# Patient Record
Sex: FEMALE | Race: WHITE | NOT HISPANIC OR LATINO | Employment: OTHER | URBAN - NONMETROPOLITAN AREA
[De-identification: names, ages, dates, MRNs, and addresses within clinical notes are randomized per-mention and may not be internally consistent; named-entity substitution may affect disease eponyms.]

---

## 2017-01-10 ENCOUNTER — OFFICE VISIT - GICH (OUTPATIENT)
Dept: FAMILY MEDICINE | Facility: OTHER | Age: 67
End: 2017-01-10

## 2017-01-10 ENCOUNTER — HISTORY (OUTPATIENT)
Dept: FAMILY MEDICINE | Facility: OTHER | Age: 67
End: 2017-01-10

## 2017-01-10 DIAGNOSIS — M25.511 PAIN IN RIGHT SHOULDER: ICD-10-CM

## 2017-01-10 DIAGNOSIS — G89.29 OTHER CHRONIC PAIN: ICD-10-CM

## 2017-01-13 ENCOUNTER — HOSPITAL ENCOUNTER (OUTPATIENT)
Dept: PHYSICAL THERAPY | Facility: OTHER | Age: 67
Setting detail: THERAPIES SERIES
End: 2017-01-13
Attending: FAMILY MEDICINE

## 2017-01-13 DIAGNOSIS — G89.29 OTHER CHRONIC PAIN: ICD-10-CM

## 2017-01-13 DIAGNOSIS — M25.511 PAIN IN RIGHT SHOULDER: ICD-10-CM

## 2017-01-16 ENCOUNTER — COMMUNICATION - GICH (OUTPATIENT)
Dept: FAMILY MEDICINE | Facility: OTHER | Age: 67
End: 2017-01-16

## 2017-01-16 DIAGNOSIS — E11.9 TYPE 2 DIABETES MELLITUS WITHOUT COMPLICATIONS (H): ICD-10-CM

## 2017-01-18 ENCOUNTER — HOSPITAL ENCOUNTER (OUTPATIENT)
Dept: PHYSICAL THERAPY | Facility: OTHER | Age: 67
Setting detail: THERAPIES SERIES
End: 2017-01-18
Attending: FAMILY MEDICINE

## 2017-01-20 ENCOUNTER — HOSPITAL ENCOUNTER (OUTPATIENT)
Dept: PHYSICAL THERAPY | Facility: OTHER | Age: 67
Setting detail: THERAPIES SERIES
End: 2017-01-20
Attending: FAMILY MEDICINE

## 2017-01-25 ENCOUNTER — HOSPITAL ENCOUNTER (OUTPATIENT)
Dept: PHYSICAL THERAPY | Facility: OTHER | Age: 67
Setting detail: THERAPIES SERIES
End: 2017-01-25
Attending: FAMILY MEDICINE

## 2017-01-27 ENCOUNTER — HOSPITAL ENCOUNTER (OUTPATIENT)
Dept: PHYSICAL THERAPY | Facility: OTHER | Age: 67
Setting detail: THERAPIES SERIES
End: 2017-01-27
Attending: FAMILY MEDICINE

## 2017-01-31 ENCOUNTER — HOSPITAL ENCOUNTER (OUTPATIENT)
Dept: PHYSICAL THERAPY | Facility: OTHER | Age: 67
Setting detail: THERAPIES SERIES
End: 2017-01-31
Attending: FAMILY MEDICINE

## 2017-02-03 ENCOUNTER — HOSPITAL ENCOUNTER (OUTPATIENT)
Dept: RADIOLOGY | Facility: OTHER | Age: 67
Setting detail: THERAPIES SERIES
End: 2017-02-03
Attending: FAMILY MEDICINE

## 2017-02-03 ENCOUNTER — HISTORY (OUTPATIENT)
Dept: RADIOLOGY | Facility: OTHER | Age: 67
End: 2017-02-03

## 2017-02-03 ENCOUNTER — AMBULATORY - GICH (OUTPATIENT)
Dept: FAMILY MEDICINE | Facility: OTHER | Age: 67
End: 2017-02-03

## 2017-02-03 DIAGNOSIS — Z12.31 ENCOUNTER FOR SCREENING MAMMOGRAM FOR MALIGNANT NEOPLASM OF BREAST: ICD-10-CM

## 2017-02-08 ENCOUNTER — HOSPITAL ENCOUNTER (OUTPATIENT)
Dept: PHYSICAL THERAPY | Facility: OTHER | Age: 67
Setting detail: THERAPIES SERIES
End: 2017-02-08
Attending: FAMILY MEDICINE

## 2017-02-10 ENCOUNTER — HISTORY (OUTPATIENT)
Dept: FAMILY MEDICINE | Facility: OTHER | Age: 67
End: 2017-02-10

## 2017-02-10 ENCOUNTER — OFFICE VISIT - GICH (OUTPATIENT)
Dept: FAMILY MEDICINE | Facility: OTHER | Age: 67
End: 2017-02-10

## 2017-02-10 DIAGNOSIS — M25.511 PAIN IN RIGHT SHOULDER: ICD-10-CM

## 2017-02-10 DIAGNOSIS — E11.9 TYPE 2 DIABETES MELLITUS WITHOUT COMPLICATIONS (H): ICD-10-CM

## 2017-02-10 DIAGNOSIS — G89.29 OTHER CHRONIC PAIN: ICD-10-CM

## 2017-02-13 ENCOUNTER — COMMUNICATION - GICH (OUTPATIENT)
Dept: FAMILY MEDICINE | Facility: OTHER | Age: 67
End: 2017-02-13

## 2017-02-13 DIAGNOSIS — E11.22 TYPE 2 DIABETES MELLITUS WITH DIABETIC CHRONIC KIDNEY DISEASE (H): ICD-10-CM

## 2017-02-13 DIAGNOSIS — N18.30 CHRONIC KIDNEY DISEASE, STAGE III (MODERATE) (H): ICD-10-CM

## 2017-02-13 DIAGNOSIS — Z79.4 LONG TERM CURRENT USE OF INSULIN (H): ICD-10-CM

## 2017-02-14 ENCOUNTER — COMMUNICATION - GICH (OUTPATIENT)
Dept: FAMILY MEDICINE | Facility: OTHER | Age: 67
End: 2017-02-14

## 2017-02-14 DIAGNOSIS — G89.29 OTHER CHRONIC PAIN: ICD-10-CM

## 2017-02-14 DIAGNOSIS — M25.511 PAIN IN RIGHT SHOULDER: ICD-10-CM

## 2017-02-25 ENCOUNTER — COMMUNICATION - GICH (OUTPATIENT)
Dept: FAMILY MEDICINE | Facility: OTHER | Age: 67
End: 2017-02-25

## 2017-02-25 DIAGNOSIS — E11.9 TYPE 2 DIABETES MELLITUS WITHOUT COMPLICATIONS (H): ICD-10-CM

## 2017-03-01 ENCOUNTER — AMBULATORY - GICH (OUTPATIENT)
Dept: ORTHOPEDICS | Facility: OTHER | Age: 67
End: 2017-03-01

## 2017-03-01 DIAGNOSIS — M25.511 PAIN IN RIGHT SHOULDER: ICD-10-CM

## 2017-03-01 DIAGNOSIS — G89.29 OTHER CHRONIC PAIN: ICD-10-CM

## 2017-03-04 ENCOUNTER — COMMUNICATION - GICH (OUTPATIENT)
Dept: FAMILY MEDICINE | Facility: OTHER | Age: 67
End: 2017-03-04

## 2017-03-04 DIAGNOSIS — K21.9 GASTRO-ESOPHAGEAL REFLUX DISEASE WITHOUT ESOPHAGITIS: ICD-10-CM

## 2017-03-04 DIAGNOSIS — I10 ESSENTIAL (PRIMARY) HYPERTENSION: ICD-10-CM

## 2017-03-06 ENCOUNTER — AMBULATORY - GICH (OUTPATIENT)
Dept: LAB | Facility: OTHER | Age: 67
End: 2017-03-06

## 2017-03-06 DIAGNOSIS — Z79.4 LONG TERM CURRENT USE OF INSULIN (H): ICD-10-CM

## 2017-03-06 DIAGNOSIS — N18.30 CHRONIC KIDNEY DISEASE, STAGE III (MODERATE) (H): ICD-10-CM

## 2017-03-06 DIAGNOSIS — E11.22 TYPE 2 DIABETES MELLITUS WITH DIABETIC CHRONIC KIDNEY DISEASE (H): ICD-10-CM

## 2017-03-06 LAB
ESTIMATED AVERAGE GLUCOSE: 183 MG/DL
HEMOGLOBIN A1C MONITORING (POCT) - HISTORICAL: 8 % (ref 4–6.2)

## 2017-03-08 ENCOUNTER — OFFICE VISIT - GICH (OUTPATIENT)
Dept: FAMILY MEDICINE | Facility: OTHER | Age: 67
End: 2017-03-08

## 2017-03-08 DIAGNOSIS — K21.9 GASTRO-ESOPHAGEAL REFLUX DISEASE WITHOUT ESOPHAGITIS: ICD-10-CM

## 2017-03-08 DIAGNOSIS — I10 ESSENTIAL (PRIMARY) HYPERTENSION: ICD-10-CM

## 2017-03-08 DIAGNOSIS — E11.9 TYPE 2 DIABETES MELLITUS WITHOUT COMPLICATIONS (H): ICD-10-CM

## 2017-03-10 ENCOUNTER — HISTORY (OUTPATIENT)
Dept: ORTHOPEDICS | Facility: OTHER | Age: 67
End: 2017-03-10

## 2017-03-10 ENCOUNTER — HOSPITAL ENCOUNTER (OUTPATIENT)
Dept: RADIOLOGY | Facility: OTHER | Age: 67
End: 2017-03-10
Attending: ORTHOPAEDIC SURGERY

## 2017-03-10 ENCOUNTER — AMBULATORY - GICH (OUTPATIENT)
Dept: SCHEDULING | Facility: OTHER | Age: 67
End: 2017-03-10

## 2017-03-10 ENCOUNTER — OFFICE VISIT - GICH (OUTPATIENT)
Dept: ORTHOPEDICS | Facility: OTHER | Age: 67
End: 2017-03-10

## 2017-03-10 DIAGNOSIS — G89.29 OTHER CHRONIC PAIN: ICD-10-CM

## 2017-03-10 DIAGNOSIS — M75.81 OTHER SHOULDER LESIONS, RIGHT SHOULDER: ICD-10-CM

## 2017-03-10 DIAGNOSIS — M25.511 PAIN IN RIGHT SHOULDER: ICD-10-CM

## 2017-03-10 DIAGNOSIS — M19.019 PRIMARY OSTEOARTHRITIS OF SHOULDER: ICD-10-CM

## 2017-03-10 DIAGNOSIS — M75.41 IMPINGEMENT SYNDROME OF RIGHT SHOULDER: ICD-10-CM

## 2017-03-13 ENCOUNTER — AMBULATORY - GICH (OUTPATIENT)
Dept: SCHEDULING | Facility: OTHER | Age: 67
End: 2017-03-13

## 2017-03-17 ENCOUNTER — OFFICE VISIT - GICH (OUTPATIENT)
Dept: UROLOGY | Facility: OTHER | Age: 67
End: 2017-03-17

## 2017-03-17 ENCOUNTER — HOSPITAL ENCOUNTER (OUTPATIENT)
Dept: RADIOLOGY | Facility: OTHER | Age: 67
End: 2017-03-17
Attending: UROLOGY

## 2017-03-17 ENCOUNTER — HISTORY (OUTPATIENT)
Dept: UROLOGY | Facility: OTHER | Age: 67
End: 2017-03-17

## 2017-03-17 DIAGNOSIS — N39.41 URGE INCONTINENCE: ICD-10-CM

## 2017-03-18 ENCOUNTER — COMMUNICATION - GICH (OUTPATIENT)
Dept: FAMILY MEDICINE | Facility: OTHER | Age: 67
End: 2017-03-18

## 2017-03-18 DIAGNOSIS — E11.9 TYPE 2 DIABETES MELLITUS WITHOUT COMPLICATIONS (H): ICD-10-CM

## 2017-03-22 ENCOUNTER — AMBULATORY - GICH (OUTPATIENT)
Dept: RADIOLOGY | Facility: OTHER | Age: 67
End: 2017-03-22

## 2017-03-24 ENCOUNTER — HOSPITAL ENCOUNTER (OUTPATIENT)
Dept: RADIOLOGY | Facility: OTHER | Age: 67
End: 2017-03-24
Attending: ORTHOPAEDIC SURGERY

## 2017-03-24 DIAGNOSIS — M75.41 IMPINGEMENT SYNDROME OF RIGHT SHOULDER: ICD-10-CM

## 2017-03-24 DIAGNOSIS — M75.81 OTHER SHOULDER LESIONS, RIGHT SHOULDER: ICD-10-CM

## 2017-03-24 DIAGNOSIS — M19.019 PRIMARY OSTEOARTHRITIS OF SHOULDER: ICD-10-CM

## 2017-03-31 ENCOUNTER — OFFICE VISIT - GICH (OUTPATIENT)
Dept: ORTHOPEDICS | Facility: OTHER | Age: 67
End: 2017-03-31

## 2017-03-31 ENCOUNTER — HISTORY (OUTPATIENT)
Dept: ORTHOPEDICS | Facility: OTHER | Age: 67
End: 2017-03-31

## 2017-03-31 DIAGNOSIS — M19.019 PRIMARY OSTEOARTHRITIS OF SHOULDER: ICD-10-CM

## 2017-03-31 DIAGNOSIS — M75.81 OTHER SHOULDER LESIONS, RIGHT SHOULDER: ICD-10-CM

## 2017-03-31 DIAGNOSIS — M75.41 IMPINGEMENT SYNDROME OF RIGHT SHOULDER: ICD-10-CM

## 2017-04-07 ENCOUNTER — HISTORY (OUTPATIENT)
Dept: FAMILY MEDICINE | Facility: OTHER | Age: 67
End: 2017-04-07

## 2017-04-07 ENCOUNTER — AMBULATORY - GICH (OUTPATIENT)
Dept: FAMILY MEDICINE | Facility: OTHER | Age: 67
End: 2017-04-07

## 2017-04-07 DIAGNOSIS — Z79.4 LONG TERM CURRENT USE OF INSULIN (H): ICD-10-CM

## 2017-04-07 DIAGNOSIS — Z01.818 ENCOUNTER FOR OTHER PREPROCEDURAL EXAMINATION: ICD-10-CM

## 2017-04-07 DIAGNOSIS — N18.30 CHRONIC KIDNEY DISEASE, STAGE III (MODERATE) (H): ICD-10-CM

## 2017-04-07 DIAGNOSIS — I25.118 ATHEROSCLEROTIC HEART DISEASE OF NATIVE CORONARY ARTERY WITH OTHER FORMS OF ANGINA PECTORIS (H): ICD-10-CM

## 2017-04-07 DIAGNOSIS — E11.22 TYPE 2 DIABETES MELLITUS WITH DIABETIC CHRONIC KIDNEY DISEASE (H): ICD-10-CM

## 2017-04-07 DIAGNOSIS — I10 ESSENTIAL (PRIMARY) HYPERTENSION: ICD-10-CM

## 2017-04-07 LAB
ANION GAP - HISTORICAL: 6 (ref 5–18)
BUN SERPL-MCNC: 25 MG/DL (ref 7–25)
BUN/CREAT RATIO - HISTORICAL: 11
CALCIUM SERPL-MCNC: 9.5 MG/DL (ref 8.6–10.3)
CHLORIDE SERPLBLD-SCNC: 107 MMOL/L (ref 98–107)
CO2 SERPL-SCNC: 28 MMOL/L (ref 21–31)
CREAT SERPL-MCNC: 2.38 MG/DL (ref 0.7–1.3)
GFR IF NOT AFRICAN AMERICAN - HISTORICAL: 20 ML/MIN/1.73M2
GLUCOSE SERPL-MCNC: 77 MG/DL (ref 70–105)
HEMOGLOBIN: 12.1 G/DL (ref 12–16)
MCV RBC AUTO: 89 FL (ref 80–100)
POTASSIUM SERPL-SCNC: 3 MMOL/L (ref 3.5–5.1)
SODIUM SERPL-SCNC: 141 MMOL/L (ref 133–143)

## 2017-04-10 ENCOUNTER — COMMUNICATION - GICH (OUTPATIENT)
Dept: FAMILY MEDICINE | Facility: OTHER | Age: 67
End: 2017-04-10

## 2017-04-10 DIAGNOSIS — E11.9 TYPE 2 DIABETES MELLITUS WITHOUT COMPLICATIONS (H): ICD-10-CM

## 2017-04-10 DIAGNOSIS — N18.30 CHRONIC KIDNEY DISEASE, STAGE III (MODERATE) (H): ICD-10-CM

## 2017-04-11 ENCOUNTER — HISTORY (OUTPATIENT)
Dept: SURGERY | Facility: OTHER | Age: 67
End: 2017-04-11

## 2017-04-14 ENCOUNTER — AMBULATORY - GICH (OUTPATIENT)
Dept: LAB | Facility: OTHER | Age: 67
End: 2017-04-14

## 2017-04-14 DIAGNOSIS — N18.30 CHRONIC KIDNEY DISEASE, STAGE III (MODERATE) (H): ICD-10-CM

## 2017-04-14 LAB
ANION GAP - HISTORICAL: 10 (ref 5–18)
BUN SERPL-MCNC: 18 MG/DL (ref 7–25)
BUN/CREAT RATIO - HISTORICAL: 8
CALCIUM SERPL-MCNC: 9.3 MG/DL (ref 8.6–10.3)
CHLORIDE SERPLBLD-SCNC: 111 MMOL/L (ref 98–107)
CO2 SERPL-SCNC: 25 MMOL/L (ref 21–31)
CREAT SERPL-MCNC: 2.36 MG/DL (ref 0.7–1.3)
GFR IF NOT AFRICAN AMERICAN - HISTORICAL: 20 ML/MIN/1.73M2
GLUCOSE SERPL-MCNC: 64 MG/DL (ref 70–105)
POTASSIUM SERPL-SCNC: 3.2 MMOL/L (ref 3.5–5.1)
SODIUM SERPL-SCNC: 146 MMOL/L (ref 133–143)

## 2017-04-19 ENCOUNTER — HISTORY (OUTPATIENT)
Dept: SURGERY | Facility: OTHER | Age: 67
End: 2017-04-19

## 2017-04-19 ENCOUNTER — SURGERY (OUTPATIENT)
Dept: SURGERY | Facility: OTHER | Age: 67
End: 2017-04-19

## 2017-04-19 ENCOUNTER — HOSPITAL ENCOUNTER (OUTPATIENT)
Dept: SURGERY | Facility: OTHER | Age: 67
Discharge: HOME | End: 2017-04-19
Attending: ORTHOPAEDIC SURGERY | Admitting: ORTHOPAEDIC SURGERY

## 2017-04-19 DIAGNOSIS — Z98.890 OTHER SPECIFIED POSTPROCEDURAL STATES: ICD-10-CM

## 2017-04-20 ENCOUNTER — AMBULATORY - GICH (OUTPATIENT)
Dept: ORTHOPEDICS | Facility: OTHER | Age: 67
End: 2017-04-20

## 2017-04-20 DIAGNOSIS — Z98.890 OTHER SPECIFIED POSTPROCEDURAL STATES: ICD-10-CM

## 2017-04-24 ENCOUNTER — COMMUNICATION - GICH (OUTPATIENT)
Dept: ORTHOPEDICS | Facility: OTHER | Age: 67
End: 2017-04-24

## 2017-04-24 ENCOUNTER — HISTORY (OUTPATIENT)
Dept: ORTHOPEDICS | Facility: OTHER | Age: 67
End: 2017-04-24

## 2017-04-24 ENCOUNTER — OFFICE VISIT - GICH (OUTPATIENT)
Dept: ORTHOPEDICS | Facility: OTHER | Age: 67
End: 2017-04-24

## 2017-04-24 DIAGNOSIS — Z98.890 OTHER SPECIFIED POSTPROCEDURAL STATES: ICD-10-CM

## 2017-04-28 ENCOUNTER — HOSPITAL ENCOUNTER (OUTPATIENT)
Dept: PHYSICAL THERAPY | Facility: OTHER | Age: 67
Setting detail: THERAPIES SERIES
End: 2017-04-28
Attending: ORTHOPAEDIC SURGERY

## 2017-04-28 DIAGNOSIS — Z98.890 OTHER SPECIFIED POSTPROCEDURAL STATES: ICD-10-CM

## 2017-05-08 ENCOUNTER — COMMUNICATION - GICH (OUTPATIENT)
Dept: FAMILY MEDICINE | Facility: OTHER | Age: 67
End: 2017-05-08

## 2017-05-08 DIAGNOSIS — N18.30 CHRONIC KIDNEY DISEASE, STAGE III (MODERATE) (H): ICD-10-CM

## 2017-05-08 DIAGNOSIS — E11.22 TYPE 2 DIABETES MELLITUS WITH DIABETIC CHRONIC KIDNEY DISEASE (H): ICD-10-CM

## 2017-05-08 DIAGNOSIS — Z79.4 LONG TERM CURRENT USE OF INSULIN (H): ICD-10-CM

## 2017-05-17 ENCOUNTER — HOSPITAL ENCOUNTER (OUTPATIENT)
Dept: PHYSICAL THERAPY | Facility: OTHER | Age: 67
Setting detail: THERAPIES SERIES
End: 2017-05-17
Attending: ORTHOPAEDIC SURGERY

## 2017-05-22 ENCOUNTER — HOSPITAL ENCOUNTER (OUTPATIENT)
Dept: PHYSICAL THERAPY | Facility: OTHER | Age: 67
Setting detail: THERAPIES SERIES
End: 2017-05-22
Attending: ORTHOPAEDIC SURGERY

## 2017-05-25 ENCOUNTER — AMBULATORY - GICH (OUTPATIENT)
Dept: SCHEDULING | Facility: OTHER | Age: 67
End: 2017-05-25

## 2017-05-25 ENCOUNTER — HOSPITAL ENCOUNTER (OUTPATIENT)
Dept: PHYSICAL THERAPY | Facility: OTHER | Age: 67
Setting detail: THERAPIES SERIES
End: 2017-05-25
Attending: ORTHOPAEDIC SURGERY

## 2017-05-30 ENCOUNTER — HOSPITAL ENCOUNTER (OUTPATIENT)
Dept: PHYSICAL THERAPY | Facility: OTHER | Age: 67
Setting detail: THERAPIES SERIES
End: 2017-05-30
Attending: ORTHOPAEDIC SURGERY

## 2017-06-02 ENCOUNTER — HOSPITAL ENCOUNTER (OUTPATIENT)
Dept: PHYSICAL THERAPY | Facility: OTHER | Age: 67
Setting detail: THERAPIES SERIES
End: 2017-06-02
Attending: ORTHOPAEDIC SURGERY

## 2017-06-02 ENCOUNTER — AMBULATORY - GICH (OUTPATIENT)
Dept: LAB | Facility: OTHER | Age: 67
End: 2017-06-02

## 2017-06-02 DIAGNOSIS — Z79.4 LONG TERM CURRENT USE OF INSULIN (H): ICD-10-CM

## 2017-06-02 DIAGNOSIS — E11.22 TYPE 2 DIABETES MELLITUS WITH DIABETIC CHRONIC KIDNEY DISEASE (H): ICD-10-CM

## 2017-06-02 DIAGNOSIS — N17.9 ACUTE KIDNEY FAILURE (H): ICD-10-CM

## 2017-06-02 DIAGNOSIS — N18.30 CHRONIC KIDNEY DISEASE, STAGE III (MODERATE) (H): ICD-10-CM

## 2017-06-02 LAB
ALBUMIN SERPL-MCNC: 3.7 G/DL (ref 3.5–5.7)
ANION GAP - HISTORICAL: 4 (ref 5–18)
BUN SERPL-MCNC: 21 MG/DL (ref 7–25)
BUN/CREAT RATIO - HISTORICAL: 9
CALCIUM SERPL-MCNC: 9.3 MG/DL (ref 8.6–10.3)
CHLORIDE SERPLBLD-SCNC: 111 MMOL/L (ref 98–107)
CO2 SERPL-SCNC: 24 MMOL/L (ref 21–31)
CREAT SERPL-MCNC: 2.23 MG/DL (ref 0.7–1.3)
ESTIMATED AVERAGE GLUCOSE: 111 MG/DL
GFR IF NOT AFRICAN AMERICAN - HISTORICAL: 22 ML/MIN/1.73M2
GLUCOSE SERPL-MCNC: 126 MG/DL (ref 70–105)
HEMOGLOBIN A1C MONITORING (POCT) - HISTORICAL: 5.5 % (ref 4–6.2)
PHOSPHATE SERPL-MCNC: 3 MG/DL (ref 2.5–5)
POTASSIUM SERPL-SCNC: 3.5 MMOL/L (ref 3.5–5.1)
SODIUM SERPL-SCNC: 139 MMOL/L (ref 133–143)

## 2017-06-05 ENCOUNTER — OFFICE VISIT - GICH (OUTPATIENT)
Dept: ORTHOPEDICS | Facility: OTHER | Age: 67
End: 2017-06-05

## 2017-06-05 ENCOUNTER — HISTORY (OUTPATIENT)
Dept: FAMILY MEDICINE | Facility: OTHER | Age: 67
End: 2017-06-05

## 2017-06-05 ENCOUNTER — OFFICE VISIT - GICH (OUTPATIENT)
Dept: FAMILY MEDICINE | Facility: OTHER | Age: 67
End: 2017-06-05

## 2017-06-05 ENCOUNTER — HISTORY (OUTPATIENT)
Dept: ORTHOPEDICS | Facility: OTHER | Age: 67
End: 2017-06-05

## 2017-06-05 DIAGNOSIS — I10 ESSENTIAL (PRIMARY) HYPERTENSION: ICD-10-CM

## 2017-06-05 DIAGNOSIS — Z98.890 OTHER SPECIFIED POSTPROCEDURAL STATES: ICD-10-CM

## 2017-06-05 DIAGNOSIS — E11.9 TYPE 2 DIABETES MELLITUS WITHOUT COMPLICATIONS (H): ICD-10-CM

## 2017-06-06 ENCOUNTER — HOSPITAL ENCOUNTER (OUTPATIENT)
Dept: PHYSICAL THERAPY | Facility: OTHER | Age: 67
Setting detail: THERAPIES SERIES
End: 2017-06-06
Attending: ORTHOPAEDIC SURGERY

## 2017-06-08 ENCOUNTER — HOSPITAL ENCOUNTER (OUTPATIENT)
Dept: PHYSICAL THERAPY | Facility: OTHER | Age: 67
Setting detail: THERAPIES SERIES
End: 2017-06-08
Attending: ORTHOPAEDIC SURGERY

## 2017-06-12 ENCOUNTER — HOSPITAL ENCOUNTER (OUTPATIENT)
Dept: PHYSICAL THERAPY | Facility: OTHER | Age: 67
Setting detail: THERAPIES SERIES
End: 2017-06-12
Attending: ORTHOPAEDIC SURGERY

## 2017-06-15 ENCOUNTER — HOSPITAL ENCOUNTER (OUTPATIENT)
Dept: PHYSICAL THERAPY | Facility: OTHER | Age: 67
Setting detail: THERAPIES SERIES
End: 2017-06-15
Attending: ORTHOPAEDIC SURGERY

## 2017-06-19 ENCOUNTER — HOSPITAL ENCOUNTER (OUTPATIENT)
Dept: PHYSICAL THERAPY | Facility: OTHER | Age: 67
Setting detail: THERAPIES SERIES
End: 2017-06-19
Attending: ORTHOPAEDIC SURGERY

## 2017-06-21 ENCOUNTER — COMMUNICATION - GICH (OUTPATIENT)
Dept: FAMILY MEDICINE | Facility: OTHER | Age: 67
End: 2017-06-21

## 2017-06-21 DIAGNOSIS — E11.9 TYPE 2 DIABETES MELLITUS WITHOUT COMPLICATIONS (H): ICD-10-CM

## 2017-06-22 ENCOUNTER — HOSPITAL ENCOUNTER (OUTPATIENT)
Dept: PHYSICAL THERAPY | Facility: OTHER | Age: 67
Setting detail: THERAPIES SERIES
End: 2017-06-22
Attending: ORTHOPAEDIC SURGERY

## 2017-06-26 ENCOUNTER — HOSPITAL ENCOUNTER (OUTPATIENT)
Dept: PHYSICAL THERAPY | Facility: OTHER | Age: 67
Setting detail: THERAPIES SERIES
End: 2017-06-26
Attending: ORTHOPAEDIC SURGERY

## 2017-06-28 ENCOUNTER — HOSPITAL ENCOUNTER (OUTPATIENT)
Dept: PHYSICAL THERAPY | Facility: OTHER | Age: 67
Setting detail: THERAPIES SERIES
End: 2017-06-28
Attending: ORTHOPAEDIC SURGERY

## 2017-07-05 ENCOUNTER — HOSPITAL ENCOUNTER (OUTPATIENT)
Dept: PHYSICAL THERAPY | Facility: OTHER | Age: 67
Setting detail: THERAPIES SERIES
End: 2017-07-05
Attending: ORTHOPAEDIC SURGERY

## 2017-07-07 ENCOUNTER — HOSPITAL ENCOUNTER (OUTPATIENT)
Dept: PHYSICAL THERAPY | Facility: OTHER | Age: 67
Setting detail: THERAPIES SERIES
End: 2017-07-07
Attending: ORTHOPAEDIC SURGERY

## 2017-07-12 ENCOUNTER — HOSPITAL ENCOUNTER (OUTPATIENT)
Dept: PHYSICAL THERAPY | Facility: OTHER | Age: 67
Setting detail: THERAPIES SERIES
End: 2017-07-12
Attending: ORTHOPAEDIC SURGERY

## 2017-07-14 ENCOUNTER — HOSPITAL ENCOUNTER (OUTPATIENT)
Dept: PHYSICAL THERAPY | Facility: OTHER | Age: 67
Setting detail: THERAPIES SERIES
End: 2017-07-14
Attending: ORTHOPAEDIC SURGERY

## 2017-07-18 ENCOUNTER — HISTORY (OUTPATIENT)
Dept: UROLOGY | Facility: OTHER | Age: 67
End: 2017-07-18

## 2017-07-18 ENCOUNTER — AMBULATORY - GICH (OUTPATIENT)
Dept: UROLOGY | Facility: OTHER | Age: 67
End: 2017-07-18

## 2017-07-19 ENCOUNTER — HOSPITAL ENCOUNTER (OUTPATIENT)
Dept: PHYSICAL THERAPY | Facility: OTHER | Age: 67
Setting detail: THERAPIES SERIES
End: 2017-07-19
Attending: ORTHOPAEDIC SURGERY

## 2017-07-28 ENCOUNTER — HISTORY (OUTPATIENT)
Dept: ORTHOPEDICS | Facility: OTHER | Age: 67
End: 2017-07-28

## 2017-07-28 ENCOUNTER — OFFICE VISIT - GICH (OUTPATIENT)
Dept: ORTHOPEDICS | Facility: OTHER | Age: 67
End: 2017-07-28

## 2017-07-28 DIAGNOSIS — Z98.890 OTHER SPECIFIED POSTPROCEDURAL STATES: ICD-10-CM

## 2017-08-07 ENCOUNTER — COMMUNICATION - GICH (OUTPATIENT)
Dept: FAMILY MEDICINE | Facility: OTHER | Age: 67
End: 2017-08-07

## 2017-08-07 DIAGNOSIS — E11.9 TYPE 2 DIABETES MELLITUS WITHOUT COMPLICATIONS (H): ICD-10-CM

## 2017-08-08 ENCOUNTER — HISTORY (OUTPATIENT)
Dept: UROLOGY | Facility: OTHER | Age: 67
End: 2017-08-08

## 2017-08-08 ENCOUNTER — AMBULATORY - GICH (OUTPATIENT)
Dept: UROLOGY | Facility: OTHER | Age: 67
End: 2017-08-08

## 2017-08-15 ENCOUNTER — AMBULATORY - GICH (OUTPATIENT)
Dept: SCHEDULING | Facility: OTHER | Age: 67
End: 2017-08-15

## 2017-08-30 ENCOUNTER — AMBULATORY - GICH (OUTPATIENT)
Dept: ORTHOPEDICS | Facility: OTHER | Age: 67
End: 2017-08-30

## 2017-08-30 DIAGNOSIS — Z98.890 OTHER SPECIFIED POSTPROCEDURAL STATES: ICD-10-CM

## 2017-09-08 ENCOUNTER — HISTORY (OUTPATIENT)
Dept: ORTHOPEDICS | Facility: OTHER | Age: 67
End: 2017-09-08

## 2017-09-08 ENCOUNTER — HOSPITAL ENCOUNTER (OUTPATIENT)
Dept: RADIOLOGY | Facility: OTHER | Age: 67
End: 2017-09-08
Attending: ORTHOPAEDIC SURGERY

## 2017-09-08 ENCOUNTER — OFFICE VISIT - GICH (OUTPATIENT)
Dept: ORTHOPEDICS | Facility: OTHER | Age: 67
End: 2017-09-08

## 2017-09-08 DIAGNOSIS — Z98.890 OTHER SPECIFIED POSTPROCEDURAL STATES: ICD-10-CM

## 2017-09-11 ENCOUNTER — AMBULATORY - GICH (OUTPATIENT)
Dept: SCHEDULING | Facility: OTHER | Age: 67
End: 2017-09-11

## 2017-09-23 ENCOUNTER — COMMUNICATION - GICH (OUTPATIENT)
Dept: FAMILY MEDICINE | Facility: OTHER | Age: 67
End: 2017-09-23

## 2017-09-23 DIAGNOSIS — I10 ESSENTIAL (PRIMARY) HYPERTENSION: ICD-10-CM

## 2017-10-10 ENCOUNTER — AMBULATORY - GICH (OUTPATIENT)
Dept: UROLOGY | Facility: OTHER | Age: 67
End: 2017-10-10

## 2017-11-03 ENCOUNTER — AMBULATORY - GICH (OUTPATIENT)
Dept: SCHEDULING | Facility: OTHER | Age: 67
End: 2017-11-03

## 2017-11-07 ENCOUNTER — COMMUNICATION - GICH (OUTPATIENT)
Dept: FAMILY MEDICINE | Facility: OTHER | Age: 67
End: 2017-11-07

## 2017-11-07 DIAGNOSIS — E11.9 TYPE 2 DIABETES MELLITUS WITHOUT COMPLICATIONS (H): ICD-10-CM

## 2017-11-14 ENCOUNTER — COMMUNICATION - GICH (OUTPATIENT)
Dept: FAMILY MEDICINE | Facility: OTHER | Age: 67
End: 2017-11-14

## 2017-11-14 ENCOUNTER — AMBULATORY - GICH (OUTPATIENT)
Dept: UROLOGY | Facility: OTHER | Age: 67
End: 2017-11-14

## 2017-11-14 ENCOUNTER — HISTORY (OUTPATIENT)
Dept: UROLOGY | Facility: OTHER | Age: 67
End: 2017-11-14

## 2017-11-14 DIAGNOSIS — E11.22 TYPE 2 DIABETES MELLITUS WITH DIABETIC CHRONIC KIDNEY DISEASE (H): ICD-10-CM

## 2017-11-14 DIAGNOSIS — N18.30 CHRONIC KIDNEY DISEASE, STAGE III (MODERATE) (H): ICD-10-CM

## 2017-11-14 DIAGNOSIS — Z79.4 LONG TERM CURRENT USE OF INSULIN (H): ICD-10-CM

## 2017-12-07 ENCOUNTER — COMMUNICATION - GICH (OUTPATIENT)
Dept: FAMILY MEDICINE | Facility: OTHER | Age: 67
End: 2017-12-07

## 2017-12-07 DIAGNOSIS — I10 ESSENTIAL (PRIMARY) HYPERTENSION: ICD-10-CM

## 2017-12-11 ENCOUNTER — AMBULATORY - GICH (OUTPATIENT)
Dept: LAB | Facility: OTHER | Age: 67
End: 2017-12-11

## 2017-12-11 DIAGNOSIS — E11.22 TYPE 2 DIABETES MELLITUS WITH DIABETIC CHRONIC KIDNEY DISEASE (H): ICD-10-CM

## 2017-12-11 DIAGNOSIS — N18.30 CHRONIC KIDNEY DISEASE, STAGE III (MODERATE) (H): ICD-10-CM

## 2017-12-11 DIAGNOSIS — Z79.4 LONG TERM CURRENT USE OF INSULIN (H): ICD-10-CM

## 2017-12-11 LAB
A/G RATIO - HISTORICAL: 1.5 (ref 1–2)
ALBUMIN SERPL-MCNC: 3.7 G/DL (ref 3.5–5.7)
ALP SERPL-CCNC: 66 IU/L (ref 34–104)
ALT (SGPT) - HISTORICAL: 16 IU/L (ref 7–52)
ANION GAP - HISTORICAL: 10 (ref 5–18)
AST SERPL-CCNC: 16 IU/L (ref 13–39)
BILIRUB SERPL-MCNC: 0.5 MG/DL (ref 0.3–1)
BUN SERPL-MCNC: 41 MG/DL (ref 7–25)
BUN/CREAT RATIO - HISTORICAL: 18
CALCIUM SERPL-MCNC: 8.7 MG/DL (ref 8.6–10.3)
CHLORIDE SERPLBLD-SCNC: 107 MMOL/L (ref 98–107)
CHOL/HDL RATIO - HISTORICAL: 3.87
CHOLESTEROL TOTAL: 151 MG/DL
CO2 SERPL-SCNC: 24 MMOL/L (ref 21–31)
CREAT SERPL-MCNC: 2.25 MG/DL (ref 0.7–1.3)
ESTIMATED AVERAGE GLUCOSE: 137 MG/DL
GFR IF NOT AFRICAN AMERICAN - HISTORICAL: 22 ML/MIN/1.73M2
GLOBULIN - HISTORICAL: 2.5 G/DL (ref 2–3.7)
GLUCOSE SERPL-MCNC: 175 MG/DL (ref 70–105)
HDLC SERPL-MCNC: 39 MG/DL (ref 23–92)
HEMOGLOBIN A1C MONITORING (POCT) - HISTORICAL: 6.4 % (ref 4–6.2)
LDLC SERPL CALC-MCNC: 72 MG/DL
NON-HDL CHOLESTEROL - HISTORICAL: 112 MG/DL
POTASSIUM SERPL-SCNC: 4 MMOL/L (ref 3.5–5.1)
PROT SERPL-MCNC: 6.2 G/DL (ref 6.4–8.9)
PROVIDER ORDERDED STATUS - HISTORICAL: ABNORMAL
SODIUM SERPL-SCNC: 141 MMOL/L (ref 133–143)
TRIGL SERPL-MCNC: 202 MG/DL

## 2017-12-15 ENCOUNTER — HISTORY (OUTPATIENT)
Dept: FAMILY MEDICINE | Facility: OTHER | Age: 67
End: 2017-12-15

## 2017-12-15 ENCOUNTER — OFFICE VISIT - GICH (OUTPATIENT)
Dept: FAMILY MEDICINE | Facility: OTHER | Age: 67
End: 2017-12-15

## 2017-12-15 DIAGNOSIS — E11.9 TYPE 2 DIABETES MELLITUS WITHOUT COMPLICATIONS (H): ICD-10-CM

## 2017-12-15 DIAGNOSIS — E78.5 HYPERLIPIDEMIA: ICD-10-CM

## 2017-12-15 DIAGNOSIS — I10 ESSENTIAL (PRIMARY) HYPERTENSION: ICD-10-CM

## 2017-12-15 DIAGNOSIS — K21.9 GASTRO-ESOPHAGEAL REFLUX DISEASE WITHOUT ESOPHAGITIS: ICD-10-CM

## 2017-12-15 ASSESSMENT — PATIENT HEALTH QUESTIONNAIRE - PHQ9: SUM OF ALL RESPONSES TO PHQ QUESTIONS 1-9: 0

## 2017-12-27 NOTE — PROGRESS NOTES
Patient Information     Patient Name MRN Sex Bailey Law [id number] Female 1950      Progress Notes by Brigid Rivas PT at 2017  8:53 AM     Author:  Brigid Rivas PT Service:  (none) Author Type:  PT- Physical Therapist     Filed:  2017  9:36 AM Date of Service:  2017  8:53 AM Status:  Signed     :  Brigid Rivas PT (PT- Physical Therapist)            [facility]  Outpatient PT - Daily note    Date of Service: 2017     Visit # 14 (4 of 10)    Patient Name: Bailey Sierra   YOB: 1950   Referring MD/Provider: Vaibhav Traore DO  Medical and Treatment Diagnosis: s/p right shoulder arthroscopy  PT Treatment Diagnosis: impaired shoulder range of motion and strength  Insurance: Medicare  Start of Service: 17  Certification Dates: 6/15/2017  Medicare/MA Re-Cert Due:  8/10/2017       Precautions:    Phase 3 - on  - out of all gear then.      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  (normally 5 hours but extra this week).  Mornings still tough to get up and moving.            Objective  DATE: 5/17 5/22 5/25 5/30 6/2 6/6 6/8 6/12 6/15 6/19 6/22   ROM:   107 P 114 P 95 pre  122 post 137 P 135 P  130  AA  148 P 142 AA  148 P 147 AA  152 P 150 AA  150 P 152 AA  154 P 150 AA  155 P   Pt specifics: supine flexion (pt position, PROM/AAROM/AROM, pain)  DATE:          ROM:   123 AROM standing  160 AAROM supine             Today s Interventions:   UBC standing level 65 clockwise and counter clockwise x2 mins each  internal rotation and external rotation AAROM cane x15 each    Pulleys into flexion and abduction x15-20    Ball roll on wall x1 min each clockwise and counter clockwise   Serratus punches yellow theraband x20 *        external  rotation yellow theraband x20 *    Supine wand into flexion x10 added 2# weight on cane today  Supine shoulder circles x1 min clockwise and counter clockwise *    PROM all motions to tolerance x10 mins  STM and cross-friction massage to right biceps tendon and pecs    Reviewed doorway pec stretch (she's got the paper but forgets to do this one)    Standing flex to 90 deg in front of mirror to avoid shoulder elevation x10 with tactile cuing    Row (green band at home) MedEx 60# with 3 holes showing for good posture 2x10 reps    Game ready x15 mins. Patient seated, low compression    Home Exercise Program:      Access Code: [email or web address] URL: [email or web address] Date: 06/22/2017 Prepared by: Lina Ruiz   Exercises   Doorway Pec Stretch at 90 Degrees Abduction - 2 Sets - 30 Hold (sec) - 1x daily - 5x weekly   Supine Shoulder Circles - 10-15 reps - 1-2 sets - 5 seconds hold - 1x daily   Shoulder External Rotation with Anchored Resistance - 10-15 reps - 1-2 sets - 5 seconds hold - 1x daily   Supine Shoulder External Rotation with Dowel - 10-15 reps - 1-2 sets - 5 seconds hold - 1x daily   Scapular Retraction with Resistance Advanced - 15-20 reps - 1-2 sets - 5 hold - 1x daily   Shoulder Flexion Overhead with Dowel - 8-10 reps - 1-2 sets - 5 hold - 1x daily   Standing Shoulder Abduction AAROM with Dowel - 8-10 reps - 1-2 sets - 5 hold - 1x daily   Standing Shoulder Flexion Wall Walk - 8-10 reps - 1-2 sets - 5 hold - 1x daily   Single Arm Punch with Resistance - 10-15 reps - 1-2 sets - 5 seconds hold - 1x daily       Assessment    Patient demonstrates ability to progress strengthening and home exercise program with expected soreness.    The patient is appropriate for physical therapy to address their pain, functional limitations, and physical impairments.    Patient Specific Functional and Pain Scales (PSFS):    Clinician Instructions: Complete after the history and before the exam.    Initial Assessment: We  want to know what 3 activities in your life you are unable to perform, or are having the most difficulty performing, as a result of your chief problem. Please list and score at least 3 activities that you are unable to perform, or having the most difficulty performing, because of your chief problem.   Patient Specific Activity Scoring Scheme (score one number for each activity):   Activity Score (0-10)  0= Unable to perform activity  10= Able to perform activity at same level as before injury or problem Score (0-10)  0= Unable to perform activity  10= Able to perform activity at same level as before injury or problem  6/15/2017    1. dishes 0/10 4/10   2. cooking 0/10 4/10   3. Taking garbage out 0/10 6/10   4. Reaching and lifting 0/10 3/10   5.  /10    Totals:  0/40 = 0 % ability which relates to 100% impairment 17/40= 42.5% ability, 57.5% impairment    Patient verbally states that they understand that the information they have provided above is current and complete to the best of their knowledge.    Patient Specific Functional Scale Modifier Scale Conversion: (patient's modifier that correlates with pt's score on PSFS): 5-Ck (50% Impaired).    G codes and Modifier taken from pt completing a PSFS: updated 6/15/2017    Current Primary G Code and Modifier:    Per the Patient's intake and/or assessment the Primary G Code is: Handling Objects .   The Patient's Impairment, Limitation or Restriction Modifier would be best described as: CK - 40% - 60% Impairment.     Goal Primary G Code and Modifier:    The Patient's G Code Goal would be: Handling Objects    The Patient's Impairment, Limitation or Restriction Modifier goal would be best described as: CI - 1% - 20% Impairment.         Goals:  Functional Short Term Goals (8 weeks):    Patient is to have improved reaching ability to allow dressing and self-cares with minimal to no pain. 50% met  Patient will have improved shoulder flexion to 100 degrees to allow  for improved reaching ability with less than 3/10 pain.  50% met with compensation patterns noted.  Patient is to complete hygiene activities with less than 3/10 pain consistently. Progressing. 3-4/10    Functional Long Term Goals (16 weeks): progressing.    Patient is to self-manage symptoms and return to prior level of function.    Patient is to be independent in their Home Exercise Program.  Patient is to tolerate all reaching and lifting activities above shoulder height with less than 2/10 pain.    Patient is to report less than 2/10 pain cooking and cleaning type activities.      Plan    Treatment Plan:      Frequency:   16 visits (10 additional)    Duration of Treatment: 16 weeks (8 weeks)    Plan for next visit:  Phase 3, strength and active motion    Thank you for your referral to [facility].  Please call with any questions, concerns or comments.  [phone]  Lina Ruiz, PT, DPT

## 2017-12-27 NOTE — PROGRESS NOTES
Patient Information     Patient Name MRN Sex Bailey Law [id number] Female 1950      Progress Notes by Vaibhav Traore DO at 2017 11:15 AM     Author:  Vaibhav Traore DO Service:  (none) Author Type:  Physician     Filed:  2017 11:49 AM Encounter Date:  2017 Status:  Signed     :  Vaibhav Traore DO (Physician)            PROGRESS NOTE    SUBJECTIVE:  Bailey Sierra is here for evaluation in regards to right shoulder. She is 6 weeks 5 days since shoulder surgery. She is making good gains has expected discomfort with her therapy. She is hoping to get back to some of her duties at work since she works at the [employer].    OBJECTIVE:  /58  Pulse 62  Wt 94.3 kg (208 lb)  BMI 35.7 kg/m2 Body mass index is 35.7 kg/(m^2).    General Appearance: Pleasant female in good appearance, mood and affect.  Alert and orientated times three ( time, date and location).    Skin: Well-healed incision sites.    Shoulder:  Strength: Expected weakness.  Motion: Motion tested today passively shows 145  forward flexion with a soft end feel to her capsule.    Elbow:  Flexion: Normal  Extension: Normal  Deep tendon reflexes: Normal    Hand:  Sensation: Normal  Radial and ulnar blood flow:  Normal    Eyes: Pupils are round.    Ears: Hearing: Impaired    Heart: Good capillary refill in her fingertips.    Lungs: Coarse breath sounds.     Physical therapy: Last note showed active assisted 130  passive 135.    Notes where reviewed with the patient.  Please refer to the reports for full details.    ASSESSMENT:    POSTOPERATIVE DIAGNOSIS  1. Right impingement syndrome.   2. Right AC arthritis.   3. Long head of the biceps fraying.   4. 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   5. Labral fraying.   6. Rotator cuff tear, incomplete.      PROCEDURE  1. Right shoulder arthroscopy with extensive debridement to include 2% of the anterior to posterior  labrum (DOS 04/19/2017).   2. Arthroscopic subacromial decompression.   3. Arthroscopic distal clavicle excision.   4. Arthroscopic biceps tenotomy.   5. Chondroplasty of the humeral head and glenoid.   6. Arthroscopic rotator cuff repair utilizing a double row technique with one 5.5 mm BioComposite corkscrew anchor and 2 lateral row 5.5 mm BioComposite SwiveLock anchors.     PLAN:    She'll continue with physical therapy.  She really needs to work on her motions at home.  She understands how to protect her shoulder while at work but can return to some of her duties on 6/26.  Follow up in a proximally 6-1/2 weeks I will need PT notes.    Phase 3 - on 6/14 - out of all gear then.    Vaibhav Traore D.O.  Orthopaedic Surgeon    [facility]  [address] [place] [address]  Phone [phone] (KNEE)  Fax [phone]    This document was created using computer generated templates and voice activated software.    11:46 AM 6/5/2017

## 2017-12-27 NOTE — PROGRESS NOTES
Patient Information     Patient Name MRN Sex Bailey Law [id number] Female 1950      Progress Notes by Lina Ruiz PT at 2017  1:00 PM     Author:  Lina Ruiz PT Service:  (none) Author Type:  PT- Physical Therapist     Filed:  2017  2:05 PM Date of Service:  2017  1:00 PM Status:  Signed     :  Lina Ruiz PT (PT- Physical Therapist)            [facility]  Outpatient PT - Daily note    Date of Service: 2017     Visit # 11 (1 of 10)    Patient Name: Bailey Sierra   YOB: 1950   Referring MD/Provider: Vaibhav Traore DO  Medical and Treatment Diagnosis: s/p right shoulder arthroscopy  PT Treatment Diagnosis: impaired shoulder range of motion and strength  Insurance: Medicare  Start of Service: 17  Certification Dates: 6/15/2017  Medicare/MA Re-Cert Due:  8/10/2017       Precautions:    Phase 3 - on  - out of all gear then.      Subjective      Patient rates soreness a 0 out of 10 - currently.  Yesterday felt like there were knots in there when she moved it and it really hurt. Today is better.       Objective  DATE: 5/17 5/22 5/25 5/30 6/2 6/6 6/8 6/12 6/15 6/19   ROM:   107 P 114 P 95 pre  122 post 137 P 135 P  130  AA  148 P 142 AA  148 P 147 AA  152 P 150 AA  150 P 152 AA  154 P   Pt specifics: supine flexion (pt position, PROM/AAROM/AROM, pain)    left shoulder flexion: passive in supine: 164 degrees     Today s Interventions:   UBC level 50 x3 mins, then x3 retro  TRX flexion stretch x1 min and rows x15. Cues for scapular depression  Pulleys into flexion and abduction x15    Ball roll on wall x1 min each clockwise and counter clockwise   Serratus punches yellow theraband x15  external rotation yellow theraband x15    Standing flexion cane x10, abduction x10  Supine wand into flexion x10 added 2# weight on cane today  Supine shoulder circles x1 min clockwise and counter clockwise *    PROM flexion and internal  rotation to tolerance x15 mins    pec stretch in doorway x30 sec *    Game ready x15 mins. Patient seated, low compression    Home Exercise Program:    Elbow and wrist range of motion, ball squeezes, scapular sets     Date: 06/02/2017 Prepared by: Tesha Hernandez [email or web address]      Standing Shoulder Flexion Wall Walk - 1 reps - 1 sets - 5 hold - 3x daily - 7x weekly    Seated Shoulder Flexion AAROM with Pulley Behind - 10 reps - 3 sets - 5 hold - 2x daily - 7x weekly    Supine Shoulder Flexion Extension AAROM with Dowel - 10-20 reps - 1 sets - 5 hold - 2x daily - 7x weekly    Access Code: [email or web address] URL: [email or web address] Date: 06/19/2017 Prepared by: Lina Ruiz   Doorway Pec Stretch at 90 Degrees Abduction - 2 Sets - 30 Hold (sec) - 1x daily - 5x weekly   Scapular Retraction with Resistance - 10-15 reps - 1-2 sets - 5 seconds hold - 1x daily   Supine Shoulder Circles - 10-15 reps - 1-2 sets - 5 seconds hold - 1x daily       Assessment    Patient demonstrates improvement in active assisted range of motion and ability to perform strengthening without significant increase in pain.   The patient is appropriate for physical therapy to address their pain, functional limitations, and physical impairments.    Patient Specific Functional and Pain Scales (PSFS):    Clinician Instructions: Complete after the history and before the exam.    Initial Assessment: We want to know what 3 activities in your life you are unable to perform, or are having the most difficulty performing, as a result of your chief problem. Please list and score at least 3 activities that you are unable to perform, or having the most difficulty performing, because of your chief problem.   Patient Specific Activity Scoring Scheme (score one number for each activity):   Activity Score (0-10)  0= Unable to perform activity  10= Able to perform activity at same level as before injury or problem Score (0-10)  0= Unable to perform activity  10= Able  to perform activity at same level as before injury or problem  6/15/2017    1. dishes 0/10 4/10   2. cooking 0/10 4/10   3. Taking garbage out 0/10 6/10   4. Reaching and lifting 0/10 3/10   5.  /10    Totals:  0/40 = 0 % ability which relates to 100% impairment 17/40= 42.5% ability, 57.5% impairment    Patient verbally states that they understand that the information they have provided above is current and complete to the best of their knowledge.    Patient Specific Functional Scale Modifier Scale Conversion: (patient's modifier that correlates with pt's score on PSFS): 5-Ck (50% Impaired).    G codes and Modifier taken from pt completing a PSFS: updated 6/15/2017    Current Primary G Code and Modifier:    Per the Patient's intake and/or assessment the Primary G Code is: Handling Objects .   The Patient's Impairment, Limitation or Restriction Modifier would be best described as: CK - 40% - 60% Impairment.     Goal Primary G Code and Modifier:    The Patient's G Code Goal would be: Handling Objects    The Patient's Impairment, Limitation or Restriction Modifier goal would be best described as: CI - 1% - 20% Impairment.         Goals:  Functional Short Term Goals (8 weeks):    Patient is to have improved reaching ability to allow dressing and self-cares with minimal to no pain. 50% met  Patient will have improved shoulder flexion to 100 degrees to allow for improved reaching ability with less than 3/10 pain.  50% met with compensation patterns noted.  Patient is to complete hygiene activities with less than 3/10 pain consistently. Progressing. 3-4/10    Functional Long Term Goals (16 weeks): progressing.    Patient is to self-manage symptoms and return to prior level of function.    Patient is to be independent in their Home Exercise Program.  Patient is to tolerate all reaching and lifting activities above shoulder height with less than 2/10 pain.    Patient is to report less than 2/10 pain cooking and  cleaning type activities.      Plan    Treatment Plan:      Frequency:   16 visits (10 additional)    Duration of Treatment: 16 weeks (8 weeks)    Plan for next visit:  Phase 3, strength and active motion    Thank you for your referral to [facility].  Please call with any questions, concerns or comments.  [phone]  Lina Ruiz, PT, DPT

## 2017-12-27 NOTE — PROGRESS NOTES
Patient Information     Patient Name MRN Sex Bailey Law [id number] Female 1950      Progress Notes by Tesha Hernandez at 2017 12:48 PM     Author:  Tesha Hernandez Service:  (none) Author Type:  PT- Physical Therapy Assistant     Filed:  2017  1:22 PM Date of Service:  2017 12:48 PM Status:  Signed     :  Tesha Hernandez (PT- Physical Therapy Assistant)            [facility]  Outpatient PT - Daily note    Date of Service: 2017     Visit # 6 of 10    Patient Name: Bailey Sierra   YOB: 1950   Referring MD/Provider: Vaibhav Traore DO  Medical and Treatment Diagnosis: s/p right shoulder arthroscopy  PT Treatment Diagnosis: impaired shoulder range of motion and strength  Insurance: Medicare  Start of Service: 17  Certification Dates: Start of Service: 17  Medicare/MA Re-Cert Due: 17        Precautions:    Start rehab now  Phase 1 - no passive ROM at shoulder  Phase 2A - on                  Phase 2B - on  - out of pillow in the daytime, wall walk 1X per every hour awake.  Focus is full passive forward flexion by 8 weeks post op.  Wear pillow at night  Phase 3 - on  - out of all gear then.      Subjective    0/10 - currently     Reports compliance with HEP and ice. Can now do zipper and buttons on jeans.     Objective  DATE:      ROM:   107 P 114 P 95 pre  122 post 137 P 135 P  130 AA     Pt specifics: supine flexion (pt position, PROM/AAROM/AROM, pain)    Today s Interventions:   Pendulums x1 min  Table flexion x10 reps  Pulleys into flexion x10   Wall walking x5   Supine wand into flexion x10     PROM flexion and internal rotation to tolerance x15 mins  Sidelying scapular mobilizations all directions x5 mins. Patient very guarded today despite cues for relaxing.  Sidelying soft tissue mobilization scapular and upper back/neck muscles x3 mins    Game ready x10 mins. Patient seated, low  compression      Home Exercise Program:    Elbow and wrist range of motion, ball squeezes, scapular sets       Date: 06/02/2017 Prepared by: Tesha Hernandez [email or web address]      Standing Shoulder Flexion Wall Walk - 1 reps - 1 sets - 5 hold - 3x daily - 7x weekly    Seated Shoulder Flexion AAROM with Pulley Behind - 10 reps - 3 sets - 5 hold - 2x daily - 7x weekly    Supine Shoulder Flexion Extension AAROM with Dowel - 10-20 reps - 1 sets - 5 hold - 2x daily - 7x weekly    Assessment    Patient demonstrates improving passive range of motion with complaints of soreness in anterior shoulder.    The patient is appropriate for physical therapy to address their pain, functional limitations, and physical impairments.    Patient Specific Functional and Pain Scales (PSFS):    Clinician Instructions: Complete after the history and before the exam.    Initial Assessment: We want to know what 3 activities in your life you are unable to perform, or are having the most difficulty performing, as a result of your chief problem. Please list and score at least 3 activities that you are unable to perform, or having the most difficulty performing, because of your chief problem.   Patient Specific Activity Scoring Scheme (score one number for each activity):   Activity Score (0-10)  0= Unable to perform activity  10= Able to perform activity at same level as before injury or problem   1. dishes 0/10   2. cooking 0/10   3. Taking garbage out 0/10   4. Reaching and lifting 0/10   5.  /10   Totals:  0/40 = 0 % ability which relates to 100% impairment    Patient verbally states that they understand that the information they have provided above is current and complete to the best of their knowledge.    Patient Specific Functional Scale Modifier Scale Conversion: (patient's modifier that correlates with pt's score on PSFS): 0-CN (100% Impaired).    G codes and Modifier taken from pt completing a PSFS: completed at initial evaluation   Initial Primary  G Code and Modifier:    Per the Patient's intake and/or assessment the Primary G Code is: Handling Objects .   The Patient's Impairment, Limitation or Restriction Modifier would be best described as: CL - 60% - 80% Impairment.     Goal Primary G Code and Modifier:    The Patient's G Code Goal would be: Handling Objects    The Patient's Impairment, Limitation or Restriction Modifier goal would be best described as: CI - 1% - 20% Impairment.         Goals:  Functional Short Term Goals (8 weeks):    Patient is to have improved reaching ability to allow dressing and self-cares with minimal to no pain.   Patient will have improved shoulder flexion to 100 degrees to allow for improved reaching ability with less than 3/10 pain.    Patient is to complete hygiene activities with less than 3/10 pain consistently.    Functional Long Term Goals (16 weeks):     Patient is to self-manage symptoms and return to prior level of function.    Patient is to be independent in their Home Exercise Program.  Patient is to tolerate all reaching and lifting activities above shoulder height with less than 2/10 pain.    Patient is to report less than 2/10 pain cooking and cleaning type activities.      Plan    Treatment Plan:      Frequency:   16 visits    Duration of Treatment: 16 weeks    Plan for next visit:  Phase 2B    Thank you for your referral to [facility].  Please call with any questions, concerns or comments.  [phone]  Lina Ruiz, PT, DPT

## 2017-12-27 NOTE — PROGRESS NOTES
Patient Information     Patient Name MRN Sex Bailey Law [id number] Female 1950      Progress Notes by Fredis Millan, PT at 2017  8:57 AM     Author:  Fredis Millan PT Service:  (none) Author Type:  PT- Physical Therapist     Filed:  2017  9:35 AM Date of Service:  2017  8:57 AM Status:  Signed     :  Fredis Millan PT (PT- Physical Therapist)            [facility]  Outpatient PT - Daily note    Date of Service: 2017     Visit # 15 (5 of 10)    Patient Name: Bailey Sierra   YOB: 1950   Referring MD/Provider: Vaibhav Traore DO  Medical and Treatment Diagnosis: s/p right shoulder arthroscopy  PT Treatment Diagnosis: impaired shoulder range of motion and strength  Insurance: Medicare  Start of Service: 17  Certification Dates: 6/15/2017  Medicare/MA Re-Cert Due:  8/10/2017       Precautions:    Phase 3 - on  - out of all gear then.      Subjective    0/10 - right shoulder    Patient reports gradual improvement with right shoulder.  Reports no issues with the current HEP.            Objective  DATE: 5/17 5/22 5/25 5/30 6/2 6/6 6/8 6/12 6/15 6/19 6/22   ROM:   107 P 114 P 95 pre  122 post 137 P 135 P  130  AA  148 P 142 AA  148 P 147 AA  152 P 150 AA  150 P 152 AA  154 P 150 AA  155 P   Pt specifics: supine flexion (pt position, PROM/AAROM/AROM, pain)  DATE:         ROM:   123 AROM standing  160 AAROM supine 125  AROM  standing  160  AAROM  supine            Today s Interventions:   UBC standing level 65 clockwise and counter clockwise x2 mins each  internal rotation and external rotation AAROM cane x15 each    Pulleys into flexion and abduction x15-20    Ball roll on wall x1 min each clockwise and counter clockwise   Serratus punches yellow theraband x20     external rotation yellow theraband x20     Supine wand into flexion x10 added 2# weight on cane today  Supine shoulder circles x1 min clockwise and counter clockwise      PROM all motions to tolerance x 5 mins    Standing flex to 90 deg in front of mirror to avoid shoulder elevation x10 with tactile cuing    Row (green band at home) MedEx 60# with 3 holes showing for good posture 2x10 reps    Game ready x15 mins. Patient seated, low compression    Home Exercise Program:      Access Code: [email or web address] URL: [email or web address] Date: 06/22/2017 Prepared by: Lina Ruiz   Exercises   Doorway Pec Stretch at 90 Degrees Abduction - 2 Sets - 30 Hold (sec) - 1x daily - 5x weekly   Supine Shoulder Circles - 10-15 reps - 1-2 sets - 5 seconds hold - 1x daily   Shoulder External Rotation with Anchored Resistance - 10-15 reps - 1-2 sets - 5 seconds hold - 1x daily   Supine Shoulder External Rotation with Dowel - 10-15 reps - 1-2 sets - 5 seconds hold - 1x daily   Scapular Retraction with Resistance Advanced - 15-20 reps - 1-2 sets - 5 hold - 1x daily   Shoulder Flexion Overhead with Dowel - 8-10 reps - 1-2 sets - 5 hold - 1x daily   Standing Shoulder Abduction AAROM with Dowel - 8-10 reps - 1-2 sets - 5 hold - 1x daily   Standing Shoulder Flexion Wall Walk - 8-10 reps - 1-2 sets - 5 hold - 1x daily   Single Arm Punch with Resistance - 10-15 reps - 1-2 sets - 5 seconds hold - 1x daily       Assessment    Patient demonstrates ability to progress strengthening and home exercise program with expected soreness.    The patient is appropriate for physical therapy to address their pain, functional limitations, and physical impairments.    Patient Specific Functional and Pain Scales (PSFS):    Clinician Instructions: Complete after the history and before the exam.    Initial Assessment: We want to know what 3 activities in your life you are unable to perform, or are having the most difficulty performing, as a result of your chief problem. Please list and score at least 3 activities that you are unable to perform, or having the most difficulty performing, because of your chief problem.    Patient Specific Activity Scoring Scheme (score one number for each activity):   Activity Score (0-10)  0= Unable to perform activity  10= Able to perform activity at same level as before injury or problem Score (0-10)  0= Unable to perform activity  10= Able to perform activity at same level as before injury or problem  6/15/2017    1. dishes 0/10 4/10   2. cooking 0/10 4/10   3. Taking garbage out 0/10 6/10   4. Reaching and lifting 0/10 3/10   5.  /10    Totals:  0/40 = 0 % ability which relates to 100% impairment 17/40= 42.5% ability, 57.5% impairment    Patient verbally states that they understand that the information they have provided above is current and complete to the best of their knowledge.    Patient Specific Functional Scale Modifier Scale Conversion: (patient's modifier that correlates with pt's score on PSFS): 5-Ck (50% Impaired).    G codes and Modifier taken from pt completing a PSFS: updated 6/15/2017    Current Primary G Code and Modifier:    Per the Patient's intake and/or assessment the Primary G Code is: Handling Objects .   The Patient's Impairment, Limitation or Restriction Modifier would be best described as: CK - 40% - 60% Impairment.     Goal Primary G Code and Modifier:    The Patient's G Code Goal would be: Handling Objects    The Patient's Impairment, Limitation or Restriction Modifier goal would be best described as: CI - 1% - 20% Impairment.         Goals:  Functional Short Term Goals (8 weeks):    Patient is to have improved reaching ability to allow dressing and self-cares with minimal to no pain. 50% met  Patient will have improved shoulder flexion to 100 degrees to allow for improved reaching ability with less than 3/10 pain.  50% met with compensation patterns noted.  Patient is to complete hygiene activities with less than 3/10 pain consistently. Progressing. 3-4/10    Functional Long Term Goals (16 weeks): progressing.    Patient is to self-manage symptoms and  return to prior level of function.    Patient is to be independent in their Home Exercise Program.  Patient is to tolerate all reaching and lifting activities above shoulder height with less than 2/10 pain.    Patient is to report less than 2/10 pain cooking and cleaning type activities.      Plan    Treatment Plan:      Frequency:   16 visits (10 additional)    Duration of Treatment: 16 weeks (8 weeks)    Plan for next visit:  Phase 3, strength and active motion    Thank you for your referral to [facility].  Please call with any questions, concerns or comments.  [phone]

## 2017-12-27 NOTE — PROGRESS NOTES
Patient Information     Patient Name MRN Sex Bailey Law [id number] Female 1950      Progress Notes by Lina Ruiz, PT at 2017  1:00 PM     Author:  Lina Ruiz PT Service:  (none) Author Type:  PT- Physical Therapist     Filed:  2017  4:29 PM Date of Service:  2017  1:00 PM Status:  Signed     :  Lina Ruiz PT (PT- Physical Therapist)            [facility]  Outpatient PT - Daily note    Date of Service: 2017     Visit # 12 (2 of 10)    Patient Name: Bailey Sierra   YOB: 1950   Referring MD/Provider: Vaibhav Traore DO  Medical and Treatment Diagnosis: s/p right shoulder arthroscopy  PT Treatment Diagnosis: impaired shoulder range of motion and strength  Insurance: Medicare  Start of Service: 17  Certification Dates: 6/15/2017  Medicare/MA Re-Cert Due:  8/10/2017       Precautions:    Phase 3 - on  - out of all gear then.      Subjective      Patient rates soreness an achy 3 out of 10 - currently.  Has difficulty reaching to the side to place the dog gate out of her way. (demo'ed external rotation motion limitation)      Objective  DATE: 5/17 5/22 5/25 5/30 6/2 6/6 6/8 6/12 6/15 6/19 6/22   ROM:   107 P 114 P 95 pre  122 post 137 P 135 P  130  AA  148 P 142 AA  148 P 147 AA  152 P 150 AA  150 P 152 AA  154 P 150 AA  155 P   Pt specifics: supine flexion (pt position, PROM/AAROM/AROM, pain)  125 active flexion in standing    left shoulder flexion: passive in supine: 164 degrees     Today s Interventions:   UBC standing level 65 clockwise and counter clockwise x2 mins each  internal rotation and external rotation AAROM cane x15 each    Pulleys into flexion and abduction x15    Ball roll on wall x1 min each clockwise and counter clockwise   Serratus punches yellow theraband x15 *  external rotation yellow theraband x15 *    Supine wand into flexion x10 added 2# weight on cane today  Supine shoulder circles x1 min  clockwise and counter clockwise *    PROM all motions to tolerance x15 mins    Review and modification of home exercise program x7 mins    Game ready x15 mins. Patient seated, low compression    Home Exercise Program:      Access Code: [email or web address] URL: [email or web address] Date: 06/22/2017 Prepared by: Lina Ruiz   Exercises   Doorway Pec Stretch at 90 Degrees Abduction - 2 Sets - 30 Hold (sec) - 1x daily - 5x weekly   Supine Shoulder Circles - 10-15 reps - 1-2 sets - 5 seconds hold - 1x daily   Shoulder External Rotation with Anchored Resistance - 10-15 reps - 1-2 sets - 5 seconds hold - 1x daily   Supine Shoulder External Rotation with Dowel - 10-15 reps - 1-2 sets - 5 seconds hold - 1x daily   Scapular Retraction with Resistance Advanced - 15-20 reps - 1-2 sets - 5 hold - 1x daily   Shoulder Flexion Overhead with Dowel - 8-10 reps - 1-2 sets - 5 hold - 1x daily   Standing Shoulder Abduction AAROM with Dowel - 8-10 reps - 1-2 sets - 5 hold - 1x daily   Standing Shoulder Flexion Wall Walk - 8-10 reps - 1-2 sets - 5 hold - 1x daily   Single Arm Punch with Resistance - 10-15 reps - 1-2 sets - 5 seconds hold - 1x daily       Assessment    Patient demonstrates ability to progress strengthening and home exercise program with expected soreness.    The patient is appropriate for physical therapy to address their pain, functional limitations, and physical impairments.    Patient Specific Functional and Pain Scales (PSFS):    Clinician Instructions: Complete after the history and before the exam.    Initial Assessment: We want to know what 3 activities in your life you are unable to perform, or are having the most difficulty performing, as a result of your chief problem. Please list and score at least 3 activities that you are unable to perform, or having the most difficulty performing, because of your chief problem.   Patient Specific Activity Scoring Scheme (score one number for each activity):   Activity  Score (0-10)  0= Unable to perform activity  10= Able to perform activity at same level as before injury or problem Score (0-10)  0= Unable to perform activity  10= Able to perform activity at same level as before injury or problem  6/15/2017    1. dishes 0/10 4/10   2. cooking 0/10 4/10   3. Taking garbage out 0/10 6/10   4. Reaching and lifting 0/10 3/10   5.  /10    Totals:  0/40 = 0 % ability which relates to 100% impairment 17/40= 42.5% ability, 57.5% impairment    Patient verbally states that they understand that the information they have provided above is current and complete to the best of their knowledge.    Patient Specific Functional Scale Modifier Scale Conversion: (patient's modifier that correlates with pt's score on PSFS): 5-Ck (50% Impaired).    G codes and Modifier taken from pt completing a PSFS: updated 6/15/2017    Current Primary G Code and Modifier:    Per the Patient's intake and/or assessment the Primary G Code is: Handling Objects .   The Patient's Impairment, Limitation or Restriction Modifier would be best described as: CK - 40% - 60% Impairment.     Goal Primary G Code and Modifier:    The Patient's G Code Goal would be: Handling Objects    The Patient's Impairment, Limitation or Restriction Modifier goal would be best described as: CI - 1% - 20% Impairment.         Goals:  Functional Short Term Goals (8 weeks):    Patient is to have improved reaching ability to allow dressing and self-cares with minimal to no pain. 50% met  Patient will have improved shoulder flexion to 100 degrees to allow for improved reaching ability with less than 3/10 pain.  50% met with compensation patterns noted.  Patient is to complete hygiene activities with less than 3/10 pain consistently. Progressing. 3-4/10    Functional Long Term Goals (16 weeks): progressing.    Patient is to self-manage symptoms and return to prior level of function.    Patient is to be independent in their Home Exercise  Program.  Patient is to tolerate all reaching and lifting activities above shoulder height with less than 2/10 pain.    Patient is to report less than 2/10 pain cooking and cleaning type activities.      Plan    Treatment Plan:      Frequency:   16 visits (10 additional)    Duration of Treatment: 16 weeks (8 weeks)    Plan for next visit:  Phase 3, strength and active motion    Thank you for your referral to [facility].  Please call with any questions, concerns or comments.  [phone]  Lina Ruiz, PT, DPT

## 2017-12-27 NOTE — PROGRESS NOTES
Patient Information     Patient Name MRN Sex Bailey Law [id number] Female 1950      Progress Notes by Vaibhav Traore DO at 2017 10:30 AM     Author:  Vaibhav Traore DO Service:  (none) Author Type:  Physician     Filed:  2017 10:55 AM Encounter Date:  2017 Status:  Signed     :  Vaibhav Traore DO (Physician)            PROGRESS NOTE    SUBJECTIVE:  Bailey Sierra is here for evaluation in regards to her right shoulder. She is now 14 weeks and 2 days and shoulder surgery. She's doing very well with minimal discomfort. She is able to do almost everything that she wants to do. Denies problems except for achiness when the weather changes. She does utilize over-the-counter medications were needed. And when she does have discomfort is more of a dull ache.    OBJECTIVE:  /62  Pulse 56  Wt 98.4 kg (217 lb)  BMI 37.25 kg/m2 Body mass index is 37.25 kg/(m^2).    General Appearance: Pleasant female in good appearance, mood and affect.  Alert and orientated times three ( time, date and location).    Skin: Well-healed incision sites.    Shoulder:  Strength: Expected weakness.  Motion: Motion tested today passively shows 175  forward flexion with a soft end feel to her capsule.    Elbow:  Flexion: Normal  Extension: Normal  Deep tendon reflexes: Normal    Hand:  Sensation: Normal  Radial and ulnar blood flow:  Normal    Eyes: Pupils are round.    Ears: Hearing: Impaired    Heart: Good capillary refill in her fingertips.    Lungs: Coarse breath sounds.     Physical therapy: Last note showed good progress.    Notes where reviewed with the patient.  Please refer to the reports for full details.    ASSESSMENT:    POSTOPERATIVE DIAGNOSIS  1. Right impingement syndrome.   2. Right AC arthritis.   3. Long head of the biceps fraying.   4. 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   5. Labral fraying.   6. Rotator cuff tear, incomplete.       PROCEDURE  1. Right shoulder arthroscopy with extensive debridement to include 2% of the anterior to posterior labrum (DOS 04/19/2017).   2. Arthroscopic subacromial decompression.   3. Arthroscopic distal clavicle excision.   4. Arthroscopic biceps tenotomy.   5. Chondroplasty of the humeral head and glenoid.   6. Arthroscopic rotator cuff repair utilizing a double row technique with one 5.5 mm Bio[employer]posite corkscrew anchor and 2 lateral row 5.5 mm Bio[employer]posite SwiveLock anchors.     PLAN:    She'll continue with home physical therapy.  She really needs to work on her motions at home, and I stressed this again this is really in her case a lifelong project.  She understands how to protect her shoulder while at work but can return to some of her duties on 6/26.  Follow up in a proximally 6-1/2 weeks with x-ray first.  Questions and concerns were answered to her satisfaction.    Vaibhav Traore D.O.  Orthopaedic Surgeon    [address] [place] [address]  Phone [phone] (KNEE)  Fax [phone]    This document was created using computer generated templates and voice activated software.    10:53 AM 7/28/2017

## 2017-12-27 NOTE — PROGRESS NOTES
Patient Information     Patient Name MRN Sex Bailey Law [id number] Female 1950      Progress Notes by Tesha Hernandez at 2017  9:55 AM     Author:  Tesha Hernandez Service:  (none) Author Type:  PT- Physical Therapy Assistant     Filed:  2017 12:02 PM Date of Service:  2017  9:55 AM Status:  Signed     :  Tesha Hernandez (PT- Physical Therapy Assistant)            [facility]  Outpatient PT - Daily note    Date of Service: 2017     Visit # 13 (3 of 10)    Patient Name: Bailey Sierra   YOB: 1950   Referring MD/Provider: Vaibhav Traore DO  Medical and Treatment Diagnosis: s/p right shoulder arthroscopy  PT Treatment Diagnosis: impaired shoulder range of motion and strength  Insurance: Medicare  Start of Service: 17  Certification Dates: 6/15/2017  Medicare/MA Re-Cert Due:  8/10/2017       Precautions:    Phase 3 - on  - out of all gear then.      Subjective    4/10 - right shoulder as her it isn't limbered up yet     Bailey reports she pulled weeds over the weekend with her right arm. Things went ok with this. Will be returning to work today.      30 minute appt secondary to mistake when checking patient in       Objective  DATE: 5/17 5/22 5/25 5/30 6/2 6/6 6/8 6/12 6/15 6/19 6/22   ROM:   107 P 114 P 95 pre  122 post 137 P 135 P  130  AA  148 P 142 AA  148 P 147 AA  152 P 150 AA  150 P 152 AA  154 P 150 AA  155 P   Pt specifics: supine flexion (pt position, PROM/AAROM/AROM, pain)  DATE:          ROM:   123 AROM standing  160 AAROM supine             Today s Interventions:   UBC standing level 65 clockwise and counter clockwise x2 mins each  internal rotation and external rotation AAROM cane x15 each    Pulleys into flexion and abduction x15    Ball roll on wall x1 min each clockwise and counter clockwise   Serratus punches yellow theraband x15 *    STM and cross-friction massage to right biceps tendon and UT    external  rotation yellow theraband x15 *    Supine wand into flexion x10 added 2# weight on cane today  Supine shoulder circles x1 min clockwise and counter clockwise *    PROM all motions to tolerance x15 mins    Game ready x10 mins. Patient seated, low compression    Home Exercise Program:      Access Code: [email or web address] URL: [email or web address] Date: 06/22/2017 Prepared by: Lina Ruiz   Exercises   Doorway Pec Stretch at 90 Degrees Abduction - 2 Sets - 30 Hold (sec) - 1x daily - 5x weekly   Supine Shoulder Circles - 10-15 reps - 1-2 sets - 5 seconds hold - 1x daily   Shoulder External Rotation with Anchored Resistance - 10-15 reps - 1-2 sets - 5 seconds hold - 1x daily   Supine Shoulder External Rotation with Dowel - 10-15 reps - 1-2 sets - 5 seconds hold - 1x daily   Scapular Retraction with Resistance Advanced - 15-20 reps - 1-2 sets - 5 hold - 1x daily   Shoulder Flexion Overhead with Dowel - 8-10 reps - 1-2 sets - 5 hold - 1x daily   Standing Shoulder Abduction AAROM with Dowel - 8-10 reps - 1-2 sets - 5 hold - 1x daily   Standing Shoulder Flexion Wall Walk - 8-10 reps - 1-2 sets - 5 hold - 1x daily   Single Arm Punch with Resistance - 10-15 reps - 1-2 sets - 5 seconds hold - 1x daily       Assessment    Patient demonstrates ability to progress strengthening and home exercise program with expected soreness.    The patient is appropriate for physical therapy to address their pain, functional limitations, and physical impairments.    Patient Specific Functional and Pain Scales (PSFS):    Clinician Instructions: Complete after the history and before the exam.    Initial Assessment: We want to know what 3 activities in your life you are unable to perform, or are having the most difficulty performing, as a result of your chief problem. Please list and score at least 3 activities that you are unable to perform, or having the most difficulty performing, because of your chief problem.   Patient Specific Activity  Scoring Scheme (score one number for each activity):   Activity Score (0-10)  0= Unable to perform activity  10= Able to perform activity at same level as before injury or problem Score (0-10)  0= Unable to perform activity  10= Able to perform activity at same level as before injury or problem  6/15/2017    1. dishes 0/10 4/10   2. cooking 0/10 4/10   3. Taking garbage out 0/10 6/10   4. Reaching and lifting 0/10 3/10   5.  /10    Totals:  0/40 = 0 % ability which relates to 100% impairment 17/40= 42.5% ability, 57.5% impairment    Patient verbally states that they understand that the information they have provided above is current and complete to the best of their knowledge.    Patient Specific Functional Scale Modifier Scale Conversion: (patient's modifier that correlates with pt's score on PSFS): 5-Ck (50% Impaired).    G codes and Modifier taken from pt completing a PSFS: updated 6/15/2017    Current Primary G Code and Modifier:    Per the Patient's intake and/or assessment the Primary G Code is: Handling Objects .   The Patient's Impairment, Limitation or Restriction Modifier would be best described as: CK - 40% - 60% Impairment.     Goal Primary G Code and Modifier:    The Patient's G Code Goal would be: Handling Objects    The Patient's Impairment, Limitation or Restriction Modifier goal would be best described as: CI - 1% - 20% Impairment.         Goals:  Functional Short Term Goals (8 weeks):    Patient is to have improved reaching ability to allow dressing and self-cares with minimal to no pain. 50% met  Patient will have improved shoulder flexion to 100 degrees to allow for improved reaching ability with less than 3/10 pain.  50% met with compensation patterns noted.  Patient is to complete hygiene activities with less than 3/10 pain consistently. Progressing. 3-4/10    Functional Long Term Goals (16 weeks): progressing.    Patient is to self-manage symptoms and return to prior level of  function.    Patient is to be independent in their Home Exercise Program.  Patient is to tolerate all reaching and lifting activities above shoulder height with less than 2/10 pain.    Patient is to report less than 2/10 pain cooking and cleaning type activities.      Plan    Treatment Plan:      Frequency:   16 visits (10 additional)    Duration of Treatment: 16 weeks (8 weeks)    Plan for next visit:  Phase 3, strength and active motion    Thank you for your referral to [facility].  Please call with any questions, concerns or comments.  [phone]  Lina Ruiz, PT, DPT

## 2017-12-28 NOTE — PROGRESS NOTES
Patient Information     Patient Name MRN Sex Bailey Law [id number] Female 1950      Progress Notes by Lina Ruiz PT at 6/15/2017 12:58 PM     Author:  Lina Ruiz PT Service:  (none) Author Type:  PT- Physical Therapist     Filed:  6/15/2017  2:07 PM Date of Service:  6/15/2017 12:58 PM Status:  Signed     :  Lina Ruiz PT (PT- Physical Therapist)            [facility]  Outpatient PT - Re-certification note    Date of Service: 6/15/2017     Visit # 10 of 10    Patient Name: Bailey Sierra   YOB: 1950   Referring MD/Provider: Vaibhav Traore DO  Medical and Treatment Diagnosis: s/p right shoulder arthroscopy  PT Treatment Diagnosis: impaired shoulder range of motion and strength  Insurance: Medicare  Start of Service: 17  Certification Dates: 6/15/2017  Medicare/MA Re-Cert Due:  8/10/2017       Precautions:    Phase 3 - on  - out of all gear then.      Subjective      Patient rates soreness a 2 out of 10 - currently.  Feels good being out of the sling and has been trying to use the right shoulder a little bit, but carefully.      Objective  DATE: 5/17 5/22 5/25 5/30 6/2 6/6 6/8 6/12 6/15   ROM:   107 P 114 P 95 pre  122 post 137 P 135 P  130  AA  148 P 142 AA  148 P 147 AA  152 P 150 AA  150 P   Pt specifics: supine flexion (pt position, PROM/AAROM/AROM, pain)    Standing AROM: 85 degrees without shoulder hiking; 110 with scapular compensation    ROM / Strength:  Passive measures in supine:       Norms Left Right   Shld.   Flexion 170   150    Shld. Abduction 170  110               External Rotation 90  60   Internal Rotation 70  80       Today s Interventions:   UBC level 60 x1 min, level 55 x2 more forward, then x2 retro  TRX flexion stretch x1 min and rows x15. Cues for scapular depression  Pulleys into flexion and abduction x15  Scapular sets red theraband, progressed to green x15. *    Supine wand into flexion x10   Supine  shoulder circles x1 min clockwise and counter clockwise *    PROM flexion and internal rotation to tolerance x15 mins    Game ready x15 mins. Patient seated, low compression    Home Exercise Program:    Elbow and wrist range of motion, ball squeezes, scapular sets     Date: 06/02/2017 Prepared by: Tesha Hernandez [id number]      Standing Shoulder Flexion Wall Walk - 1 reps - 1 sets - 5 hold - 3x daily - 7x weekly    Seated Shoulder Flexion AAROM with Pulley Behind - 10 reps - 3 sets - 5 hold - 2x daily - 7x weekly    Supine Shoulder Flexion Extension AAROM with Dowel - 10-20 reps - 1 sets - 5 hold - 2x daily - 7x weekly    Added 6/15/2017: resisted scapular sets, shoulder circles in supine    Assessment    Patient demonstrates minimal progress with active assisted and passive range of motion today with increased tightness and soreness. Patient has poor active shoulder motion with tendency for scapular compensation to obtain overhead ranges of motion with reaching. Persisting motion restrictions into flexion, abduction and external rotation and patient is very guarded against passive stretching. Patient is just beginning the strengthening phase of rehab protocol and will benefit from additional skilled physical therapy to guide strengthening and prevent compensatory patterns from developing.    The patient is appropriate for physical therapy to address their pain, functional limitations, and physical impairments.    Patient Specific Functional and Pain Scales (PSFS):    Clinician Instructions: Complete after the history and before the exam.    Initial Assessment: We want to know what 3 activities in your life you are unable to perform, or are having the most difficulty performing, as a result of your chief problem. Please list and score at least 3 activities that you are unable to perform, or having the most difficulty performing, because of your chief problem.   Patient Specific Activity Scoring Scheme (score one number  for each activity):   Activity Score (0-10)  0= Unable to perform activity  10= Able to perform activity at same level as before injury or problem Score (0-10)  0= Unable to perform activity  10= Able to perform activity at same level as before injury or problem  6/15/2017    1. dishes 0/10 4/10   2. cooking 0/10 4/10   3. Taking garbage out 0/10 6/10   4. Reaching and lifting 0/10 3/10   5.  /10    Totals:  0/40 = 0 % ability which relates to 100% impairment 17/40= 42.5% ability, 57.5% impairment    Patient verbally states that they understand that the information they have provided above is current and complete to the best of their knowledge.    Patient Specific Functional Scale Modifier Scale Conversion: (patient's modifier that correlates with pt's score on PSFS): 5-Ck (50% Impaired).    G codes and Modifier taken from pt completing a PSFS: updated 6/15/2017    Current Primary G Code and Modifier:    Per the Patient's intake and/or assessment the Primary G Code is: Handling Objects .   The Patient's Impairment, Limitation or Restriction Modifier would be best described as: CK - 40% - 60% Impairment.     Goal Primary G Code and Modifier:    The Patient's G Code Goal would be: Handling Objects    The Patient's Impairment, Limitation or Restriction Modifier goal would be best described as: CI - 1% - 20% Impairment.         Goals:  Functional Short Term Goals (8 weeks):    Patient is to have improved reaching ability to allow dressing and self-cares with minimal to no pain. 50% met  Patient will have improved shoulder flexion to 100 degrees to allow for improved reaching ability with less than 3/10 pain.  50% met with compensation patterns noted.  Patient is to complete hygiene activities with less than 3/10 pain consistently. Progressing. 3-4/10    Functional Long Term Goals (16 weeks): progressing.    Patient is to self-manage symptoms and return to prior level of function.    Patient is to be  independent in their Home Exercise Program.  Patient is to tolerate all reaching and lifting activities above shoulder height with less than 2/10 pain.    Patient is to report less than 2/10 pain cooking and cleaning type activities.      Plan    Treatment Plan:      Frequency:   16 visits (10 additional)    Duration of Treatment: 16 weeks (8 weeks)    Plan for next visit:  Phase 3, strength and active motion    Thank you for your referral to [facility].  Please call with any questions, concerns or comments.  [phone]  Lina Ruiz, PT, DPT

## 2017-12-28 NOTE — TELEPHONE ENCOUNTER
Patient Information     Patient Name MRN Bailey Monique [id number] Female 1950      Telephone Encounter by Ingrid Erazo at 2017  1:09 PM     Author:  Ingrid Erazo Service:  (none) Author Type:  (none)     Filed:  2017  1:10 PM Encounter Date:  2017 Status:  Signed     :  Ingrid Erazo            Left message for patient on voicemail as requested, notifying her of the information below.  Ingrid Erazo LPN .............2017  1:09 PM

## 2017-12-28 NOTE — TELEPHONE ENCOUNTER
Patient Information     Patient Name MRN Sex Bailey Law [id number] Female 1950      Telephone Encounter by Sam Leal MD at 2017 12:12 PM     Author:  Sam Leal MD Service:  (none) Author Type:  Physician     Filed:  2017 12:12 PM Encounter Date:  2017 Status:  Signed     :  Sam Leal MD (Physician)            Fasting labs ordered

## 2017-12-28 NOTE — TELEPHONE ENCOUNTER
Patient Information     Patient Name MRN Sex Bailey Law [id number] Female 1950      Telephone Encounter by Phan Wright RN at 2017 10:15 AM     Author:  Phan Wright RN Service:  (none) Author Type:  NURS- Registered Nurse     Filed:  2017 10:19 AM Encounter Date:  2017 Status:  Signed     :  Phan Wright RN (NURS- Registered Nurse)            Nitrates/Vasodilators    Office visit in the past 12 months.    Last visit with CELSA BUCK was on: 2017 in [facility]  Next visit with CELSA BUCK is on: No future appointment listed with this provider  Next visit with Family Practice is on: No future appointment listed in this department    Max refills 12 months from last office visit.  If any concerns call patient and notify provider.    Chart review shows that patient was last seen by PCP for diagnosis of hypertension on 17. No changes noted in relation to rx as requested in office visit notes on that date. However, PCP does state that patient should follow up in 6 months. Would be due in 2017. Writer will refill rx as requested for a 90 day supply at this time.    Prescription refilled per RN Medication Refill Policy.................... Phan Wright RN ....................  2017   10:17 AM

## 2017-12-28 NOTE — PROGRESS NOTES
Patient Information     Patient Name MRN Sex Bailey Law [id number] Female 1950      Progress Notes by Brigid Rivas PT at 2017 12:32 PM     Author:  Brigid Rivas PT Service:  (none) Author Type:  PT- Physical Therapist     Filed:  2017  1:12 PM Date of Service:  2017 12:32 PM Status:  Signed     :  Brigid Rivas PT (PT- Physical Therapist)            [facility]  Outpatient PT - Daily note    Date of Service: 2017     Visit # 16 (6 of 10)    Patient Name: Bailey Sierra   YOB: 1950   Referring MD/Provider: Vaibhav Traore DO  Medical and Treatment Diagnosis: s/p right shoulder arthroscopy  PT Treatment Diagnosis: impaired shoulder range of motion and strength  Insurance: Medicare  Start of Service: 17  Certification Dates: 6/15/2017  Medicare/MA Re-Cert Due:  8/10/2017       Precautions:    Phase 3 - on  - out of all gear then.      Subjective    2-3/10 - right shoulder    Patient reports last night it hurt to lay on R shoulder, usually had been doing good, not sure why it was worse.  Reports no issues with the current HEP.            Objective  DATE: 5/17 5/22 5/25 5/30 6/2 6/6 6/8 6/12 6/15 6/19 6/22   ROM:   107 P 114 P 95 pre  122 post 137 P 135 P  130  AA  148 P 142 AA  148 P 147 AA  152 P 150 AA  150 P 152 AA  154 P 150 AA  155 P   Pt specifics: supine flexion (pt position, PROM/AAROM/AROM, pain)  DATE:         ROM:   123 AROM standing  160 AAROM supine 125  AROM  standing  160  AAROM  supine            Today s Interventions:   UBC standing level 65 clockwise and counter clockwise x2 mins each  internal rotation and external rotation AAROM cane x15 each    Pulleys into flexion and abduction x15-20    Ball roll on wall x1 min each clockwise and counter clockwise   Serratus punches yellow theraband x20     External rotation yellow theraband x20   Row (green band at home) MedEx 60# with 3 holes showing  for good posture 2x10 reps    Supine wand into flexion x10 added 2# weight on cane today  Supine shoulder circles x1 min clockwise and counter clockwise     PROM all motions to tolerance x 8 mins  STM to pecs x2 minutes     Standing flex to 90 deg in front of mirror to avoid shoulder elevation x10 with tactile cuing  Doorway ER/pec stretch 2x30 sec     Game ready x15 mins. Patient seated, low compression    Home Exercise Program:      Access Code: [email or web address] URL: [email or web address] Date: 06/22/2017 Prepared by: Lina Ruiz   Exercises   Doorway Pec Stretch at 90 Degrees Abduction - 2 Sets - 30 Hold (sec) - 1x daily - 5x weekly   Supine Shoulder Circles - 10-15 reps - 1-2 sets - 5 seconds hold - 1x daily   Shoulder External Rotation with Anchored Resistance - 10-15 reps - 1-2 sets - 5 seconds hold - 1x daily   Supine Shoulder External Rotation with Dowel - 10-15 reps - 1-2 sets - 5 seconds hold - 1x daily   Scapular Retraction with Resistance Advanced - 15-20 reps - 1-2 sets - 5 hold - 1x daily   Shoulder Flexion Overhead with Dowel - 8-10 reps - 1-2 sets - 5 hold - 1x daily   Standing Shoulder Abduction AAROM with Dowel - 8-10 reps - 1-2 sets - 5 hold - 1x daily   Standing Shoulder Flexion Wall Walk - 8-10 reps - 1-2 sets - 5 hold - 1x daily   Single Arm Punch with Resistance - 10-15 reps - 1-2 sets - 5 seconds hold - 1x daily       Assessment    Patient demonstrates ability to progress strengthening and home exercise program with expected soreness.    The patient is appropriate for physical therapy to address their pain, functional limitations, and physical impairments.    Patient Specific Functional and Pain Scales (PSFS):    Clinician Instructions: Complete after the history and before the exam.    Initial Assessment: We want to know what 3 activities in your life you are unable to perform, or are having the most difficulty performing, as a result of your chief problem. Please list and score at  least 3 activities that you are unable to perform, or having the most difficulty performing, because of your chief problem.   Patient Specific Activity Scoring Scheme (score one number for each activity):   Activity Score (0-10)  0= Unable to perform activity  10= Able to perform activity at same level as before injury or problem Score (0-10)  0= Unable to perform activity  10= Able to perform activity at same level as before injury or problem  6/15/2017    1. dishes 0/10 4/10   2. cooking 0/10 4/10   3. Taking garbage out 0/10 6/10   4. Reaching and lifting 0/10 3/10   5.  /10    Totals:  0/40 = 0 % ability which relates to 100% impairment 17/40= 42.5% ability, 57.5% impairment    Patient verbally states that they understand that the information they have provided above is current and complete to the best of their knowledge.    Patient Specific Functional Scale Modifier Scale Conversion: (patient's modifier that correlates with pt's score on PSFS): 5-Ck (50% Impaired).    G codes and Modifier taken from pt completing a PSFS: updated 6/15/2017    Current Primary G Code and Modifier:    Per the Patient's intake and/or assessment the Primary G Code is: Handling Objects .   The Patient's Impairment, Limitation or Restriction Modifier would be best described as: CK - 40% - 60% Impairment.     Goal Primary G Code and Modifier:    The Patient's G Code Goal would be: Handling Objects    The Patient's Impairment, Limitation or Restriction Modifier goal would be best described as: CI - 1% - 20% Impairment.         Goals:  Functional Short Term Goals (8 weeks):    Patient is to have improved reaching ability to allow dressing and self-cares with minimal to no pain. 50% met  Patient will have improved shoulder flexion to 100 degrees to allow for improved reaching ability with less than 3/10 pain.  50% met with compensation patterns noted.  Patient is to complete hygiene activities with less than 3/10 pain  consistently. Progressing. 3-4/10    Functional Long Term Goals (16 weeks): progressing.    Patient is to self-manage symptoms and return to prior level of function.    Patient is to be independent in their Home Exercise Program.  Patient is to tolerate all reaching and lifting activities above shoulder height with less than 2/10 pain.    Patient is to report less than 2/10 pain cooking and cleaning type activities.      Plan    Treatment Plan:      Frequency:   16 visits (10 additional)    Duration of Treatment: 16 weeks (8 weeks)    Plan for next visit:  Phase 3, strength and active motion    Thank you for your referral to [facility].  Please call with any questions, concerns or comments.  [phone]

## 2017-12-28 NOTE — PROGRESS NOTES
Patient Information     Patient Name MRN Bailey Monique [id number] Female 1950      Progress Notes by Lina Ruiz PT at 2017 11:24 AM     Author:  Lina Ruiz PT Service:  (none) Author Type:  PT- Physical Therapist     Filed:  2017 12:00 PM Date of Service:  2017 11:24 AM Status:  Signed     :  Lina Ruiz PT (PT- Physical Therapist)            [facility]  Outpatient PT - Daily note    Date of Service: 2017     Visit # 19 (9 of 10)    Patient Name: Bailey Sierra   YOB: 1950   Referring MD/Provider: Vaibhav Traore DO  Medical and Treatment Diagnosis: s/p right shoulder arthroscopy  PT Treatment Diagnosis: impaired shoulder range of motion and strength  Insurance: Medicare  Start of Service: 17  Certification Dates: 6/15/2017  Medicare/MA Re-Cert Due:  8/10/2017       Precautions:    Phase 3 - on  - out of all gear then.      Subjective    Patient denies pain today, does get sore still with the fan blowing on her shoulder. Biggest complaint is tiring easily and not able to lift/carry groceries like normal yet. Other than that she is reaching up to do her hair, hang clothes with the right arm. 80% back to normal overall, and the only issue is weakness.        Objective  DATE: 5/17 5/22 5/25 5/30 6/2 6/6 6/8 6/12 6/15 6/19 6/22   ROM:   107 P 114 P 95 pre  122 post 137 P 135 P  130  AA  148 P 142 AA  148 P 147 AA  152 P 150 AA  150 P 152 AA  154 P 150 AA  155 P   Pt specifics: supine flexion (pt position, PROM/AAROM/AROM, pain)  DATE:      ROM:   123 AROM standing  160 AAROM supine 125  AROM  standing  160  AAROM  supine 123 AROM  163 AAROM 163 AAROM 135 AROM standing       Functional testing:  Patient able to lift 12.5# object from below waist height to above waist height, then from waist height to above shoulder height, then added weight to create 22.5# object which she proceeded to lift  from waist to above shoulder height without difficulty or pain.    Today s Interventions:   UBC standing level 60 clockwise and counter clockwise x5 mins total    CASSANDRA punches 3 kg  CASSANDRA adduction 3 kg    Med x:   Rows 64# 2 x15    Snow angels on wall x10    D2 flexion no weight to shoulder height, standing with light manual assist    Standing flex to 90 deg in front of mirror to avoid shoulder elevation x10 with tactile cuing- deferred today  TRX pec stretch 2x30 sec.     Review of home exercise program and patient instructed in progression of resistance. Dispensed red, green and blue theraband.    Home Exercise Program:      Access Code: [email or web address] URL: [email or web address] Date: 06/22/2017 Prepared by: Lina Ruiz   Exercises   Doorway Pec Stretch at 90 Degrees Abduction - 2 Sets - 30 Hold (sec) - 1x daily - 5x weekly   Supine Shoulder Circles - 10-15 reps - 1-2 sets - 5 seconds hold - 1x daily   Shoulder External Rotation with Anchored Resistance - 10-15 reps - 1-2 sets - 5 seconds hold - 1x daily   Supine Shoulder External Rotation with Dowel - 10-15 reps - 1-2 sets - 5 seconds hold - 1x daily   Scapular Retraction with Resistance Advanced - 15-20 reps - 1-2 sets - 5 hold - 1x daily   Shoulder Flexion Overhead with Dowel - 8-10 reps - 1-2 sets - 5 hold - 1x daily   Standing Shoulder Abduction AAROM with Dowel - 8-10 reps - 1-2 sets - 5 hold - 1x daily   Standing Shoulder Flexion Wall Walk - 8-10 reps - 1-2 sets - 5 hold - 1x daily   Single Arm Punch with Resistance - 10-15 reps - 1-2 sets - 5 seconds hold - 1x daily       Assessment    Patient demonstrates good progress with active range of motion and improving strength. She now has minimal compensations with reaching above shoulder height. She has met or nearly achieved all goals set at initial evaluation. Pending 2 weeks of home exercise program performance and visit to Dr. Traore, patient may be discharged to self management.    The patient is  appropriate for physical therapy to address their pain, functional limitations, and physical impairments.    Patient Specific Functional and Pain Scales (PSFS):    Clinician Instructions: Complete after the history and before the exam.    Initial Assessment: We want to know what 3 activities in your life you are unable to perform, or are having the most difficulty performing, as a result of your chief problem. Please list and score at least 3 activities that you are unable to perform, or having the most difficulty performing, because of your chief problem.   Patient Specific Activity Scoring Scheme (score one number for each activity):   Activity Score (0-10)  0= Unable to perform activity  10= Able to perform activity at same level as before injury or problem Score (0-10)  0= Unable to perform activity  10= Able to perform activity at same level as before injury or problem  6/15/2017    1. dishes 0/10 4/10   2. cooking 0/10 4/10   3. Taking garbage out 0/10 6/10   4. Reaching and lifting 0/10 3/10   5.  /10    Totals:  0/40 = 0 % ability which relates to 100% impairment 17/40= 42.5% ability, 57.5% impairment    Patient verbally states that they understand that the information they have provided above is current and complete to the best of their knowledge.    Patient Specific Functional Scale Modifier Scale Conversion: (patient's modifier that correlates with pt's score on PSFS): 5-Ck (50% Impaired).    G codes and Modifier taken from pt completing a PSFS: updated 6/15/2017    Current Primary G Code and Modifier:    Per the Patient's intake and/or assessment the Primary G Code is: Handling Objects .   The Patient's Impairment, Limitation or Restriction Modifier would be best described as: CK - 40% - 60% Impairment.     Goal Primary G Code and Modifier:    The Patient's G Code Goal would be: Handling Objects    The Patient's Impairment, Limitation or Restriction Modifier goal would be best described as: CI  - 1% - 20% Impairment.         Goals:  Functional Short Term Goals (8 weeks):    Patient is to have improved reaching ability to allow dressing and self-cares with minimal to no pain. Met 7/19/2017   Patient will have improved shoulder flexion to 100 degrees to allow for improved reaching ability with less than 3/10 pain.  Met 7/19/2017   Patient is to complete hygiene activities with less than 3/10 pain consistently. Met 7/19/2017     Functional Long Term Goals (16 weeks):     Patient is to self-manage symptoms and return to prior level of function.  80% met 7/19/2017   Patient is to be independent in their Home Exercise Program. Met 7/19/2017   Patient is to tolerate all reaching and lifting activities above shoulder height with less than 2/10 pain.  80% met 7/19/2017   Patient is to report less than 2/10 pain cooking and cleaning type activities.  Met 7/19/2017     Plan    Treatment Plan:      Frequency:   16 visits (10 additional)    Duration of Treatment: 16 weeks (8 weeks)    Plan for next visit:  Phase 3, strength and active motion    Thank you for your referral to [facility].  Please call with any questions, concerns or comments.  [phone]

## 2017-12-28 NOTE — PROGRESS NOTES
Patient Information     Patient Name MRN Sex Bailey Law [id number] Female 1950      Progress Notes by Vaibhav Traore DO at 2017 11:15 AM     Author:  Vaibhav Traore DO Service:  (none) Author Type:  Physician     Filed:  2017 11:59 AM Encounter Date:  2017 Status:  Signed     :  Vaibhav Traore DO (Physician)            PROGRESS NOTE    SUBJECTIVE:  Bailey Sierra is here for evaluation in regards to her right shoulder. She is now almost 5 months out from shoulder surgery. She's been making pretty good gains does have some soreness when the weather changes. She describes it as achy. She feels like her strength has improved. She utilizes over-the-counter medications and also a cream on her shoulder which helps.    OBJECTIVE:  /60  Pulse 60  Wt 98 kg (216 lb)  BMI 37.08 kg/m2 Body mass index is 37.08 kg/(m^2).    General Appearance: Pleasant female in good appearance, mood and affect.  Alert and orientated times three ( time, date and location).    Skin: Well-healed incision sites.    Shoulder:  Strength: Expected weakness with improvement since her last visit.  Motion: Motion tested today passively shows 175  forward flexion, actively is also 175  which is equal to the opposite side. Her abduction is 175 . External rotation 50  and internal rotation to her back pocket.    Elbow:  Flexion: Normal  Extension: Normal  Deep tendon reflexes: Normal    Hand:  Sensation: Normal  Radial and ulnar blood flow:  Normal    Eyes: Pupils are round.    Ears: Hearing: Impaired    Heart: Good capillary refill in her fingertips.    Lungs: Coarse breath sounds.     Radiographic images from  where independently reviewed and discussed with the patient.      Xray:     X-rays reviewed here today show an appropriate subacromial decompression and distal clavicle excision. The old metallic anchors had been placed prior to my surgery with her are still  visible.    ASSESSMENT:    POSTOPERATIVE DIAGNOSIS  1. Right impingement syndrome.   2. Right AC arthritis.   3. Long head of the biceps fraying.   4. 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   5. Labral fraying.   6. Rotator cuff tear, incomplete.      PROCEDURE  1. Right shoulder arthroscopy with extensive debridement to include 2% of the anterior to posterior labrum (DOS 04/19/2017).   2. Arthroscopic subacromial decompression.   3. Arthroscopic distal clavicle excision.   4. Arthroscopic biceps tenotomy.   5. Chondroplasty of the humeral head and glenoid.   6. Arthroscopic rotator cuff repair utilizing a double row technique with one 5.5 mm BioComposite corkscrew anchor and 2 lateral row 5.5 mm BioComposite SwiveLock anchors.     PLAN:    She'll continue with her home physical therapy.  She really needs to work on her motions at home, and I stressed this again this is really in her case a lifelong project, I did go over this again and she verbalizes an understanding.  Full return to all activities.  Follow up as needed.  Questions and concerns were answered to her satisfaction.    Vaibhav Traore D.O.  Orthopaedic Surgeon    [facility]  [address] [place] [address]  Phone [phone] (KNEE)  Fax [phone]    This document was created using computer generated templates and voice activated software.    11:56 AM 9/8/2017

## 2017-12-28 NOTE — TELEPHONE ENCOUNTER
Patient Information     Patient Name MRN Sex Bailey Law [id number] Female 1950      Telephone Encounter by Phan Wright RN at 2017 10:22 AM     Author:  Phan Wright RN Service:  (none) Author Type:  NURS- Registered Nurse     Filed:  2017 10:33 AM Encounter Date:  2017 Status:  Signed     :  Phan Wright RN (NURS- Registered Nurse)            Diabetes    Office visit in the past 12 months or per provider note.    Last visit with CELSA BUCK was on: 2017 in [facility]  Next visit with CELSA BUCK is on: 12/15/2017 in [facility]  Next visit with Family Practice is on: 12/15/2017 in [facility]    Lab test requirements:  HgbA1c annually or per provider note.  HEMOGLOBIN A1C MONITORING (POCT)    Date Value   2017 5.5 %   2012 6.9 % NGSP (H)     Max refill for 12 months from last office visit or per provider note.    Chart review shows that patient with an appointment scheduled with PCP for diabetic management on 12/15/17. Patient is in compliance with plan of care as per office visit notes on 17 with PCP. Writer will refill rx as requested at this time.    Prescription refilled per RN Medication Refill Policy.................... Phan Wright RN ....................  2017   10:30 AM

## 2017-12-28 NOTE — PROGRESS NOTES
Patient Information     Patient Name MRN Sex Bailey Law [id number] Female 1950      Progress Notes by Sam Leal MD at 2017 10:30 AM     Author:  Sam Leal MD Service:  (none) Author Type:  Physician     Filed:  2017 10:58 AM Encounter Date:  2017 Status:  Signed     :  Sam Leal MD (Physician)            SUBJECTIVE:  Bailey Sierra is a 67 y.o. female here for follow-up. Patient has a history of type 2 diabetes. She reports that she is currently using 50 units of Lantus at night and 7 units of NovoLog plus a sliding scale during the day with her meals. She's having no hypoglycemia.    She continues to work with nephrology for kidney disease, hypokalemia.    Allergies:  No Known Allergies    ROS:    As above otherwise ROS is unremarkable.    OBJECTIVE:  /58  Pulse 62  Wt 94.3 kg (208 lb)  BMI 35.7 kg/m2    EXAM:  General Appearance: Pleasant, alert, appropriate appearance for age. No acute distress  Lungs: Normal chest wall and respirations. Clear to auscultation, no wheezes or crackles.  Cardiovascular: Regular rate and rhythm. S1, S2, no murmurs.  Musculoskeletal: No edema.    Results for orders placed or performed in visit on 17      Hgb A1c      Result  Value Ref Range    HEMOGLOBIN A1C MONITORING (POCT) 5.5 4.0 - 6.2 %    ESTIMATED AVERAGE GLUCOSE  111 mg/dL   RENAL FUNCTION PANEL      Result  Value Ref Range    SODIUM 139 133 - 143 mmol/L    POTASSIUM 3.5 3.5 - 5.1 mmol/L    CHLORIDE 111 (H) 98 - 107 mmol/L    CO2,TOTAL 24 21 - 31 mmol/L    ANION GAP 4 (L) 5 - 18                    GLUCOSE 126 (H) 70 - 105 mg/dL    CALCIUM 9.3 8.6 - 10.3 mg/dL    BUN 21 7 - 25 mg/dL    CREATININE 2.23 (H) 0.70 - 1.30 mg/dL    BUN/CREAT RATIO           9                    GFR if African American 26 (L) >60 ml/min/1.73m2    GFR if not African American 22 (L) >60 ml/min/1.73m2    PHOSPHORUS 3.0 2.5 - 5.0 mg/dL    ALBUMIN 3.7 3.5 - 5.7 g/dL         ASSESSEMENT AND  PLAN:    Bailey was seen today for follow up.    Diagnoses and all orders for this visit:    Diabetes mellitus without complication (HC)    HYPERTENSION    Above labs were reviewed with the patient. Her diabetes is under great control, continue current regimen. She'll continue following up with nephrology in regards to her kidney function. Recommend yearly fasting labs again in 6 months.      Sukumar Leal MD    This document was prepared using voice generated software.  While every attempt was made for accuracy, grammatical errors may exist.

## 2017-12-28 NOTE — PROGRESS NOTES
"Patient Information     Patient Name MRN Bailey Monique [id number] Female 1950      Progress Notes by Tesha Hernandez at 2017  1:00 PM     Author:  Tesha Hernandez Service:  (none) Author Type:  PT- Physical Therapy Assistant     Filed:  2017  1:31 PM Date of Service:  2017  1:00 PM Status:  Signed     :  Tesha Hernandez (PT- Physical Therapy Assistant)            [facility]  Outpatient PT - Daily note    Date of Service: 2017     Visit # 8 of 10    Patient Name: Bailey Sierra   YOB: 1950   Referring MD/Provider: Vaibhav Traore DO  Medical and Treatment Diagnosis: s/p right shoulder arthroscopy  PT Treatment Diagnosis: impaired shoulder range of motion and strength  Insurance: Medicare  Start of Service: 17  Certification Dates: Start of Service: 17  Medicare/MA Re-Cert Due: 17        Precautions:    Start rehab now  Phase 1 - no passive ROM at shoulder  Phase 2A - on                  Phase 2B - on  - out of pillow in the daytime, wall walk 1X per every hour awake.  Focus is full passive forward flexion by 8 weeks post op.  Wear pillow at night  Phase 3 - on  - out of all gear then.      Subjective      3-4/10 - currently.     \"For some reason it's sore today. I don't know why.\"       Objective  DATE:    ROM:   107 P 114 P 95 pre  122 post 137 P 135 P  130  AA  148 P 142 AA  148 P   Pt specifics: supine flexion (pt position, PROM/AAROM/AROM, pain)    Today s Interventions:     Wall walking x5   Pulleys into flexion x15  Supine wand into flexion x10     PROM flexion and internal rotation to tolerance x15 mins  Sidelying scapular mobilizations all directions x5 mins. Patient very guarded today despite cues for relaxing.  Sidelying soft tissue mobilization scapular and upper back/neck muscles x3 mins  Seated UT and levator stretches x30 sec each    Game ready x15 mins. Patient " "seated, low compression    Bailey had questions regarding use of her sling. PTA recommended she wear it as directed, including \"just walking around the house\".     Home Exercise Program:    Elbow and wrist range of motion, ball squeezes, scapular sets       Date: 06/02/2017 Prepared by: Tesha Hernandez [email or web address]      Standing Shoulder Flexion Wall Walk - 1 reps - 1 sets - 5 hold - 3x daily - 7x weekly    Seated Shoulder Flexion AAROM with Pulley Behind - 10 reps - 3 sets - 5 hold - 2x daily - 7x weekly    Supine Shoulder Flexion Extension AAROM with Dowel - 10-20 reps - 1 sets - 5 hold - 2x daily - 7x weekly    Assessment    Patient demonstrates improving passive range of motion with complaints of soreness in anterior shoulder.    The patient is appropriate for physical therapy to address their pain, functional limitations, and physical impairments.    Patient Specific Functional and Pain Scales (PSFS):    Clinician Instructions: Complete after the history and before the exam.    Initial Assessment: We want to know what 3 activities in your life you are unable to perform, or are having the most difficulty performing, as a result of your chief problem. Please list and score at least 3 activities that you are unable to perform, or having the most difficulty performing, because of your chief problem.   Patient Specific Activity Scoring Scheme (score one number for each activity):   Activity Score (0-10)  0= Unable to perform activity  10= Able to perform activity at same level as before injury or problem   1. dishes 0/10   2. cooking 0/10   3. Taking garbage out 0/10   4. Reaching and lifting 0/10   5.  /10   Totals:  0/40 = 0 % ability which relates to 100% impairment    Patient verbally states that they understand that the information they have provided above is current and complete to the best of their knowledge.    Patient Specific Functional Scale Modifier Scale Conversion: (patient's modifier that correlates with " pt's score on PSFS): 0-CN (100% Impaired).    G codes and Modifier taken from pt completing a PSFS: completed at initial evaluation   Initial Primary G Code and Modifier:    Per the Patient's intake and/or assessment the Primary G Code is: Handling Objects .   The Patient's Impairment, Limitation or Restriction Modifier would be best described as: CL - 60% - 80% Impairment.     Goal Primary G Code and Modifier:    The Patient's G Code Goal would be: Handling Objects    The Patient's Impairment, Limitation or Restriction Modifier goal would be best described as: CI - 1% - 20% Impairment.         Goals:  Functional Short Term Goals (8 weeks):    Patient is to have improved reaching ability to allow dressing and self-cares with minimal to no pain.   Patient will have improved shoulder flexion to 100 degrees to allow for improved reaching ability with less than 3/10 pain.    Patient is to complete hygiene activities with less than 3/10 pain consistently.    Functional Long Term Goals (16 weeks):     Patient is to self-manage symptoms and return to prior level of function.    Patient is to be independent in their Home Exercise Program.  Patient is to tolerate all reaching and lifting activities above shoulder height with less than 2/10 pain.    Patient is to report less than 2/10 pain cooking and cleaning type activities.      Plan    Treatment Plan:      Frequency:   16 visits    Duration of Treatment: 16 weeks    Plan for next visit:  Phase 2B    Thank you for your referral to [facility].  Please call with any questions, concerns or comments.  [phone]  Lina Ruiz, PT, DPT

## 2017-12-28 NOTE — PROGRESS NOTES
"Patient Information     Patient Name MRN Bailey Monique [id number] Female 1950      Progress Notes by Lina Ruiz, PT at 2017  1:49 PM     Author:  Lina Ruiz, PT Service:  (none) Author Type:  PT- Physical Therapist     Filed:  2017  5:23 PM Date of Service:  2017  1:49 PM Status:  Signed     :  Lina Ruiz PT (PT- Physical Therapist)            [facility]  Outpatient PT - Daily note    Date of Service: 2017     Visit # 7 of 10    Patient Name: Bailey Sierra   YOB: 1950   Referring MD/Provider: Vaibhav Traore DO  Medical and Treatment Diagnosis: s/p right shoulder arthroscopy  PT Treatment Diagnosis: impaired shoulder range of motion and strength  Insurance: Medicare  Start of Service: 17  Certification Dates: Start of Service: 17  Medicare/MA Re-Cert Due: 17        Precautions:    Start rehab now  Phase 1 - no passive ROM at shoulder  Phase 2A - on                  Phase 2B - on  - out of pillow in the daytime, wall walk 1X per every hour awake.  Focus is full passive forward flexion by 8 weeks post op.  Wear pillow at night  Phase 3 - on  - out of all gear then.      Subjective    3-4/10 - currently. Did a little bit of exercising before she came \"so that's probably why it's sore.\"    Finally figured out how to put on socks without bringing the shoulder up. Saw Dr. Traore recently, reports that she gets to go back to work on  on light duty.     Objective  DATE:     ROM:   107 P 114 P 95 pre  122 post 137 P 135 P  130  AA  148 P    Pt specifics: supine flexion (pt position, PROM/AAROM/AROM, pain)    Today s Interventions:   Pulleys into flexion x10   Wall walking x5   Supine wand into flexion x10     PROM flexion and internal rotation to tolerance x15 mins  Sidelying scapular mobilizations all directions x5 mins. Patient very guarded today despite cues for " relaxing.  Sidelying soft tissue mobilization scapular and upper back/neck muscles x3 mins  Seated UT and levator stretches x30 sec each    Game ready x10 mins. Patient seated, low compression      Home Exercise Program:    Elbow and wrist range of motion, ball squeezes, scapular sets       Date: 06/02/2017 Prepared by: Tesha Hernandez [id number]      Standing Shoulder Flexion Wall Walk - 1 reps - 1 sets - 5 hold - 3x daily - 7x weekly    Seated Shoulder Flexion AAROM with Pulley Behind - 10 reps - 3 sets - 5 hold - 2x daily - 7x weekly    Supine Shoulder Flexion Extension AAROM with Dowel - 10-20 reps - 1 sets - 5 hold - 2x daily - 7x weekly    Assessment    Patient demonstrates improving passive range of motion with complaints of soreness in anterior shoulder.    The patient is appropriate for physical therapy to address their pain, functional limitations, and physical impairments.    Patient Specific Functional and Pain Scales (PSFS):    Clinician Instructions: Complete after the history and before the exam.    Initial Assessment: We want to know what 3 activities in your life you are unable to perform, or are having the most difficulty performing, as a result of your chief problem. Please list and score at least 3 activities that you are unable to perform, or having the most difficulty performing, because of your chief problem.   Patient Specific Activity Scoring Scheme (score one number for each activity):   Activity Score (0-10)  0= Unable to perform activity  10= Able to perform activity at same level as before injury or problem   1. dishes 0/10   2. cooking 0/10   3. Taking garbage out 0/10   4. Reaching and lifting 0/10   5.  /10   Totals:  0/40 = 0 % ability which relates to 100% impairment    Patient verbally states that they understand that the information they have provided above is current and complete to the best of their knowledge.    Patient Specific Functional Scale Modifier Scale Conversion:  (patient's modifier that correlates with pt's score on PSFS): 0-CN (100% Impaired).    G codes and Modifier taken from pt completing a PSFS: completed at initial evaluation   Initial Primary G Code and Modifier:    Per the Patient's intake and/or assessment the Primary G Code is: Handling Objects .   The Patient's Impairment, Limitation or Restriction Modifier would be best described as: CL - 60% - 80% Impairment.     Goal Primary G Code and Modifier:    The Patient's G Code Goal would be: Handling Objects    The Patient's Impairment, Limitation or Restriction Modifier goal would be best described as: CI - 1% - 20% Impairment.         Goals:  Functional Short Term Goals (8 weeks):    Patient is to have improved reaching ability to allow dressing and self-cares with minimal to no pain.   Patient will have improved shoulder flexion to 100 degrees to allow for improved reaching ability with less than 3/10 pain.    Patient is to complete hygiene activities with less than 3/10 pain consistently.    Functional Long Term Goals (16 weeks):     Patient is to self-manage symptoms and return to prior level of function.    Patient is to be independent in their Home Exercise Program.  Patient is to tolerate all reaching and lifting activities above shoulder height with less than 2/10 pain.    Patient is to report less than 2/10 pain cooking and cleaning type activities.      Plan    Treatment Plan:      Frequency:   16 visits    Duration of Treatment: 16 weeks    Plan for next visit:  Phase 2B    Thank you for your referral to [facility].  Please call with any questions, concerns or comments.  [phone]  Lina Ruiz, PT, DPT

## 2017-12-28 NOTE — PROGRESS NOTES
"Patient Information     Patient Name MRN Bailey Monique [id number] Female 1950      Progress Notes by Lina Ruiz, PT at 2017  1:03 PM     Author:  Lina Ruiz, PT Service:  (none) Author Type:  PT- Physical Therapist     Filed:  2017  4:41 PM Date of Service:  2017  1:03 PM Status:  Signed     :  Lina Ruiz PT (PT- Physical Therapist)            [facility]  Outpatient PT - Daily note    Date of Service: 2017     Visit # 18 (8 of 10)    Patient Name: Bailey Sierra   YOB: 1950   Referring MD/Provider: Vaibhav Traore DO  Medical and Treatment Diagnosis: s/p right shoulder arthroscopy  PT Treatment Diagnosis: impaired shoulder range of motion and strength  Insurance: Medicare  Start of Service: 17  Certification Dates: 6/15/2017  Medicare/MA Re-Cert Due:  8/10/2017       Precautions:    Phase 3 - on  - out of all gear then.      Subjective    Rates pain /10 today. Sore in the anterior aspect of the shoulder. Reports trying to use the right arm for reaching, but not too much for lifting yet. \"I'm careful.\"        Objective  DATE: 5/17 5/22 5/25 5/30 6/2 6/6 6/8 6/12 6/15 6/19 6/22   ROM:   107 P 114 P 95 pre  122 post 137 P 135 P  130  AA  148 P 142 AA  148 P 147 AA  152 P 150 AA  150 P 152 AA  154 P 150 AA  155 P   Pt specifics: supine flexion (pt position, PROM/AAROM/AROM, pain)  DATE:       ROM:   123 AROM standing  160 AAROM supine 125  AROM  standing  160  AAROM  supine 123 AROM  163 AAROM 163 AAROM        Today s Interventions:   UBC standing level 60 clockwise and counter clockwise x5 mins total    CASSANDRA punches 2.5 kg  CASSANDRA adduction 3 kg    Med x:   Rows 64# 2 x15   Lat pull 86# seat 4 x15   Chest press 32# seat 4, x15   Dips 80# seat 4 x15    Snow angels on wall x10    Standing flexion and abduction with cane x15 each  BAW horizontal abduction, extension with 2# weight x15    D2 flexion no " weight to shoulder height, flexion to 80 degrees, standing with manual assist    Ball roll on wall x1 min each clockwise and counter clockwise     Supine range of motion into flexion x10 added 2# weight on wrist today  Supine shoulder circles x1 min clockwise and counter clockwise 2# weight on wrist    Side lying abduction no weight, external rotation 2# weight    PROM all motions to tolerance x 5 mins    Standing flex to 90 deg in front of mirror to avoid shoulder elevation x10 with tactile cuing- deferred today  TRX pec stretch 2x30 sec. Rows x15    Game ready x15 mins. Patient seated, low compression    Home Exercise Program:      Access Code: [email or web address] URL: [email or web address] Date: 06/22/2017 Prepared by: Lina Ruiz   Exercises   Doorway Pec Stretch at 90 Degrees Abduction - 2 Sets - 30 Hold (sec) - 1x daily - 5x weekly   Supine Shoulder Circles - 10-15 reps - 1-2 sets - 5 seconds hold - 1x daily   Shoulder External Rotation with Anchored Resistance - 10-15 reps - 1-2 sets - 5 seconds hold - 1x daily   Supine Shoulder External Rotation with Dowel - 10-15 reps - 1-2 sets - 5 seconds hold - 1x daily   Scapular Retraction with Resistance Advanced - 15-20 reps - 1-2 sets - 5 hold - 1x daily   Shoulder Flexion Overhead with Dowel - 8-10 reps - 1-2 sets - 5 hold - 1x daily   Standing Shoulder Abduction AAROM with Dowel - 8-10 reps - 1-2 sets - 5 hold - 1x daily   Standing Shoulder Flexion Wall Walk - 8-10 reps - 1-2 sets - 5 hold - 1x daily   Single Arm Punch with Resistance - 10-15 reps - 1-2 sets - 5 seconds hold - 1x daily       Assessment    Patient demonstrates slowly improving ability to perform shoulder flexion without compensations. Tolerated progression of strengthening today without increase in pain, but definite fatigue.    The patient is appropriate for physical therapy to address their pain, functional limitations, and physical impairments.    Patient Specific Functional and Pain Scales  (PSFS):    Clinician Instructions: Complete after the history and before the exam.    Initial Assessment: We want to know what 3 activities in your life you are unable to perform, or are having the most difficulty performing, as a result of your chief problem. Please list and score at least 3 activities that you are unable to perform, or having the most difficulty performing, because of your chief problem.   Patient Specific Activity Scoring Scheme (score one number for each activity):   Activity Score (0-10)  0= Unable to perform activity  10= Able to perform activity at same level as before injury or problem Score (0-10)  0= Unable to perform activity  10= Able to perform activity at same level as before injury or problem  6/15/2017    1. dishes 0/10 4/10   2. cooking 0/10 4/10   3. Taking garbage out 0/10 6/10   4. Reaching and lifting 0/10 3/10   5.  /10    Totals:  0/40 = 0 % ability which relates to 100% impairment 17/40= 42.5% ability, 57.5% impairment    Patient verbally states that they understand that the information they have provided above is current and complete to the best of their knowledge.    Patient Specific Functional Scale Modifier Scale Conversion: (patient's modifier that correlates with pt's score on PSFS): 5-Ck (50% Impaired).    G codes and Modifier taken from pt completing a PSFS: updated 6/15/2017    Current Primary G Code and Modifier:    Per the Patient's intake and/or assessment the Primary G Code is: Handling Objects .   The Patient's Impairment, Limitation or Restriction Modifier would be best described as: CK - 40% - 60% Impairment.     Goal Primary G Code and Modifier:    The Patient's G Code Goal would be: Handling Objects    The Patient's Impairment, Limitation or Restriction Modifier goal would be best described as: CI - 1% - 20% Impairment.         Goals:  Functional Short Term Goals (8 weeks):    Patient is to have improved reaching ability to allow dressing and  self-cares with minimal to no pain. 50% met  Patient will have improved shoulder flexion to 100 degrees to allow for improved reaching ability with less than 3/10 pain.  50% met with compensation patterns noted.  Patient is to complete hygiene activities with less than 3/10 pain consistently. Progressing. 3-4/10    Functional Long Term Goals (16 weeks): progressing.    Patient is to self-manage symptoms and return to prior level of function.    Patient is to be independent in their Home Exercise Program.  Patient is to tolerate all reaching and lifting activities above shoulder height with less than 2/10 pain.    Patient is to report less than 2/10 pain cooking and cleaning type activities.      Plan    Treatment Plan:      Frequency:   16 visits (10 additional)    Duration of Treatment: 16 weeks (8 weeks)    Plan for next visit:  Phase 3, strength and active motion    Thank you for your referral to [facility].  Please call with any questions, concerns or comments.  [phone]

## 2017-12-28 NOTE — TELEPHONE ENCOUNTER
Patient Information     Patient Name MRN Bailey Monique [id number] Female 1950      Telephone Encounter by Phan Wright RN at 2017  9:32 AM     Author:  Phan Wright RN Service:  (none) Author Type:  NURS- Registered Nurse     Filed:  2017  9:46 AM Encounter Date:  2017 Status:  Signed     :  Phan Wright RN (NURS- Registered Nurse)            Diabetes    Office visit in the past 12 months or per provider note.    Last visit with CELSA BUCK was on: 2017 in [facility]  Next visit with CELSA BUCK is on: No future appointment listed with this provider  Next visit with Family Practice is on: No future appointment listed in this department    Lab test requirements:  HgbA1c annually or per provider note.  HEMOGLOBIN A1C MONITORING (POCT)    Date Value   2017 5.5 %   2012 6.9 % NGSP (H)     Max refill for 12 months from last office visit or per provider note.    Chart review shows that last office visit with PCP noted to be on on 17. Diagnosis of diabetes was noted at that office visit as per office visit notes. PCP states in notes at that office visit as follows:    She reports that she is currently using 50 units of Lantus at night and 7 units of NovoLog plus a sliding scale during the day with her meals.    Her diabetes is under great control, continue current regimen. She'll continue following up with nephrology in regards to her kidney function. Recommend yearly fasting labs again in 6 months.    Writer will update sig from pharmacy to match current sig in chart, as well as sig as noted above and fill rx as requested for a 6 month supply.     Prescription refilled per RN Medication Refill Policy.................... Phan Wright RN ....................  2017   9:35 AM

## 2017-12-28 NOTE — PROGRESS NOTES
"Patient Information     Patient Name MRN Bailey Monique [id number] Female 1950      Progress Notes by Lina Ruiz, PT at 2017 10:31 AM     Author:  Lina Ruiz PT Service:  (none) Author Type:  PT- Physical Therapist     Filed:  2017 12:49 PM Date of Service:  2017 10:31 AM Status:  Signed     :  Lina Ruiz PT (PT- Physical Therapist)            [facility]  Outpatient PT - Daily note    Date of Service: 2017     Visit # 17 (7 of 10)    Patient Name: Bailey Sierra   YOB: 1950   Referring MD/Provider: Vaibhav Traore DO  Medical and Treatment Diagnosis: s/p right shoulder arthroscopy  PT Treatment Diagnosis: impaired shoulder range of motion and strength  Insurance: Medicare  Start of Service: 17  Certification Dates: 6/15/2017  Medicare/MA Re-Cert Due:  8/10/2017       Precautions:    Phase 3 - on  - out of all gear then.      Subjective    4/10 - right shoulder. \"Sore today. I don't know why.\" Has been lifting the shoulder up and doing fine with that.    85% back to normal. Has been reaching up to do her hair but still feeling weak.         Objective  DATE: 5/17 5/22 5/25 5/30 6/2 6/6 6/8 6/12 6/15 6/19 6/22   ROM:   107 P 114 P 95 pre  122 post 137 P 135 P  130  AA  148 P 142 AA  148 P 147 AA  152 P 150 AA  150 P 152 AA  154 P 150 AA  155 P   Pt specifics: supine flexion (pt position, PROM/AAROM/AROM, pain)  DATE:        ROM:   123 AROM standing  160 AAROM supine 125  AROM  standing  160  AAROM  supine 123 AROM  163 AAROM         Today s Interventions:   UBC standing level 60 clockwise and counter clockwise x2 mins each    Pulleys into flexion and abduction x15-20    CASSANDRA punches 2 kg  CASSANDRA adduction 3 kg  CASSANDRA external rotation     Med x:   Rows 60# 2 x10   Lat pull 80# seat 4 x15   Chest press 30# seat 4, 2 x10    Snow angels on wall x10    Standing flexion and abduction with cane x15 each  BAW " horizontal abduction     D2 flexion no weight to shoulder height, flexion to 100 degrees, standing    Ball roll on wall x1 min each clockwise and counter clockwise     Supine range of motion into flexion x10 added 2# weight on wrist today  Supine shoulder circles x1 min clockwise and counter clockwise     Side lying abduction no weight, external rotation 1# weight    PROM all motions to tolerance x 5 mins    Standing flex to 90 deg in front of mirror to avoid shoulder elevation x10 with tactile cuing  TRX pec stretch 2x30 sec     Game ready x15 mins. Patient seated, low compression    Home Exercise Program:      Access Code: [email or web address] URL: [email or web address] Date: 06/22/2017 Prepared by: Lina Ruiz   Exercises   Doorway Pec Stretch at 90 Degrees Abduction - 2 Sets - 30 Hold (sec) - 1x daily - 5x weekly   Supine Shoulder Circles - 10-15 reps - 1-2 sets - 5 seconds hold - 1x daily   Shoulder External Rotation with Anchored Resistance - 10-15 reps - 1-2 sets - 5 seconds hold - 1x daily   Supine Shoulder External Rotation with Dowel - 10-15 reps - 1-2 sets - 5 seconds hold - 1x daily   Scapular Retraction with Resistance Advanced - 15-20 reps - 1-2 sets - 5 hold - 1x daily   Shoulder Flexion Overhead with Dowel - 8-10 reps - 1-2 sets - 5 hold - 1x daily   Standing Shoulder Abduction AAROM with Dowel - 8-10 reps - 1-2 sets - 5 hold - 1x daily   Standing Shoulder Flexion Wall Walk - 8-10 reps - 1-2 sets - 5 hold - 1x daily   Single Arm Punch with Resistance - 10-15 reps - 1-2 sets - 5 seconds hold - 1x daily       Assessment    Patient demonstrates slowly improving ability to perform shoulder flexion without compensations. Tolerated progression of strengthening today without increase in pain, but definite fatigue.    The patient is appropriate for physical therapy to address their pain, functional limitations, and physical impairments.    Patient Specific Functional and Pain Scales (PSFS):    Clinician  Instructions: Complete after the history and before the exam.    Initial Assessment: We want to know what 3 activities in your life you are unable to perform, or are having the most difficulty performing, as a result of your chief problem. Please list and score at least 3 activities that you are unable to perform, or having the most difficulty performing, because of your chief problem.   Patient Specific Activity Scoring Scheme (score one number for each activity):   Activity Score (0-10)  0= Unable to perform activity  10= Able to perform activity at same level as before injury or problem Score (0-10)  0= Unable to perform activity  10= Able to perform activity at same level as before injury or problem  6/15/2017    1. dishes 0/10 4/10   2. cooking 0/10 4/10   3. Taking garbage out 0/10 6/10   4. Reaching and lifting 0/10 3/10   5.  /10    Totals:  0/40 = 0 % ability which relates to 100% impairment 17/40= 42.5% ability, 57.5% impairment    Patient verbally states that they understand that the information they have provided above is current and complete to the best of their knowledge.    Patient Specific Functional Scale Modifier Scale Conversion: (patient's modifier that correlates with pt's score on PSFS): 5-Ck (50% Impaired).    G codes and Modifier taken from pt completing a PSFS: updated 6/15/2017    Current Primary G Code and Modifier:    Per the Patient's intake and/or assessment the Primary G Code is: Handling Objects .   The Patient's Impairment, Limitation or Restriction Modifier would be best described as: CK - 40% - 60% Impairment.     Goal Primary G Code and Modifier:    The Patient's G Code Goal would be: Handling Objects    The Patient's Impairment, Limitation or Restriction Modifier goal would be best described as: CI - 1% - 20% Impairment.         Goals:  Functional Short Term Goals (8 weeks):    Patient is to have improved reaching ability to allow dressing and self-cares with minimal to  no pain. 50% met  Patient will have improved shoulder flexion to 100 degrees to allow for improved reaching ability with less than 3/10 pain.  50% met with compensation patterns noted.  Patient is to complete hygiene activities with less than 3/10 pain consistently. Progressing. 3-4/10    Functional Long Term Goals (16 weeks): progressing.    Patient is to self-manage symptoms and return to prior level of function.    Patient is to be independent in their Home Exercise Program.  Patient is to tolerate all reaching and lifting activities above shoulder height with less than 2/10 pain.    Patient is to report less than 2/10 pain cooking and cleaning type activities.      Plan    Treatment Plan:      Frequency:   16 visits (10 additional)    Duration of Treatment: 16 weeks (8 weeks)    Plan for next visit:  Phase 3, strength and active motion    Thank you for your referral to [facility].  Please call with any questions, concerns or comments.  [phone]

## 2017-12-28 NOTE — PROGRESS NOTES
"Patient Information     Patient Name MRN Bailey Monique [id number] Female 1950      Progress Notes by Lina Ruiz PT at 2017  1:08 PM     Author:  Lina Ruiz PT Service:  (none) Author Type:  PT- Physical Therapist     Filed:  2017  2:04 PM Date of Service:  2017  1:08 PM Status:  Signed     :  Lina Ruiz PT (PT- Physical Therapist)            [facility]  Outpatient PT - Daily note    Date of Service: 2017     Visit # 9 of 10    Patient Name: Bailey Sierra   YOB: 1950   Referring MD/Provider: Vaibhav Traore DO  Medical and Treatment Diagnosis: s/p right shoulder arthroscopy  PT Treatment Diagnosis: impaired shoulder range of motion and strength  Insurance: Medicare  Start of Service: 17  Certification Dates: Start of Service: 17  Medicare/MA Re-Cert Due: 17        Precautions:    Start rehab now  Phase 1 - no passive ROM at shoulder  Phase 2A - on                  Phase 2B - on  - out of pillow in the daytime, wall walk 1X per every hour awake.  Focus is full passive forward flexion by 8 weeks post op.  Wear pillow at night  Phase 3 - on  - out of all gear then.      Subjective      \"maybe a 1\" out of 10 - currently.        Objective  DATE:    ROM:   107 P 114 P 95 pre  122 post 137 P 135 P  130  AA  148 P 142 AA  148 P 147 AA  152 P   Pt specifics: supine flexion (pt position, PROM/AAROM/AROM, pain)    Today s Interventions:   TRX flexion stretch x1 min and rows x15. Cues for scapular depression  Wall walking x5 Patient reached rung 27  Pulleys into flexion x15  Supine wand into flexion x10     PROM flexion and internal rotation to tolerance x15 mins  Sidelying scapular mobilizations all directions x5 mins. Patient moderately guarded today despite cues for relaxing.  Sidelying soft tissue mobilization scapular and upper back/neck muscles x3 mins    Game " ready x15 mins. Patient seated, low compression    Educated patient she can be out of sling on 6/14.    Home Exercise Program:    Elbow and wrist range of motion, ball squeezes, scapular sets       Date: 06/02/2017 Prepared by: Tesha Hernandez [email or web address]      Standing Shoulder Flexion Wall Walk - 1 reps - 1 sets - 5 hold - 3x daily - 7x weekly    Seated Shoulder Flexion AAROM with Pulley Behind - 10 reps - 3 sets - 5 hold - 2x daily - 7x weekly    Supine Shoulder Flexion Extension AAROM with Dowel - 10-20 reps - 1 sets - 5 hold - 2x daily - 7x weekly    Assessment    Patient demonstrates increased active assisted and passive range of motion today with increased soreness.    The patient is appropriate for physical therapy to address their pain, functional limitations, and physical impairments.    Patient Specific Functional and Pain Scales (PSFS):    Clinician Instructions: Complete after the history and before the exam.    Initial Assessment: We want to know what 3 activities in your life you are unable to perform, or are having the most difficulty performing, as a result of your chief problem. Please list and score at least 3 activities that you are unable to perform, or having the most difficulty performing, because of your chief problem.   Patient Specific Activity Scoring Scheme (score one number for each activity):   Activity Score (0-10)  0= Unable to perform activity  10= Able to perform activity at same level as before injury or problem   1. dishes 0/10   2. cooking 0/10   3. Taking garbage out 0/10   4. Reaching and lifting 0/10   5.  /10   Totals:  0/40 = 0 % ability which relates to 100% impairment    Patient verbally states that they understand that the information they have provided above is current and complete to the best of their knowledge.    Patient Specific Functional Scale Modifier Scale Conversion: (patient's modifier that correlates with pt's score on PSFS): 0-CN (100% Impaired).    G codes and  Modifier taken from pt completing a PSFS: completed at initial evaluation   Initial Primary G Code and Modifier:    Per the Patient's intake and/or assessment the Primary G Code is: Handling Objects .   The Patient's Impairment, Limitation or Restriction Modifier would be best described as: CL - 60% - 80% Impairment.     Goal Primary G Code and Modifier:    The Patient's G Code Goal would be: Handling Objects    The Patient's Impairment, Limitation or Restriction Modifier goal would be best described as: CI - 1% - 20% Impairment.         Goals:  Functional Short Term Goals (8 weeks):    Patient is to have improved reaching ability to allow dressing and self-cares with minimal to no pain.   Patient will have improved shoulder flexion to 100 degrees to allow for improved reaching ability with less than 3/10 pain.    Patient is to complete hygiene activities with less than 3/10 pain consistently.    Functional Long Term Goals (16 weeks):     Patient is to self-manage symptoms and return to prior level of function.    Patient is to be independent in their Home Exercise Program.  Patient is to tolerate all reaching and lifting activities above shoulder height with less than 2/10 pain.    Patient is to report less than 2/10 pain cooking and cleaning type activities.      Plan    Treatment Plan:      Frequency:   16 visits    Duration of Treatment: 16 weeks    Plan for next visit:  Phase 2B    Thank you for your referral to [facility].  Please call with any questions, concerns or comments.  [phone]  Lina Ruiz, PT, DPT

## 2017-12-28 NOTE — TELEPHONE ENCOUNTER
Patient Information     Patient Name MRN Bailey Monique [id number] Female 1950      Telephone Encounter by Kenya Teresa at 2017 11:51 AM     Author:  Kenya Teresa Service:  (none) Author Type:  (none)     Filed:  2017 11:52 AM Encounter Date:  2017 Status:  Signed     :  Kenya Teresa            Patient is requesting that her lab orders be placed for her A1C prior to her appointment in December with [facility]. Please call patient to schedule once orders are in.

## 2017-12-28 NOTE — TELEPHONE ENCOUNTER
Patient Information     Patient Name MRN Sex Bailey Law [id number] Female 1950      Telephone Encounter by Phan Wright RN at 2017  2:03 PM     Author:  Phan Wright RN Service:  (none) Author Type:  NURS- Registered Nurse     Filed:  2017  2:07 PM Encounter Date:  2017 Status:  Signed     :  Phan Wright RN (NURS- Registered Nurse)            Diabetic Supplies    Office visit in the past 12 months.    Last visit with CELSA BUCK was on: 2017 in [employer] [facility]  Next visit with CELSA BUCK is on: No future appointment listed with this provider  Next visit with Family Practice is on: No future appointment listed in this department    Max refill for 12 months from last office visit.    Needs not be listed on Med List to fill.  Need new RX every 12 months or if exceeds the limitations set by Medicare, then every 6 months.  Also when testing frequency is changed is there a need to obtain a new order.  A refill request does not need to be approved by the ordering physician-a beneficiary or their caregiver may request refills.  Physicians are not required to fill out additional forms such as home testing results for suppliers or provide additional documentation unless the supplier is audited and the  is requesting such documentation.    Chart review shows that patient was seen by PCP for diagnosis af diabetes on 17. PCP states in that office visit as follows:    Above labs were reviewed with the patient. Her diabetes is under great control, continue current regimen. She'll continue following up with nephrology in regards to her kidney function. Recommend yearly fasting labs again in 6 months.    No changes noted in relation to diabetic testing frequency. Writer will refill rx as requested at this time.    Prescription refilled per RN Medication Refill Policy.................... Phan Wright RN ....................  2017   2:06 PM

## 2017-12-29 ENCOUNTER — COMMUNICATION - GICH (OUTPATIENT)
Dept: FAMILY MEDICINE | Facility: OTHER | Age: 67
End: 2017-12-29

## 2017-12-29 DIAGNOSIS — E11.9 TYPE 2 DIABETES MELLITUS WITHOUT COMPLICATIONS (H): ICD-10-CM

## 2017-12-29 NOTE — DISCHARGE SUMMARY
Patient Information     Patient Name MRN Bailey Monique [id number] Female 1950      Discharge Summaries by Lina Ruiz PT at 10/5/2017  3:53 PM     Author:  Lina Ruiz PT Service:  (none) Author Type:  PT- Physical Therapist     Filed:  10/5/2017  3:55 PM Date of Service:  10/5/2017  3:53 PM Status:  Signed     :  Lina Ruiz PT (PT- Physical Therapist)            [facility]  Outpatient PT - Discharge Summary    Date of discharge: 10/5/2017     Patient Name: Bailey Sierra   YOB: 1950   Referring MD/Provider: Vaibhav Traore DO  Medical and Treatment Diagnosis: s/p right shoulder arthroscopy  PT Treatment Diagnosis: impaired shoulder range of motion and strength  Insurance: Medicare  Start of Service: 17      Subjective  from last visit on 17:  Patient denies pain today, does get sore still with the fan blowing on her shoulder. Biggest complaint is tiring easily and not able to lift/carry groceries like normal yet. Other than that she is reaching up to do her hair, hang clothes with the right arm. 80% back to normal overall, and the only issue is weakness.       Dates of Service: 17    Thru:  17  Number of visits: 19           Reason for Discharge:  Goals of therapy met     Progress from Initial to Discharge (if applicable):  Decreased pain   Increased ROM   Increased strength    Improved overhead reaching   Improved ability to reach behind back   Improved lifting/carrying   Improved sleep     Comments: Please see last visit note from 17 for details of patient progress. This is an unplanned discharge.    Further Recommendations:    Patient will continue with established home exercise program    Patient is discharged from Physical Therapy    Thank you for your referral to [facility].  Please call with any questions, concerns or comments.  [phone]          Lina Ruiz PT, DPT

## 2017-12-30 NOTE — NURSING NOTE
Patient Information     Patient Name MRBailey Hurst [id number] Female 1950      Nursing Note by Meg Dennison RN at 2017  9:45 AM     Author:  Meg Dennison RN Service:  (none) Author Type:  NURS- Registered Nurse     Filed:  2017 10:16 AM Encounter Date:  2017 Status:  Signed     :  Meg Dennison RN (NURS- Registered Nurse)            Per Adelso the [employer] representative patient is now moved to program 4 and will do a 12 day bladder diary.  She will bring the diary in and it can be given to Adelso.  Meg Dennison RN.........2017...10:16 AM

## 2017-12-30 NOTE — NURSING NOTE
Patient Information     Patient Name MRN Bailey Monique [id number] Female 1950      Nursing Note by Ingrid Erazo at 2017 10:30 AM     Author:  Ingrid Erazo Service:  (none) Author Type:  (none)     Filed:  2017 10:44 AM Encounter Date:  2017 Status:  Signed     :  Ingrid Erazo            Patient presents today to follow up on labs drawn 17.  Ingrid Erazo LPN .............2017  10:38 AM

## 2017-12-30 NOTE — NURSING NOTE
Patient Information     Patient Name MRN Bailey Monique [id number] Female 1950      Nursing Note by Iveth Lin at 2017 11:00 AM     Author:  Iveth Lin Service:  (none) Author Type:  (none)     Filed:  2017  1:13 PM Encounter Date:  2017 Status:  Signed     :  Iveth Lin [employer] rep pt is to follow up in 4-6 weeks with Bonny. Device is to be shut off for 2 weeks than restarted, and to complete a new diary.  Iveth Lin LPN  2017  1:13 PM

## 2017-12-30 NOTE — NURSING NOTE
Patient Information     Patient Name MRN Bailey Monique [id number] Female 1950      Nursing Note by Judith Cortez at 2017  9:45 AM     Author:  Judith Cortez Service:  (none) Author Type:  (none)     Filed:  2017 10:16 AM Encounter Date:  2017 Status:  Signed     :  Judith Cortez            Patient presents to the clinic for follow up with [facility] mitali.  Judith Cortez LPN........................2017  9:46 AM

## 2017-12-30 NOTE — NURSING NOTE
Patient Information     Patient Name MRN Bailey Monique [id number] Female 1950      Nursing Note by Iveth Lin at 2017 11:00 AM     Author:  Iveth Lin Service:  (none) Author Type:  (none)     Filed:  2017 11:18 AM Encounter Date:  2017 Status:  Signed     :  Iveth Lin            [facility] repShreya Lin LPN  2017  11:18 AM

## 2017-12-30 NOTE — NURSING NOTE
Patient Information     Patient Name MRN Sex Bailey Law [id number] Female 1950      Nursing Note by Rochelle Sanches at 2017 10:30 AM     Author:  Rochelle Sanches Service:  (none) Author Type:  (none)     Filed:  2017 10:38 AM Encounter Date:  2017 Status:  Signed     :  Rochelle Sanches            Pt presents for a follow up s/p right shoulder scope, dos 17    Rochelle Sanches CMA 2017 10:37 AM

## 2017-12-30 NOTE — NURSING NOTE
Patient Information     Patient Name MRN Sex Bailey Law [id number] Female 1950      Nursing Note by Monie Hu at 2017 11:15 AM     Author:  Monie Hu Service:  (none) Author Type:  (none)     Filed:  2017 11:23 AM Encounter Date:  2017 Status:  Signed     :  Monie Hu            Patient is here today for rt shoulder follow up  Monie Hu LPN 2017 11:21 AM

## 2017-12-30 NOTE — NURSING NOTE
Patient Information     Patient Name MRBailey Hurst [id number] Female 1950      Nursing Note by Meg Dennison RN at 2017  9:00 AM     Author:  Meg Dennison RN Service:  (none) Author Type:  NURS- Registered Nurse     Filed:  2017  9:49 AM Encounter Date:  2017 Status:  Signed     :  Meg Dennison RN (NURS- Registered Nurse)            Per Adelso the [employer] Inter[employer] representative the patient changed programs.  She will do a 12 day diary and bring it back into clinic.  We will show Adelso once received.  Meg Dennison RN.........2017...9:49 AM

## 2017-12-30 NOTE — NURSING NOTE
Patient Information     Patient Name MRBailey Hurst [id number] Female 1950      Nursing Note by Monie Hu at 10/10/2017 10:45 AM     Author:  Monie Hu Service:  (none) Author Type:  (none)     Filed:  10/10/2017 12:30 PM Encounter Date:  10/10/2017 Status:  Signed     :  Monie Hu            Patient saw Adelso the [employer] Rep.for interstem check. He changed her to program 1.  She will do a two week diary and return to clinic to see Adelso in approximately two weeks.  Monie Hu LPN 10/10/2017 12:30 PM

## 2017-12-30 NOTE — NURSING NOTE
Patient Information     Patient Name MRN Bailey Monique [id number] Female 1950      Nursing Note by Judith Cortez at 2017  9:00 AM     Author:  Judith Cortez Service:  (none) Author Type:  (none)     Filed:  2017  9:49 AM Encounter Date:  2017 Status:  Signed     :  Judith Cortez            Patient presents to the clinic for follow up with [employer] [facility] representative.  Judith Cortez LPN........................2017  9:00 AM

## 2017-12-30 NOTE — NURSING NOTE
Patient Information     Patient Name MRN Sex Bailey Law [id number] Female 1950      Nursing Note by Rochelle Sanches at 2017 11:15 AM     Author:  Rochelle Sanches Service:  (none) Author Type:  (none)     Filed:  2017 11:12 AM Encounter Date:  2017 Status:  Signed     :  Rochelle Sanches            Pt presents for a follow up s/p right shoulder scope, dos     Rochelle Sanches CMA 2017 11:11 AM

## 2018-01-02 NOTE — NURSING NOTE
Patient Information     Patient Name MRN Bailey Monique [id number] Female 1950      Nursing Note by Minoo Laura at 1/10/2017  4:30 PM     Author:  Minoo Laura Service:  (none) Author Type:  (none)     Filed:  1/10/2017  4:44 PM Encounter Date:  1/10/2017 Status:  Signed     :  Minoo Laura            Patient presents for right shoulder pain. States injection did not help  Minoo Laura LPN   1/10/2017  4:25 PM

## 2018-01-03 NOTE — TELEPHONE ENCOUNTER
Patient Information     Patient Name MRN Bailey Monique [id number] Female 1950      Telephone Encounter by Myrna Horton at 2017  8:42 AM     Author:  Myrna Horton Service:  (none) Author Type:  (none)     Filed:  2017  8:46 AM Encounter Date:  2017 Status:  Signed     :  Myrna Horton            Please call patient as she needs lab orders to be put in for her A1C. She has an appointment set up for 2017. Please call her when order are in so she can make th lab appointment.   Myrna Horton ....................  2017   8:46 AM

## 2018-01-03 NOTE — DISCHARGE SUMMARY
"Patient Information     Patient Name MRN Bailey Monique [id number] Female 1950      Discharge Summaries by Ravindra Fung PT at 2017 10:11 AM     Author:  Ravindra Fung PT Service:  (none) Author Type:  PT- Physical Therapist     Filed:  2017  5:04 PM Date of Service:  2017 10:11 AM Status:  Signed     :  Ravindra Fung PT (PT- Physical Therapist)            [facility]  Outpatient PT - Daily/ Discharge Note      Date of Service:  2/3/2017   Appointment #7    Patient Name: Bailey Sierra   YOB: 1950   Referring MD/Provider:Dr. Sam Leal  Medical and Treatment Diagnosis: Chronic right shoulder pain [M25.511, G89.29]   PT Treatment Diagnosis: Right shoulder pain due to impingement syndrome, weakness and immobility  Insurance: Medicare  Start of Service:  2017     Certification Dates: Start of Service: 2017  Medicare/MA Re-Cert Due: 3/11/2017          Subjective        Pain Ratin = Severe Pain, (Miserable, Gnawing, Fierce, Piercing) Notes that after work her shoulder pain is a \"10\". She continues to try an d complete all her work tasks many of which require lifting.  / Location:  Left superior anterior shoulder. Reports that even the supine RC exercises are causing pain. Patient is not taking any pain medications OTC or prescribed.            Objective    Persistent exquisite tenderness in anterior shoulder region with AROM limitations.    Continued patient guarding due to pain.    Shoulder Range of Motion 2017     ACTIVE  PASSIVE      Right Left Right Left Normal   Flexion 115 145   180   Extension     60   Abduction 124 147   180   Int Rotation     70   Ext Rotation     90     Shoulder Strength   Right Left   Flexion 3-    Extension 4    Abduction 3-    Int Rotation 4    Ext Rotation 3-      0-Absent   1-Trace   2-Poor   3-Fair   4-Good   5-Normal        Today's Intervention:   1) Reviewed Physical Therapy POC  2) " Reviewed therapeutic exercise program. The focus of exercises is on restoration of ROM.  3) Again encouraged patient to begin using ice packs 2-3x per day to treat shoulder pain. She should ice before bed time to reduce inflammation allowing better sleep.    Continue current treatment:    Overhead pulleys x 15 flexion and abduction in pain free zone w/u    - Physiologic mobilizations and manual end range stretching. Patient has difficulty relaxing the shoulder to allow Mobilization  - Supine reverse Codman's:   - flex/ext x15    - horiz abd/add x15    - circumduction x15 each clockwise/counterclockwise    - bench press x15    - protraction x15   - IFR e-stim to the right shoulder to treat pain x15 minutes with ice    Reported no pain following IFC and ice, but admits her shoulder usually feels good when ice is on.       -  STM with XFM to right anterior shoulder  -  GH Joint mobilizations  am     Home Exercise Program:   AAROM for all motions of the shoulder  Cervical/neck and upper shoulder stretches.    Assessment    Therapist Assessment:    Patient has not received long term relief with PT treatment. Persistent pain and AROM limitations.      Patient Specific Functional and Pain Scales (PSFS): 2/8/2017     Clinician Instructions: Complete after the history and before the exam.    Initial Assessment: We want to know what 3 activities in your life you are unable to perform, or are having the most difficulty performing, as a result of your chief problem. Please list and score at least 3 activities that you are unable to perform, or having the most difficulty performing, because of your chief problem.   Patient Specific Activity Scoring Scheme (score one number for each activity):   Activity Score (0-10)  0= Unable to perform activity  10= Able to perform activity at same level as before injury or problem   1. Lifting overhead  5/10   2. Grooming 3/10   3. Cleaning/cooking 5/10   4. Shoveling snow 5/10   5.  /10    Totals:  18/40 = 45 % ability which relates to 55% impairment    Patient verbally states that they understand that the information they have provided above is current and complete to the best of their knowledge.    Patient Specific Functional Scale Modifier Scale Conversion: (patient's modifier that correlates with pt's score on PSFS): 5-CK (50% Impaired).    G codes and Modifier taken from patient completing the PSFS:   Initial Primary G Code and Modifier:    Per the Patient's intake and/or assessment the Primary G Code is: Other PT/OT Primary .   The Patient's Impairment, Limitation or Restriction Modifier would be best described as: CK - 40% - 60% Impairment.   Goal Primary G Code and Modifier:    The Patient's G Code Goal would be: Other PT/OT Primary    The Patient's Impairment, Limitation or Restriction Modifier goal would be best described as: CJ - 20% - 40% Impairment.   Discharge Primary G Code and Modifier:    The Patient's status upon Discharge is Other PT/OT Primary    The Patient's Impairment, Limitation or Restriction Modifier would be best described as CK - 40% - 60% Impairment.     Goals:  Patient goal (time reference required): 8 weeks      Long term goal: Patient would like to be able to complete all her tasks and work without shoulder pain. NOT MET      ST) Patient is to be independent in Home Exercise Program in 4-6 weeks.  MET 2017    Below goals NOT MET  2017  2) Patient is to demo shoulder flexion to 140 deg. In 4-6 weeks  3) Patient will report a 30% decrease in shoulder pain to a VAS of no higher than 4-5/10 with use and minimal pain at rest.  4) Patient will be able to reach around the back sufficient to fasten clothing in 4-6 weeks.     Patients long term functional goals:   Below goals NOT MET 2017    Patient is to have improved shoulder elevation to 160 degrees to allow for improved dressing and self-cares with 0-2 pain in 8-10 weeks.  Patient is to  sleep with no disruptions due to shoulder pain in 10 weeks. Patient cannot lay on the right shoulder   Patient is to complete work activities with 0-3/10 pain with no restrictions in 8-12 weeks. She continues to use her shoulder at work for above shoulder lifting despite pain. 2/8/2017    Patient is to demonstrate improved strength to 4/5 minimum with no complaints of inability to perform work related tasks in 8-12 weeks. NOT MET 2/8/2017       Patient is to report 0-3/10 pain during household activities including light cleaning, self-cares in 8-10 weeks NOT MET 2/8/2017         Response to Intervention:  Decreased pain after E-stim  And MT treatment. Rates pain at 4/10.     Plan        Plan for next visit:  Patient is discharged from PT at this time. She is referred back to her PCP for further orthopedic evaluation. She will continue with her HEP with focus on restoring ROM and to continue strengthening. She should continue to use her ice packs at home and try to limit her lifting at work and home.        Thank you for your referral to [facility].  Please call with any questions, concerns or comments.  [phone]

## 2018-01-03 NOTE — NURSING NOTE
Patient Information     Patient Name MRN Sex Bailey Law [id number] Female 1950      Nursing Note by Meg Dennison RN at 3/17/2017 10:45 AM     Author:  Meg Dennison RN Service:  (none) Author Type:  NURS- Registered Nurse     Filed:  3/17/2017 11:00 AM Encounter Date:  3/17/2017 Status:  Signed     :  Meg Dennison RN (NURS- Registered Nurse)            Review of Systems:    Weight loss:    Yes     Recent fever/chills:  No   Night sweats:   No  Current skin rash:  No   Recent hair loss:  No  Heat intolerance:  No   Cold intolerance:  No  Chest pain:   No   Palpitations:   No  Shortness of breath:  Yes   Wheezing:   No  Constipation:    No   Diarrhea:   No   Nausea:   No   Vomiting:   No   Kidney/side pain:  No   Back pain:   Yes  Frequent headaches:  No   Dizziness:     No  Leg swelling:   Yes   Calf pain:    No

## 2018-01-03 NOTE — PROGRESS NOTES
Patient Information     Patient Name MRN Bailey Monique [id number] Female 1950      Progress Notes by Tesha Hernandez at 2/3/2017 12:50 PM     Author:  Tesha Hernandez  Service:  (none) Author Type:  PT- Physical Therapy Assistant     Filed:  2/3/2017  1:39 PM  Date of Service:  2/3/2017 12:50 PM Status:  Addendum     :  Tesha Hernandez (PT- Physical Therapy Assistant)        Related Notes: Original Note by Tesha Hernandez (PT- Physical Therapy Assistant) filed at 2/3/2017  1:37 PM            [facility]  Outpatient PT - Daily Note      Date of Service:  2/3/2017   Appointment #7    Patient Name: Bailey Sierra   YOB: 1950   Referring MD/Provider:Dr. Sam Leal  Medical and Treatment Diagnosis: Chronic right shoulder pain [M25.511, G89.29]   PT Treatment Diagnosis: Right shoulder pain due to impingement syndrome, weakness and immobility  Insurance: Medicare  Start of Service:  2017     Certification Dates: Start of Service: 2017  Medicare/MA Re-Cert Due: 3/11/2017          Subjective        Pain Ratin = Severe Pain, (Miserable, Gnawing, Fierce, Piercing) / Location:  Left superior anterior shoulder.     She reports only short term relief from pain with the PT interventions        Objective    Persistent exquisite tenderness in anterior shoulder region.     Continued patient guarding due to pain.    Today's Intervention:   1) Reviewed Physical Therapy POC  2) Reviewed therapeutic exercise program. The focus of exercises is on restoration of ROM.  3) Again encouraged patient to begin using ice packs 2-3x per day to treat shoulder pain. She should ice before bed time to reduce inflammation allowing better sleep.    Continue current treatment:    Overhead pulleys x 15 flexion and abduction in pain free zone w/u    - Physiologic mobilizations and manual end range stretching. Patient has difficulty relaxing the shoulder to allow Mobilization  -  Supine reverse Codman's:   - flex/ext x15    - horiz abd/add x15    - circumduction x15 each clockwise/counterclockwise    - bench press x15    - protraction x15   - IFR e-stim to the right shoulder to treat pain x15 minutes with ice    Reported no pain following IFC and ice, but admits her shoulder usually feels good when ice is on.     HOLD below 2017  -  STM with XFM to right anterior shoulder  -  GH Joint mobilizations  -  Phonophoresis: 1 MHz at 1.6 W/cm2 /pulsed 50% for 10 minutes to right anterior shoulder  -  Reviewed AAROM exercise program     Home Exercise Program:   AAROM for all motions of the shoulder  Cervical/neck and upper shoulder stretches.    Assessment    Therapist Assessment:    Persistent right shoulder pain, weakness and immobility due to impingement syndrome.        Goals:  Patient goal (time reference required): 8 weeks      Long term goal: Patient would like to be able to complete all her tasks and work without shoulder pain.     ST) Patient is to be independent in Home Exercise Program in 4-6 weeks.  2) Patient is to demo shoulder flexion to 140 deg. In 4-6 weeks  3) Patient will report a 30% decrease in shoulder pain to a VAS of no higher than 4-5/10 with use and minimal pain at rest.  4) Patient will be able to reach around the back sufficient to fasten clothing in 4-6 weeks.     Patients long term functional goals:   Patient is to have improved shoulder elevation to 160 degrees to allow for improved dressing and self-cares with 0-2 pain in 8-10 weeks.  Patient is to sleep with no disruptions due to shoulder pain in 10 weeks.  Patient is to complete work activities with 0-3/10 pain with no restrictions in 8-12 weeks.  Patient is to demonstrate improved strength to 4/5 minimum with no complaints of inability to perform work related tasks in 8-12 weeks.     Patient is to report 0-3/10 pain during household activities including light cleaning, self-cares in 8-10  weeks       Response to Intervention:  Patient is not tolerating manual therapy well due to pain.  She is marginally able to complete her exercises with adequate technique. She reports pain of  4/10  after E-Stim. Her pain was 7/10 previous.      Plan    Treatment Plan / Targeted Outcomes:     Frequency:   16 visits     Duration of Treatment: 60 days    Planned Interventions:    Home Exercise Program development  Therapeutic Exercise (ROM & Strengthening)  Manual Therapy  Neuromuscular Re-education  Ultrasound  Electrical Stimulation  Phonophoresis with Ketoprofen  Iontophoresis with Dexamethasone  Therapeutic Activities    Plan for next visit:  Continue PT. Focus has to be on pain reduction and restoration of mobility.    Student or PTA has been instructed in and demonstrates skills necessary to carry out above stated treatment plan: Yes    Thank you for your referral to [facility].  Please call with any questions, concerns or comments.  [phone]

## 2018-01-03 NOTE — TELEPHONE ENCOUNTER
Patient Information     Patient Name MRN Sex Bailey Law [id number] Female 1950      Telephone Encounter by Pretty Vasquez RN at 2017  8:28 AM     Author:  Pretty Vasquez RN Service:  (none) Author Type:  NURS- Registered Nurse     Filed:  2017  8:33 AM Encounter Date:  2017 Status:  Signed     :  Pretty Vasquez RN (NURS- Registered Nurse)            Diabetes    Office visit in the past 12 months or per provider note.    Last visit with CELSA BUCK was on: 01/10/2017 in [facility]  Next visit with CELSA BUCK is on: No future appointment listed with this provider  Next visit with Family Practice is on: No future appointment listed in this department    Lab test requirements:  HgbA1c annually or per provider note.  HEMOGLOBIN A1C MONITORING (POCT)    Date Value   2016 7.8 % (H)   2012 6.9 % NGSP (H)     HEMOGLOBIN A1C GIH (%)    Date Value   2012 7.2 (H)       Max refill for 12 months from last office visit or per provider note.  Prescription refilled per RN Medication Refill Policy.................... Pretty Vasquez RN ....................  2017   8:32 AM

## 2018-01-03 NOTE — PROGRESS NOTES
"Patient Information     Patient Name MRN Bailey Monique [id number] Female 1950      Progress Notes by Ravindra Fung PT at 2017  3:21 PM     Author:  Ravindra Fung PT Service:  (none) Author Type:  PT- Physical Therapist     Filed:  2017  3:59 PM Date of Service:  2017  3:21 PM Status:  Signed     :  Ravindra Fung PT (PT- Physical Therapist)            [facility]  Outpatient PT - Daily Note      Date of Service:  2017  Appointment #3    Patient Name: Bailey Sierra   YOB: 1950   Referring MD/Provider:Dr. Sam Leal  Medical and Treatment Diagnosis: Chronic right shoulder pain [M25.511, G89.29]   PT Treatment Diagnosis: Right shoulder pain due to impingement syndrome, weakness and immobility  Insurance: Medicare  Start of Service:  2017     Certification Dates: Start of Service: 2017  Medicare/MA Re-Cert Due: 3/11/2017          Subjective        Pain Rating: Increased pain today. She has been \"using it\" all day. She has \"lots of things I have to do\". She has been using ice but does not feel it is helping much. She is not using any pain medications.   6 = Severe Pain, (Miserable, Gnawing, Fierce, Piercing) / Location:  Left superior anterior shoulder.        Objective      Today's Intervention:   1) Reviewed Physical Therapy POC  2) Reviewed therapeutic exercise program  3) Again encouraged patient to begin using ice packs 2-3 X per day to treat shoulder pain. She should ice before bed time to reduce inflammation allowing better sleep.    Continue current treatment  4) STM with XFM to right anterior shoulder  5) GH Joint mobilizations  6) Ultrasound/Phonophoresis: 1 MHz at 1.6 W/cm2 /pulsed 50% for 10 minutes to right anterior shoulder  7) Reviewed AAROM exercise program     Home Exercise Program:   AAROM for all motions of the shoulder  Cervical/neck and upper shoulder stretches.    Assessment    Therapist Assessment:  "   Right shoulder pain, weakness and immobility due to impingement syndrome.        Goals:  Patient goal (time reference required): 8 weeks      Long term goal: Patient would like to be able to complete all her tasks and work without shoulder pain.     ST) Patient is to be independent in Home Exercise Program in 4-6 weeks.  2) Patient is to demo shoulder flexion to 140 deg. In 4-6 weeks  3) Patient will report a 30% decrease in shoulder pain to a VAS of no higher than 4-5/10 with use and minimal pain at rest.  4) Patient will be able to reach around the back sufficient to fasten clothing in 4-6 weeks.     Patients long term functional goals:   Patient is to have improved shoulder elevation to 160 degrees to allow for improved dressing and self-cares with 0-2 pain in 8-10 weeks.  Patient is to sleep with no disruptions due to shoulder pain in 10 weeks.  Patient is to complete work activities with 0-3/10 pain with no restrictions in 8-12 weeks.  Patient is to demonstrate improved strength to 4/5 minimum with no complaints of inability to perform work related tasks in 8-12 weeks.     Patient is to report 0-3/10 pain during household activities including light cleaning, self-cares in 8-10 weeks       Response to Intervention:  Patient understands and is able to complete all exercises with effective technique.  Patient demonstrated improved AROM right shoulder after treatment       Plan    Treatment Plan / Targeted Outcomes:     Frequency:   16 visits     Duration of Treatment: 60 days    Planned Interventions:    Home Exercise Program development  Therapeutic Exercise (ROM & Strengthening)  Manual Therapy  Neuromuscular Re-education  Ultrasound  Electrical Stimulation  Phonophoresis with Ketoprofen  Iontophoresis with Dexamethasone  Therapeutic Activities    Plan for next visit:  Continue PT as per initial plan    Student or PTA has been instructed in and demonstrates skills necessary to carry out above stated  treatment plan: Yes    Thank you for your referral to [facility].  Please call with any questions, concerns or comments.  [phone]

## 2018-01-03 NOTE — PROGRESS NOTES
Patient Information     Patient Name MRN Sex Bailey Law [id number] Female 1950      Progress Notes by Ravindra Fung PT at 2017  2:32 PM     Author:  Ravindra Fung PT Service:  (none) Author Type:  PT- Physical Therapist     Filed:  2017  3:17 PM Date of Service:  2017  2:32 PM Status:  Signed     :  Ravindra Fung PT (PT- Physical Therapist)            [facility]  Outpatient PT - Daily Note      Date of Service: 2017      Appointment #5    Patient Name: Bailey Sierra   YOB: 1950   Referring MD/Provider:Dr. Sam Leal  Medical and Treatment Diagnosis: Chronic right shoulder pain [M25.511, G89.29]   PT Treatment Diagnosis: Right shoulder pain due to impingement syndrome, weakness and immobility  Insurance: Medicare  Start of Service:  2017     Certification Dates: Start of Service: 2017  Medicare/MA Re-Cert Due: 3/11/2017          Subjective        Pain Rating: Her shoulder is more painful today. She is unsure why. No new injury.   6 = Severe Pain, (Miserable, Gnawing, Fierce, Piercing) and  7 = Severe Pain, (Miserable, Gnawing, Fierce, Piercing) / Location:  Left superior anterior shoulder. She reports only short term relief from pain with the PT interventions        Objective  Shoulder Range of Motion  2017     ACTIVE  PASSIVE      Right Left Right Left Normal   Flexion 110    180   Extension 55    60   Abduction 110    180   Int Rotation To lumbar    70   Ext Rotation 80    90     Shoulder Strength  2017   Right Left   Flexion 3-    Extension 4    Abduction 3-    Int Rotation 3-    Ext Rotation 3-      0-Absent   1-Trace   2-Poor   3-Fair   4-Good   5-Normal    Persistent exquisite tenderness in anterior shoulder region. Most notable over the supraspinatus tendon insertion    Continued patient guarding due to pain.      Today's Intervention:   1) Reviewed Physical Therapy POC  2) Reviewed therapeutic  exercise program. The focus of exercises is on restoration of ROM.  3) Again encouraged patient to begin using ice packs 2-3 X per day to treat shoulder pain. She should ice before bed time to reduce inflammation allowing better sleep.    Continue current treatment:  -  Physiologic mobilizations and manual end range stretching. Patient has difficulty relaxing the shoulder to allow Mobilization  -  started supine RC strength exercise program  - IFR e-stim to the right shoulder to treat pain x 20'    HOLD below 2017    -  STM with XFM to right anterior shoulder  -  GH Joint mobilizations  -  Phonophoresis: 1 MHz at 1.6 W/cm2 /pulsed 50% for 10 minutes to right anterior shoulder  -  Reviewed AAROM exercise program     Home Exercise Program:   AAROM for all motions of the shoulder  Cervical/neck and upper shoulder stretches.    Assessment    Therapist Assessment:    Persistent right shoulder pain, weakness and immobility due to impingement syndrome.        Goals:  Patient goal (time reference required): 8 weeks      Long term goal: Patient would like to be able to complete all her tasks and work without shoulder pain.     ST) Patient is to be independent in Home Exercise Program in 4-6 weeks.  2) Patient is to demo shoulder flexion to 140 deg. In 4-6 weeks  3) Patient will report a 30% decrease in shoulder pain to a VAS of no higher than 4-5/10 with use and minimal pain at rest.  4) Patient will be able to reach around the back sufficient to fasten clothing in 4-6 weeks.     Patients long term functional goals:   Patient is to have improved shoulder elevation to 160 degrees to allow for improved dressing and self-cares with 0-2 pain in 8-10 weeks.  Patient is to sleep with no disruptions due to shoulder pain in 10 weeks.  Patient is to complete work activities with 0-3/10 pain with no restrictions in 8-12 weeks.  Patient is to demonstrate improved strength to 4/5 minimum with no complaints of inability to  perform work related tasks in 8-12 weeks.     Patient is to report 0-3/10 pain during household activities including light cleaning, self-cares in 8-10 weeks       Response to Intervention:  Patient is not tolerating manual therapy well due to pain.  She is marginally able to complete her exercises with adequate technique. She reports pain of  4/10  after E-Stim. Her pain was 7/10 previous.      Plan    Treatment Plan / Targeted Outcomes:     Frequency:   16 visits     Duration of Treatment: 60 days    Planned Interventions:    Home Exercise Program development  Therapeutic Exercise (ROM & Strengthening)  Manual Therapy  Neuromuscular Re-education  Ultrasound  Electrical Stimulation  Phonophoresis with Ketoprofen  Iontophoresis with Dexamethasone  Therapeutic Activities    Plan for next visit:  Continue PT. Focus has to be on pain reduction and restoration of mobility.    Student or PTA has been instructed in and demonstrates skills necessary to carry out above stated treatment plan: Yes    Thank you for your referral to [facility].  Please call with any questions, concerns or comments.  [phone]

## 2018-01-03 NOTE — INITIAL ASSESSMENTS
"Patient Information     Patient Name MRN Bailey Monique [id number] Female 1950      Initial Assessments by Ravindra Fung PT at 2017  9:23 AM     Author:  Ravindra Fung PT  Service:  (none) Author Type:  PT- Physical Therapist     Filed:  2017 10:44 AM  Date of Service:  2017  9:23 AM Status:  Addendum     :  Ravindra Fung PT (PT- Physical Therapist)        Related Notes: Original Note by Ravindra Fung PT (PT- Physical Therapist) filed at 2017  1:35 PM            [facility]  Outpatient PT - Initial Evaluation  Upper Extremity Eval    Date of Service: 2017     Patient Name: Bailey Sierra   YOB: 1950   Referring MD/Provider:Dr. Sam Leal  Medical and Treatment Diagnosis: Chronic right shoulder pain [M25.511, G89.29]   PT Treatment Diagnosis: Right shoulder pain due to impingement syndrome, weakness and immobility  Insurance: Medicare  Start of Service:  2017    Certification Dates: Start of Service: 2017    Medicare/MA Re-Cert Due:  3/11/2017      Living Situations:  Independent in Living Situation Lives alone in two level home    Preadmission Functional Mobility: Independent  Precautions:  None  Cognition:  Oriented to Person, Place, and Time.     Were cultural / age or other special adaptations needed? No      Patient is a vulnerable adult: No      Patient is aware of diagnosis: Yes      Risks and benefits explained: Yes    Subjective        History of Injury:  66 y.o. Right hand dominant female reports that she has experienced right shoulder pain for several years. She did have two past RC repair surgeries the last \"about\" 15 years ago. She recently received a cortisone injection that did not relieve her pain. She reports she has pain when laying on the right shoulder. Her pain is generally with use only but she will have some pain at rest if she uses the shoulder very much. She is not taking any prescribed " "pain medications or NSAIDs as \"they don't work for me\". She is not using ice or heat at this time. Patient reports that she continues to \"use\" the shoulder and has tried to not let it \"get stiff\".  An MRI of the right shoulder in  revealed a possible small tear. Patient does not remember if she had any treatment at that time. Reports she also had two surgeries on her left shoulder. Patient is referred to PT at this time for treatment of right shoulder pain.         Current Symptoms:    Decreased Motion  Right shoulder.  Weakness  Right shouder  I  Tingling/Numbness  NO  Pain Ratin = Mild Pain, (Bothersome, Annoying, Irritating, Nagging) and  9 = Very Severe Pain, (Dreadful, Overwhelming, Horrible, Agonizing) / Location:  Anterior right shoulder.      Subjective rating of current functional limitations:  Functional Activity No Difficulty Mild Difficulty Mod. Difficulty Severe Difficulty   Overhead Reach    X   Reach Behind Back    X   Grooming    X   Carrying 1 gallon object   X X   Lifting 1 gallon to shoulder height   X X   Pushing  X     Pulling  X X    Driving   X    Sleep  X       Patient Specific Functional and Pain Scales (PSFS):    Clinician Instructions: Complete after the history and before the exam.    Initial Assessment: We want to know what 3 activities in your life you are unable to perform, or are having the most difficulty performing, as a result of your chief problem. Please list and score at least 3 activities that you are unable to perform, or having the most difficulty performing, because of your chief problem.   Patient Specific Activity Scoring Scheme (score one number for each activity):   Activity Score (0-10)  0= Unable to perform activity  10= Able to perform activity at same level as before injury or problem   1. Lifting overhead   5/10   2. Grooming 3/10   3. Cooking/cleaning 6/10   4. Shoveling snow 5/10   5.  /10   Totals:   = 47 % ability which relates to 53% impairment "    Patient verbally states that they understand that the information they have provided above is current and complete to the best of their knowledge.    Patient Specific Functional Scale Modifier Scale Conversion: (patient's modifier that correlates with pt's score on PSFS): 5-CK (50% Impaired).    G codes and Modifier taken from patient completing the PSFS:   Initial Primary G Code and Modifier:    Per the Patient's intake and/or assessment the Primary G Code is: Other PT/OT Primary .   The Patient's Impairment, Limitation or Restriction Modifier would be best described as: CK - 40% - 60% Impairment.   Goal Primary G Code and Modifier:    The Patient's G Code Goal would be: Other PT/OT Primary    The Patient's Impairment, Limitation or Restriction Modifier goal would be best described as: CJ - 20% - 40% Impairment.         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 This job requires significant use of the right shoulder.    Current Work Status:  She continues to work despite her pain.    Restrictions:  Self restricted to avoid use of the right shoulder.    Prior Level of Function:    The use of her right shoulder has been limited for several years but has become more acute over the past 3 months.    Previous Injury / Surgery:    Two past right shoulder surgeries at an outside facility.The reports are not available.     Previous Treatment:    Pain Meds / Anti-inflammatory Meds  OTC NSAIDs are of no help with pain   ?   Injections    One injection one month ago  Physical Therapy after her shoulder surgeries many years ago.      Diagnostics:    None recent.   An MRI of the right shoulder in 2011 revealed a possible small tear.    Current Medications:  Reviewed (see chart)    Drug Allergies:  Reviewed (see chart)  ?   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    Fall Risk Screening: Not indicated        ROM / Strength:          WNL        WFL        Not Applicable        Norms Left  Right Strength Left  Right R.I.T. s   Shld. Flexion 170  130 105 Shld. Flexion       4/5       3-/5    Shld. Abduction 170 155 115 Shld. Abduction      4/5      3-/5    External Rotation 90 72 60 External Rotation      4/5      3-/5    Internal Rotation 70 To thoracic To Lumbar Internal Rotation      5/5      3-/5    Elbow Extension 0 WFL WFL Elbow Extension      5/5      5/5    Elbow Flexion 140 ' \"  \" Elbow Flexion      5/5      5/5    Supination 90 \" \" Supination      5/5      5/5    Pronation 90 \"  Pronation      5/5      5/5    (R.I.T. s Resisted Isometric Test  + indicates pain)    Cervical AROM is all WFL. Note tightness in the upper trapezius and levator scapulae on the right.    Observation:   Head Forward, Rounded Shoulders, Increased Thoracic Kyphosis    Patient is holding her right shoulder in a position of rest.    Palpation:  Tender right anterior shoulder and anterior insertion of the supraspinatus. Tender subscapularis insertion    Special Tests:  (+ indicates positive test)  Neer impingement test  POS right  Sterling/Tevin impingement  POS right  Yergason s test (bicep s tendonitis)  NEG    Supraspinatus / drop arm test   POS   Belly press test   Subscap.  POS " right  Capsular pattern (Adhesive Cap.)  POS      Today's Intervention:    1) Discussed a Physical Therapy POC  2) Instructed an initial therapeutic exercise program  3) Encouraged patient to begin using ice packs 2-3 X per day to treat shoulder pain. She should ice before bed time to reduce inflammation allowing better sleep.  4) Instructed the importance of developing improved shoulder AROM    5) Discussed rotator cuff impingement and development of adhesive capsulitis. Discussed the difficulty of shoulder rehabilitation after two previous shoulder surgeries.    Home Exercise Program:    AAROM for all motions of the shoulder  Cervical/neck and upper shoulder stretches.      Assessment    Therapist Assessment / Clinical Impression:    Right shoulder functional impairment due to shoulder pain, immobility and=weakness associated with RC impingement syndrome.      Functional Impairment(s):  See subjective functional limitations above      Physical Impairment(s):       MUSCULOSKELETAL:  Deconditioned, Impingement, Loss of Motion, Muscle Tightness, Pain, Postural Problems, Soft Tissue Injury and Weakness       PAIN    Goals:  Patient goal (time reference required): 8 weeks     Long term goal: Patient would like to be able to complete all her tasks and work without shoulder pain.    ST) Patient is to be independent in Home Exercise Program in 4-6 weeks.  2) Patient is to demo shoulder flexion to 140 deg. In 4-6 weeks  3) Patient will report a 30% decrease in shoulder pain to a VAS of no higher than 4-5/10 with use and minimal pain at rest.  4) Patient will be able to reach around the back sufficient to fasten clothing in 4-6 weeks.    Patients long term functional goals:   Patient is to have improved shoulder elevation to 160 degrees to allow for improved dressing and self-cares with 0-2 pain in 8-10 weeks.  Patient is to sleep with no disruptions due to shoulder pain in 10 weeks.  Patient is to complete work  activities with 0-3/10 pain with no restrictions in 8-12 weeks.  Patient is to demonstrate improved strength to 4/5  minimum with no complaints of inability to perform work related tasks in 8-12 weeks.    Patient is to report 0-3/10 pain during household activities including light cleaning, self-cares in 8-10 weeks    Patient participated in goal selection and understand(s) the plan of care: Yes   Patient Potential for Achieving Desired Outcome:  Good, Fair Considering patient has history of two previous right shoulder surgeries.    Response to Intervention:    Patient understands and is able to complete all exercises with effective technique.       Plan    Treatment Plan / Targeted Outcomes:      Frequency:   16 visits    Duration of Treatment: 60 days    Planned Interventions:    Home Exercise Program development  Therapeutic Exercise (ROM & Strengthening)  Neuromuscular Re-education  Manual Therapy  Ice/heat packs as needed  Ultrasound  Phonophoresis with Ketoprofen  Iontophoresis with Dexamethasone  Therapeutic Activities    Plan for next visit:   Continue PT treatment with a focus on restoring right shoulder AROM and strength, reduce shoulder pain.      Student or PTA has been instructed in and demonstrates skills necessary to carry out above stated treatment plan: Yes    Thank you for your referral to [facility].  Please call with any questions, concerns or comments.  [phone]    The signature, of the referring medical provider, on this document indicates certification of the above prescribed plan of care and is medically necessary.

## 2018-01-03 NOTE — TELEPHONE ENCOUNTER
Patient Information     Patient Name MRN Sex Bailey aLw [id number] Female 1950      Telephone Encounter by Darling Reis at 2017  8:51 AM     Author:  Darling Reis Service:  (none) Author Type:  (none)     Filed:  2017  9:26 AM Encounter Date:  2017 Status:  Signed     :  Darling Reis            Please call patient today.  She is not able to have an MRI Arthrogram with CDI do to her Medtronic Neurostimulator Model 3058.  The only scans she can have done on a 1.5T is a MR Brain.  Patient was under the impression she could turn this off for the MRI, however, that is not the case.  Doing an MRI of her shoulder could render her stimulator useless.     Patient would like a phone call to discuss other options for her such as a CT.      Please call asap, as she states her shoulder is causing her a great deal of pain.    Thank you,   Darling Reis ....................  2017   9:26 AM]

## 2018-01-03 NOTE — PROGRESS NOTES
"Patient Information     Patient Name MRN Bailey Monique [id number] Female 1950      Progress Notes by Ravindra Fung PT at 2017 10:09 AM     Author:  Ravindra Fung PT Service:  (none) Author Type:  PT- Physical Therapist     Filed:  2017  3:27 PM Date of Service:  2017 10:09 AM Status:  Signed     :  Ravindra Fung PT (PT- Physical Therapist)            [facility]  Outpatient PT - Daily Note      Date of Service:  2017        Appointment #6    Patient Name: Bailey Sierra   YOB: 1950   Referring MD/Provider:Dr. Sam Leal  Medical and Treatment Diagnosis: Chronic right shoulder pain [M25.511, G89.29]   PT Treatment Diagnosis: Right shoulder pain due to impingement syndrome, weakness and immobility  Insurance: Medicare  Start of Service:  2017     Certification Dates: Start of Service: 2017  Medicare/MA Re-Cert Due: 3/11/2017          Subjective        Pain Rating: \"I am not doing so well\"  She has not been sleeping well. Has some family issues. Shoulder is not getting better. The exercises are hurting.  She is \"working through\"  her pain   6 = Severe Pain, (Miserable, Gnawing, Fierce, Piercing) and  7 = Severe Pain, (Miserable, Gnawing, Fierce, Piercing) / Location:  Left superior anterior shoulder. She reports only short term relief from pain with the PT interventions        Objective    Persistent exquisite tenderness in anterior shoulder region.     Continued patient guarding due to pain.      Today's Intervention:   1) Reviewed Physical Therapy POC  2) Reviewed therapeutic exercise program. The focus of exercises is on restoration of ROM.  3) Again encouraged patient to begin using ice packs 2-3 X per day to treat shoulder pain. She should ice before bed time to reduce inflammation allowing better sleep.    Continue current treatment:    Overhead pulleys x 15 flexion and abduction in pain free zone w/u    -  " Physiologic mobilizations and manual end range stretching. Patient has difficulty relaxing the shoulder to allow Mobilization  -  started supine RC strength exercise program  - IFR e-stim to the right shoulder to treat pain x 20'    HOLD below 2017  -  STM with XFM to right anterior shoulder  -  GH Joint mobilizations  -  Phonophoresis: 1 MHz at 1.6 W/cm2 /pulsed 50% for 10 minutes to right anterior shoulder  -  Reviewed AAROM exercise program     Home Exercise Program:   AAROM for all motions of the shoulder  Cervical/neck and upper shoulder stretches.    Assessment    Therapist Assessment:    Persistent right shoulder pain, weakness and immobility due to impingement syndrome.        Goals:  Patient goal (time reference required): 8 weeks      Long term goal: Patient would like to be able to complete all her tasks and work without shoulder pain.     ST) Patient is to be independent in Home Exercise Program in 4-6 weeks.  2) Patient is to demo shoulder flexion to 140 deg. In 4-6 weeks  3) Patient will report a 30% decrease in shoulder pain to a VAS of no higher than 4-5/10 with use and minimal pain at rest.  4) Patient will be able to reach around the back sufficient to fasten clothing in 4-6 weeks.     Patients long term functional goals:   Patient is to have improved shoulder elevation to 160 degrees to allow for improved dressing and self-cares with 0-2 pain in 8-10 weeks.  Patient is to sleep with no disruptions due to shoulder pain in 10 weeks.  Patient is to complete work activities with 0-3/10 pain with no restrictions in 8-12 weeks.  Patient is to demonstrate improved strength to 4/5 minimum with no complaints of inability to perform work related tasks in 8-12 weeks.     Patient is to report 0-3/10 pain during household activities including light cleaning, self-cares in 8-10 weeks       Response to Intervention:  Patient is not tolerating manual therapy well due to pain.  She is marginally able to  complete her exercises with adequate technique. She reports pain of  4/10  after E-Stim. Her pain was 7/10 previous.      Plan    Treatment Plan / Targeted Outcomes:     Frequency:   16 visits     Duration of Treatment: 60 days    Planned Interventions:    Home Exercise Program development  Therapeutic Exercise (ROM & Strengthening)  Manual Therapy  Neuromuscular Re-education  Ultrasound  Electrical Stimulation  Phonophoresis with Ketoprofen  Iontophoresis with Dexamethasone  Therapeutic Activities    Plan for next visit:  Continue PT. Focus has to be on pain reduction and restoration of mobility.    Student or PTA has been instructed in and demonstrates skills necessary to carry out above stated treatment plan: Yes    Thank you for your referral to [facility].  Please call with any questions, concerns or comments.  [phone]

## 2018-01-03 NOTE — PATIENT INSTRUCTIONS
Patient Information     Patient Name MRN Sex Bailey Law [id number] Female 1950      Patient Instructions by Sam Leal MD at 3/8/2017  4:00 PM     Author:  Sam Leal MD Service:  (none) Author Type:  Physician     Filed:  3/8/2017  4:17 PM Encounter Date:  3/8/2017 Status:  Signed     :  Sam Leal MD (Physician)            Increase lantus to 50 units at night.    Continue sliding scale as you are currently doing.    Repeat a1c in 3 months.

## 2018-01-03 NOTE — PROGRESS NOTES
Patient Information     Patient Name MRN Sex Bailey Law [id number] Female 1950      Progress Notes by Arlene Denise at 2/3/2017  1:58 PM     Author:  Arlene Denise Service:  (none) Author Type:  (none)     Filed:  2/3/2017  1:58 PM Date of Service:  2/3/2017  1:58 PM Status:  Signed     :  Arlene Denise            Falls Risk Criteria:    Age 65 and older or under age 4        Sensory deficits    Poor vision    Use of ambulatory aides    Impaired judgment    Unable to walk independently    Meets High Risk criteria for falls:  Yes                 1.  Do you have dizziness or vertigo?    no                    2.  Do you need help standing or walking?   no                 3.  Have you fallen within the last 6 months?    no           4.  Has the patient been fasting?      no       If any risks are marked Yes, the following interventions are utilized:    Do not leave patient unattended     Assist patient in the dressing room and bathroom    Have ambulatory aides available throughout procedure    Involve patient s family if available

## 2018-01-03 NOTE — TELEPHONE ENCOUNTER
Patient Information     Patient Name MRN Bailey Monique [id number] Female 1950      Telephone Encounter by Evelyn Zabala at 2/15/2017  8:53 AM     Author:  Evelyn Zabala Service:  (none) Author Type:  (none)     Filed:  2/15/2017  8:54 AM Encounter Date:  2017 Status:  Signed     :  Evelyn Zabala            Patient was given the message below and scheduled to see Dr. Traore.  Evelyn Zabala ....................  2/15/2017   8:54 AM

## 2018-01-03 NOTE — NURSING NOTE
Patient Information     Patient Name MRN Sex Bailey Law [id number] Female 1950      Nursing Note by Gosselin, Norma J at 3/10/2017 10:45 AM     Author:  Gosselin, Norma J Service:  (none) Author Type:  (none)     Filed:  3/10/2017 10:55 AM Encounter Date:  3/10/2017 Status:  Signed     :  Gosselin, Norma J            Consult chronic right shoulder pain.  Norma J Gosselin LPN....................  3/10/2017   10:52 AM

## 2018-01-03 NOTE — NURSING NOTE
Patient Information     Patient Name MRN Bailey Monique [id number] Female 1950      Nursing Note by Minoo Laura at 3/8/2017  4:00 PM     Author:  Minoo Laura Service:  (none) Author Type:  (none)     Filed:  3/8/2017  4:15 PM Encounter Date:  3/8/2017 Status:  Signed     :  Minoo Laura            Patient presents for a diabetic check  Minoo Luara LPN   3/8/2017  4:02 PM

## 2018-01-03 NOTE — TELEPHONE ENCOUNTER
Patient Information     Patient Name MRN Bailey Monique [id number] Female 1950      Telephone Encounter by Minoo Laura at 2017 11:48 AM     Author:  Minoo Laura Service:  (none) Author Type:  (none)     Filed:  2017 11:49 AM Encounter Date:  2017 Status:  Signed     :  Minoo Laura            Left message to call back  Minoo Laura LPN   2017  11:49 AM

## 2018-01-03 NOTE — TELEPHONE ENCOUNTER
Patient Information     Patient Name MRN Sex Bailey Law [id number] Female 1950      Telephone Encounter by Liz Camarena RN at 3/6/2017 10:40 AM     Author:  Liz Camarena RN Service:  (none) Author Type:  NURS- Registered Nurse     Filed:  3/6/2017 10:44 AM Encounter Date:  3/4/2017 Status:  Signed     :  Liz Camarena RN (NURS- Registered Nurse)            Request physician consideration to refill Nexium 40 mg and Lisinopril 2.5 mg as requested. OV scheduled today, 3/8/17, with PCP    Proton Pump Inhibitors  Office visit in the past 12 months or per provider note.  [facility]  Next visit with CELSA BUCK is on: 2017 in [facility]  Max refill for 12 months from last office visit or per provider note.  Unable to complete prescription refill per RN Medication Refill Policy.................... Liz Camarena RN ....................  3/6/2017   10:43 AM      Ace Inhibitors  Office visit in the past 12 months or per provider note.  Last visit with CELSA BUCK was on: 02/10/2017 in [facility]  Next visit with CELSA BUCK is on: 2017 in [facility]  Lab test requirements:  Creatinine and Potassium annually, if ordering lab, order BMP.  CREATININE (mg/dL)    Date Value   2016 1.89 (H)     POTASSIUM (mmol/L)    Date Value   2016 3.6   Max refill for 12 months from last office visit or per provider note  Unable to complete prescription refill per RN Medication Refill Policy.................... Liz Camarena RN ....................  3/6/2017   10:43 AM

## 2018-01-03 NOTE — PROGRESS NOTES
"Patient Information     Patient Name MRN Bailey Monique [id number] Female 1950      Progress Notes by Sam Leal MD at 1/10/2017  4:30 PM     Author:  Sam Leal MD Service:  (none) Author Type:  Physician     Filed:  1/10/2017  4:45 PM Encounter Date:  1/10/2017 Status:  Signed     :  Sam Leal MD (Physician)            SUBJECTIVE:  Bailey Sierra is a 66 y.o. female here for chronic right shoulder pain follow-up. Patient has had the symptoms for many years. She has a history of right shoulder surgery which required and anchored replaced approximately 25 years ago. Recently she has been having increased difficulty with repetitive movements above her shoulder height and behind her. She also has difficulty lifting items. She is able to sleep fairly well. She was seen naproxen one month ago where she tried a steroid injection which she states \"did not help.\" She's had no new injuries. She is right-hand dominant.    ROS:    As above otherwise ROS is unremarkable.    OBJECTIVE:  /58  Pulse 72  Wt 95.3 kg (210 lb)  BMI 36.05 kg/m2    EXAM:  General Appearance: Pleasant, alert, appropriate appearance for age. No acute distress  Musculoskeletal: Right shoulder shows flexion and abduction to approximately 120  actively, internal rotation to PSIS. She has tenderness anteriorly over her acromium. She has pain globally with strength testing of deltoid, supraspinous, internal rotation and external rotation. She also has pain with Sterling and Neer's.    ASSESSEMENT AND PLAN:    Bailey was seen today for shoulder pain/problem.    Diagnoses and all orders for this visit:    Chronic right shoulder pain  -     AMB CONSULT TO PHYSICAL THERAPY; Future    Her exam is fairly nonspecific given that she has pain with essentially any provocative test. She has failed cortical steroid injection. We'll refer to physical therapy. If that is not helpful after 4-6 weeks I recommend orthopedic referral. " Given her ankle that is in place until that also defer any further imaging to orthopedics to consider either a CT scan versus MR arthrogram with likely artifact.      Sukumar Leal MD    This document was prepared using voice generated softwear.  While every attempt was made for accuracy, grammatical errors may exist.

## 2018-01-03 NOTE — PROGRESS NOTES
"Patient Information     Patient Name MRN Bailey Monique [id number] Female 1950      Progress Notes by Ravindra Fung PT at 2017 11:01 AM     Author:  Ravindra Fung PT Service:  (none) Author Type:  PT- Physical Therapist     Filed:  2017  3:57 PM Date of Service:  2017 11:01 AM Status:  Signed     :  Ravindra Fung PT (PT- Physical Therapist)            [facility]  Outpatient PT - Daily Note      Date of Service: 2017    Appointment #4    Patient Name: Bailey Sierra   YOB: 1950   Referring MD/Provider:Dr. Sam Leal  Medical and Treatment Diagnosis: Chronic right shoulder pain [M25.511, G89.29]   PT Treatment Diagnosis: Right shoulder pain due to impingement syndrome, weakness and immobility  Insurance: Medicare  Start of Service:  2017     Certification Dates: Start of Service: 2017  Medicare/MA Re-Cert Due: 3/11/2017          Subjective        Pain Rating: Patient reports her pain remains and \"it is not really any better\".   5 = Moderate Pain, (Aggravating, Grueling, Upsetting, Frustrating) / Location:  Left superior anterior shoulder.        Objective  Shoulder Range of Motion   ACTIVE  PASSIVE      Right Left Right Left Normal   Flexion 110    180   Extension 55    60   Abduction 110    180   Int Rotation To lumbar    70   Ext Rotation 80    90     Shoulder Strength   Right Left   Flexion 3-    Extension 4    Abduction 3-    Int Rotation 3-    Ext Rotation 3-      0-Absent   1-Trace   2-Poor   3-Fair   4-Good   5-Normal    Patients right shoulder remains exquisitely tender in anterior region    Continued patient guarding due to pain.      Today's Intervention:   1) Reviewed Physical Therapy POC  2) Reviewed therapeutic exercise program  3) Again encouraged patient to begin using ice packs 2-3 X per day to treat shoulder pain. She should ice before bed time to reduce inflammation allowing better sleep.    Continue " current treatment    Add physiologic mobilizations and manual end range stretching. This is not tolerated well.  - started supine RC strength exercise program  -  STM with XFM to right anterior shoulder  -  GH Joint mobilizations  -  Phonophoresis: 1 MHz at 1.6 W/cm2 /pulsed 50% for 10 minutes to right anterior shoulder  -  Reviewed AAROM exercise program     Home Exercise Program:   AAROM for all motions of the shoulder  Cervical/neck and upper shoulder stretches.    Assessment    Therapist Assessment:    Persistent right shoulder pain, weakness and immobility due to impingement syndrome.        Goals:  Patient goal (time reference required): 8 weeks      Long term goal: Patient would like to be able to complete all her tasks and work without shoulder pain.     ST) Patient is to be independent in Home Exercise Program in 4-6 weeks.  2) Patient is to demo shoulder flexion to 140 deg. In 4-6 weeks  3) Patient will report a 30% decrease in shoulder pain to a VAS of no higher than 4-5/10 with use and minimal pain at rest.  4) Patient will be able to reach around the back sufficient to fasten clothing in 4-6 weeks.     Patients long term functional goals:   Patient is to have improved shoulder elevation to 160 degrees to allow for improved dressing and self-cares with 0-2 pain in 8-10 weeks.  Patient is to sleep with no disruptions due to shoulder pain in 10 weeks.  Patient is to complete work activities with 0-3/10 pain with no restrictions in 8-12 weeks.  Patient is to demonstrate improved strength to 4/5 minimum with no complaints of inability to perform work related tasks in 8-12 weeks.     Patient is to report 0-3/10 pain during household activities including light cleaning, self-cares in 8-10 weeks       Response to Intervention:  Patient is not tolerating manual therapy well. She is able to complete her exercises with adequate technique.      Plan    Treatment Plan / Targeted Outcomes:     Frequency:   16  visits     Duration of Treatment: 60 days    Planned Interventions:    Home Exercise Program development  Therapeutic Exercise (ROM & Strengthening)  Manual Therapy  Neuromuscular Re-education  Ultrasound  Electrical Stimulation  Phonophoresis with Ketoprofen  Iontophoresis with Dexamethasone  Therapeutic Activities    Plan for next visit:  Continue PT as per initial plan    Student or PTA has been instructed in and demonstrates skills necessary to carry out above stated treatment plan: Yes    Thank you for your referral to [facility].  Please call with any questions, concerns or comments.  [phone]

## 2018-01-03 NOTE — TELEPHONE ENCOUNTER
Patient Information     Patient Name MRN Bailey Monique [id number] Female 1950      Telephone Encounter by Minoo Laura at 2017  9:17 AM     Author:  Minoo Laura Service:  (none) Author Type:  (none)     Filed:  2017  9:18 AM Encounter Date:  2017 Status:  Signed     :  Minoo Laura            Patient notified and transferred to the appointment line  Minoo Laura LPN   2017  9:17 AM

## 2018-01-03 NOTE — TELEPHONE ENCOUNTER
Patient Information     Patient Name MRN Bailey Monique [id number] Female 1950      Telephone Encounter by Phan Wright RN at 2017 11:06 AM     Author:  Phan Wright RN Service:  (none) Author Type:  NURS- Registered Nurse     Filed:  2017 11:26 AM Encounter Date:  2017 Status:  Signed     :  Phan Wright RN (NURS- Registered Nurse)            Refill request received from pharmacy with regards to patient's Calcitriol. Medication is currently listed as a historical medication on patient's active med list with sig of taking one tab daily. Current rx request from pharmacy with directions to take one tab on M, W, F. Writer contacted patient to verify rx directions before pending to PCP for his consideration. Patient states she doesn't need a refill at this time. Has plenty of requested rx at home. Did verify that she is taking one tab daily. Will leave rx in chart as currently listed. Writer did remind patient that she is due for a diabetic check in 2017. Patient with appointment scheduled for 3/8/17 per chart review. Writer encouraged patient to discussed diabetes with PCP at that time, as well as to request a refill of calcitriol at that time so that she will have refills when she needs them. Patient stated understanding, did advise writer to forget about current requested refill. Writer will refuse rx request per patient's request.    Unable to complete prescription refill per RN Medication Refill Policy.................... Phan Wright RN ....................  2017   11:24 AM

## 2018-01-03 NOTE — PROGRESS NOTES
Patient Information     Patient Name MRN Sex Bailey Law [id number] Female 1950      Progress Notes by Honorio Bowling MD at 3/17/2017 10:45 AM     Author:  Honorio Bowling MD Service:  (none) Author Type:  Physician     Filed:  3/17/2017  1:20 PM Encounter Date:  3/17/2017 Status:  Signed     :  Honorio Bowling MD (Physician)            Type of Visit  Established    Chief Complaint  Urge urinary incontinence    HPI  Ms. Sierra is a 66 y.o. female with history of urge urinary incontinence s/p Interstim implant 3 year(s) ago.  The placement and post-operative course were uncomplicated.  She reported over 90% improvement shortly after the implant was placed.  She now reports it is not working-she denies working with the stimulator on the 4 different programs to assess potential benefit.  She denies any recent fall.  She also denies any other signs of progression of her MS      Review of Systems  I reviewed the ROS the patient today.    Nursing Notes:   Meg Dennison RN  3/17/2017 11:00 AM  Signed  Review of Systems:    Weight loss:    Yes     Recent fever/chills:  No   Night sweats:   No  Current skin rash:  No   Recent hair loss:  No  Heat intolerance:  No   Cold intolerance:  No  Chest pain:   No   Palpitations:   No  Shortness of breath:  Yes   Wheezing:   No  Constipation:    No   Diarrhea:   No   Nausea:   No   Vomiting:   No   Kidney/side pain:  No   Back pain:   Yes  Frequent headaches:  No   Dizziness:     No  Leg swelling:   Yes   Calf pain:    No    Family History  Family History       Problem   Relation Age of Onset     Cancer  Mother      Some type of cancer, aneurysm        Other  Father      MI       Cancer  Sister      Diabetes  Sister      Type II       Diabetes  Sister      Type II       Diabetes  Sister      Type II       Diabetes  Paternal Grandmother      Type II       Other  Daughter      depression       Other  Daughter      depression       Heart Disease  Brother      CAD,  "MI         Physical Exam  Vitals:     03/17/17 1057   BP: 112/42   Pulse: 60   Resp: 16   Weight: 95 kg (209 lb 6.4 oz)   Height: 1.651 m (5' 5\")   Constitutional: NAD, WDWN.  Cardiovascular: Regular rate.  Pulmonary/Chest: Respirations are even and non-labored bilaterally.  Abdominal: Soft. No distension, tenderness, masses or guarding. No CVA tenderness.  Extremities: BETTE x 4, Warm. No clubbing.  No cyanosis.    Skin: Pink, warm and dry.  No rashes noted.  Palpable battery, no signs of infection of issues      Imaging  Sacral X ray  3/17/2017  IMPRESSION: Neural stimulator wire projects in the left lateral abdomen with electrode pack projecting over the posterior left ilium. Degenerative arthritis visualized lower lumbar spine. Vascular calcifications. Sacrum and coccyx otherwise normal.    Assessment & Plan  Ms. Sierra is a 66 y.o. female with history of urge urinary incontinence s/p Interstim implant 3 year(s) ago.  She initially reported up to 90% improvement in her symptoms shortly after the time of implantation.  Currently she is very frustrated with the lack of improvement in her symptoms.  Today she presents with a remote without batteries.  She has not worked with the stimulator much when it comes to adjusting the program.  I recommended she try the 4 different programs and manage by FACT        "

## 2018-01-03 NOTE — TELEPHONE ENCOUNTER
Patient Information     Patient Name MRN Bailey Monique [id number] Female 1950      Telephone Encounter by Sam Leal MD at 2017  9:32 AM     Author:  Sam Leal MD Service:  (none) Author Type:  Physician     Filed:  2017  9:33 AM Encounter Date:  2017 Status:  Signed     :  Sam Leal MD (Physician)            I would like her to see Dr. Traore for assessment and his recommendations in regards to further testing.  I do not want to get a CT if he does not feel it would be helpful.  I will place the order if you could call her and let her know.

## 2018-01-03 NOTE — PROGRESS NOTES
"Patient Information     Patient Name MRN Sex Bailey Law [id number] Female 1950      Progress Notes by Sam Leal MD at 2/10/2017  1:15 PM     Author:  Sam Leal MD Service:  (none) Author Type:  Physician     Filed:  2/10/2017  1:50 PM Encounter Date:  2/10/2017 Status:  Signed     :  Sam Leal MD (Physician)            SUBJECTIVE:  Bailey Sierra is a 66 y.o. female here for chronic right shoulder pain follow-up. Patient has had the symptoms for many years. She has a history of right shoulder surgery approximately 25 years ago. Recently she has been having increased difficulty with repetitive movements above her shoulder height and behind her. She also has difficulty lifting items. She is able to sleep fairly well. She was seen naproxen one month ago where she tried a steroid injection which she states \"did not help.\" She's had no new injuries. She is right-hand dominant.    Since her last visit she has completed a month of physical therapy does not feel that that has been helpful.    ROS:    As above otherwise ROS is unremarkable.    OBJECTIVE:  /56  Pulse 63  Wt 93 kg (205 lb)  BMI 35.19 kg/m2    EXAM:  General Appearance: Pleasant, alert, appropriate appearance for age. No acute distress  Musculoskeletal: Right shoulder shows flexion and abduction to approximately 120  actively, internal rotation to PSIS. She has tenderness anteriorly over her acromium. She has pain globally with strength testing of deltoid, supraspinous, internal rotation and external rotation. She also has pain with Sterling and Neer's.    ASSESSEMENT AND PLAN:    Bailey was seen today for shoulder pain/problem.    Diagnoses and all orders for this visit:    Chronic right shoulder pain  -     MR ARTHROGRAM SHOULDER RIGHT; Future  -     XR INJ CT/MR ARTHROGRAM SHOULDER RIGHT; Future    Diabetes mellitus without complication (HC)  -     insulin glargine (LANTUS SOLOSTAR) 100 unit/mL (3 mL) pen; Inject " 45 Units subcutaneous before bedtime. Product desired:LANTUS. Dx: E11.9    Given her lack of improvement with oral anti-inflammatories, steroid injection and physical therapy will proceed with an MR arthrogram to further define her anatomy. Further recommendations will depend on those results.      Sukumar Leal MD    This document was prepared using voice generated softwear.  While every attempt was made for accuracy, grammatical errors may exist.

## 2018-01-03 NOTE — NURSING NOTE
Patient Information     Patient Name MRN Bailey Monique [id number] Female 1950      Nursing Note by Minoo Laura at 2/10/2017  1:15 PM     Author:  Minoo Laura Service:  (none) Author Type:  (none)     Filed:  2/10/2017  1:04 PM Encounter Date:  2/10/2017 Status:  Signed     :  Minoo Laura            Patient presents for continued right shoulder pain.  Minoo Laura LPN   2/10/2017  1:03 PM

## 2018-01-03 NOTE — TELEPHONE ENCOUNTER
Patient Information     Patient Name MRN Bailey Monique [id number] Female 1950      Telephone Encounter by Oanh Lin RN at 3/20/2017  9:53 AM     Author:  Oanh Lin RN Service:  (none) Author Type:  NURS- Registered Nurse     Filed:  3/20/2017  9:56 AM Encounter Date:  3/18/2017 Status:  Signed     :  Oanh Lin RN (NURS- Registered Nurse)            Diabetic Supplies    Office visit in the past 12 months.    Last visit with CELSA BUCK was on: 2017 in [facility]  Next visit with CELSA BUCK is on: No future appointment listed with this provider  Next visit with [facility] is on: No future appointment listed in this department    Max refill for 12 months from last office visit.  Always add ICD-9 code.    Needs not be listed on Med List to fill.  Need new RX every 12 months or if exceeds the limitations set by Medicare, then every 6 months.  Also when testing frequency is changed is there a need to obtain a new order.  A refill request does not need to be approved by the ordering physician-a beneficiary or their caregiver may request refills.  Physicians are not required to fill out additional forms such as home testing results for suppliers or provide additional documentation unless the supplier is audited and the  is requesting such documentation.      Diabetes    Office visit in the past 12 months or per provider note.    Last visit with CELSA BUCK was on: 2017 in [facility]  Next visit with CELSA BUCK is on: No future appointment listed with this provider  Next visit with [facility] is on: No future appointment listed in this department    Lab test requirements:  HgbA1c annually or per provider note.  HEMOGLOBIN A1C MONITORING (POCT)    Date Value   2017 8.0 % (H)   2012 6.9 % NGSP (H)     HEMOGLOBIN A1C GIH (%)    Date Value   2012 7.2 (H)       Max refill for 12 months from last office visit or  per provider note. Due for FU and lab in 3 months per LOV    Prescription refilled per RN Medication Refill Policy.................... CLEOPATRA BRITT RN ....................  3/20/2017   9:53 AM

## 2018-01-03 NOTE — PROGRESS NOTES
Patient Information     Patient Name MRN Sex Bailey Law [id number] Female 1950      Progress Notes by Sam Leal MD at 3/8/2017  4:00 PM     Author:  Sam Leal MD Service:  (none) Author Type:  Physician     Filed:  3/8/2017  4:26 PM Encounter Date:  3/8/2017 Status:  Signed     :  Sam Leal MD (Physician)            SUBJECTIVE:  Bailey Sierra is a 66 y.o. female here for follow-up.  Patient has a history of type 2 diabetes and she currently uses insulin. She is using 45 units of Lantus at night and is insulin sliding scale +5 units of NovoLog throughout the day. Her sugars continue to be elevated in the morning and as the day goes on. She's having no hypoglycemia. She recently had a hemoglobin A1c which can be seen below.    She also has a history of coronary artery disease which has been stable.    She continues to use Nexium for acid reflux and lisinopril for hypertension.      Patient Active Problem List       Diagnosis  Date Noted     Long-term insulin use in type 2 diabetes (HC)  2017     CAD (coronary artery disease)  2013     2 vessel bypass, 2013        Lumbar back pain  2013     GERD (gastroesophageal reflux disease)  2013     Neurogenic bladder  10/25/2012     SLEEP APNEA  2012     INSOMNIA  2012     HYPERLIPIDEMIA       HYPERTENSION       PEDAL EDEMA       chronic          DEGENERATIVE JOINT DISEASE       PLANTAR FASCIITIS       CHRONIC KIDNEY DISEASE STAGE III (MODERATE)       HEART MURMUR, SYSTOLIC         Past Medical History      Diagnosis   Date     ANEMIA, IRON DEFICIENCY       Bile leak, postoperative       CHRONIC KIDNEY DISEASE STAGE III (MODERATE)       DEGENERATIVE JOINT DISEASE       DIABETES MELLITUS, TYPE II       HEART MURMUR, SYSTOLIC       Hx of pregnancy       Three childbirths      HYPERLIPIDEMIA       HYPERTENSION       INSOMNIA  2012     PEDAL EDEMA       PLANTAR FASCIITIS       S/P CABG x 2   07/13     [facility]      SHOULDER IMPINGEMENT SYNDROME       URINARY URGENCY S/P BOTOX PRN  3/8/2011     UTERINE PROLAPSE         Past Surgical History       Procedure   Laterality Date     Lumbar laminectomy   1997     Tubal ligation        Dental surgery        Dental extractions       Shoulder arthroscopy   1997     Right       Shoulder arthroscopy   2012     Left       Kenny and bso   4/2/01     Vag vault suspension with Cortex mesh       Cystoscopy   3/15/01     Lap cholecystectomy   04/2008     Ercp diagnostic   05/2008     Bile leak with ERCP and stenting and drainage of bile collection through abdominal wall.       Cardiac bypass   07/2013     Interstim device placement   04/14/14     Dr. Dash WARD         Current Outpatient Prescriptions       Medication  Sig Dispense Refill     ASCENSIA CONTOUR strip TEST 3 TIMES A  Each 2     aspirin (ECOTRIN) 81 mg enteric coated tablet Take 1 tablet by mouth once daily with a meal.  0     blood sugar diagnostic (ACCU-CHEK ROXANNE PLUS TEST STRP) strip Test blood sugars TID with meals. Dx: E11.9 100 Strip 5     calcitriol (ROCALTROL) 0.25 mcg capsule Take one tablet weekly on Monday 12 capsule 3     calcium carbonate-vitamin D3 600 mg (1,500 mg)-2,500 unit cap Take 1 capsule by mouth once daily.  0     DME Diabetic shoes, Dx E11.9.  Lifelong need. 1 Each 0     esomeprazole (NEXIUM) 40 mg capsule Take 1 capsule by mouth once daily before a meal. 90 capsule 3     furosemide (LASIX) 40 mg tablet TAKE 1 TABLET BY MOUTH EVERY MORNING 90 tablet 2     insulin aspart (NOVOLOG FLEXPEN) 100 unit/mL solution for injection Sling scale for breakfast, 5 units plus sliding scale for lunch and dinner  0     insulin glargine (LANTUS SOLOSTAR) 100 unit/mL (3 mL) pen Inject 50 Units subcutaneous before bedtime. Product desired:LANTUS. Dx: E11.9 15 mL 6     isosorbide mononitrate (IMDUR) 30 mg extended release tablet 24 Hour TAKE 2 TABLETS BY MOUTH ONCE A  tablet 2     lancets  "(ACCU-CHEK FASTCLIX) Dispense item covered by pt ins. E11.9 IDDM type II - Test 3 times/day. 102 Each 10     lisinopril (PRINIVIL; ZESTRIL) 2.5 mg tablet Take 1 tablet by mouth once daily. 90 tablet 3     metoprolol succinate (TOPROL XL) 25 mg Sustained-Release tablet TAKE 1 TABLET BY MOUTH ONCE DAILY. 90 tablet 3     nitroglycerin (NITROSTAT) 0.4 mg sublingual tablet Place 1 tablet under the tongue every 5 minutes if needed for Chest Pain. 1 Bottle 0     rosuvastatin (CRESTOR) 40 mg tablet Take 40 mg by mouth daily 90 tablet 2     ULTICARE PEN NEEDLE 31 gauge x 5/16\" USE AS DIRECTED FOUR TIMES DAILY 400 Each 1     zolpidem (AMBIEN) 10 mg tablet Take 1 tablet by mouth at bedtime if needed for Sleep. 30 tablet 5     No current facility-administered medications for this visit.      Medications have been reviewed by me and are current to the best of my knowledge and ability.      Allergies:  No Known Allergies    Family History       Problem   Relation Age of Onset     Other  Father      MI       Cancer  Mother      Some type of cancer, aneurysm        Cancer  Sister      unknown type       Diabetes  Sister      Type II       Diabetes  Sister      Type II       Diabetes  Sister      Type II       Diabetes  Paternal Grandmother      Type II       Other  Daughter      depression       Other  Daughter      ovarian cancer and depression       Heart Disease  Brother      CAD, MI         Social History     Substance Use Topics       Smoking status: Never Smoker     Smokeless tobacco: Never Used     Alcohol use No       ROS:    As above otherwise ROS is unremarkable.      OBJECTIVE:  /52  Pulse 60  Wt 94.3 kg (208 lb)  BMI 35.7 kg/m2    EXAM:  General Appearance: Pleasant, alert, appropriate appearance for age. No acute distress  Lungs: Normal chest wall and respirations. Clear to auscultation, no wheezes or crackles.  Cardiovascular: Regular rate and rhythm. S1, S2, no murmurs.  Musculoskeletal: No edema.  Skin: no " concerning or new rashes.  Neurologic Exam: CN 2-12 grossly intact.  Normal gait.  Symmetric DTRs, No focal motor or sensory deficits. No tremor.  Psychiatric Exam: Alert and oriented, appropriate affect.    Results for orders placed or performed in visit on 03/06/17      HEMOGLOBIN A1C MONITORING (POCT)      Result  Value Ref Range    HEMOGLOBIN A1C MONITORING (POCT) 8.0 (H) 4.0 - 6.2 %    ESTIMATED AVERAGE GLUCOSE  183 mg/dL         ASSESSEMENT AND PLAN:    Bailey was seen today for diabetes.    Diagnoses and all orders for this visit:    Diabetes mellitus without complication (HC)    Gastroesophageal reflux disease without esophagitis  -     esomeprazole (NEXIUM) 40 mg capsule; Take 1 capsule by mouth once daily before a meal.    HYPERTENSION  -     lisinopril (PRINIVIL; ZESTRIL) 2.5 mg tablet; Take 1 tablet by mouth once daily.    We will increase her Lantus to 50 units at night and continue with sliding scale as she was preceded using. We'll repeat hemoglobin A1c in 3 months and if it continues to be elevated will likely change her NovoLog to 7 units plus a sliding scale with meals. She'll continue checking her blood sugars several times a day.    We'll continue Nexium for her acid reflux.    Blood pressure is well controlled and will continue lisinopril daily. She had a stable basic metabolic panel in November which was reviewed.      Sukumar Leal MD

## 2018-01-03 NOTE — PROGRESS NOTES
"Patient Information     Patient Name MRN Bailey Monique [id number] Female 1950      Progress Notes by Ravindra Fung PT at 2017 10:56 AM     Author:  Ravindra Fung PT Service:  (none) Author Type:  PT- Physical Therapist     Filed:  2017  4:16 PM Date of Service:  2017 10:56 AM Status:  Signed     :  Ravindra Fung PT (PT- Physical Therapist)            [facility]  Outpatient PT - Daily Note      Date of Service: 2017  Patient Name: Bailey Sierra   YOB: 1950   Referring MD/Provider:Dr. Sam Leal  Medical and Treatment Diagnosis: Chronic right shoulder pain [M25.511, G89.29]   PT Treatment Diagnosis: Right shoulder pain due to impingement syndrome, weakness and immobility  Insurance: Medicare  Start of Service:  2017     Certification Dates: Start of Service: 2017  Medicare/[place] Re-Cert Due: 3/11/2017          Subjective        Pain Rating: She has no shoulder pain in the morning. She cannot lay on the right side for \"very long\" but it does not wake her at night.    4 = Moderate Pain, (Aggravating, Grueling, Upsetting, Frustrating) and  7 = Severe Pain, (Miserable, Gnawing, Fierce, Piercing) / Location:  Left superior anterior shoulder.        Objective      Today's Intervention:   1) Reviewed Physical Therapy POC  2) Reviewed therapeutic exercise program  3) Again encouraged patient to begin using ice packs 2-3 X per day to treat shoulder pain. She should ice before bed time to reduce inflammation allowing better sleep.  4) Moist heat x 10' prior to shoulder stretch/mobilization all motions  5) GH Joint mobilizations  6) Ultrasound: 1 MHz at 1.6 W/cm2 /pulsed 50% for 10 minutes to right anterior shoulder       Home Exercise Program:   AAROM for all motions of the shoulder  Cervical/neck and upper shoulder stretches.    Assessment    Therapist Assessment:    Right shoulder pain, weakness and immobility due to " impingement syndrome.        Goals:  Patient goal (time reference required): 8 weeks      Long term goal: Patient would like to be able to complete all her tasks and work without shoulder pain.     ST) Patient is to be independent in Home Exercise Program in 4-6 weeks.  2) Patient is to demo shoulder flexion to 140 deg. In 4-6 weeks  3) Patient will report a 30% decrease in shoulder pain to a VAS of no higher than 4-5/10 with use and minimal pain at rest.  4) Patient will be able to reach around the back sufficient to fasten clothing in 4-6 weeks.     Patients long term functional goals:   Patient is to have improved shoulder elevation to 160 degrees to allow for improved dressing and self-cares with 0-2 pain in 8-10 weeks.  Patient is to sleep with no disruptions due to shoulder pain in 10 weeks.  Patient is to complete work activities with 0-3/10 pain with no restrictions in 8-12 weeks.  Patient is to demonstrate improved strength to 4/5 minimum with no complaints of inability to perform work related tasks in 8-12 weeks.     Patient is to report 0-3/10 pain during household activities including light cleaning, self-cares in 8-10 weeks       Response to Intervention:  Patient understands and is able to complete all exercises with effective technique.  Patient demonstrated improved AROM right shoulder after treatment       Plan    Treatment Plan / Targeted Outcomes:     Frequency:   16 visits     Duration of Treatment: 60 days    Planned Interventions:    Home Exercise Program development  Therapeutic Exercise (ROM & Strengthening)  Manual Therapy  Neuromuscular Re-education  Ultrasound  Electrical Stimulation  Phonophoresis with Ketoprofen  Iontophoresis with Dexamethasone  Therapeutic Activities    Plan for next visit:  Continue PT as per initial plan    Student or PTA has been instructed in and demonstrates skills necessary to carry out above stated treatment plan: Yes    Thank you for your [facility] to [facility].  Please call with any questions, concerns or comments.  [phone]

## 2018-01-04 NOTE — OR ANESTHESIA
Patient Information     Patient Name MRN Sex Bailey Law [id number] Female 1950      OR Anesthesia by Ellie Morales MD at 2017 11:50 AM     Author:  Ellie Morales MD  Service:  (none) Author Type:  PHYS- Anesthesiologist     Filed:  2017 12:22 PM  Date of Service:  2017 11:50 AM Status:  Addendum     :  Ellie Morales MD (PHYS- Anesthesiologist)        Related Notes: Original Note by Ellie Morales MD (PHYS- Anesthesiologist) filed at 2017 12:21 PM                                                           ANESTHESIA ASSESSMENT    Date: 17 Time: 11:50 PM      Patient:  Bailey Sierra    Procedure(s) (LRB):  Right shoulder arthroscopy w/subacromial decompression, distal Clavicle Excision, If Indicated: Rotator cuff Repair Biceps Tenodesis vs.Tenotomy, Labral Repair. (Right)    Past Medical History:     Diagnosis  Date     AC (acromioclavicular) joint arthritis 3/10/2017     ANEMIA, IRON DEFICIENCY      Bile leak, postoperative      CAD (coronary artery disease)      CHRONIC KIDNEY DISEASE STAGE III (MODERATE)      DEGENERATIVE JOINT DISEASE      DIABETES MELLITUS, TYPE II      GERD (gastroesophageal reflux disease)      HEART MURMUR, SYSTOLIC      Hx of pregnancy     Three childbirths      HYPERLIPIDEMIA      HYPERTENSION      Impingement syndrome of right shoulder 3/10/2017     INSOMNIA 2012     Neurogenic bladder      PEDAL EDEMA      PLANTAR FASCIITIS      Right rotator cuff tendinitis 3/10/2017     S/P arthroscopic right shoulder surgery 2017     S/P CABG x 2     [facility]      SHOULDER IMPINGEMENT SYNDROME      URINARY URGENCY S/P BOTOX PRN 3/8/2011     UTERINE PROLAPSE        Past Surgical History:      Procedure  Laterality Date     cardiac bypass  2013     CYSTOSCOPY  3/15/01     DENTAL SURGERY      Dental extractions       ERCP DIAGNOSTIC  2008    Bile leak with ERCP and stenting and drainage of bile collection through  abdominal wall.       Interstim Device placement  04/14/14    Dr. Dash WARD       LAP CHOLECYSTECTOMY  04/2008     LUMBAR LAMINECTOMY  1997     S/P ARTHROSCOPIC RIGHT SHOULDER SURGERY Right 04/19/2017     SHOULDER ARTHROSCOPY  1997    Right       SHOULDER ARTHROSCOPY  2012    Left       RICCARDO AND BSO  4/2/01    Vag vault suspension with Cortex mesh       TUBAL LIGATION         Family History       Problem   Relation Age of Onset     Other  Father      MI       Cancer  Mother      Some type of cancer, aneurysm        Cancer  Sister      unknown type       Diabetes  Sister      Type II       Diabetes  Sister      Type II       Diabetes  Sister      Type II       Diabetes  Paternal Grandmother      Type II       Other  Daughter      depression       Other  Daughter      ovarian cancer and depression       Heart Disease  Brother      CAD, MI         Patient Active Problem List     Diagnosis  Code     HYPERLIPIDEMIA E78.5     HYPERTENSION I10     PEDAL EDEMA R60.9     DEGENERATIVE JOINT DISEASE M19.90     PLANTAR FASCIITIS M72.2     CHRONIC KIDNEY DISEASE STAGE III (MODERATE) N18.3     HEART MURMUR, SYSTOLIC R01.1     INSOMNIA G47.00     Neurogenic bladder N31.9     GERD (gastroesophageal reflux disease) K21.9     Lumbar back pain M54.5     CAD (coronary artery disease) I25.10     Long-term insulin use in type 2 diabetes (HC) E11.9, Z79.4     Right rotator cuff tendinitis M75.81     AC (acromioclavicular) joint arthritis M19.90     Impingement syndrome of right shoulder M75.41     S/P arthroscopic right shoulder surgery Z98.890       Prescriptions Prior to Admission       Medication  Sig Dispense Refill     ASCENSIA CONTOUR strip TEST 3 TIMES A  Each 2     aspirin (ECOTRIN) 81 mg enteric coated tablet Take 1 tablet by mouth once daily with a meal.  0     blood sugar diagnostic (ACCU-CHEK ROXANNE PLUS TEST STRP) strip Test blood sugars TID with meals. Dx: E11.9 100 Strip 5     calcitriol (ROCALTROL) 0.25 mcg capsule  "Take one tablet weekly on Monday 12 capsule 3     calcium carbonate-vitamin D3 600 mg (1,500 mg)-2,500 unit cap Take 1 capsule by mouth once daily.  0     DME Diabetic shoes, Dx E11.9.  Lifelong need. 1 Each 0     esomeprazole (NEXIUM) 40 mg capsule Take 1 capsule by mouth once daily before a meal. 90 capsule 3     furosemide (LASIX) 40 mg tablet TAKE 1 TABLET BY MOUTH EVERY MORNING 90 tablet 2     insulin aspart (NOVOLOG FLEXPEN) 100 unit/mL solution for injection Use Sliding scale for breakfast, 5 units plus sliding scale for lunch and dinner 15 mL 2     insulin aspart (NOVOLOG FLEXPEN) 100 unit/mL solution for injection Sling scale for breakfast, 5 units plus sliding scale for lunch and dinner  0     insulin glargine (LANTUS SOLOSTAR) 100 unit/mL (3 mL) pen Inject 50 Units subcutaneous before bedtime. 45 mL 3     isosorbide mononitrate (IMDUR) 30 mg extended release tablet 24 Hour TAKE 2 TABLETS BY MOUTH ONCE A DAY (Patient taking differently: TAKE 1 TABLETS BY MOUTH ONCE A DAY) 180 tablet 2     lancets (ACCU-CHEK FASTCLIX) Dispense item covered by pt ins. E11.9 IDDM type II - Test 3 times/day. 102 Each 10     lisinopril (PRINIVIL; ZESTRIL) 2.5 mg tablet Take 1 tablet by mouth once daily. 90 tablet 3     metoprolol succinate (TOPROL XL) 25 mg Sustained-Release tablet TAKE 1 TABLET BY MOUTH ONCE DAILY. 90 tablet 3     nitroglycerin (NITROSTAT) 0.4 mg sublingual tablet Place 1 tablet under the tongue every 5 minutes if needed for Chest Pain. 1 Bottle 0     rosuvastatin (CRESTOR) 40 mg tablet Take 40 mg by mouth daily 90 tablet 2     ULTICARE PEN NEEDLE 31 gauge x 5/16\" USE AS DIRECTED FOUR TIMES DAILY 400 Each 3     zolpidem (AMBIEN) 10 mg tablet Take 1 tablet by mouth at bedtime if needed for Sleep. 30 tablet 5       Allergies:No Known Allergies    Review of Systems:  GERD: No (No symptoms on Nexium.)  Chest pain: No (CAD S/P CABG X2 8/2013; denies chest pain, SOB or syncope.)  Shortness of breath: No  Recent " "fever: No  Poor exercise tolerance: No  Bleeding tendency: No  Pregnant: No  Anesthesia Complications: None      History    Smoking Status      Never Smoker   Smokeless Tobacco      Never Used     Social History     Social History        Marital status:       Spouse name: Marky SHIPMAN     Number of children:  3     Years of education:  N/A     Social History Main Topics       Smoking status: Never Smoker     Smokeless tobacco: Never Used     Alcohol use No     Drug use: No     Sexual activity: No     Other Topics   Concern      Service  No     Blood Transfusions  Yes     Caffeine Concern  No     Occupational Exposure  No     Hobby Hazards  No     Sleep Concern  Yes     sleep apnea      Stress Concern  No     Weight Concern  Yes     BMI - 35.7      Special Diet  Yes     low salt, low fat. low cholesterol      Back Care  Yes     2 back surgeries      Exercise  No     Seat Belt  Yes     Self-Exams  No     Social History Narrative     She is .  Retired.     She had worked in a bakery.      Patient has never smoked.    Alcohol Use - no         Marky ENAMORADO has sleep apnea.    3 children.    Preload  8/5/2013.       Physical Examination:  /61  Pulse 52  Temp 98.5  F (36.9  C)  Resp 18  Ht 1.626 m (5' 4\")  Wt 94.3 kg (208 lb)  SpO2 99%  BMI 35.7 kg/m2 Body mass index is 35.7 kg/(m^2). Body surface area is 2.06 meters squared.  Dental Condition: Fair (Upper full denture in place; lower teeth intact.)     Mallampati Score (Airway): II (Jowls; short neck; large tongue.)  Cardiovascular: Abnormal  (Healed sternotomy.)  Pulmonary: Normal  Other: Interstim in place & turned off in SDS.    [facility]    No results for input(s): SODIUM, POTASSIUM, CHLORIDE, LY3LYEOA, ANIONGAP, BUN, CREATININE, BUNCREARATIO, CALCIUM, GLUCOSE, GLUCOSEMETER, KETONES, MAGNESIUM, WBC, HGB, HCT, PLT, ABORH, RHTYPE, PREGURINE, BHCGQL, HCGBETAQUANT, INR in the last 72 hours.          Assessment/Plan:  ASA " Class: IV  Risk of dental injury discussed: Yes  NPO status confirmed: Yes  Anesthetic Plan:  General; Block (Interscalene block for PPC.)  Risk/Benefit/Alt discussed: Yes  Questions answered: Yes  Emergency Case?: No  Labs/ECG/Radiology Reviewed?: Yes      H&P Reviewed.  Patient Examined.      Provider Electronic Signature:  JOHNATHAN LARSON MD

## 2018-01-04 NOTE — OR PREOP
Patient Information     Patient Name MRN Bailey Monique [id number] Female 1950      OR PreOp by Vilma Chao RN at 2017  1:30 PM     Author:  Vilma Chao RN Service:  (none) Author Type:  NURS- Registered Nurse     Filed:  2017  1:31 PM Date of Service:  2017  1:30 PM Status:  Signed     :  Vilma Chao RN (NURS- Registered Nurse)            Report given to DANICA Edmond prior to transport to the OR. VILMA CHAO RN ....................  2017   1:31 PM

## 2018-01-04 NOTE — TELEPHONE ENCOUNTER
Patient Information     Patient Name MRN Bailey Monique [id number] Female 1950      Telephone Encounter by Scarlet Barron at 2017 12:09 PM     Author:  Scarlet Barron Service:  (none) Author Type:  (none)     Filed:  2017 12:13 PM Encounter Date:  2017 Status:  Signed     :  Scarlet Barron            Patient forgot to get her work release paperwork today 2017. She would just like a call when it is at the front check in to .    Scarlet Barron ....................  2017   12:13 PM

## 2018-01-04 NOTE — PROGRESS NOTES
"Patient Information     Patient Name MRN Sex Bailey Law [id number] Female 1950      Progress Notes by Vaibhav Traore DO at 3/31/2017 11:30 AM     Author:  Vaibhav Traore DO Service:  (none) Author Type:  Physician     Filed:  3/31/2017 11:50 AM Encounter Date:  3/31/2017 Status:  Signed     :  Vaibhav Traore DO (Physician)            PROGRESS NOTE    SUBJECTIVE:  Bailey Sierra is here for evaluation in regards to right shoulder. Patient did make it to her CT arthrogram and had some discomfort into her shoulder after the procedure. She still has pain into the lateral deltoid region worse with overhead activities. This is a shoulder that she's had 2 surgeries previously with metallic anchors in place. She is utilizing over-the-counter medications as needed area    OBJECTIVE:  Visit Vitals       /60     Pulse 64     Ht 1.626 m (5' 4\")     Wt 94.8 kg (209 lb)     BMI 35.87 kg/m2    Body mass index is 35.87 kg/(m^2).    General Appearance: Pleasant female in good appearance, mood and affect.  Alert and orientated times three ( time, date and location).    Skin: Abnormal, well-healed surgical incision sites.  Sensation is Normal    Shoulder:  Motion: Patient struggles with active forward flexion and is lacking 5  . When I passively stretch her she almost at 180 . Abduction she is lacking 10   actively. Tight with internal and external rotation.  Hawkin's Sign: positive  Neer's Sign: positive  Cross body adduction:  positive  Drop arm test: negative  Weakness at waist level: positive  Bicipital testing: positive  Lift off test:  negative  Luna's test:  negative    Elbow:  Flexion: Normal  Extension: Normal    Hand:  Sensation: Normal  Radial and ulnar blood flow:  Normal    Eyes: Pupils are round.    Ears: Hearing: Slightly decreased hearing.    Heart: regular rate and rhythm    Lungs: Coarse breath sounds.     Radiographic images from 3/10 where independently " reviewed and discussed with the patient.      Xray:     Surgical changes are noted with anchors in place all metal, there is narrowing of the acromioclavicular joint and a type II acromial hook.    XR SHOULDER 3 VIEWS RIGHT  HISTORY: 66 yearsFemale Chronic right shoulder pain  TECHNIQUE: 3 views right shoulder were obtained.  COMPARISON: 12/22/2011  FINDINGS: 2-0 suture anchors are present within the greater tuberosity of the humerus. The glenohumeral joint is congruent. There is mild osteoarthritic change. The acromioclavicular joint is congruent with mild osteoarthritic change. There is no evidence of fracture or dislocation.  IMPRESSION: Interval surgical change. 2 new suture anchors have been placed.  There is mild osteoarthritic change of the shoulder.  Electronically Signed By: Alonzo Márquez M.D. on 3/10/2017 10:53 AM    CT arthrogram:    Procedure: CT ARTHROGRAM SHOULDER RIGHT  HISTORY: Right rotator cuff tendinitis. Shoulder pain for two years. History of previous rotator cuff repair.  TECHNIQUE: CT images of the right shoulder were obtained after intra-articular injection of dilute contrast. Sagittal and coronal reformatted images were reviewed..  COMPARISON: MRI right shoulder 12/28/2011.  FINDINGS:  Rotator cuff: There are postoperative changes from previous rotator cuff repair. There is an undersurface partial tear in the supraspinatus and extending to the junction with the infraspinatus tendon which comprises approximately 50% of the thickness of the rotator cuff. There is no complete rotator cuff tear. There is intramuscular delamination of this tear extending over a length of approximately 3.5 cm. The subscapularis tendon is grossly intact.  Acromioclavicular joint: Intact. There is minimal undersurface hypertrophy producing slight mass effect on the rotator cuff.  Biceps tendon: The biceps tendon is normally located in the bicipital groove and is grossly intact.  Glenohumeral joint: No fracture  or dislocation. No definite large labral tears, although CT arthrogram could miss subtle labral tears. There are mild degenerative changes in the glenohumeral joint.  IMPRESSION:  1. Partial undersurface supraspinatus and infraspinatus tendon tear with intramuscular delamination. No complete rotator cuff tear. Overall, this is similar to the prior MRI.  2. Mild degenerative disease.  Electronically Signed By: Pretty Rosas M.D. on 3/24/2017 3:41 PM    IMPRESSION:  History of previous shoulder surgery by Dr. Medina on the right and left with increased discomfort right shoulder over the past 1 year.  Probable re-tear right shoulder.  Right impingement syndrome.  Right acromioclavicular arthritis.  Minimal glenohumeral arthritis.  Early glenohumeral adhesive capsulitis on the right.    PLAN:  Risks, benefits, conservative, surgical and alternatives to treatment where discussed and the patient would like to proceed with consideration of surgery.  I reviewed the model, poster board and handouts so she can understand shoulder pathology.  She needs to work on her wall walking which I outlined today and had her , she will continue with this.  She will follow-up after above.  Questions and concerns answered.    I have discussed options with Bailey Sierra the treatment for shoulder rotator cuff tear, which have included observation, physical therapy, corticosteroid injection versus shoulder rotator cuff repair. I discussed pros and cons of each approach, and at this point, Bailey Sierra would like to proceed with rotator cuff shoulder surgery. We discussed surgery would be an outpatient procedure, you would be able to go home following the surgery. We will plan on arthroscopic versus mini open rotator cuff repair with anything else that needs to be done.  Surgical anesthesia would be general anesthesia with possible interscalene block to control pain following surgery.   I discussed following surgery Bailey Sierra would  "be in a sling for eight weeks following surgery with gentle motion of the elbow, wrist maneuvers after surgery.  At four weeks following surgery  further motion and strengthening with the shoulder with physical therapy will commence and it is a step wise approach.   Full recovery from rotator cuff surgery may take a year. The goal will be pain relief. Complications were discussed including continued pain, stiffness in the shoulder, rare chance of neurovascular damage, potential chance of infection. If deep   Infection were to occur, further surgery may be needed with repeat washout in the operating room with possible need for treatment with antibiotics.    If tolerated use of of an EC-ASA 325mg is recommended for 30 days after surgery.    Risks, benefits, conservative, surgical, and alternatives of treatment were thoroughly outlined. No guarantees were given. Risks which do include, but are not limited to:  Scar, infection, decreased motion, damage to blood vessels, nerves and tendons, failure or need for further treatment, reaction to medications and anesthesia, blood clots, and the possibility of death where discussed.  She did verbalize an understanding of the above and that if a biceps tenotomy is performed they would have a \"citlalli sign\" since the long head of the biceps would sit lower.  All questions and concerns were addressed.    Patient is set up for right shoulder arthroscopy with SAD,DCE, possible rotator cuff repair and biceps tenotomy and a block if it is felt appropriate after discussing with anesthesia.    Bailey Sierra will need pre op clearance for management of diabetes and she has an implantable stimulator that was placed by urology.    Follow up after surgery will be 3-7 days    All questions where answered to the patients satisfaction.    Vaibhav Traore D.O.  Orthopaedic Surgeon    [facility] [address] [place] [address]  Phone [phone] (KNEE)  Fax " [phone]    This document was created using computer generated templates and voice activated software.    11:47 AM 3/31/2017

## 2018-01-04 NOTE — TELEPHONE ENCOUNTER
Patient Information     Patient Name MRN Sex Bailey Law [id number] Female 1950      Telephone Encounter by Rochelle Sanches at 2017 12:45 PM     Author:  Rochelle Sanches Service:  (none) Author Type:  (none)     Filed:  2017 12:45 PM Encounter Date:  2017 Status:  Signed     :  Rochelle Sanches            Called pt back and let her know they are in the bag she was sent home with. No further questions.    Rochelle Sanches CMA 2017 12:45 PM

## 2018-01-04 NOTE — OR ANESTHESIA
"Patient Information     Patient Name MRN Sex Carlos Law [id number] Female 1950      OR Anesthesia by Ellie Morales MD at 2017 12:23 PM     Author:  Ellie Morales MD Service:  (none) Author Type:  PHYS- Anesthesiologist     Filed:  2017 12:24 PM Date of Service:  2017 12:23 PM Status:  Signed     :  Ellie Morales MD (PHYS- Anesthesiologist)            ULTRASOUND-GUIDED INTERSCALENE BRACHIAL PLEXUS BLOCK FOR POST-OP PAIN MANAGEMENT  [facility]    Patient: CARLOS SCRUGGS  Patient : 1950  Date: 2017  Procedure time:  1155 to 1205  Diagnosis: Shoulder pain.    Surgeon requests interscalene block of the brachial plexus for post-op pain management.    The procedure, potential benefits, risks, and alternatives were discussed during the preoperative interview.  The patient voiced understanding of the information and agreed to proceed with the procedure.    Universal protocol was followed.  TIME OUT conducted just prior to starting procedure confirmed patient identity, site/side, procedure, patient position and availability of correct equipment and implants.     Full continuous monitoring and supplemental oxygen per nasal cannula were placed.  Sedation: midazolam 2 mg IV & fentanyl 50 mcg IV was administered pre-procedure.  Anesthesia: 0.5% ropivacaine subcutaneously.    The ultrasound probe was used to locate the brachial plexus at the clavicle adjacent to the artery.  It was followed cephalad to a point where it was bracketed by the scalene muscles. The right neck was prepped with chlorhexidine.  A 22 gauge 4\" stimuplex insulated needle was advanced to a point where the tip rested adjacent to but not within the brachial plexus.  Proper needle placement was confirmed using a nerve stimulator set at 0.4 mAmps.  After negative aspiration, a 1 mL test dose of local anesthetic was administered.  The patient denied tinnitus or metallic " taste in the mouth.  A 5 mL bolus of local was injected easily and painlessly to demonstrate hydrodissection around the brachial plexus.  A total of 30 mL of 0.5% ropivacaine with 1 mg preservative free dexamethasone was injected incrementally, with negative aspiration every 5 ml.  No parasthesias were elicited either during needle placement or medication injection.  There were no other immediate complications apparent.  The patient tolerated the procedure well.     A permanent copy of the ultrasound image was printed and will be scanned into the EMR.    Electronically Signed by  JOHNATHAN LARSON MD

## 2018-01-04 NOTE — INTERVAL H&P NOTE
Patient Information     Patient Name MRN Bailey Monique [id number] Female 1950      Interval H&P Note by Vaibhav Traore DO at 2017 10:49 AM     Author:  Vaibhav Traore DO Service:  (none) Author Type:  Physician     Filed:  2017 10:49 AM Date of Service:  2017 10:49 AM Status:  Signed     :  Vaibhav Traore DO (Physician)            History and Physical Update    The history and physical has been reviewed and the patient's right she has been examined.  There are no interim changes to the patient's history or physical condition.  She is ready to proceed with planned procedure.  She understands the risks and benefits and once again these where outlined.    Vaibhav Traore DO  Orthopedic Surgeon        Source Note     Author:  Sam Leal MD Service:  (none) Author Type:  Physician    Filed:  2017  1:20 PM Date of Service:  2017 12:15 PM Status:  Signed    :  Sam Leal MD (Physician)              ----------------- PREOPERATIVE EXAM ------------------  2017    SUBJECTIVE:  Bialey Sierra is a 67 y.o. female here for preoperative optimization.    I was asked to see Bailey Sierra by Dr. Traore for a preoperative optimization due to CAD, DM2, HTN, lipids.    Date of Surgery: 17    Type of Surgery: Right shoulder arthroscopy  Surgeon: Dr. Traore  Hospital:  [facility]    HPI:  Patient has had recurrent right shoulder pain. She is scheduled to undergo arthroscopy with rotator cuff repair, distal clavicle excision. She has had bilateral shoulder arthroscopies in the past.    Patient has a history of type 2 diabetes and uses insulin. Currently she uses 50 units of Lantus at night and 15 units of NovoLog plus a sliding scale with meals. Her recent hemoglobin A1c in January was 8.0. She reports no hypoglycemia. She generally has checked her sugar one to 2 times daily and seems to be a little bit higher in the evening.    Patient  "also has history of hypertension and coronary artery disease. She has had no chest pain or exertional symptoms.      Fever/Chills or other infectious symptoms in past month:  (NO)   >10lb weight loss in past two months:  (NO)     Preoperative Evaluation: Obstructive Sleep Apnea screening    S: Snore -  Do you snore loudly? (louder than talking or loud enough to be heard through closed doors)(NO)  T: Tired - Do you often feel tired, fatigued, or sleepy during the daytime?(NO)  O: Observed - Has anyone ever observed you stop breathing during your sleep?(NO)  P: Pressure - Do you have or are you being treated for high blood pressure?(YES)  B: BMI - BMI greater than 35kg/m2?(YES)  A: Age - Age over 50 years old?(YES)  N: Neck - Neck circumference greater than 40 cm?(YES)  G: Gender - Gender: Male?(NO)    Total number of \"YES\" responses:  4    Scoring: Low risk of DARIA 0-2  At Risk of DARIA: >3 High Risk of DARIA: 5-8        Patient Active Problem List       Diagnosis  Date Noted     Right rotator cuff tendinitis  03/10/2017     AC (acromioclavicular) joint arthritis  03/10/2017     Impingement syndrome of right shoulder  03/10/2017     Long-term insulin use in type 2 diabetes (HC)  02/13/2017     CAD (coronary artery disease)  09/03/2013     2 vessel bypass, August 2013        Lumbar back pain  05/06/2013     GERD (gastroesophageal reflux disease)  01/08/2013     Neurogenic bladder  10/25/2012     INSOMNIA  01/25/2012     HYPERLIPIDEMIA       HYPERTENSION       PEDAL EDEMA       chronic          DEGENERATIVE JOINT DISEASE       PLANTAR FASCIITIS       CHRONIC KIDNEY DISEASE STAGE III (MODERATE)       HEART MURMUR, SYSTOLIC         Past Medical History:     Diagnosis  Date     AC (acromioclavicular) joint arthritis 3/10/2017     ANEMIA, IRON DEFICIENCY      Bile leak, postoperative      CHRONIC KIDNEY DISEASE STAGE III (MODERATE)      DEGENERATIVE JOINT DISEASE      DIABETES MELLITUS, TYPE II      HEART MURMUR, SYSTOLIC      " Hx of pregnancy     Three childbirths      HYPERLIPIDEMIA      HYPERTENSION      Impingement syndrome of right shoulder 3/10/2017     INSOMNIA 1/25/2012     PEDAL EDEMA      PLANTAR FASCIITIS      Right rotator cuff tendinitis 3/10/2017     S/P CABG x 2 07/13    [facility]      SHOULDER IMPINGEMENT SYNDROME      URINARY URGENCY S/P BOTOX PRN 3/8/2011     UTERINE PROLAPSE        Past Surgical History:      Procedure  Laterality Date     cardiac bypass  07/2013     CYSTOSCOPY  3/15/01     DENTAL SURGERY      Dental extractions       ERCP DIAGNOSTIC  05/2008    Bile leak with ERCP and stenting and drainage of bile collection through abdominal wall.       Interstim Device placement  04/14/14    Dr. Dash WARD       LAP CHOLECYSTECTOMY  04/2008     LUMBAR LAMINECTOMY  1997     SHOULDER ARTHROSCOPY  1997    Right       SHOULDER ARTHROSCOPY  2012    Left       RICCARDO AND BSO  4/2/01    Vag vault suspension with Cortex mesh       TUBAL LIGATION         Current Outpatient Prescriptions       Medication  Sig Dispense Refill     ASCENSIA CONTOUR strip TEST 3 TIMES A  Each 2     aspirin (ECOTRIN) 81 mg enteric coated tablet Take 1 tablet by mouth once daily with a meal.  0     blood sugar diagnostic (ACCU-CHEK ROXANNE PLUS TEST STRP) strip Test blood sugars TID with meals. Dx: E11.9 100 Strip 5     calcitriol (ROCALTROL) 0.25 mcg capsule Take one tablet weekly on Monday 12 capsule 3     calcium carbonate-vitamin D3 600 mg (1,500 mg)-2,500 unit cap Take 1 capsule by mouth once daily.  0     DME Diabetic shoes, Dx E11.9.  Lifelong need. 1 Each 0     esomeprazole (NEXIUM) 40 mg capsule Take 1 capsule by mouth once daily before a meal. 90 capsule 3     furosemide (LASIX) 40 mg tablet TAKE 1 TABLET BY MOUTH EVERY MORNING 90 tablet 2     insulin aspart (NOVOLOG FLEXPEN) 100 unit/mL solution for injection Use Sliding scale for breakfast, 5 units plus sliding scale for lunch and dinner 15 mL 2     insulin aspart (NOVOLOG FLEXPEN) 100  "unit/mL solution for injection Sling scale for breakfast, 5 units plus sliding scale for lunch and dinner  0     insulin glargine (LANTUS SOLOSTAR) 100 unit/mL (3 mL) pen Inject 50 Units subcutaneous before bedtime. Product desired:LANTUS. Dx: E11.9 15 mL 6     isosorbide mononitrate (IMDUR) 30 mg extended release tablet 24 Hour TAKE 2 TABLETS BY MOUTH ONCE A  tablet 2     lancets (ACCU-CHEK FASTCLIX) Dispense item covered by pt ins. E11.9 IDDM type II - Test 3 times/day. 102 Each 10     lisinopril (PRINIVIL; ZESTRIL) 2.5 mg tablet Take 1 tablet by mouth once daily. 90 tablet 3     metoprolol succinate (TOPROL XL) 25 mg Sustained-Release tablet TAKE 1 TABLET BY MOUTH ONCE DAILY. 90 tablet 3     nitroglycerin (NITROSTAT) 0.4 mg sublingual tablet Place 1 tablet under the tongue every 5 minutes if needed for Chest Pain. 1 Bottle 0     rosuvastatin (CRESTOR) 40 mg tablet Take 40 mg by mouth daily 90 tablet 2     ULTICARE PEN NEEDLE 31 gauge x 5/16\" USE AS DIRECTED FOUR TIMES DAILY 400 Each 3     zolpidem (AMBIEN) 10 mg tablet Take 1 tablet by mouth at bedtime if needed for Sleep. 30 tablet 5     No current facility-administered medications for this visit.      Medications have been reviewed by me and are current to the best of my knowledge and ability.    Recent use of: aspirin    Allergies:  No Known AllergiesLatex allergy  no    Family History       Problem   Relation Age of Onset     Other  Father      MI       Cancer  Mother      Some type of cancer, aneurysm        Cancer  Sister      unknown type       Diabetes  Sister      Type II       Diabetes  Sister      Type II       Diabetes  Sister      Type II       Diabetes  Paternal Grandmother      Type II       Other  Daughter      depression       Other  Daughter      ovarian cancer and depression       Heart Disease  Brother      CAD, MI         Denies family hx of bleeding tendencies, anesthesia complications, or other problems with surgery.    Social " "History     Substance Use Topics       Smoking status: Never Smoker     Smokeless tobacco: Never Used     Alcohol use No       ROS:    Surgical:  patient denies previous complications from prior surgeries including but not limited to prolonged bleeding, anesthesia complications, dysrhythmias, surgical wound infections, or prolonged hospital stay.       -------------------------------------------------------------    PHYSICAL EXAM:  /56  Pulse 59  Temp 97.6  F (36.4  C)  Ht 1.626 m (5' 4\")  Wt 94.3 kg (208 lb)  SpO2 98%  BMI 35.7 kg/m2    EXAM:  General Appearance: Pleasant, alert, appropriate appearance for age. No acute distress  Head Exam: Normal. Normocephalic, atraumatic.  Eyes: PERRL, EOMI  Ears: Normal TM's bilaterally. Normal auditory canals and external ears.   OroPharynx: Normal buccal mucosa. Normal pharynx.  Neck: Supple, no masses or nodes, no lymphadenopathy.  No thyromegaly.  Lungs: Normal chest wall and respirations. Clear to auscultation, no wheezes or crackles.  Cardiovascular: 1/6 systolic murmur heard best in the right upper sternal border. Regular rate and rhythm.  Gastrointestinal: Soft, nontender, no abnormal masses or organomegaly. BS normal   Musculoskeletal: No edema.  Skin: no concerning or new rashes.  Neurologic Exam: CN 2-12 grossly intact.  Normal gait.  Symmetric DTRs, normal gross motor movement, tone, and coordination. No tremor.  Psychiatric Exam: Alert and oriented, appropriate affect.      EKG:  appears normal, NSR  ---------------------------------------------------------------  Results for orders placed or performed in visit on 03/06/17      Hgb A1c      Result  Value Ref Range    HEMOGLOBIN A1C MONITORING (POCT) 8.0 (H) 4.0 - 6.2 %    ESTIMATED AVERAGE GLUCOSE  183 mg/dL       ASSESSEMENT AND PLAN:    Bailey was seen today for preoperative exam.    Diagnoses and all orders for this visit:    Preop examination  -     BASIC METABOLIC PANEL; Future  -     HEMOGLOBIN; " Future  -     EKG 12 LEAD UNIT PERFORMED (PERFORMED TODAY)    Coronary artery disease involving native coronary artery of native heart with other form of angina pectoris (HC)    Type 2 diabetes mellitus with stage 3 chronic kidney disease, with long-term current use of insulin (HC)    HYPERTENSION    Patient is cleared for her procedure with appropriate anesthesia. She has questions in regards to how soon after surgery she'll be able to return to work. I deferred her to her surgeon. She'll be nothing by mouth after midnight. Medication changes can be seen below. Feel free to contact me if you have any questions or concerns.  Patient Instructions   The night before your surgery, use lantus 25 units (1/2 normal dose).    The morning of your surgery, do not take any novolog.  Take your normal morning medications with a sip of water.    Stop aspirin on 4-12-17.          PRE OP RECOMMENDATIONS:  Patient is on chronic pain medications (NO);   Patient is on antiplatlet/anticoagulation (YES)  Other medications that need adjustment perioperatively (YES)    Other:  Patient was advised to call our office and the surgical services with any change in condition or new symptoms if they were to develop between today and their surgical date.  Especially any cardiopulmonary symptoms or symptoms concerning for an infection.    Sukumar Leal MD    This document was prepared using voice generated softwear.  While every attempt was made for accuracy, grammatical errors may exist.

## 2018-01-04 NOTE — OR POSTOP
Patient Information     Patient Name MRN Sex Bailey Law [id number] Female 1950      OR PostOp by Pam Carlos RN at 2017  5:10 PM     Author:  Pam Carlos RN Service:  (none) Author Type:  NURS- Registered Nurse     Filed:  2017  5:11 PM Date of Service:  2017  5:10 PM Status:  Signed     :  Pam Carlos RN (NURS- Registered Nurse)            Discharge Note    Data:  Bailey Sierra has been discharged home at 1709 via wheelchair accompanied by Registered Nurse and Family.      Action:  Written discharge/follow-up instructions were provided to patient and Daughter(s). Prescriptions were filled and sent with patient.  Belongings sent with patient. Medications from home sent with patient/family: Not Applicable  Equipment none .     Response:  Patient and Daughter(s) verbalized understanding of discharge instructions, reason for discharge, and necessary follow-up appointments.

## 2018-01-04 NOTE — PROGRESS NOTES
Patient Information     Patient Name MRN Sex Bailey Law [id number] Female 1950      Progress Notes by Alyse Del Rio at 3/24/2017  9:38 AM     Author:  Alyse Del Rio Service:  (none) Author Type:  (none)     Filed:  3/24/2017  9:38 AM Date of Service:  3/24/2017  9:38 AM Status:  Signed     :  Alyse Del Rio            [facility] Protocol    A. Pre-procedure verification complete yes  1-relevant information / documentation available, reviewed and properly matched to the patient; 2-consent accurate and complete, 3-equipment and supplies available    B. Site marking complete Yes  Site marked if not in continuous attendance with patient    C. TIME OUT completed yes  Time Out was conducted just prior to starting procedure to verify the eight required elements: 1-patient identity, 2-consent accurate and complete, 3-position, 4-correct side/site marked (if applicable), 5-procedure, 6-relevant images / results properly labeled and displayed (if applicable), 7-antibiotics / irrigation fluids (if applicable), 8-safety precautions.

## 2018-01-04 NOTE — PROGRESS NOTES
Patient Information     Patient Name MRN Bailey Monique [id number] Female 1950      Progress Notes by Marky Downey PT at 2017  4:42 PM     Author:  Marky Downey PT Service:  (none) Author Type:  PT- Physical Therapist     Filed:  2017  5:08 PM Date of Service:  2017  4:42 PM Status:  Signed     :  Marky Downey PT (PT- Physical Therapist)            G codes and Modifier based on patient's presentation and clinical judgement:     S: Patient seen post operatively for right shoulder arthroscopy. Patient's present during post operative session with PT/OT.    O: Patient properly fitted for Breg Slingshot 3 brace and then instructed in proper donning and doffing technique. Patient also instructed by OT with safe performance of ADLs along with instruction for gentle exercises to be performed. Patient able to          demonstrate fair stability with mobility activities.    A: Patient appears to understand function of Breg brace and appears stable with mobility.    P: Patient discharge to home with family.        Current Primary G Code and Modifier:    Per the Patient's intake and/or assessment the Primary G Code is: Mobility .   The Patient's Impairment, Limitation or Restriction Modifier would be best described as: CI - 1% - 20% Impairment.     Goal Primary G Code and Modifier:    The Patient's G Code Goal would be: Mobility    The Patient's Impairment, Limitation or Restriction Modifier goal would be best described as: CI - 1% - 20% Impairment.   Discharge Primary G Code and Modifier:      The Patient's status upon Discharge is Mobility    The Patient's Impairment, Limitation or Restriction Modifier would be best described as CI - 1% - 20% Impairment.

## 2018-01-04 NOTE — PROCEDURES
Patient Information     Patient Name MRN Sex Bailey Law [id number] Female 1950      Procedures signed by Vaibhav Alvarado DO at 2017  5:34 PM      Author:  Vaibhav Alvarado DO Service:  (none) Author Type:  Physician     Filed:  2017  5:34 PM Creation Time:  2017  4:11 PM Status:  Signed     :  Vaibhav Alvarado DO (Physician)            -  DATE OF SERVICE:  2017    SURGEON  VAIBHAV ALVARADO DO    ASSISTANT  None.    PREOPERATIVE DIAGNOSIS  1. Right impingement syndrome.   2. Right AC arthritis.     POSTOPERATIVE DIAGNOSIS  1. Right impingement syndrome.   2. Right AC arthritis.   3. Long head of the biceps fraying.   4. 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   5. Labral fraying.   6. Rotator cuff tear, incomplete.     PROCEDURE  1. Right shoulder arthroscopy with extensive debridement to include 2% of the anterior to posterior labrum (DOS 2017).   2. Arthroscopic subacromial decompression.   3. Arthroscopic distal clavicle excision.   4. Arthroscopic biceps tenotomy.   5. Chondroplasty of the humeral head and glenoid.   6. Arthroscopic rotator cuff repair utilizing a double row technique with one 5.5 mm BioComposite corkscrew anchor and 2 lateral row 5.5 mm BioComposite SwiveLock anchors.     IMPLANTS   As above.      ANESTHESIA  General via LMA, as well as an interscalene block.     ESTIMATED BLOOD LOSS  Less than 5.     FLUIDS  See chart.     DRAINS  None.     COMPLICATIONS  None.     DISPOSITION  Stable to postop.     INDICATIONS FOR PROCEDURE  The patient is a 67-year-old female who had been having ongoing complaints of pain about her right shoulder. She had multiple previous surgeries on that shoulder. After undergoing a CT arthrogram and having tried conservative measures including range of motion exercises and anti-inflammatories, she elected to go ahead with surgical intervention.     PROCEDURE NOTE  After  informed consent was received from the patient, having listed all the risks and benefits as noted on the consent form, but not limited to the consent form, and having discussed all conservative, surgical, and alternatives to treatment, patient signed the consent form with a witness present. The patient understood there were numerous risks, all of them were not written down, but were discussed at length. No guarantees were given. All questions were answered prior to signing the consent form. Patient was given antibiotics one half hour prior to skin incision. She also received an interscalene block according to anesthesia protocol. With this done, she was then taken back to the operating room, supine on a gurney, transferred to the operating room table, secured and all bony prominences padded. She was then given general anesthesia via LMA according to the anesthesia protocol. Once proper anesthesia was obtained, examined the shoulder, showing it to have good stability. She was repositioned in the beach chair, head and neck positioned by Anesthesia, bony prominences checked, padded once again, and she was secured to the operating room table. The patient's right upper extremity was sterilely prepped and draped.     Timeout was performed according to institutional guidelines. With this done, I then marked my incision site and made my posterior skin incision with a #11 scalpel. Blunt trocar and cannula were inserted, trocar removed and a Linvatec viewing lens was placed. Beginning in the glenohumeral joint, she was found to have small areas of grade II chondromalacia of the humeral head and glenoid. There was thinning of the supraspinatus noted. No loose bodies in the inferior pouch. There was fraying of the labrum as well as some fraying of the long head of the biceps and also what appeared to be irritation into the bicipital sling and subscapularis anterolaterally. Utilizing inside-out technique, I made my anterior  portal site. With the probe in place, I once again noted the above-mentioned structures. I then inserted my shaver and performed extensive debridement. About 2% of the anterior to posterior labrum was removed. With this back to a stable edge, I then elected to do a long biceps tenotomy, and this was done with the arthroscopic scissors. With that done, I debrided the stump. I then placed a marking suture through the thinned area of the supraspinatus, brought my viewing lens laterally and looked at the posterior labrum. I was happy with my debridement, and I removed my instruments.     I repositioned my instruments into the subacromial space, and I performed extensive debridement and bursectomy. I was able to see her previous surgery and could still see some of the sutures there. I went over to where the marking suture was placed, and made a lateral portal site under direct visualization and then was able to probe this area. It was found to be very thin, approximately 5% was left, 95% was thinned out. I removed the marking suture. She did have still remnants of the type II acromial hook and narrowing of the acromioclavicular joint even though she had had previous surgeries in these areas. I moved my viewing lens laterally, brought my bur in posteriorly and performed a subacromial decompression. I then reversed my viewing and working portals and removed more of the anterolateral corner of the acromion until I had it back to a type I reconfiguration. I then removed the inferior aspect of the distal clavicle, brought my bur in anteriorly and performed the last of the distal clavicle excision until approximately 12 mm width of resection was completed. I then irrigated everything copiously. I turned my attention onto the supraspinatus tear. I made a more anterolateral portal site, placed one Arthrex Passport cannula and debrided this area more to get good bleeding bone. I then placed one 5.5 BioComposite Arthrex corkscrew  FT anchor, passed all 4 suture limbs, and then placed and tied them arthroscopically. I then placed my lateral row fixation with two 5.5 mm BioComposite SwiveLock anchors. This was done according to 's specifications. I irrigated the area copiously, checked my repair, was satisfied, irrigated again and then I removed my instruments.     I closed the portal sites deep with 3-0 Monocryl interrupted and all 4 skin incisions were closed with 3-0 nylon modified horizontal mattress fashion. Sterile dressings, Xeroform, 4 x 4 and Tegaderm were placed and patient was placed into a slingshot 3. She was extubated, transferred back to the [facility] and taken to the recovery room in satisfactory condition. She will be discharged home per protocol. She was given a prescription for Norco 10/325, #60, and no refills; Keflex and aspirin.  She will follow up in my office as already arranged.       DO TOSHIA ZAIDI/larisa   D:  2017 15:09:04  T:  2017 15:44:55  Voice Job ID:  [id number]  Text Job ID:  [id number]  cc:CELSA BUCK MD, PRIMARY PHYSICIAN         [facility] & [place], [place]NAME:  SCRUGGS CARLOS HAMPTON  MR#:  [id number]  :  1950  DATE:  2017  LOCATION:  [facility]  ROOM:  OR  TYPE:  AMBOPERATIVE / PROCEDUREPage 1 of 3

## 2018-01-04 NOTE — TELEPHONE ENCOUNTER
Patient Information     Patient Name MRN Bailey Monique [id number] Female 1950      Telephone Encounter by Scarlet Barron at 2017  8:51 AM     Author:  Scarlet Barron Service:  (none) Author Type:  (none)     Filed:  2017  8:53 AM Encounter Date:  2017 Status:  Signed     :  Scarlet Barron            PATIENT WOULD LIKE LAB ORDERS FOR AN A1C. WONDERING IF YOU COULD PUT LAB ORDERS IN FOR HER.    Scarlet Barron ....................  2017   8:51 AM

## 2018-01-04 NOTE — H&P
Patient Information     Patient Name MRN Bailey Monique [id number] Female 1950      H&P by Sam Leal MD at 2017 12:15 PM     Author:  Sam Leal MD  Service:  (none) Author Type:  Physician     Filed:  2017  1:20 PM  Encounter Date:  2017 Status:  Addendum     :  Sam Leal MD (Physician)        Related Notes: Original Note by Sam Leal MD (Physician) filed at 2017 12:53 PM            ----------------- PREOPERATIVE EXAM ------------------  2017    SUBJECTIVE:  Bailey Sierra is a 67 y.o. female here for preoperative optimization.    I was asked to see Bailey Sierra by Dr. Traore for a preoperative optimization due to CAD, DM2, HTN, lipids.    Date of Surgery: 17    Type of Surgery: Right shoulder arthroscopy  Surgeon: Dr. Traore  Hospital:  [facility]    HPI:  Patient has had recurrent right shoulder pain. She is scheduled to undergo arthroscopy with rotator cuff repair, distal clavicle excision. She has had bilateral shoulder arthroscopies in the past.    Patient has a history of type 2 diabetes and uses insulin. Currently she uses 50 units of Lantus at night and 15 units of NovoLog plus a sliding scale with meals. Her recent hemoglobin A1c in January was 8.0. She reports no hypoglycemia. She generally has checked her sugar one to 2 times daily and seems to be a little bit higher in the evening.    Patient also has history of hypertension and coronary artery disease. She has had no chest pain or exertional symptoms.      Fever/Chills or other infectious symptoms in past month:  (NO)   >10lb weight loss in past two months:  (NO)     Preoperative Evaluation: Obstructive Sleep Apnea screening    S: Snore -  Do you snore loudly? (louder than talking or loud enough to be heard through closed doors)(NO)  T: Tired - Do you often feel tired, fatigued, or sleepy during the daytime?(NO)  O: Observed - Has anyone ever observed you stop breathing  "during your sleep?(NO)  P: Pressure - Do you have or are you being treated for high blood pressure?(YES)  B: BMI - BMI greater than 35kg/m2?(YES)  A: Age - Age over 50 years old?(YES)  N: Neck - Neck circumference greater than 40 cm?(YES)  G: Gender - Gender: Male?(NO)    Total number of \"YES\" responses:  4    Scoring: Low risk of DARIA 0-2  At Risk of DARIA: >3 High Risk of DARIA: 5-8        Patient Active Problem List       Diagnosis  Date Noted     Right rotator cuff tendinitis  03/10/2017     AC (acromioclavicular) joint arthritis  03/10/2017     Impingement syndrome of right shoulder  03/10/2017     Long-term insulin use in type 2 diabetes (HC)  02/13/2017     CAD (coronary artery disease)  09/03/2013     2 vessel bypass, August 2013        Lumbar back pain  05/06/2013     GERD (gastroesophageal reflux disease)  01/08/2013     Neurogenic bladder  10/25/2012     INSOMNIA  01/25/2012     HYPERLIPIDEMIA       HYPERTENSION       PEDAL EDEMA       chronic          DEGENERATIVE JOINT DISEASE       PLANTAR FASCIITIS       CHRONIC KIDNEY DISEASE STAGE III (MODERATE)       HEART MURMUR, SYSTOLIC         Past Medical History:     Diagnosis  Date     AC (acromioclavicular) joint arthritis 3/10/2017     ANEMIA, IRON DEFICIENCY      Bile leak, postoperative      CHRONIC KIDNEY DISEASE STAGE III (MODERATE)      DEGENERATIVE JOINT DISEASE      DIABETES MELLITUS, TYPE II      HEART MURMUR, SYSTOLIC      Hx of pregnancy     Three childbirths      HYPERLIPIDEMIA      HYPERTENSION      Impingement syndrome of right shoulder 3/10/2017     INSOMNIA 1/25/2012     PEDAL EDEMA      PLANTAR FASCIITIS      Right rotator cuff tendinitis 3/10/2017     S/P CABG x 2 07/13    [facility]      SHOULDER IMPINGEMENT SYNDROME      URINARY URGENCY S/P BOTOX PRN 3/8/2011     UTERINE PROLAPSE        Past Surgical History:      Procedure  Laterality Date     cardiac bypass  07/2013     CYSTOSCOPY  3/15/01     DENTAL SURGERY      Dental extractions       ERCP " DIAGNOSTIC  05/2008    Bile leak with ERCP and stenting and drainage of bile collection through abdominal wall.       Interstim Device placement  04/14/14    Dr. Dash WARD       LAP CHOLECYSTECTOMY  04/2008     LUMBAR LAMINECTOMY  1997     SHOULDER ARTHROSCOPY  1997    Right       SHOULDER ARTHROSCOPY  2012    Left       RICCARDO AND BSO  4/2/01    Vag vault suspension with Cortex mesh       TUBAL LIGATION         Current Outpatient Prescriptions       Medication  Sig Dispense Refill     ASCENSIA CONTOUR strip TEST 3 TIMES A  Each 2     aspirin (ECOTRIN) 81 mg enteric coated tablet Take 1 tablet by mouth once daily with a meal.  0     blood sugar diagnostic (ACCU-CHEK ROXANNE PLUS TEST STRP) strip Test blood sugars TID with meals. Dx: E11.9 100 Strip 5     calcitriol (ROCALTROL) 0.25 mcg capsule Take one tablet weekly on Monday 12 capsule 3     calcium carbonate-vitamin D3 600 mg (1,500 mg)-2,500 unit cap Take 1 capsule by mouth once daily.  0     DME Diabetic shoes, Dx E11.9.  Lifelong need. 1 Each 0     esomeprazole (NEXIUM) 40 mg capsule Take 1 capsule by mouth once daily before a meal. 90 capsule 3     furosemide (LASIX) 40 mg tablet TAKE 1 TABLET BY MOUTH EVERY MORNING 90 tablet 2     insulin aspart (NOVOLOG FLEXPEN) 100 unit/mL solution for injection Use Sliding scale for breakfast, 5 units plus sliding scale for lunch and dinner 15 mL 2     insulin aspart (NOVOLOG FLEXPEN) 100 unit/mL solution for injection Sling scale for breakfast, 5 units plus sliding scale for lunch and dinner  0     insulin glargine (LANTUS SOLOSTAR) 100 unit/mL (3 mL) pen Inject 50 Units subcutaneous before bedtime. Product desired:LANTUS. Dx: E11.9 15 mL 6     isosorbide mononitrate (IMDUR) 30 mg extended release tablet 24 Hour TAKE 2 TABLETS BY MOUTH ONCE A  tablet 2     lancets (ACCU-CHEK FASTCLIX) Dispense item covered by pt ins. E11.9 IDDM type II - Test 3 times/day. 102 Each 10     lisinopril (PRINIVIL; ZESTRIL) 2.5 mg  "tablet Take 1 tablet by mouth once daily. 90 tablet 3     metoprolol succinate (TOPROL XL) 25 mg Sustained-Release tablet TAKE 1 TABLET BY MOUTH ONCE DAILY. 90 tablet 3     nitroglycerin (NITROSTAT) 0.4 mg sublingual tablet Place 1 tablet under the tongue every 5 minutes if needed for Chest Pain. 1 Bottle 0     rosuvastatin (CRESTOR) 40 mg tablet Take 40 mg by mouth daily 90 tablet 2     ULTICARE PEN NEEDLE 31 gauge x 5/16\" USE AS DIRECTED FOUR TIMES DAILY 400 Each 3     zolpidem (AMBIEN) 10 mg tablet Take 1 tablet by mouth at bedtime if needed for Sleep. 30 tablet 5     No current facility-administered medications for this visit.      Medications have been reviewed by me and are current to the best of my knowledge and ability.    Recent use of: aspirin    Allergies:  No Known AllergiesLatex allergy  no    Family History       Problem   Relation Age of Onset     Other  Father      MI       Cancer  Mother      Some type of cancer, aneurysm        Cancer  Sister      unknown type       Diabetes  Sister      Type II       Diabetes  Sister      Type II       Diabetes  Sister      Type II       Diabetes  Paternal Grandmother      Type II       Other  Daughter      depression       Other  Daughter      ovarian cancer and depression       Heart Disease  Brother      CAD, MI         Denies family hx of bleeding tendencies, anesthesia complications, or other problems with surgery.    Social History     Substance Use Topics       Smoking status: Never Smoker     Smokeless tobacco: Never Used     Alcohol use No       ROS:    Surgical:  patient denies previous complications from prior surgeries including but not limited to prolonged bleeding, anesthesia complications, dysrhythmias, surgical wound infections, or prolonged hospital stay.       -------------------------------------------------------------    PHYSICAL EXAM:  /56  Pulse 59  Temp 97.6  F (36.4  C)  Ht 1.626 m (5' 4\")  Wt 94.3 kg (208 lb)  SpO2 98%  BMI " 35.7 kg/m2    EXAM:  General Appearance: Pleasant, alert, appropriate appearance for age. No acute distress  Head Exam: Normal. Normocephalic, atraumatic.  Eyes: PERRL, EOMI  Ears: Normal TM's bilaterally. Normal auditory canals and external ears.   OroPharynx: Normal buccal mucosa. Normal pharynx.  Neck: Supple, no masses or nodes, no lymphadenopathy.  No thyromegaly.  Lungs: Normal chest wall and respirations. Clear to auscultation, no wheezes or crackles.  Cardiovascular: 1/6 systolic murmur heard best in the right upper sternal border. Regular rate and rhythm.  Gastrointestinal: Soft, nontender, no abnormal masses or organomegaly. BS normal   Musculoskeletal: No edema.  Skin: no concerning or new rashes.  Neurologic Exam: CN 2-12 grossly intact.  Normal gait.  Symmetric DTRs, normal gross motor movement, tone, and coordination. No tremor.  Psychiatric Exam: Alert and oriented, appropriate affect.      EKG:  appears normal, NSR  ---------------------------------------------------------------  Results for orders placed or performed in visit on 03/06/17      Hgb A1c      Result  Value Ref Range    HEMOGLOBIN A1C MONITORING (POCT) 8.0 (H) 4.0 - 6.2 %    ESTIMATED AVERAGE GLUCOSE  183 mg/dL       ASSESSEMENT AND PLAN:    Bailey was seen today for preoperative exam.    Diagnoses and all orders for this visit:    Preop examination  -     BASIC METABOLIC PANEL; Future  -     HEMOGLOBIN; Future  -     EKG 12 LEAD UNIT PERFORMED (PERFORMED TODAY)    Coronary artery disease involving native coronary artery of native heart with other form of angina pectoris (HC)    Type 2 diabetes mellitus with stage 3 chronic kidney disease, with long-term current use of insulin (HC)    HYPERTENSION    Patient is cleared for her procedure with appropriate anesthesia. She has questions in regards to how soon after surgery she'll be able to return to work. I deferred her to her surgeon. She'll be nothing by mouth after midnight. Medication  changes can be seen below. Feel free to contact me if you have any questions or concerns.  Patient Instructions   The night before your surgery, use lantus 25 units (1/2 normal dose).    The morning of your surgery, do not take any novolog.  Take your normal morning medications with a sip of water.    Stop aspirin on 4-12-17.          PRE OP RECOMMENDATIONS:  Patient is on chronic pain medications (NO);   Patient is on antiplatlet/anticoagulation (YES)  Other medications that need adjustment perioperatively (YES)    Other:  Patient was advised to call our office and the surgical services with any change in condition or new symptoms if they were to develop between today and their surgical date.  Especially any cardiopulmonary symptoms or symptoms concerning for an infection.    Sukumar Leal MD    This document was prepared using voice generated softwear.  While every attempt was made for accuracy, grammatical errors may exist.

## 2018-01-04 NOTE — INITIAL ASSESSMENTS
Patient Information     Patient Name MRN Bailey Monique [id number] Female 1950      Initial Assessments by Padma Anguiano OT at 2017  2:53 PM     Author:  Padma Anguiano OT Service:  (none) Author Type:  OT- Occupational Therapist     Filed:  2017  5:24 PM Date of Service:  2017  2:53 PM Status:  Signed     :  Padma Anguiano OT (OT- Occupational Therapist)            Occupational Therapy Evaluation: 2017    Patient is a 67 year old female who presents today for right shoulder arthroscopy with Dr. Traore. Patient was seen pre-operatively and fit with sling shot III, size large. Patient presents today with her daughter.  Patient states she will have someone staying with her; however, it will not be the daughter who is present.     Patient seen post-operatively for adjustment of immobilizer as well as final education. Education provided to patient and daughter regarding fit of immobilizer as well as elbow, wrist, hand exercises. Demonstration with returned demonstration provided to patient and spouse regarding one-handed ADL techniques including bathing and dressing. Verbal cues required for post-op shoulder precautions as patient tends to move right shoulder. Reviewed importance of following precautions. Patient returned verbal understanding.      Patient and daughter returned verbal understanding of training provided this date. No further skilled OT services warranted.     G codes and Modifier based on patient's presentation and clinical judgement:     Current Primary G Code and Modifier:    Per the Patient's intake and/or assessment the Primary G Code is: Self Care .   The Patient's Impairment, Limitation or Restriction Modifier would be best described as: CI - 1% - 20% Impairment.     Goal Primary G Code and Modifier:    The Patient's G Code Goal would be: Self Care    The Patient's Impairment, Limitation or Restriction Modifier goal would be best  described as: CI - 1% - 20% Impairment.   Discharge Primary G Code and Modifier:      The Patient's status upon Discharge is Self Care    The Patient's Impairment, Limitation or Restriction Modifier would be best described as CI - 1% - 20% Impairment.       Padma Anguiano, OTR/L

## 2018-01-04 NOTE — NURSING NOTE
Patient Information     Patient Name MRN Sex Bailey Law [id number] Female 1950      Nursing Note by Minoo Laura at 2017 12:15 PM     Author:  Minoo Laura Service:  (none) Author Type:  (none)     Filed:  2017 12:18 PM Encounter Date:  2017 Status:  Signed     :  Minoo Laura            This patient presents today for a Preoperative exam for this procedure:  Right shoulder arthroscopy  Date of Surgery:  17  Surgeon:  León  Facility:  [facility]  Fax:

## 2018-01-04 NOTE — OR ANESTHESIA
Patient Information     Patient Name MRN Sex Bialey Law [id number] Female 1950      OR Anesthesia by Ellie Morales MD at 2017  4:00 PM     Author:  Ellie Morales MD Service:  (none) Author Type:  PHYS- Anesthesiologist     Filed:  2017  4:01 PM Date of Service:  2017  4:00 PM Status:  Signed     :  Ellie Morales MD (PHYS- Anesthesiologist)            Anesthesia Post Operative Care Note    Name: Bailey Sierra  MRN:   [id number]  :    1950       Procedure Done:  See Surgeon Note   Case Cancelled for Anesthetic Reason:  No      Anesthesia Technique    Anesthetic Type:  General     Airway Management:  LMA     Oral Trauma:  No    Intraoperative Course     Hemodynamics:  Requires Support  Hemodynamics Unstable: Hypotensive        Vasoactives:  Neosynephrine and Volume    Ventilation Normal:  Yes Lung Sounds:  Normal      PACU Course      Nondepolarizer Used: No    Reintubation:  No   Hemodynamics:  Stable      Hydration: Euvolemic   Temperature:  36.1 - 38.3      Mental Status:  Awake, alert, follows commands   Pain Management:  Adequate     Regional Block:  Yes     Regional Block Outcome:  Adequate   Anesthesia Complications:  None      Vital Signs:  Temp: 96.9  F (36.1  C)  Pulse: 54  BP: 127/95  Resp: 14  SpO2: 91 %    O2 Device: Room Air    NON-VERBAL PAIN SCALE  Face: No particular expression or smile  Activity (Movement): Lying quietly, normal position  Guarding: Lying quietly  Physiologic I (Vital Signs): Stable vital signs/no change 4 hrs  Physiologic II: Warm, dry, skin  Total Score: 0    Level of Nausea: None        Active Lines:  Patient Lines/Drains/Airways Status    Active Line     Name: Placement date: Placement time: Site: Days:    PERIPHERAL VAD 14   0720      1101    PERIPHERAL VAD Left Forearm 20 17   1125   Forearm   less than 1                Intake & Output:  Date  17 1500 - 17 0659(Not Admitted)   17 0700 -  04/20/17 0659      Shift  4603-2302 2398-4410 24 Hour Total 2904-8439 9497-9739 0818-1587 24 Hour Total   I  N  T  A  K  E   Intravenous    1000   1000       +I/O+  Maint IV (lactated ringers infusion)    1000   1000    Shift Total    1000   1000   O  U  T  P  U  T   Shift Total          NET     1000   1000   Weight (kg)  94.3 94.3 94.3 94.3 94.3 94.3 94.3         Labs:  No results for input(s): TO5YERLZTZB, EWH9SOOHOICH, PHARTERIAL, JBH3CQHOEYKR, A5GNLKANJYYW in the last 24 hours.    No results for input(s): MAGNESIUM in the last 24 hours.    No results for input(s): GLUCOSEMETER in the last 720 hours.        JOHNATHAN LARSON MD ....................  4/19/2017   4:00 PM

## 2018-01-04 NOTE — PROCEDURES
Patient Information     Patient Name MRN Sex Bailey Law [id number] Female 1950      Procedures by Vaibhav Traore DO at 2017  2:55 PM     Author:  Vaibhav Traore DO Service:  (none) Author Type:  Physician     Filed:  2017  2:56 PM Date of Service:  2017  2:55 PM Status:  Signed     :  Vaibhav Traore DO (Physician)            POSTOPERATIVE / POSTPROCEDURE NOTE - IMMEDIATE :    Surgeon(s)/Proceduralist(s) and Assistants (if any):  Surgeon(s):  Vaibhav Traore DO    Procedure(s):  Right shoulder arthroscopy with ED/SAD/DCE/RTCR    Procedure(s) findings:   See op note    Specimen(s) removed: no    (EBL) Estimated blood loss (ml): 5    Postoperative/Postprocedure Diagnosis:   See op note    Vaibhav Traore D.O.  Orthopaedic Surgeon    [address] [address]  Phone [phone] (KNEE)  Fax [phone]    2:55 PM 2017

## 2018-01-04 NOTE — PROGRESS NOTES
Patient Information     Patient Name MRN Sex Bailey Law [id number] Female 1950      Progress Notes by Alyse Del Rio at 3/24/2017  9:38 AM     Author:  Alyse Del Rio  Service:  (none) Author Type:  (none)     Filed:  3/24/2017  9:48 AM  Date of Service:  3/24/2017  9:38 AM Status:  Addendum     :  Alyse Del Rio        Related Notes: Original Note by Alyse Del Rio filed at 3/24/2017  9:38 AM            Falls Risk Criteria:    Age 65 and older or under age 4        Sensory deficits    Poor vision    Use of ambulatory aides    Impaired judgment    Unable to walk independently    Meets High Risk criteria for falls:  Yes             1.  Do you have dizziness or vertigo?    no                    2.  Do you need help standing or walking?   no                 3.  Have you fallen within the last 6 months?    no           4.  Has the patient been fasting?      no       If any risks are marked Yes, the following interventions are utilized:    Do not leave patient unattended     Assist patient in the dressing room and bathroom    Have ambulatory aides available throughout procedure    Involve patient s family if available

## 2018-01-04 NOTE — INITIAL ASSESSMENTS
Patient Information     Patient Name MRN Sex Bailey Law [id number] Female 1950      Initial Assessments by Lina Ruiz PT at 2017  1:24 PM     Author:  Lina Ruiz PT Service:  (none) Author Type:  PT- Physical Therapist     Filed:  2017  2:24 PM Date of Service:  2017  1:24 PM Status:  Signed     :  Lina Ruiz PT (PT- Physical Therapist)            [facility]  Outpatient PT - Initial Evaluation  Upper Extremity    Date of Service: 2017     Visit #: 1  [id number]    Patient Name: Bailey Sierra   YOB: 1950   Referring MD/Provider: Vaibhav Traore DO  Medical and Treatment Diagnosis: s/p right shoulder arthroscopy  PT Treatment Diagnosis: impaired shoulder range of motion and strength  Insurance: Medicare  Start of Service: 17  Certification Dates: Start of Service: 17  Medicare/MA Re-Cert Due: 17     Living Situations:  Independent in Living Situation     Preadmission Functional Mobility: Independent     Precautions:    Start rehab now  Phase 1 - no passive ROM at shoulder  Phase 2A - on                  Phase 2B - on  -out of pillow in the daytime, wall walk 1X per every hour awake.  Focus is full passive forward flexion by 8 weeks post op.  Wear pillow at night  Phase 3 - on  - out of all gear then.    Cognition:  Oriented to Person, Place, and Time.     Were cultural / age or other special adaptations needed? No      Patient is a vulnerable adult: No      Patient is aware of diagnosis: Yes      Risks and benefits explained: Yes    Subjective      History:  Patient underwent right shoulder arthroscopy on 2017 with Dr. Traore. Stopped taking pain pills this weekend. Did have a loud pop in her shoulder a few days ago when trying to pull up her pants with the left arm without her sling on. Reports she had been just dangling her arm and had to use the restroom. Was tender for several days and feeling  better now. Patient is right handed and is driving herself.  PROCEDURE  1. Right shoulder arthroscopy with extensive debridement to include 2% of the anterior to posterior labrum (DOS 04/19/2017).   2. Arthroscopic subacromial decompression.   3. Arthroscopic distal clavicle excision.   4. Arthroscopic biceps tenotomy.   5. Chondroplasty of the humeral head and glenoid.   6. Arthroscopic rotator cuff repair utilizing a double row technique with one 5.5 mm BioComposite corkscrew anchor and 2 lateral row 5.5 mm BioComposite SwiveLock anchors.     Rates pain: nothing at rest currently, at worst   Describes pain: tender, sore  Locates pain: anterior shoulder    Aggravating: movement  Easing: ice, rest      Current functional limitations: dressing, cooking, garbage, hair hygiene    Prior Level:  Minimal to no difficulty completing the above functional activities.     Past Medical History:     Diagnosis  Date     AC (acromioclavicular) joint arthritis 3/10/2017     ANEMIA, IRON DEFICIENCY      Bile leak, postoperative      CAD (coronary artery disease)      CHRONIC KIDNEY DISEASE STAGE III (MODERATE)      DEGENERATIVE JOINT DISEASE      DIABETES MELLITUS, TYPE II      GERD (gastroesophageal reflux disease)      HEART MURMUR, SYSTOLIC      Hx of pregnancy     Three childbirths      HYPERLIPIDEMIA      HYPERTENSION      Impingement syndrome of right shoulder 3/10/2017     INSOMNIA 1/25/2012     Neurogenic bladder      PEDAL EDEMA      PLANTAR FASCIITIS      Right rotator cuff tendinitis 3/10/2017     S/P arthroscopic right shoulder surgery 4/19/2017     S/P CABG x 2 07/13    [facility]      SHOULDER IMPINGEMENT SYNDROME      URINARY URGENCY S/P BOTOX PRN 3/8/2011     UTERINE PROLAPSE      Past Surgical History:      Procedure  Laterality Date     cardiac bypass  07/2013     CYSTOSCOPY  3/15/01     DENTAL SURGERY      Dental extractions       ERCP DIAGNOSTIC  05/2008    Bile leak with ERCP and stenting and drainage of bile  collection through abdominal wall.       Interstim Device placement  04/14/14    Dr. Dash WARD       LAP CHOLECYSTECTOMY  04/2008     LUMBAR LAMINECTOMY  1997     S/P ARTHROSCOPIC RIGHT SHOULDER SURGERY Right 04/19/2017     SHOULDER ARTHROSCOPY  1997    Right       SHOULDER ARTHROSCOPY  2012    Left       RICCARDO AND BSO  4/2/01    Vag vault suspension with Cortex mesh       TUBAL LIGATION       Occupation:  Volunteer driving    Previous Treatment:    Pain Meds / Anti-inflammatory Meds  - Yes after surgery  Physical Therapy - a few years ago  Injections - yes  Surgery - previous 2 surgeries on right, rotator cuff   Pain Clinic   No    Diagnostics:  Reviewed (see chart)   Current Medications:  Reviewed (see chart)    Drug Allergies:  Reviewed (see chart)  ?   Latex Allergy:  No     Objective    Items left blank indicate that the test was inappropriate or not meaningful at the time of evaluation and therefore not performed.    Fall Risk Screening: No risk factors identified    ROM / Strength:    Deferred due to recent surgical procedure; full elbow range of motion demo'd    Observation:     Palpation: n/a    Today s Interventions:   Evaluation  Patient education regarding goals and expectations for therapy    home exercise program development/ review    Game ready x15 mins. Patient seated, low compression    Home Exercise Program:  Elbow and wrist range of motion, ball squeezes, scapular sets     Assessment    Therapist Assessment / Clinical Impression:  Signs and symptoms consistent with impaired shoulder range of motion and strength s/p right shoulder arthroscopy, biceps tenotomy, supraspinatus repair, DCE, SAD on 4/19. Patient is unable to functionally use her dominant upper extremity.    The patient is appropriate for physical therapy to address their pain, functional limitations, and physical impairments.      Functional Impairment(s): See subjective on initial evaluation and Functional Assessment / Summary Report  from [facility].    Physical Impairment(s):  As above      Patient Specific Functional and Pain Scales (PSFS):    Clinician Instructions: Complete after the history and before the exam.    Initial Assessment: We want to know what 3 activities in your life you are unable to perform, or are having the most difficulty performing, as a result of your chief problem. Please list and score at least 3 activities that you are unable to perform, or having the most difficulty performing, because of your chief problem.   Patient Specific Activity Scoring Scheme (score one number for each activity):   Activity Score (0-10)  0= Unable to perform activity  10= Able to perform activity at same level as before injury or problem   1. dishes 0/10   2. cooking 0/10   3. Taking garbage out 0/10   4. Reaching and lifting 0/10   5.  /10   Totals:  0/40 = 0 % ability which relates to 100% impairment    Patient verbally states that they understand that the information they have provided above is current and complete to the best of their knowledge.    Patient Specific Functional Scale Modifier Scale Conversion: (patient's modifier that correlates with pt's score on PSFS): 0-CN (100% Impaired).    G codes and Modifier taken from pt completing a PSFS: completed at initial evaluation   Initial Primary G Code and Modifier:    Per the Patient's intake and/or assessment the Primary G Code is: Handling Objects .   The Patient's Impairment, Limitation or Restriction Modifier would be best described as: CL - 60% - 80% Impairment.     Goal Primary G Code and Modifier:    The Patient's G Code Goal would be: Handling Objects    The Patient's Impairment, Limitation or Restriction Modifier goal would be best described as: CI - 1% - 20% Impairment.         Goals:  Functional Short Term Goals (8 weeks):    Patient is to have improved reaching ability to allow dressing and self-cares with minimal to no pain.   Patient will have improved shoulder flexion  to 100 degrees to allow for improved reaching ability with less than 3/10 pain.    Patient is to complete hygiene activities with less than 3/10 pain consistently.    Functional Long Term Goals (16 weeks):     Patient is to self-manage symptoms and return to prior level of function.    Patient is to be independent in their Home Exercise Program.  Patient is to tolerate all reaching and lifting activities above shoulder height with less than 2/10 pain.    Patient is to report less than 2/10 pain cooking and cleaning type activities.      Patient participated in goal selection and understand(s) the plan of care: Yes   Patient Potential for Achieving Desired Outcome: Good     Response to Intervention:  Good response to treatment     Plan    Treatment Plan:      Frequency:   16 visits    Duration of Treatment: 16 weeks    Planned Interventions:    Home Exercise Program development  Therapeutic Exercise (ROM & Strengthening)  Therapeutic Activities  Neuromuscular Re-education  Manual Therapy  Hot Packs / Cold Pack Modalities  Vasopneumatic Compression with Cold Therapy ( Game Ready )    Plan for next visit:  Phase 2A    Thank you for your referral to [facility].  Please call with any questions, concerns or comments.  [phone]  Lina Ruiz PT, DPT    The signature, of the referring medical provider, on this document indicates certification of the above prescribed plan of care and is medically necessary.

## 2018-01-04 NOTE — PROGRESS NOTES
"Patient Information     Patient Name MRN Sex Bailey Law [id number] Female 1950      Progress Notes by Vaibhav Traore DO at 2017 11:15 AM     Author:  Vaibhav Traore DO Service:  (none) Author Type:  Physician     Filed:  2017 11:53 AM Encounter Date:  2017 Status:  Signed     :  Vaibhav Traore DO (Physician)            PROGRESS NOTE    SUBJECTIVE:  Bailey Sierra is here for recheck in evaluation of right shoulder. She is doing well with expected tenderness.     OBJECTIVE:  /60  Temp 97.7  F (36.5  C) (Tympanic)   Ht 1.626 m (5' 4\")  Wt 94.3 kg (208 lb)  BMI 35.7 kg/m2 Body mass index is 35.7 kg/(m^2).    General Appearance: Pleasant female in good appearance, mood and affect.  Alert and orientated times three ( time, date and location).    Incision Clean and dry, closed, no infection    Shoulder:  Effusion: no  Motion:  not tested due to the recent surgical procedure.    Elbow:  Flexion: Normal  Extension: Normal  Deep tendon reflexes: Normal    Hand:  Sensation: Normal  Radial and ulnar blood flow:  Normal    Eyes: Pupils are round.    Ears: Hearing: Impaired    Heart: Good capillary refill in her fingertips.    Lungs: Coarse breath sounds.     Arthroscopy pictures and op note where reviewed with the patient and copies given.    ASSESSMENT:    POSTOPERATIVE DIAGNOSIS  1. Right impingement syndrome.   2. Right AC arthritis.   3. Long head of the biceps fraying.   4. 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   5. Labral fraying.   6. Rotator cuff tear, incomplete.      PROCEDURE  1. Right shoulder arthroscopy with extensive debridement to include 2% of the anterior to posterior labrum (DOS 2017).   2. Arthroscopic subacromial decompression.   3. Arthroscopic distal clavicle excision.   4. Arthroscopic biceps tenotomy.   5. Chondroplasty of the humeral head and glenoid.   6. Arthroscopic rotator cuff repair " utilizing a double row technique with one 5.5 mm BioComposite corkscrew anchor and 2 lateral row 5.5 mm BioComposite SwiveLock anchors.     PLAN:    Suture removal and wound care where completed.  She'll begin physical therapy.  I did talked her about driving for long she is not to be driving while in the sling.  She understands how to protect her shoulder.  Follow up in a proximally 6-1/2 weeks I will need PT notes.    Evaluate and treat  Modalities as needed    See operative note for full details of procedure    Start rehab now  Phase 1 - no passive ROM at shoulder  Phase 2A - on 5/17   Phase 2B - on 5/31 -out of pillow in the daytime, wall walk 1X per every hour awake.  Focus is full passive forward flexion by 8 weeks post op.  Wear pillow at night  Phase 3 - on 6/14 - out of all gear then.    Vaibhav Traore D.O.  Orthopaedic Surgeon    [facility]  [address] [place] [address]  Phone [phone] (KNEE)  Fax [phone]    This document was created using computer generated templates and voice activated software.    11:50 AM 4/24/2017

## 2018-01-04 NOTE — TELEPHONE ENCOUNTER
Patient Information     Patient Name MRN Sex Bailey Law [id number] Female 1950      Telephone Encounter by Phan Wright RN at 4/10/2017 11:27 AM     Author:  Phan Wright RN Service:  (none) Author Type:  NURS- Registered Nurse     Filed:  4/10/2017 11:41 AM Encounter Date:  4/10/2017 Status:  Signed     :  Phan Wright RN (NURS- Registered Nurse)            Diabetes    Office visit in the past 12 months or per provider note.    Last visit with CELSA BUCK was on: 2017 in [facility]  Next visit with CELSA BUCK is on: No future appointment listed with this provider  Next visit with Family Practice is on: No future appointment listed in this department    Lab test requirements:  HgbA1c annually or per provider note.  HEMOGLOBIN A1C MONITORING (POCT)    Date Value   2017 8.0 % (H)   2012 6.9 % NGSP (H)       Max refill for 12 months from last office visit or per provider note.    Chart review shows that patient was seen on 17 for a preop appointment with PCP. Medications reviewed, including lantus at that office visit. PCP addressed lantus in specific at office visit, stating that patient is taking lantus 50 units at night. Will refill rx as requested.    Prescription refilled per RN Medication Refill Policy.................... Phan Wright RN ....................  4/10/2017   11:39 AM

## 2018-01-04 NOTE — PATIENT INSTRUCTIONS
Patient Information     Patient Name MRN Sex Bailey Law [id number] Female 1950      Patient Instructions by Sam Leal MD at 2017 12:15 PM     Author:  Sam Leal MD Service:  (none) Author Type:  Physician     Filed:  2017 12:44 PM Encounter Date:  2017 Status:  Signed     :  Sam Leal MD (Physician)            The night before your surgery, use lantus 25 units (1/2 normal dose).    The morning of your surgery, do not take any novolog.  Take your normal morning medications with a sip of water.    Stop aspirin on 17.

## 2018-01-04 NOTE — NURSING NOTE
Patient Information     Patient Name MRN Sex Bailey Law [id number] Female 1950      Nursing Note by Rochelle Sanches at 2017 11:15 AM     Author:  Rochelle Sanches Service:  (none) Author Type:  (none)     Filed:  2017 11:16 AM Encounter Date:  2017 Status:  Signed     :  Rochelle Sanches            Pt presents for a post op s/p right shoulder scope, dos     Rochelle Sanches CMA 2017 11:15 AM

## 2018-01-04 NOTE — TELEPHONE ENCOUNTER
Patient Information     Patient Name MRN Sex Bailey Law [id number] Female 1950      Telephone Encounter by Sam Leal MD at 2017  9:20 AM     Author:  Sam Leal MD Service:  (none) Author Type:  Physician     Filed:  2017  9:21 AM Encounter Date:  2017 Status:  Signed     :  Sam Leal MD (Physician)            The computer will not allow me to order her lab until 90 days past which would be mid .

## 2018-01-04 NOTE — H&P
Patient Information     Patient Name MRN Bailey Monique [id number] Female 1950      H&P (View-Only) by Sam Leal MD at 2017 12:15 PM     Author:  Sam Leal MD Service:  (none) Author Type:  Physician     Filed:  2017  1:20 PM Date of Service:  2017 12:15 PM Status:  Signed     :  Sam Leal MD (Physician)            ----------------- PREOPERATIVE EXAM ------------------  2017    SUBJECTIVE:  Bailey Sierra is a 67 y.o. female here for preoperative optimization.    I was asked to see Bailey Sierra by Dr. Traore for a preoperative optimization due to CAD, DM2, HTN, lipids.    Date of Surgery: 17    Type of Surgery: Right shoulder arthroscopy  Surgeon: Dr. Traore  Hospital:  [facility]    HPI:  Patient has had recurrent right shoulder pain. She is scheduled to undergo arthroscopy with rotator cuff repair, distal clavicle excision. She has had bilateral shoulder arthroscopies in the past.    Patient has a history of type 2 diabetes and uses insulin. Currently she uses 50 units of Lantus at night and 15 units of NovoLog plus a sliding scale with meals. Her recent hemoglobin A1c in January was 8.0. She reports no hypoglycemia. She generally has checked her sugar one to 2 times daily and seems to be a little bit higher in the evening.    Patient also has history of hypertension and coronary artery disease. She has had no chest pain or exertional symptoms.      Fever/Chills or other infectious symptoms in past month:  (NO)   >10lb weight loss in past two months:  (NO)     Preoperative Evaluation: Obstructive Sleep Apnea screening    S: Snore -  Do you snore loudly? (louder than talking or loud enough to be heard through closed doors)(NO)  T: Tired - Do you often feel tired, fatigued, or sleepy during the daytime?(NO)  O: Observed - Has anyone ever observed you stop breathing during your sleep?(NO)  P: Pressure - Do you have or are you being treated for high  "blood pressure?(YES)  B: BMI - BMI greater than 35kg/m2?(YES)  A: Age - Age over 50 years old?(YES)  N: Neck - Neck circumference greater than 40 cm?(YES)  G: Gender - Gender: Male?(NO)    Total number of \"YES\" responses:  4    Scoring: Low risk of DARIA 0-2  At Risk of DARIA: >3 High Risk of DARIA: 5-8        Patient Active Problem List       Diagnosis  Date Noted     Right rotator cuff tendinitis  03/10/2017     AC (acromioclavicular) joint arthritis  03/10/2017     Impingement syndrome of right shoulder  03/10/2017     Long-term insulin use in type 2 diabetes (HC)  02/13/2017     CAD (coronary artery disease)  09/03/2013     2 vessel bypass, August 2013        Lumbar back pain  05/06/2013     GERD (gastroesophageal reflux disease)  01/08/2013     Neurogenic bladder  10/25/2012     INSOMNIA  01/25/2012     HYPERLIPIDEMIA       HYPERTENSION       PEDAL EDEMA       chronic          DEGENERATIVE JOINT DISEASE       PLANTAR FASCIITIS       CHRONIC KIDNEY DISEASE STAGE III (MODERATE)       HEART MURMUR, SYSTOLIC         Past Medical History:     Diagnosis  Date     AC (acromioclavicular) joint arthritis 3/10/2017     ANEMIA, IRON DEFICIENCY      Bile leak, postoperative      CHRONIC KIDNEY DISEASE STAGE III (MODERATE)      DEGENERATIVE JOINT DISEASE      DIABETES MELLITUS, TYPE II      HEART MURMUR, SYSTOLIC      Hx of pregnancy     Three childbirths      HYPERLIPIDEMIA      HYPERTENSION      Impingement syndrome of right shoulder 3/10/2017     INSOMNIA 1/25/2012     PEDAL EDEMA      PLANTAR FASCIITIS      Right rotator cuff tendinitis 3/10/2017     [facility]      SHOULDER IMPINGEMENT SYNDROME      URINARY URGENCY S/P BOTOX PRN 3/8/2011     UTERINE PROLAPSE        Past Surgical History:      Procedure  Laterality Date     cardiac bypass  07/2013     CYSTOSCOPY  3/15/01     DENTAL SURGERY      Dental extractions       ERCP DIAGNOSTIC  05/2008    Bile leak with ERCP and stenting and drainage of bile " collection through abdominal wall.       Interstim Device placement  04/14/14    Dr. Dash WARD       LAP CHOLECYSTECTOMY  04/2008     LUMBAR LAMINECTOMY  1997     SHOULDER ARTHROSCOPY  1997    Right       SHOULDER ARTHROSCOPY  2012    Left       RICCARDO AND BSO  4/2/01    Vag vault suspension with Cortex mesh       TUBAL LIGATION         Current Outpatient Prescriptions       Medication  Sig Dispense Refill     ASCENSIA CONTOUR strip TEST 3 TIMES A  Each 2     aspirin (ECOTRIN) 81 mg enteric coated tablet Take 1 tablet by mouth once daily with a meal.  0     blood sugar diagnostic (ACCU-CHEK ROXANNE PLUS TEST STRP) strip Test blood sugars TID with meals. Dx: E11.9 100 Strip 5     calcitriol (ROCALTROL) 0.25 mcg capsule Take one tablet weekly on Monday 12 capsule 3     calcium carbonate-vitamin D3 600 mg (1,500 mg)-2,500 unit cap Take 1 capsule by mouth once daily.  0     DME Diabetic shoes, Dx E11.9.  Lifelong need. 1 Each 0     esomeprazole (NEXIUM) 40 mg capsule Take 1 capsule by mouth once daily before a meal. 90 capsule 3     furosemide (LASIX) 40 mg tablet TAKE 1 TABLET BY MOUTH EVERY MORNING 90 tablet 2     insulin aspart (NOVOLOG FLEXPEN) 100 unit/mL solution for injection Use Sliding scale for breakfast, 5 units plus sliding scale for lunch and dinner 15 mL 2     insulin aspart (NOVOLOG FLEXPEN) 100 unit/mL solution for injection Sling scale for breakfast, 5 units plus sliding scale for lunch and dinner  0     insulin glargine (LANTUS SOLOSTAR) 100 unit/mL (3 mL) pen Inject 50 Units subcutaneous before bedtime. Product desired:LANTUS. Dx: E11.9 15 mL 6     isosorbide mononitrate (IMDUR) 30 mg extended release tablet 24 Hour TAKE 2 TABLETS BY MOUTH ONCE A  tablet 2     lancets (ACCU-CHEK FASTCLIX) Dispense item covered by pt ins. E11.9 IDDM type II - Test 3 times/day. 102 Each 10     lisinopril (PRINIVIL; ZESTRIL) 2.5 mg tablet Take 1 tablet by mouth once daily. 90 tablet 3     metoprolol succinate  "(TOPROL XL) 25 mg Sustained-Release tablet TAKE 1 TABLET BY MOUTH ONCE DAILY. 90 tablet 3     nitroglycerin (NITROSTAT) 0.4 mg sublingual tablet Place 1 tablet under the tongue every 5 minutes if needed for Chest Pain. 1 Bottle 0     rosuvastatin (CRESTOR) 40 mg tablet Take 40 mg by mouth daily 90 tablet 2     ULTICARE PEN NEEDLE 31 gauge x 5/16\" USE AS DIRECTED FOUR TIMES DAILY 400 Each 3     zolpidem (AMBIEN) 10 mg tablet Take 1 tablet by mouth at bedtime if needed for Sleep. 30 tablet 5     No current facility-administered medications for this visit.      Medications have been reviewed by me and are current to the best of my knowledge and ability.    Recent use of: aspirin    Allergies:  No Known AllergiesLatex allergy  no    Family History       Problem   Relation Age of Onset     Other  Father      MI       Cancer  Mother      Some type of cancer, aneurysm        Cancer  Sister      unknown type       Diabetes  Sister      Type II       Diabetes  Sister      Type II       Diabetes  Sister      Type II       Diabetes  Paternal Grandmother      Type II       Other  Daughter      depression       Other  Daughter      ovarian cancer and depression       Heart Disease  Brother      CAD, MI         Denies family hx of bleeding tendencies, anesthesia complications, or other problems with surgery.    Social History     Substance Use Topics       Smoking status: Never Smoker     Smokeless tobacco: Never Used     Alcohol use No       ROS:    Surgical:  patient denies previous complications from prior surgeries including but not limited to prolonged bleeding, anesthesia complications, dysrhythmias, surgical wound infections, or prolonged hospital stay.       -------------------------------------------------------------    PHYSICAL EXAM:  /56  Pulse 59  Temp 97.6  F (36.4  C)  Ht 1.626 m (5' 4\")  Wt 94.3 kg (208 lb)  SpO2 98%  BMI 35.7 kg/m2    EXAM:  General Appearance: Pleasant, alert, appropriate appearance " for age. No acute distress  Head Exam: Normal. Normocephalic, atraumatic.  Eyes: PERRL, EOMI  Ears: Normal TM's bilaterally. Normal auditory canals and external ears.   OroPharynx: Normal buccal mucosa. Normal pharynx.  Neck: Supple, no masses or nodes, no lymphadenopathy.  No thyromegaly.  Lungs: Normal chest wall and respirations. Clear to auscultation, no wheezes or crackles.  Cardiovascular: 1/6 systolic murmur heard best in the right upper sternal border. Regular rate and rhythm.  Gastrointestinal: Soft, nontender, no abnormal masses or organomegaly. BS normal   Musculoskeletal: No edema.  Skin: no concerning or new rashes.  Neurologic Exam: CN 2-12 grossly intact.  Normal gait.  Symmetric DTRs, normal gross motor movement, tone, and coordination. No tremor.  Psychiatric Exam: Alert and oriented, appropriate affect.      EKG:  appears normal, NSR  ---------------------------------------------------------------  Results for orders placed or performed in visit on 03/06/17      Hgb A1c      Result  Value Ref Range    HEMOGLOBIN A1C MONITORING (POCT) 8.0 (H) 4.0 - 6.2 %    ESTIMATED AVERAGE GLUCOSE  183 mg/dL       ASSESSEMENT AND PLAN:    Bailey was seen today for preoperative exam.    Diagnoses and all orders for this visit:    Preop examination  -     BASIC METABOLIC PANEL; Future  -     HEMOGLOBIN; Future  -     EKG 12 LEAD UNIT PERFORMED (PERFORMED TODAY)    Coronary artery disease involving native coronary artery of native heart with other form of angina pectoris (HC)    Type 2 diabetes mellitus with stage 3 chronic kidney disease, with long-term current use of insulin (HC)    HYPERTENSION    Patient is cleared for her procedure with appropriate anesthesia. She has questions in regards to how soon after surgery she'll be able to return to work. I deferred her to her surgeon. She'll be nothing by mouth after midnight. Medication changes can be seen below. Feel free to contact me if you have any questions or  concerns.  Patient Instructions   The night before your surgery, use lantus 25 units (1/2 normal dose).    The morning of your surgery, do not take any novolog.  Take your normal morning medications with a sip of water.    Stop aspirin on 4-12-17.          PRE OP RECOMMENDATIONS:  Patient is on chronic pain medications (NO);   Patient is on antiplatlet/anticoagulation (YES)  Other medications that need adjustment perioperatively (YES)    Other:  Patient was advised to call our office and the surgical services with any change in condition or new symptoms if they were to develop between today and their surgical date.  Especially any cardiopulmonary symptoms or symptoms concerning for an infection.    Sukumar Leal MD    This document was prepared using voice generated softwear.  While every attempt was made for accuracy, grammatical errors may exist.

## 2018-01-04 NOTE — NURSING NOTE
Patient Information     Patient Name MRN Sex Bailey Law [id number] Female 1950      Nursing Note by Rochelle Sanches at 3/31/2017 11:30 AM     Author:  Rochelle Sanches Service:  (none) Author Type:  (none)     Filed:  3/31/2017 11:31 AM Encounter Date:  3/31/2017 Status:  Signed     :  Rochelle Sanches            Pt presents for a follow up on her right shoulder pain. Here to review the results from her CT scan.    Rochelle Sanches CMA 3/31/2017 11:31 AM

## 2018-01-04 NOTE — OR POSTOP
Patient Information     Patient Name MRN Sex Bailey Law [id number] Female 1950      OR PostOp by Korin Mcneal RN at 2017  3:50 PM     Author:  Korin Mcneal RN Service:  (none) Author Type:  NURS- Registered Nurse     Filed:  2017  3:50 PM Date of Service:  2017  3:50 PM Status:  Signed     :  Korin Mcneal RN (NURS- Registered Nurse)            PACU Transfer Note    Bailey Sierra transferred to [facility] via cart.  Report to [facility].  Equipment used for transport:  None.  Accompanied by:  Registered Nurse    Patient stable and meets phase 1 discharge criteria for transport from PACU.      PACU Respiratory Event Documentation     1) Episodes of Apnea greater than or equal to 10 seconds: no    2) Bradypnea - less than 8 breaths per minute: no    3) Pain score on 0 to 10 scale: 0    4) Pain-sedation mismatch (yes or no): no    5) Repeated 02 desaturation less than 90% (yes or no): no    Anesthesia notified? (yes or no): no    Any of the above events occuring repeatedly in separate 30 minute intervals may be considered recurrent PACU respiratory events.

## 2018-01-04 NOTE — TELEPHONE ENCOUNTER
Patient Information     Patient Name MRN Bailey Monique [id number] Female 1950      Telephone Encounter by Odilia Cisneros at 4/10/2017 10:29 AM     Author:  Odilia Cisneros Service:  (none) Author Type:  (none)     Filed:  4/10/2017 10:30 AM Encounter Date:  4/10/2017 Status:  Signed     :  Odilia Cisneros            Notified of lab results. She is coming in Friday to do BMP. Can you place an order?  Odilia Cisneros LPN..................4/10/2017   10:29 AM

## 2018-01-05 NOTE — PROGRESS NOTES
Patient Information     Patient Name MRN Sex Bailey Law [id number] Female 1950      Progress Notes by Lina Ruiz PT at 2017 10:31 AM     Author:  Lina Ruiz PT Service:  (none) Author Type:  PT- Physical Therapist     Filed:  2017 11:09 AM Date of Service:  2017 10:31 AM Status:  Signed     :  Lina Ruiz PT (PT- Physical Therapist)            [facility]  Outpatient PT - Daily note    Date of Service: 2017     Visit #: 2 of 10    Patient Name: Bailey Sierra   YOB: 1950   Referring MD/Provider: Vaibhav Traore DO  Medical and Treatment Diagnosis: s/p right shoulder arthroscopy  PT Treatment Diagnosis: impaired shoulder range of motion and strength  Insurance: Medicare  Start of Service: 17  Certification Dates: Start of Service: 17  Medicare/MA Re-Cert Due: 17        Precautions:    Start rehab now  Phase 1 - no passive ROM at shoulder  Phase 2A - on                  Phase 2B - on  -out of pillow in the daytime, wall walk 1X per every hour awake.  Focus is full passive forward flexion by 8 weeks post op.  Wear pillow at night  Phase 3 - on  - out of all gear then.      Subjective    Patient reports she has minimal pain today, maybe a 10.    Objective  DATE:          ROM:   107 P         Pt specifics: supine flexion (pt position, PROM/AAROM/AROM, pain)    Today s Interventions:   Pendulums x1 min  Table flexion x10 reps  home exercise program progression and education    PROM flexion and internal rotation to tolerance x15 mins  Side lying scapular mobilizations all directions x5 mins    Game ready x15 mins. Patient seated, low compression    Home Exercise Program:  Elbow and wrist range of motion, ball squeezes, scapular sets     Assessment    Patient demonstrates fair tolerance to Passive range of motion.    The patient is appropriate for physical therapy to address their pain, functional  limitations, and physical impairments.    Patient Specific Functional and Pain Scales (PSFS):    Clinician Instructions: Complete after the history and before the exam.    Initial Assessment: We want to know what 3 activities in your life you are unable to perform, or are having the most difficulty performing, as a result of your chief problem. Please list and score at least 3 activities that you are unable to perform, or having the most difficulty performing, because of your chief problem.   Patient Specific Activity Scoring Scheme (score one number for each activity):   Activity Score (0-10)  0= Unable to perform activity  10= Able to perform activity at same level as before injury or problem   1. dishes 0/10   2. cooking 0/10   3. Taking garbage out 0/10   4. Reaching and lifting 0/10   5.  /10   Totals:  0/40 = 0 % ability which relates to 100% impairment    Patient verbally states that they understand that the information they have provided above is current and complete to the best of their knowledge.    Patient Specific Functional Scale Modifier Scale Conversion: (patient's modifier that correlates with pt's score on PSFS): 0-CN (100% Impaired).    G codes and Modifier taken from pt completing a PSFS: completed at initial evaluation   Initial Primary G Code and Modifier:    Per the Patient's intake and/or assessment the Primary G Code is: Handling Objects .   The Patient's Impairment, Limitation or Restriction Modifier would be best described as: CL - 60% - 80% Impairment.     Goal Primary G Code and Modifier:    The Patient's G Code Goal would be: Handling Objects    The Patient's Impairment, Limitation or Restriction Modifier goal would be best described as: CI - 1% - 20% Impairment.         Goals:  Functional Short Term Goals (8 weeks):    Patient is to have improved reaching ability to allow dressing and self-cares with minimal to no pain.   Patient will have improved shoulder flexion to 100  degrees to allow for improved reaching ability with less than 3/10 pain.    Patient is to complete hygiene activities with less than 3/10 pain consistently.    Functional Long Term Goals (16 weeks):     Patient is to self-manage symptoms and return to prior level of function.    Patient is to be independent in their Home Exercise Program.  Patient is to tolerate all reaching and lifting activities above shoulder height with less than 2/10 pain.    Patient is to report less than 2/10 pain cooking and cleaning type activities.      Plan    Treatment Plan:      Frequency:   16 visits    Duration of Treatment: 16 weeks    Plan for next visit:  Phase 2A    Thank you for your referral to [facility].  Please call with any questions, concerns or comments.  [phone]  Lina Ruiz PT, DPT

## 2018-01-05 NOTE — PROGRESS NOTES
Patient Information     Patient Name MRN Sex Bailey Law [id number] Female 1950      Progress Notes by Lina Ruiz PT at 2017  1:08 PM     Author:  Lina Ruiz PT Service:  (none) Author Type:  PT- Physical Therapist     Filed:  2017  1:38 PM Date of Service:  2017  1:08 PM Status:  Signed     :  Lina Ruiz PT (PT- Physical Therapist)            [facility]  Outpatient PT - Daily note    Date of Service: 2017     Visit # 4 of 10    Patient Name: Bailey Sierra   YOB: 1950   Referring MD/Provider: Vaibhav Traore DO  Medical and Treatment Diagnosis: s/p right shoulder arthroscopy  PT Treatment Diagnosis: impaired shoulder range of motion and strength  Insurance: Medicare  Start of Service: 17  Certification Dates: Start of Service: 17  Medicare/MA Re-Cert Due: 17        Precautions:    Start rehab now  Phase 1 - no passive ROM at shoulder  Phase 2A - on                  Phase 2B - on  - out of pillow in the daytime, wall walk 1X per every hour awake.  Focus is full passive forward flexion by 8 weeks post op.  Wear pillow at night  Phase 3 - on  - out of all gear then.      Subjective    0/10 - currently     Still tough sleeping.      Objective  DATE:        ROM:   107 P 114 P 95 pre  122 post       Pt specifics: supine flexion (pt position, PROM/AAROM/AROM, pain)    Today s Interventions:   Pendulums x1 min  Table flexion x10 reps    PROM flexion and internal rotation to tolerance x15 mins  Sidelying scapular mobilizations all directions x5 mins  Side lying soft tissue mobilization scapular and upper back/neck muscles x3 mins  Elbow range of motion with stretch into full extension x3 mins    Game ready x15 mins. Patient seated, low compression- deferred today as patient had an errand.    Home Exercise Program:  Elbow and wrist range of motion, ball squeezes, scapular sets      Assessment    Patient demonstrates difficulty relaxing for passive range of motion but does gain significant motion from pre to post session.    The patient is appropriate for physical therapy to address their pain, functional limitations, and physical impairments.    Patient Specific Functional and Pain Scales (PSFS):    Clinician Instructions: Complete after the history and before the exam.    Initial Assessment: We want to know what 3 activities in your life you are unable to perform, or are having the most difficulty performing, as a result of your chief problem. Please list and score at least 3 activities that you are unable to perform, or having the most difficulty performing, because of your chief problem.   Patient Specific Activity Scoring Scheme (score one number for each activity):   Activity Score (0-10)  0= Unable to perform activity  10= Able to perform activity at same level as before injury or problem   1. dishes 0/10   2. cooking 0/10   3. Taking garbage out 0/10   4. Reaching and lifting 0/10   5.  /10   Totals:  0/40 = 0 % ability which relates to 100% impairment    Patient verbally states that they understand that the information they have provided above is current and complete to the best of their knowledge.    Patient Specific Functional Scale Modifier Scale Conversion: (patient's modifier that correlates with pt's score on PSFS): 0-CN (100% Impaired).    G codes and Modifier taken from pt completing a PSFS: completed at initial evaluation   Initial Primary G Code and Modifier:    Per the Patient's intake and/or assessment the Primary G Code is: Handling Objects .   The Patient's Impairment, Limitation or Restriction Modifier would be best described as: CL - 60% - 80% Impairment.     Goal Primary G Code and Modifier:    The Patient's G Code Goal would be: Handling Objects    The Patient's Impairment, Limitation or Restriction Modifier goal would be best described as: CI - 1% - 20%  Impairment.         Goals:  Functional Short Term Goals (8 weeks):    Patient is to have improved reaching ability to allow dressing and self-cares with minimal to no pain.   Patient will have improved shoulder flexion to 100 degrees to allow for improved reaching ability with less than 3/10 pain.    Patient is to complete hygiene activities with less than 3/10 pain consistently.    Functional Long Term Goals (16 weeks):     Patient is to self-manage symptoms and return to prior level of function.    Patient is to be independent in their Home Exercise Program.  Patient is to tolerate all reaching and lifting activities above shoulder height with less than 2/10 pain.    Patient is to report less than 2/10 pain cooking and cleaning type activities.      Plan    Treatment Plan:      Frequency:   16 visits    Duration of Treatment: 16 weeks    Plan for next visit:  Phase 2A    Thank you for your referral to [facility].  Please call with any questions, concerns or comments.  [phone]  Lina Ruiz PT, DPT

## 2018-01-05 NOTE — PROGRESS NOTES
Patient Information     Patient Name MRN Sex Bailey Law [id number] Female 1950      Progress Notes by Tesha Hernandez at 2017  1:44 PM     Author:  Tesha Hernandez Service:  (none) Author Type:  PT- Physical Therapy Assistant     Filed:  2017  2:35 PM Date of Service:  2017  1:44 PM Status:  Signed     :  Tesha Hernandez (PT- Physical Therapy Assistant)            [facility]  Outpatient PT - Daily note    Date of Service: 2017     Visit # 3 of 10    Patient Name: Bailey Sierra   YOB: 1950   Referring MD/Provider: Vaibhav Traore DO  Medical and Treatment Diagnosis: s/p right shoulder arthroscopy  PT Treatment Diagnosis: impaired shoulder range of motion and strength  Insurance: Medicare  Start of Service: 17  Certification Dates: Start of Service: 17  Medicare/MA Re-Cert Due: 17        Precautions:    Start rehab now  Phase 1 - no passive ROM at shoulder  Phase 2A - on                  Phase 2B - on  - out of pillow in the daytime, wall walk 1X per every hour awake.  Focus is full passive forward flexion by 8 weeks post op.  Wear pillow at night  Phase 3 - on  - out of all gear then.      Subjective    0/10 - currently     Bailey reports she is able to sleep 2 hours in a row before the sling starts to get uncomfortable.     Objective  DATE:         ROM:   107 P 114 P        Pt specifics: supine flexion (pt position, PROM/AAROM/AROM, pain)    Today s Interventions:   Pendulums x1 min  Table flexion x10 reps    PROM flexion and internal rotation to tolerance x15 mins  Sidelying scapular mobilizations all directions x5 mins    Game ready x15 mins. Patient seated, low compression    Home Exercise Program:  Elbow and wrist range of motion, ball squeezes, scapular sets     Assessment    Patient demonstrates fair tolerance to Passive range of motion.    The patient is appropriate for physical therapy to  address their pain, functional limitations, and physical impairments.    Patient Specific Functional and Pain Scales (PSFS):    Clinician Instructions: Complete after the history and before the exam.    Initial Assessment: We want to know what 3 activities in your life you are unable to perform, or are having the most difficulty performing, as a result of your chief problem. Please list and score at least 3 activities that you are unable to perform, or having the most difficulty performing, because of your chief problem.   Patient Specific Activity Scoring Scheme (score one number for each activity):   Activity Score (0-10)  0= Unable to perform activity  10= Able to perform activity at same level as before injury or problem   1. dishes 0/10   2. cooking 0/10   3. Taking garbage out 0/10   4. Reaching and lifting 0/10   5.  /10   Totals:  0/40 = 0 % ability which relates to 100% impairment    Patient verbally states that they understand that the information they have provided above is current and complete to the best of their knowledge.    Patient Specific Functional Scale Modifier Scale Conversion: (patient's modifier that correlates with pt's score on PSFS): 0-CN (100% Impaired).    G codes and Modifier taken from pt completing a PSFS: completed at initial evaluation   Initial Primary G Code and Modifier:    Per the Patient's intake and/or assessment the Primary G Code is: Handling Objects .   The Patient's Impairment, Limitation or Restriction Modifier would be best described as: CL - 60% - 80% Impairment.     Goal Primary G Code and Modifier:    The Patient's G Code Goal would be: Handling Objects    The Patient's Impairment, Limitation or Restriction Modifier goal would be best described as: CI - 1% - 20% Impairment.         Goals:  Functional Short Term Goals (8 weeks):    Patient is to have improved reaching ability to allow dressing and self-cares with minimal to no pain.   Patient will have  improved shoulder flexion to 100 degrees to allow for improved reaching ability with less than 3/10 pain.    Patient is to complete hygiene activities with less than 3/10 pain consistently.    Functional Long Term Goals (16 weeks):     Patient is to self-manage symptoms and return to prior level of function.    Patient is to be independent in their Home Exercise Program.  Patient is to tolerate all reaching and lifting activities above shoulder height with less than 2/10 pain.    Patient is to report less than 2/10 pain cooking and cleaning type activities.      Plan    Treatment Plan:      Frequency:   16 visits    Duration of Treatment: 16 weeks    Plan for next visit:  Phase 2A    Thank you for your referral to [facility].  Please call with any questions, concerns or comments.  [phone]  Lina Ruiz PT, DPT

## 2018-01-05 NOTE — PROGRESS NOTES
Patient Information     Patient Name MRN Sex Bailey Law [id number] Female 1950      Progress Notes by Lina Ruiz PT at 2017  1:49 PM     Author:  Lina Ruiz PT Service:  (none) Author Type:  PT- Physical Therapist     Filed:  2017  5:13 PM Date of Service:  2017  1:49 PM Status:  Signed     :  Lina Ruiz PT (PT- Physical Therapist)            [facility]  Outpatient PT - Daily note    Date of Service: 2017     Visit # 5 of 10    Patient Name: Bailey Sierra   YOB: 1950   Referring MD/Provider: Vaibhav Traore DO  Medical and Treatment Diagnosis: s/p right shoulder arthroscopy  PT Treatment Diagnosis: impaired shoulder range of motion and strength  Insurance: Medicare  Start of Service: 17  Certification Dates: Start of Service: 17  Medicare/MA Re-Cert Due: 17        Precautions:    Start rehab now  Phase 1 - no passive ROM at shoulder  Phase 2A - on                  Phase 2B - on  - out of pillow in the daytime, wall walk 1X per every hour awake.  Focus is full passive forward flexion by 8 weeks post op.  Wear pillow at night  Phase 3 - on  - out of all gear then.      Subjective    0/10 - currently     Shoulder is the same. Hoping today would get rid of that pillow.    Objective  DATE:       ROM:   107 P 114 P 95 pre  122 post 137 P      Pt specifics: supine flexion (pt position, PROM/AAROM/AROM, pain)    Today s Interventions:   Pendulums x1 min  Table flexion x10 reps    PROM flexion and internal rotation to tolerance x15 mins  Sidelying scapular mobilizations all directions x5 mins. Patient very guarded today despite cues for relaxing.  Side lying soft tissue mobilization scapular and upper back/neck muscles x3 mins    Game ready x15 mins. Patient seated, low compression    Patient educated in removal of abduction pillow beginning tomorrow.    Home Exercise Program:  Elbow and  wrist range of motion, ball squeezes, scapular sets     Assessment    Patient demonstrates improving passive range of motion with complaints of soreness in anterior shoulder.    The patient is appropriate for physical therapy to address their pain, functional limitations, and physical impairments.    Patient Specific Functional and Pain Scales (PSFS):    Clinician Instructions: Complete after the history and before the exam.    Initial Assessment: We want to know what 3 activities in your life you are unable to perform, or are having the most difficulty performing, as a result of your chief problem. Please list and score at least 3 activities that you are unable to perform, or having the most difficulty performing, because of your chief problem.   Patient Specific Activity Scoring Scheme (score one number for each activity):   Activity Score (0-10)  0= Unable to perform activity  10= Able to perform activity at same level as before injury or problem   1. dishes 0/10   2. cooking 0/10   3. Taking garbage out 0/10   4. Reaching and lifting 0/10   5.  /10   Totals:  0/40 = 0 % ability which relates to 100% impairment    Patient verbally states that they understand that the information they have provided above is current and complete to the best of their knowledge.    Patient Specific Functional Scale Modifier Scale Conversion: (patient's modifier that correlates with pt's score on PSFS): 0-CN (100% Impaired).    G codes and Modifier taken from pt completing a PSFS: completed at initial evaluation   Initial Primary G Code and Modifier:    Per the Patient's intake and/or assessment the Primary G Code is: Handling Objects .   The Patient's Impairment, Limitation or Restriction Modifier would be best described as: CL - 60% - 80% Impairment.     Goal Primary G Code and Modifier:    The Patient's G Code Goal would be: Handling Objects    The Patient's Impairment, Limitation or Restriction Modifier goal would be  best described as: CI - 1% - 20% Impairment.         Goals:  Functional Short Term Goals (8 weeks):    Patient is to have improved reaching ability to allow dressing and self-cares with minimal to no pain.   Patient will have improved shoulder flexion to 100 degrees to allow for improved reaching ability with less than 3/10 pain.    Patient is to complete hygiene activities with less than 3/10 pain consistently.    Functional Long Term Goals (16 weeks):     Patient is to self-manage symptoms and return to prior level of function.    Patient is to be independent in their Home Exercise Program.  Patient is to tolerate all reaching and lifting activities above shoulder height with less than 2/10 pain.    Patient is to report less than 2/10 pain cooking and cleaning type activities.      Plan    Treatment Plan:      Frequency:   16 visits    Duration of Treatment: 16 weeks    Plan for next visit:  Phase 2B    Thank you for your referral to [facility].  Please call with any questions, concerns or comments.  [phone]  Lina Ruiz PT, DPT

## 2018-01-06 ENCOUNTER — COMMUNICATION - GICH (OUTPATIENT)
Dept: FAMILY MEDICINE | Facility: OTHER | Age: 68
End: 2018-01-06

## 2018-01-06 DIAGNOSIS — I10 ESSENTIAL (PRIMARY) HYPERTENSION: ICD-10-CM

## 2018-01-09 ENCOUNTER — AMBULATORY - GICH (OUTPATIENT)
Dept: SCHEDULING | Facility: OTHER | Age: 68
End: 2018-01-09

## 2018-01-16 ENCOUNTER — HISTORY (OUTPATIENT)
Dept: UROLOGY | Facility: OTHER | Age: 68
End: 2018-01-16

## 2018-01-16 ENCOUNTER — AMBULATORY - GICH (OUTPATIENT)
Dept: UROLOGY | Facility: OTHER | Age: 68
End: 2018-01-16

## 2018-01-17 PROBLEM — E11.9 LONG-TERM INSULIN USE IN TYPE 2 DIABETES (H): Status: ACTIVE | Noted: 2017-02-13

## 2018-01-17 PROBLEM — M19.019 AC (ACROMIOCLAVICULAR) JOINT ARTHRITIS: Status: ACTIVE | Noted: 2017-03-10

## 2018-01-17 PROBLEM — M75.41 IMPINGEMENT SYNDROME OF RIGHT SHOULDER: Status: ACTIVE | Noted: 2017-03-10

## 2018-01-17 PROBLEM — M75.81 RIGHT ROTATOR CUFF TENDINITIS: Status: ACTIVE | Noted: 2017-03-10

## 2018-01-17 PROBLEM — Z98.890 S/P SHOULDER SURGERY: Status: ACTIVE | Noted: 2017-04-19

## 2018-01-17 PROBLEM — I10 HYPERTENSION: Status: ACTIVE | Noted: 2018-01-17

## 2018-01-17 PROBLEM — N18.30 CHRONIC KIDNEY DISEASE, STAGE III (MODERATE) (H): Status: ACTIVE | Noted: 2018-01-17

## 2018-01-17 PROBLEM — E78.5 HYPERLIPIDEMIA: Status: ACTIVE | Noted: 2018-01-17

## 2018-01-17 PROBLEM — M19.90 OSTEOARTHROSIS: Status: ACTIVE | Noted: 2018-01-17

## 2018-01-17 PROBLEM — R60.9 EDEMA: Status: ACTIVE | Noted: 2018-01-17

## 2018-01-17 PROBLEM — Z79.4 LONG-TERM INSULIN USE IN TYPE 2 DIABETES (H): Status: ACTIVE | Noted: 2017-02-13

## 2018-01-17 PROBLEM — M72.2 PLANTAR FASCIITIS: Status: ACTIVE | Noted: 2018-01-17

## 2018-01-17 PROBLEM — R01.1 HEART MURMUR, SYSTOLIC: Status: ACTIVE | Noted: 2018-01-17

## 2018-01-17 RX ORDER — FUROSEMIDE 20 MG
20 TABLET ORAL EVERY MORNING
COMMUNITY
Start: 2017-12-15 | End: 2019-10-04

## 2018-01-17 RX ORDER — CALCITRIOL 0.25 UG/1
0.25 CAPSULE, LIQUID FILLED ORAL
COMMUNITY
Start: 2017-12-15 | End: 2018-06-22

## 2018-01-17 RX ORDER — INSULIN GLARGINE 100 [IU]/ML
50 INJECTION, SOLUTION SUBCUTANEOUS AT BEDTIME
COMMUNITY
Start: 2017-12-15 | End: 2018-05-03 | Stop reason: ALTCHOICE

## 2018-01-17 RX ORDER — ROSUVASTATIN CALCIUM 40 MG/1
TABLET, COATED ORAL
COMMUNITY
Start: 2017-12-15 | End: 2019-02-04

## 2018-01-17 RX ORDER — METOPROLOL SUCCINATE 25 MG/1
25 TABLET, EXTENDED RELEASE ORAL DAILY
COMMUNITY
Start: 2017-12-15 | End: 2019-01-21

## 2018-01-17 RX ORDER — ISOSORBIDE MONONITRATE 30 MG/1
75 TABLET, EXTENDED RELEASE ORAL DAILY
COMMUNITY
Start: 2017-12-15 | End: 2018-12-12

## 2018-01-17 RX ORDER — ESOMEPRAZOLE MAGNESIUM 40 MG/1
40 CAPSULE, DELAYED RELEASE ORAL DAILY
COMMUNITY
Start: 2017-12-15 | End: 2018-12-06

## 2018-01-17 RX ORDER — LISINOPRIL 2.5 MG/1
2.5 TABLET ORAL DAILY
COMMUNITY
Start: 2017-12-15 | End: 2019-02-04

## 2018-01-17 RX ORDER — POTASSIUM CHLORIDE 1500 MG/1
TABLET, EXTENDED RELEASE ORAL
COMMUNITY
Start: 2017-05-25 | End: 2019-12-31

## 2018-01-17 RX ORDER — ZOLPIDEM TARTRATE 10 MG/1
10 TABLET ORAL
COMMUNITY
Start: 2016-03-31 | End: 2020-12-21

## 2018-01-17 RX ORDER — NITROGLYCERIN 0.4 MG/1
0.4 TABLET SUBLINGUAL EVERY 5 MIN PRN
COMMUNITY
Start: 2016-03-31 | End: 2024-04-22

## 2018-01-27 VITALS
HEART RATE: 62 BPM | BODY MASS INDEX: 36.05 KG/M2 | SYSTOLIC BLOOD PRESSURE: 112 MMHG | SYSTOLIC BLOOD PRESSURE: 116 MMHG | DIASTOLIC BLOOD PRESSURE: 58 MMHG | DIASTOLIC BLOOD PRESSURE: 58 MMHG | WEIGHT: 210 LBS | BODY MASS INDEX: 35.7 KG/M2 | WEIGHT: 208 LBS | HEART RATE: 72 BPM

## 2018-01-27 VITALS
WEIGHT: 209.4 LBS | DIASTOLIC BLOOD PRESSURE: 42 MMHG | HEIGHT: 65 IN | BODY MASS INDEX: 37.08 KG/M2 | HEART RATE: 60 BPM | SYSTOLIC BLOOD PRESSURE: 130 MMHG | SYSTOLIC BLOOD PRESSURE: 120 MMHG | WEIGHT: 217 LBS | RESPIRATION RATE: 16 BRPM | BODY MASS INDEX: 34.89 KG/M2 | DIASTOLIC BLOOD PRESSURE: 60 MMHG | DIASTOLIC BLOOD PRESSURE: 62 MMHG | SYSTOLIC BLOOD PRESSURE: 112 MMHG | WEIGHT: 216 LBS | HEART RATE: 60 BPM | BODY MASS INDEX: 37.25 KG/M2 | HEART RATE: 56 BPM

## 2018-01-27 VITALS
TEMPERATURE: 97.6 F | HEART RATE: 62 BPM | DIASTOLIC BLOOD PRESSURE: 56 MMHG | DIASTOLIC BLOOD PRESSURE: 58 MMHG | WEIGHT: 208 LBS | HEART RATE: 59 BPM | SYSTOLIC BLOOD PRESSURE: 100 MMHG | SYSTOLIC BLOOD PRESSURE: 112 MMHG | OXYGEN SATURATION: 98 % | BODY MASS INDEX: 35.51 KG/M2 | BODY MASS INDEX: 34.61 KG/M2 | HEIGHT: 64 IN | WEIGHT: 208 LBS

## 2018-01-27 VITALS
SYSTOLIC BLOOD PRESSURE: 104 MMHG | WEIGHT: 217.6 LBS | SYSTOLIC BLOOD PRESSURE: 124 MMHG | HEART RATE: 64 BPM | DIASTOLIC BLOOD PRESSURE: 52 MMHG | BODY MASS INDEX: 37.35 KG/M2 | WEIGHT: 208 LBS | SYSTOLIC BLOOD PRESSURE: 120 MMHG | HEART RATE: 60 BPM | HEART RATE: 64 BPM | BODY MASS INDEX: 35.69 KG/M2 | BODY MASS INDEX: 35.68 KG/M2 | HEIGHT: 64 IN | WEIGHT: 209 LBS | DIASTOLIC BLOOD PRESSURE: 68 MMHG | DIASTOLIC BLOOD PRESSURE: 60 MMHG

## 2018-01-27 VITALS
BODY MASS INDEX: 34.11 KG/M2 | HEART RATE: 72 BPM | SYSTOLIC BLOOD PRESSURE: 108 MMHG | DIASTOLIC BLOOD PRESSURE: 56 MMHG | SYSTOLIC BLOOD PRESSURE: 109 MMHG | WEIGHT: 205 LBS | DIASTOLIC BLOOD PRESSURE: 62 MMHG | WEIGHT: 213 LBS | HEART RATE: 60 BPM | WEIGHT: 216.2 LBS | HEART RATE: 63 BPM | BODY MASS INDEX: 37.11 KG/M2 | RESPIRATION RATE: 14 BRPM | BODY MASS INDEX: 36.56 KG/M2

## 2018-01-27 VITALS
HEIGHT: 64 IN | TEMPERATURE: 97.7 F | HEIGHT: 64 IN | DIASTOLIC BLOOD PRESSURE: 54 MMHG | SYSTOLIC BLOOD PRESSURE: 130 MMHG | DIASTOLIC BLOOD PRESSURE: 60 MMHG | SYSTOLIC BLOOD PRESSURE: 100 MMHG | WEIGHT: 208 LBS | BODY MASS INDEX: 35.51 KG/M2 | WEIGHT: 208 LBS | BODY MASS INDEX: 35.51 KG/M2

## 2018-01-27 VITALS — BODY MASS INDEX: 37.18 KG/M2 | RESPIRATION RATE: 18 BRPM | WEIGHT: 216.6 LBS | HEART RATE: 60 BPM

## 2018-02-03 ENCOUNTER — COMMUNICATION - GICH (OUTPATIENT)
Dept: FAMILY MEDICINE | Facility: OTHER | Age: 68
End: 2018-02-03

## 2018-02-03 DIAGNOSIS — E11.9 TYPE 2 DIABETES MELLITUS WITHOUT COMPLICATIONS (H): ICD-10-CM

## 2018-02-05 ENCOUNTER — AMBULATORY - GICH (OUTPATIENT)
Dept: EDUCATION SERVICES | Facility: OTHER | Age: 68
End: 2018-02-05

## 2018-02-05 DIAGNOSIS — N18.30 CHRONIC KIDNEY DISEASE, STAGE III (MODERATE) (H): ICD-10-CM

## 2018-02-05 DIAGNOSIS — E11.22 TYPE 2 DIABETES MELLITUS WITH DIABETIC CHRONIC KIDNEY DISEASE (H): ICD-10-CM

## 2018-02-05 DIAGNOSIS — Z79.4 LONG TERM CURRENT USE OF INSULIN (H): ICD-10-CM

## 2018-02-09 VITALS
HEIGHT: 64 IN | SYSTOLIC BLOOD PRESSURE: 135 MMHG | BODY MASS INDEX: 36.77 KG/M2 | DIASTOLIC BLOOD PRESSURE: 65 MMHG | WEIGHT: 215.4 LBS | HEART RATE: 55 BPM

## 2018-02-09 VITALS — HEART RATE: 68 BPM | TEMPERATURE: 96.6 F | RESPIRATION RATE: 16 BRPM

## 2018-02-11 ASSESSMENT — PATIENT HEALTH QUESTIONNAIRE - PHQ9: SUM OF ALL RESPONSES TO PHQ QUESTIONS 1-9: 0

## 2018-02-12 NOTE — TELEPHONE ENCOUNTER
Patient Information     Patient Name MRN Bailey oMnique [id number] Female 1950      Telephone Encounter by Phan Wright RN at 2018 10:49 AM     Author:  Phan Wright RN Service:  (none) Author Type:  NURS- Registered Nurse     Filed:  2018 10:51 AM Encounter Date:  2018 Status:  Signed     :  Phan Wright RN (NURS- Registered Nurse)            Redundant Refill Request for Metoprolol refused;    Prescribing Provider: Sam Buck MD                   Order Date: 12/15/2017  Ordered by: SAM BUCK  Medication:metoprolol succinate (TOPROL XL) 25 mg Sustained-Release tablet    Qty:90 tablet   Ref:3  Start:12/15/2017  End:              Route:Oral                  YUAN:No   Class:eRx    Sig:Take 1 tablet by mouth once daily.    Pharmacy:[facility] [address] [place] [address] AT SEC              OF [facility] & [facility] [phone]    Unable to complete prescription refill per RN Medication Refill Policy.................... Phan Wright RN ....................  2018   10:50 AM

## 2018-02-12 NOTE — TELEPHONE ENCOUNTER
Patient Information     Patient Name MRN Sex Bailey Law [id number] Female 1950      Telephone Encounter by Phan Wright RN at 2017 12:39 PM     Author:  Phan Wright RN Service:  (none) Author Type:  NURS- Registered Nurse     Filed:  2017 12:44 PM Encounter Date:  2017 Status:  Signed     :  Phan Wright RN (NURS- Registered Nurse)            Beta Blockers     Office visit in the past 12 months or per provider note.    Last visit with CELSA BUCK was on: 2017 in [facility]  Next visit with CELSA BUCK is on: 12/15/2017 in [facility] [employer] [facility]  Next visit with Family Practice is on: 12/15/2017 in [employer] [facility]    Max refill for 12 months from last office visit or per provider note.    Chart review shows that patient was last seen by PCP for diagnosis of hypertension on 17. No changes noted in office visit notes on that date in relation to rx as requested. Patient was to follow up in 6 months as per office visit notes on that date. Office visit is scheduled with PCP for 12/15/17. Writer will refill rx as requested at this time for a limited supply to get patient through her appointment date as noted.    Prescription refilled per RN Medication Refill Policy.................... Phan Wright RN ....................  2017   12:42 PM

## 2018-02-12 NOTE — NURSING NOTE
Patient Information     Patient Name MRN Bailey Monique [id number] Female 1950      Nursing Note by Iveth Lin at 2018 11:15 AM     Author:  Iveth Lin Service:  (none) Author Type:  (none)     Filed:  2018  1:04 PM Encounter Date:  2018 Status:  Signed     :  Iveth Lin here to see Blanca the [facility] rep. Bailey will call if she is having problems and than can discuss a replacement.  Iveth Lin LPN  2018  1:02 PM

## 2018-02-12 NOTE — TELEPHONE ENCOUNTER
Patient Information     Patient Name MRN Bailey Monique [id number] Female 1950      Telephone Encounter by Phan Ferreira RN at 2017  3:59 PM     Author:  Phan Ferreira RN Service:  (none) Author Type:  NURS- Registered Nurse     Filed:  2017  4:05 PM Encounter Date:  2017 Status:  Signed     :  Phan Ferreira RN (NURS- Registered Nurse)            Writer received an rx request from pharmacy as noted below:    METOPROLOL ER SUCCINATE 25MG TABS  Will file in chart as: metoprolol succinate (TOPROL XL) 25 mg Sustained-Release tablet  TAKE 1 TABLET BY MOUTH EVERY DAY       Disp: 90 tablet Refills: 0    Class: eRx Start: 2017    For: Essential hypertension  Documented:4 years ago  Last refill:2017  To pharmacy: **Patient requests 90 days supply**    Chart review shows that this writer had filled rx as requested for a 30 day supply as noted below, today:    Prescribing Provider: Sam Buck MD                   Order Date: 2017  Ordered by: PHAN FERREIRA  Medication:metoprolol succinate (TOPROL XL) 25 mg Sustained-Release tablet  Prescription #:[id number]    Qty:30 tablet   Ref:0  Start:2017   End:              Route:Oral                  YUAN:No   Class:eRx    Sig:TAKE 1 TABLET BY MOUTH ONCE DAILY.    Pharmacy:[facility] [address] [place] [address] AT SEC              OF [facility] - [phone]    Cosign required by SAM BUCK[[id number]]    Patient is due for an office visit with PCP. Office visit is scheduled for 12/15/17. Rx as noted above is sufficient to get patient through office visit date as noted. 90 day supply is inappropriate at this time. Writer will refuse rx request at this time.    Unable to complete prescription refill per RN Medication Refill Policy.................... Phan Ferreira RN ....................  2017   4:01 PM

## 2018-02-12 NOTE — NURSING NOTE
Patient Information     Patient Name Bailey Nunes [id number] Female 1950      Nursing Note by Ingrid Erazo at 12/15/2017 12:00 PM     Author:  Ingrid Erazo Service:  (none) Author Type:  (none)     Filed:  12/15/2017 12:07 PM Encounter Date:  12/15/2017 Status:  Signed     :  Ingrid Erazo            Patient presents today to follow up on her A1c that was drawn .  Ingrid Erazo LPN .............12/15/2017  11:55 AM

## 2018-02-12 NOTE — TELEPHONE ENCOUNTER
Patient Information     Patient Name MRN Bailey Monique [id number] Female 1950      Telephone Encounter by Phan Wright RN at 2017  4:21 PM     Author:  Phan Wright RN Service:  (none) Author Type:  NURS- Registered Nurse     Filed:  2017  4:28 PM Encounter Date:  2017 Status:  Signed     :  Phan Wright RN (NURS- Registered Nurse)            Writer received a soft transfer from patient as she is out of her novolog. Writer advised patient that he would look at rx request at this time. Encouraged patient to ask for refills of rxs at her appointments as she just saw Dr. Buck on 12/15/17. Patient states she will do so moving forward.    Diabetes    Office visit in the past 12 months or per provider note.    [facility]  Next visit with CELSA BUCK is on: 06/15/2018 in [facility]  Next visit with Family Practice is on: 06/15/2018 in [facility]    Lab test requirements:  HgbA1c annually or per provider note.  HEMOGLOBIN A1C MONITORING (POCT)    Date Value   2017 6.4 % (H)   2012 6.9 % NGSP (H)     Max refill for 12 months from last office visit or per provider note.    Chart review shows that patient was seen for diagnosis of diabetes on 12/15/17 by PCP. Rx as requested was noted and reviewed by PCP on that date as per office visit notes. Office visit notes on that date indicate that patient is to continue on her current regimen and follow up in 6 months. Writer will refill rx as requested at this time.    Prescription refilled per RN Medication Refill Policy.................... Phan Wright RN ....................  2017   4:27 PM

## 2018-02-12 NOTE — PROGRESS NOTES
Patient Information     Patient Name MRN Sex Bailey Law [id number] Female 1950      Progress Notes by Sam Leal MD at 12/15/2017 12:00 PM     Author:  Sam Leal MD Service:  (none) Author Type:  Physician     Filed:  12/15/2017 12:24 PM Encounter Date:  12/15/2017 Status:  Signed     :  Sam Leal MD (Physician)            SUBJECTIVE:  Bailey Sierra is a 67 y.o. female here for follow-up. She has a history of type 2 diabetes. She's currently using 50 units of Lantus at night and 7 units of NovoLog with meals. Her sugars have been averaging between 80 and 150. She's had no hypoglycemia.    She had her eyes checked in November.    She's had no chest pain or shortness of breath with activity.      Patient Active Problem List       Diagnosis  Date Noted     S/P arthroscopic right shoulder surgery  2017     Right rotator cuff tendinitis  03/10/2017     AC (acromioclavicular) joint arthritis  03/10/2017     Impingement syndrome of right shoulder  03/10/2017     Long-term insulin use in type 2 diabetes (HC)  2017     CAD (coronary artery disease)  2013     2 vessel bypass, 2013        Lumbar back pain  2013     GERD (gastroesophageal reflux disease)  2013     Neurogenic bladder  10/25/2012     INSOMNIA  2012     HYPERLIPIDEMIA       HYPERTENSION       PEDAL EDEMA       chronic          DEGENERATIVE JOINT DISEASE       PLANTAR FASCIITIS       CHRONIC KIDNEY DISEASE STAGE III (MODERATE)       HEART MURMUR, SYSTOLIC         Past Medical History:     Diagnosis  Date     AC (acromioclavicular) joint arthritis 3/10/2017     ANEMIA, IRON DEFICIENCY      Bile leak, postoperative      CAD (coronary artery disease)      CHRONIC KIDNEY DISEASE STAGE III (MODERATE)      DEGENERATIVE JOINT DISEASE      DIABETES MELLITUS, TYPE II      GERD (gastroesophageal reflux disease)      HEART MURMUR, SYSTOLIC      Hx of pregnancy     Three childbirths       HYPERLIPIDEMIA      HYPERTENSION      Impingement syndrome of right shoulder 3/10/2017     INSOMNIA 1/25/2012     Neurogenic bladder      PEDAL EDEMA      PLANTAR FASCIITIS      Right rotator cuff tendinitis 3/10/2017     S/P arthroscopic right shoulder surgery 4/19/2017     S/P CABG x 2 07/13    [facility]      SHOULDER IMPINGEMENT SYNDROME      URINARY URGENCY S/P BOTOX PRN 3/8/2011     UTERINE PROLAPSE        Past Surgical History:      Procedure  Laterality Date     cardiac bypass  07/2013     CYSTOSCOPY  3/15/01     DENTAL SURGERY      Dental extractions       ERCP DIAGNOSTIC  05/2008    Bile leak with ERCP and stenting and drainage of bile collection through abdominal wall.       Interstim Device placement  04/14/14    Dr. Dash WARD       LAP CHOLECYSTECTOMY  04/2008     LUMBAR LAMINECTOMY  1997     S/P ARTHROSCOPIC RIGHT SHOULDER SURGERY Right 04/19/2017     SHOULDER ARTHROSCOPY  1997    Right       SHOULDER ARTHROSCOPY  2012    Left       RICCARDO AND BSO  4/2/01    Vag vault suspension with Cortex mesh       TUBAL LIGATION         Current Outpatient Prescriptions       Medication  Sig Dispense Refill     ASCENSIA CONTOUR strip TEST 3 TIMES A  Each 2     BASAGLAR KWIKPEN 100 unit/mL (3 mL) pen Inject 50 Units subcutaneous before bedtime. Product desired:BASAGLAR 1 box 0     blood sugar diagnostic (ACCU-CHEK ROXANNE PLUS TEST STRP) strip Test blood sugars TID with meals. Dx: E11.9 300 Strip 1     calcitriol (ROCALTROL) 0.25 mcg capsule Take 1 capsule by mouth every morning. Take one tablet weekly on Monday 90 capsule 3     calcium carbonate-vitamin D3 600 mg (1,500 mg)-2,500 unit cap Take 1 capsule by mouth once daily.  0     DME Diabetic shoes, Dx E11.9.  Lifelong need. 1 Each 0     esomeprazole (NEXIUM) 40 mg capsule Take 1 capsule by mouth once daily before a meal. 90 capsule 3     furosemide (LASIX) 20 mg tablet Take 1 tablet by mouth every morning. 90 tablet 3     isosorbide mononitrate (IMDUR) 30 mg  "extended release tablet 24 Hour Take 2 tablets by mouth once daily. 180 tablet 3     lancets (ACCU-CHEK FASTCLIX) Dispense item covered by pt ins. E11.9 IDDM type II - Test 3 times/day. 102 Each 10     lisinopril (PRINIVIL; ZESTRIL) 2.5 mg tablet Take 1 tablet by mouth once daily. 90 tablet 3     metoprolol succinate (TOPROL XL) 25 mg Sustained-Release tablet Take 1 tablet by mouth once daily. 90 tablet 3     nitroglycerin (NITROSTAT) 0.4 mg sublingual tablet Place 1 tablet under the tongue every 5 minutes if needed for Chest Pain. 1 Bottle 0     NOVOLOG FLEXPEN 100 unit/mL solution for injection INJECT 7 UNITS THREE TIMES DAILY WITH MEALS PLUS A SLIDING SCALE 30 mL 0     potassium chloride (KLOR-CON M20) 20 mEq Extended-Release tablet TK 1 T PO QD. DO NOT CRUSH  6     rosuvastatin (CRESTOR) 40 mg tablet Take 40 mg by mouth daily 90 tablet 3     ULTICARE PEN NEEDLE 31 gauge x 5/16\" USE AS DIRECTED FOUR TIMES DAILY 400 Each 3     zolpidem (AMBIEN) 10 mg tablet Take 1 tablet by mouth at bedtime if needed for Sleep. 30 tablet 5     No current facility-administered medications for this visit.      Medications have been reviewed by me and are current to the best of my knowledge and ability.      Allergies:  No Known Allergies    Family History       Problem   Relation Age of Onset     Other  Father      MI       Cancer  Mother      Some type of cancer, aneurysm        Cancer  Sister      unknown type       Diabetes  Sister      Type II       Diabetes  Sister      Type II       Diabetes  Sister      Type II       Diabetes  Paternal Grandmother      Type II       Other  Daughter      depression       Other  Daughter      ovarian cancer and depression       Heart Disease  Brother      CAD, MI         Social History     Substance Use Topics       Smoking status: Never Smoker     Smokeless tobacco: Never Used     Alcohol use No       ROS:    As above otherwise ROS is unremarkable.      OBJECTIVE:  /65 (Cuff Site: Right " "Arm, Position: Sitting, Cuff Size: Adult Large)  Pulse 55  Ht 1.626 m (5' 4\")  Wt 97.7 kg (215 lb 6.4 oz)  BMI 36.97 kg/m2    EXAM:  General Appearance: Pleasant, alert, appropriate appearance for age. No acute distress  Lungs: Normal chest wall and respirations. Clear to auscultation, no wheezes or crackles.  Cardiovascular: Regular rate and rhythm with a 1/6 systolic murmur heard best at the right sternal border..  Musculoskeletal: No edema.  Skin: no concerning or new rashes.  Neurologic Exam: CN 2-12 grossly intact.  Normal gait.  Symmetric DTRs, No focal motor or sensory deficits. No tremor.  Psychiatric Exam: Alert and oriented, appropriate affect.    Results for orders placed or performed in visit on 12/11/17      Hgb A1c      Result  Value Ref Range    HEMOGLOBIN A1C MONITORING (POCT) 6.4 (H) 4.0 - 6.2 %    ESTIMATED AVERAGE GLUCOSE  137 mg/dL   LIPID PANEL      Result  Value Ref Range    CHOLESTEROL,TOTAL 151 <200 mg/dL    TRIGLYCERIDES 202 (H) <150 mg/dL    HDL CHOLESTEROL 39 23 - 92 mg/dL    NON-HDL CHOLESTEROL 112 <145 mg/dl    CHOL/HDL RATIO            3.87 <4.50                    LDL CHOLESTEROL 72 <100 mg/dL    PROVIDER ORDERED STATUS FASTING    COMPLETE METABOLIC PANEL      Result  Value Ref Range    SODIUM 141 133 - 143 mmol/L    POTASSIUM 4.0 3.5 - 5.1 mmol/L    CHLORIDE 107 98 - 107 mmol/L    CO2,TOTAL 24 21 - 31 mmol/L    ANION GAP 10 5 - 18                    GLUCOSE 175 (H) 70 - 105 mg/dL    CALCIUM 8.7 8.6 - 10.3 mg/dL    BUN 41 (H) 7 - 25 mg/dL    CREATININE 2.25 (H) 0.70 - 1.30 mg/dL    BUN/CREAT RATIO           18                    GFR if African American 26 (L) >60 ml/min/1.73m2    GFR if not African American 22 (L) >60 ml/min/1.73m2    ALBUMIN 3.7 3.5 - 5.7 g/dL    PROTEIN,TOTAL 6.2 (L) 6.4 - 8.9 g/dL    GLOBULIN                  2.5 2.0 - 3.7 g/dL    A/G RATIO 1.5 1.0 - 2.0                    BILIRUBIN,TOTAL 0.5 0.3 - 1.0 mg/dL    ALK PHOSPHATASE 66 34 - 104 IU/L    ALT (SGPT) 16 7 " - 52 IU/L    AST (SGOT) 16 13 - 39 IU/L         ASSESSEMENT AND PLAN:    Bailey was seen today for diabetes.    Diagnoses and all orders for this visit:    Diabetes mellitus without complication (HC)  -     calcitriol (ROCALTROL) 0.25 mcg capsule; Take 1 capsule by mouth every morning. Take one tablet weekly on Monday  -     BASAGLAR KWIKPEN 100 unit/mL (3 mL) pen; Inject 50 Units subcutaneous before bedtime. Product desired:BASAGLAR    Essential hypertension  -     isosorbide mononitrate (IMDUR) 30 mg extended release tablet 24 Hour; Take 2 tablets by mouth once daily.  -     metoprolol succinate (TOPROL XL) 25 mg Sustained-Release tablet; Take 1 tablet by mouth once daily.  -     furosemide (LASIX) 20 mg tablet; Take 1 tablet by mouth every morning.  -     lisinopril (PRINIVIL; ZESTRIL) 2.5 mg tablet; Take 1 tablet by mouth once daily.    Hyperlipidemia, unspecified hyperlipidemia type  -     rosuvastatin (CRESTOR) 40 mg tablet; Take 40 mg by mouth daily    Gastroesophageal reflux disease without esophagitis  -     esomeprazole (NEXIUM) 40 mg capsule; Take 1 capsule by mouth once daily before a meal.    HYPERTENSION  -     isosorbide mononitrate (IMDUR) 30 mg extended release tablet 24 Hour; Take 2 tablets by mouth once daily.  -     metoprolol succinate (TOPROL XL) 25 mg Sustained-Release tablet; Take 1 tablet by mouth once daily.  -     furosemide (LASIX) 20 mg tablet; Take 1 tablet by mouth every morning.  -     lisinopril (PRINIVIL; ZESTRIL) 2.5 mg tablet; Take 1 tablet by mouth once daily.     recent labs reviewed with patient. Her diabetes continues under good control. She'll continue current regimen. Patient insurance we will switch her to basaglar 50 units at night and continue NovoLog 7 units with meals. She'll follow-up in 6 months for repeat hemoglobin A1c.    Fasting lipids was reviewed and is appropriate. She'll follow-up in one year for this.    Blood pressures well controlled, continue current  regimen.    She declined flu shot today.      Sukumar Leal MD

## 2018-02-13 NOTE — TELEPHONE ENCOUNTER
Patient Information     Patient Name MRN Sex Bailey Law [id number] Female 1950      Telephone Encounter by Phan Wright RN at 2018  2:49 PM     Author:  Phan Wright RN Service:  (none) Author Type:  NURS- Registered Nurse     Filed:  2018  2:53 PM Encounter Date:  2/3/2018 Status:  Signed     :  Phan Wright RN (NURS- Registered Nurse)            Diabetic Supplies    Office visit in the past 12 months.    Last visit with CELSA BUCK was on: 12/15/2017 in [facility]  Next visit with CELSA BUCK is on: No future appointment listed with this provider  Next visit with Family Practice is on: No future appointment listed in this department    Max refill for 12 months from last office visit.    Needs not be listed on Med List to fill.  Need new RX every 12 months or if exceeds the limitations set by Medicare, then every 6 months.  Also when testing frequency is changed is there a need to obtain a new order.  A refill request does not need to be approved by the ordering physician-a beneficiary or their caregiver may request refills.  Physicians are not required to fill out additional forms such as home testing results for suppliers or provide additional documentation unless the supplier is audited and the  is requesting such documentation.      Chart review shows that patient was last seen by PCP on 12/15/17 for diagnosis of diabetes. Rx as requested was noted and reviewed by PCP as per office visit notes on that date. No changes to rx as requested, and patient is to follow up in 6 months time. Writer will refill rx as requested at this time.    Prescription refilled per RN Medication Refill Policy.................... Phan Wright RN ....................  2018   2:52 PM

## 2018-02-13 NOTE — PROGRESS NOTES
"Patient Information     Patient Name MRN Bailey Monique [id number] Female 1950      Progress Notes by Hazel Del Rio RN at 2018 10:30 AM     Author:  Hazel Del Rio RN Service:  (none) Author Type:  NURS- Registered Nurse     Filed:  2018 12:05 PM Encounter Date:  2018 Status:  Signed     :  Hazel Del Rio RN (NURS- Registered Nurse)               Diabetes Education Consult for CGM     SUBJECTIVE:  Bailey Sierra is an 67 y.o. female who presents for education regarding Continuous Glucose Monitoring (CGM) for Type 2 diabetes mellitus.   Age at diagnosis \"about age 34.\"    Current treatment includes diet and insulin injections.         GLUCOSE      Date/Time Value Ref Range Status   2017 09:00  (H) 70 - 105 mg/dL Final     HEMOGLOBIN A1C MONITORING (POCT)      Date/Time Value Ref Range Status   2017 09:00 AM 6.4 (H) 4.0 - 6.2 % Final   2012 09:40 AM 6.9 (H) <6.4 % NGSP Final     CHOLESTEROL,TOTAL      Date/Time Value Ref Range Status   2017 09:00  <200 mg/dL Final     CHOL/HDL RATIO                 Date/Time Value Ref Range Status   2017 09:00 AM 3.87 <4.50                 Final     TRIGLYCERIDES      Date/Time Value Ref Range Status   2017 09:00  (H) <150 mg/dL Final     HDL CHOLESTEROL      Date/Time Value Ref Range Status   2017 09:00 AM 39 23 - 92 mg/dL Final     LDL CHOLESTEROL,DIRECT         Date/Time Value Ref Range Status   2016 09:38 AM 79 mg/dL Final     MICROALBUMIN,RAND UR             Date/Time   Value Ref Range Status   2013 12:09 PM     Final    Urine Albumin below measurement range, unable to calculate.         Comment:           Interpretation Note:        Elevations seen with incipient nephropathy associated with diabetes        mellitis or hypertension.  Stress, exercise, hematuria, and urinary        tract infection may also produce elevated results.        If clinically indicated, " confirm with 24 Hour Microalbumin        Last dilated eye exam and results if available:  Patient states she visits for an annual exam and within the past year, no signs of diabetes retinopathy.     Social History     Substance Use Topics       Smoking status: Never Smoker     Smokeless tobacco: Never Used     Alcohol use No       Current Outpatient Rx       Medication  Sig Dispense Refill     ASCENSIA CONTOUR strip TEST 3 TIMES A  Each 2     BASAGLAR KWIKPEN 100 unit/mL (3 mL) pen Inject 50 Units subcutaneous before bedtime. Product desired:BASAGLAR 1 box 0     blood sugar diagnostic (ACCU-CHEK ROXANNE PLUS TEST STRP) strip Test blood sugars TID with meals. Dx: E11.9 300 Strip 1     calcitriol (ROCALTROL) 0.25 mcg capsule Take 1 capsule by mouth every morning. Take one tablet weekly on Monday 90 capsule 3     calcium carbonate-vitamin D3 600 mg (1,500 mg)-2,500 unit cap Take 1 capsule by mouth once daily.  0     DME Diabetic shoes, Dx E11.9.  Lifelong need. 1 Each 0     esomeprazole (NEXIUM) 40 mg capsule Take 1 capsule by mouth once daily before a meal. 90 capsule 3     furosemide (LASIX) 20 mg tablet Take 1 tablet by mouth every morning. 90 tablet 3     isosorbide mononitrate (IMDUR) 30 mg extended release tablet 24 Hour Take 2 tablets by mouth once daily. 180 tablet 3     lancets (ACCU-CHEK FASTCLIX) Dispense item covered by pt ins. E11.9 IDDM type II - Test 3 times/day. 102 Each 10     lisinopril (PRINIVIL; ZESTRIL) 2.5 mg tablet Take 1 tablet by mouth once daily. 90 tablet 3     metoprolol succinate (TOPROL XL) 25 mg Sustained-Release tablet Take 1 tablet by mouth once daily. 90 tablet 3     nitroglycerin (NITROSTAT) 0.4 mg sublingual tablet Place 1 tablet under the tongue every 5 minutes if needed for Chest Pain. 1 Bottle 0     NOVOLOG FLEXPEN 100 unit/mL solution for injection INJECT 7 UNITS THREE TIMES DAILY WITH MEALS PLUS A SLIDING SCALE 30 mL 2     potassium chloride (KLOR-CON M20) 20 mEq  "Extended-Release tablet TK 1 T PO QD. DO NOT CRUSH  6     rosuvastatin (CRESTOR) 40 mg tablet Take 40 mg by mouth daily 90 tablet 3     ULTICARE PEN NEEDLE 31 gauge x 5/16\" USE AS DIRECTED FOUR TIMES DAILY 400 Each 3     zolpidem (AMBIEN) 10 mg tablet Take 1 tablet by mouth at bedtime if needed for Sleep. 30 tablet 5     Medications have been reviewed by me and are current to the best of my knowledge and ability.       Allergies: Review of patient's allergies indicates no known allergies.     OBJECTIVE:  There were no vitals taken for this visit.      Patient visits for education using CGM help to continue tight control of DM2.  Discussed how the CGM works as well as benefits and contraindications regarding using the Dexcom G5 CGM for insulin dosing.   CGM placement training demonstrated and patient shares she would be able to place sensor without difficulty.      Patient interested in using Dexcom G5 CGM for insulin dosing and decreasing BG self-monitoring.  She shares she has been SMBG 4 x daily, \"because my bedtime BG has been high.\"  Paperwork given with take home booklets for further review.  Will begin investigation of insurance benefit after speaking with PCP for approval.      PLAN:    Patient will follow up if any further questions or concerns.    Time spent with patient was 60 minutes.     Hazel Del Rio RN, BSN, CDE  2/5/2018 10:29 AM               "

## 2018-02-19 ENCOUNTER — OFFICE VISIT (OUTPATIENT)
Dept: FAMILY MEDICINE | Facility: OTHER | Age: 68
End: 2018-02-19
Attending: FAMILY MEDICINE
Payer: COMMERCIAL

## 2018-02-19 VITALS
DIASTOLIC BLOOD PRESSURE: 60 MMHG | SYSTOLIC BLOOD PRESSURE: 124 MMHG | WEIGHT: 214.8 LBS | BODY MASS INDEX: 36.87 KG/M2 | HEART RATE: 72 BPM

## 2018-02-19 DIAGNOSIS — E78.00 PURE HYPERCHOLESTEROLEMIA: ICD-10-CM

## 2018-02-19 DIAGNOSIS — Z79.4 TYPE 2 DIABETES MELLITUS WITHOUT COMPLICATION, WITH LONG-TERM CURRENT USE OF INSULIN (H): Primary | ICD-10-CM

## 2018-02-19 DIAGNOSIS — I10 ESSENTIAL HYPERTENSION: ICD-10-CM

## 2018-02-19 DIAGNOSIS — E11.9 TYPE 2 DIABETES MELLITUS WITHOUT COMPLICATION, WITH LONG-TERM CURRENT USE OF INSULIN (H): Primary | ICD-10-CM

## 2018-02-19 PROCEDURE — 99213 OFFICE O/P EST LOW 20 MIN: CPT | Performed by: FAMILY MEDICINE

## 2018-02-19 PROCEDURE — G0463 HOSPITAL OUTPT CLINIC VISIT: HCPCS

## 2018-02-19 NOTE — MR AVS SNAPSHOT
"              After Visit Summary   2/19/2018    Bailey Sierra    MRN: [id number]           Patient Information     Date Of Birth          1950        Visit Information        Provider Department      2/19/2018 1:15 PM Sam Leal MD [facility]        Today's Diagnoses     Type 2 diabetes mellitus without complication, with long-term current use of insulin (H)    -  1    Pure hypercholesterolemia        Essential hypertension           Follow-ups after your visit        Your next 10 appointments already scheduled     Jack 15, 2018 12:45 PM CDT   SHORT with Sam Leal MD   [facility] ([facility])    [address]  [place] [address] [phone]              Who to contact     If you have questions or need follow up information about today's clinic visit or your schedule please contact [facility] directly at [phone].  Normal or non-critical lab and imaging results will be communicated to you by [employer], letter or phone within 4 business days after the clinic has received the results. If you do not hear from us within 7 days, please contact the clinic through [employer] or phone. If you have a critical or abnormal lab result, we will notify you by phone as soon as possible.  Submit refill requests through [employer] or call your pharmacy and they will forward the refill request to us. Please allow 3 business days for your refill to be completed.          Additional Information About Your Visit        [employer] Information     [employer] lets you send messages to your doctor, view your test results, renew your prescriptions, schedule appointments and more. To sign up, go to [email or web address] . Click on \"Log in\" on the left side of the screen, which will take you to the Welcome page. Then click on \"Sign up Now\" on the right side of the page.     You will be asked to enter the access code listed below, as " well as some personal information. Please follow the directions to create your username and password.     Your access code is: [email or web address]  Expires: 2018  1:46 PM     Your access code will  in 90 days. If you need help or a new code, please call your [place] clinic or [phone].        Care EveryWhere ID     This is your Care EveryWhere ID. This could be used by other organizations to access your [place] medical records  [id number]        Your Vitals Were     Pulse BMI (Body Mass Index)                72 36.87 kg/m2           Blood Pressure from Last 3 Encounters:   18 124/60   12/15/17 135/65   17 120/60    Weight from Last 3 Encounters:   18 214 lb 12.8 oz (97.4 kg)   12/15/17 215 lb 6.4 oz (97.7 kg)   17 213 lb (96.6 kg)              Today, you had the following     No orders found for display       Primary Care Provider Office Phone # Fax #    Sam Leal [phone]       [address] [facility] [address]        Equal Access to Services     [facility]: Hadii aad ku hadasho Sosharonali, waaxda luqadaha, qaybta kaalmada adekazyada, bill andrew. So [facility] [phone].    ATENCIÓN: Si habla español, tiene a johnson disposición servicios gratuitos de asistencia lingüística. Llame al [phone].    We comply with applicable federal civil rights laws and [place] laws. We do not discriminate on the basis of race, color, national origin, age, disability, sex, sexual orientation, or gender identity.            Thank you!     Thank you for choosing [facility] AND [facility]  for your care. Our goal is always to provide you with excellent care. Hearing back from our patients is one way we can continue to improve our services. Please take a few minutes to complete the written survey that you may receive in the mail after your visit with us. Thank you!             Your Updated Medication List - Protect others around  you: Learn how to safely use, store and throw away your medicines at www.disposemymeds.org.          This list is accurate as of 2/19/18  1:46 PM.  Always use your most recent med list.                   Brand Name Dispense Instructions for use Diagnosis    aspirin 81 MG tablet     30 tablet    Take 1 tablet (81 mg) by mouth daily        BASAGLAR 100 UNIT/ML injection      Inject 50 Units Subcutaneous At Bedtime        TRICIA CONTOUR test strip   Generic drug:  blood glucose monitoring      TEST 3 TIMES A DAY        calcitRIOL 0.25 MCG capsule    ROCALTROL     Take 0.25 mcg by mouth Take 1 capsule by mouth every morning. Take one tablet weekly on Monday        Calcium Plus D3 Absorbable 600-2500 MG-UNIT Caps      Take 1 capsule by mouth daily        esomeprazole 40 MG CR capsule    nexIUM     Take 40 mg by mouth daily Before a meal.        furosemide 20 MG tablet    LASIX     Take 20 mg by mouth every morning        isosorbide mononitrate 30 MG 24 hr tablet    IMDUR     Take 60 mg by mouth daily        lisinopril 2.5 MG tablet    PRINIVIL/Zestril     Take 2.5 mg by mouth daily        metoprolol succinate 25 MG 24 hr tablet    TOPROL-XL     Take 25 mg by mouth daily        nitroGLYcerin 0.4 MG sublingual tablet    NITROSTAT     Place 0.4 mg under the tongue every 5 minutes as needed for chest pain        NovoLOG FLEXPEN 100 UNIT/ML injection   Generic drug:  insulin aspart           potassium chloride SA 20 MEQ CR tablet    K-DUR/KLOR-CON M     TK 1 T PO QD. DO NOT CRUSH        rosuvastatin 40 MG tablet    CRESTOR     Take 40 mg by mouth daily        ULTICARE SHORT 31G X 8 MM   Generic drug:  insulin pen needle      USE AS DIRECTED FOUR TIMES DAILY        zolpidem 10 MG tablet    AMBIEN     Take 10 mg by mouth nightly as needed for sleep

## 2018-02-19 NOTE — PROGRESS NOTES
SUBJECTIVE:  Bailey Sierra is a 67 year old female here for follow-up.  She has a history of type 2 diabetes.  Her last hemoglobin A1c in December was 6.4%.  She has been checking her blood sugars 4 times daily and they have been averaging in the low to mid 100s.  She occasionally has readings in the 2-300s later in the day.  She is having no trouble with hypoglycemia.  She is interested in pursuing a continuous blood glucose monitor.    Patient takes 3 injections daily of 7 units mealtime insulin before breakfast, lunch and dinner.   Patient takes 1 injection(s) daily of 50 background/basal insulin.   Patient also requires frequent adjustments of novolog insulin using a sliding scale three times daily before breakfast, lunch and dinner.   Patient is and has been testing BG 4 times daily before breakfast, lunch, dinner and bedtime.         She is otherwise doing well today and has no further concerns.    Allergies:  No Known Allergies    ROS:    As above otherwise ROS is unremarkable.    OBJECTIVE:  /60 (BP Location: Right arm, Patient Position: Sitting, Cuff Size: Adult Large)  Pulse 72  Wt 214 lb 12.8 oz (97.4 kg)  BMI 36.87 kg/m2    EXAM:  General Appearance: Pleasant, alert, appropriate appearance for age. No acute distress  Lungs: Normal chest wall and respirations. Clear to auscultation, no wheezes or crackles.  Cardiovascular: Regular rate and rhythm. S1, S2, no murmurs.  Musculoskeletal: No edema.    Ambulatory - [facility] on 12/11/2017   Component Date Value Ref Range Status     Estimated Average Glucose 12/11/2017 137  mg/dL Final     Hemoglobin A1C Monitoring - Histor* 12/11/2017 6.4* 4.0 - 6.2 % Final     HDL Cholesterol 12/11/2017 39  23 - 92 mg/dL Final     LDL Cholesterol Calculated 12/11/2017 72  <100 mg/dL Final     Triglycerides 12/11/2017 202* <150 mg/dL Final     Non-HDL Cholesterol - Historical 12/11/2017 112  <145 mg/dL Final     Cholesterol/HDL Ratio - Historical 12/11/2017 3.87  <4.50  Final     Cholesterol Total 12/11/2017 151  <200 mg/dL Final     Provider Ordered Status - Historic* 12/11/2017 FASTING   Final     ALT (SGPT) - Historical 12/11/2017 16  7 - 52 IU/L Final     Creatinine 12/11/2017 2.25* 0.70 - 1.30 mg/dL Final     A/G Ratio - Historical 12/11/2017 1.5  1.0 - 2.0 Final     Albumin 12/11/2017 3.7  3.5 - 5.7 g/dL Final     Urea Nitrogen 12/11/2017 41* 7 - 25 mg/dL Final     Anion Gap - Historical 12/11/2017 10  5 - 18 Final     Sodium 12/11/2017 141  133 - 143 mmol/L Final     GFR If Not  - Hist* 12/11/2017 22* >60 ml/min/1.73m2 Final     GFR Estimate If Black 12/11/2017 26* >60 ml/min/1.73m2 Final     Bilirubin Total 12/11/2017 0.5  0.3 - 1.0 mg/dL Final     BUN/Creatinine Ratio - Historical 12/11/2017 18   Final     Globulin - Historical 12/11/2017 2.5  2.0 - 3.7 g/dL Final     Protein Total 12/11/2017 6.2* 6.4 - 8.9 g/dL Final     Calcium 12/11/2017 8.7  8.6 - 10.3 mg/dL Final     Chloride 12/11/2017 107  98 - 107 mmol/L Final     AST (SGOT) - Historical 12/11/2017 16  13 - 39 IU/L Final     Alkaline Phosphatase 12/11/2017 66  34 - 104 IU/L Final     Glucose 12/11/2017 175* 70 - 105 mg/dL Final     Potassium 12/11/2017 4.0  3.5 - 5.1 mmol/L Final     Carbon Dioxide 12/11/2017 24  21 - 31 mmol/L Final         ASSESSEMENT AND PLAN:    1. Type 2 diabetes mellitus without complication, with long-term current use of insulin (H)    2. Pure hypercholesterolemia    3. Essential hypertension      Diabetes under good control.  She will continue pursuing continuous blood glucose monitoring.    Repeat hemoglobin A1c in June and repeat lipids again in December.    Blood pressure continues to be well controlled.    Sukumar Leal MD    This document was prepared using voice generated software.  While every attempt was made for accuracy, grammatical errors may exist.

## 2018-02-19 NOTE — NURSING NOTE
Patient presents today to discuss CG5. She states she was talking to the diabetic educator about it and was informed she is a good candidate.    Previous A1C is at goal of <8  No results found for: A1C  Urine microalbumin:creatine: 9/26/16  Foot exam 1/11/16  Eye exam November 2017    Tobacco User No  Patient is on a daily aspirin  Patient is on a Statin.  Blood pressure today of 124/60 is at the goal of <139/89 for diabetics.    Ingrid Erazo 2/19/2018 1:19 PM

## 2018-02-20 ENCOUNTER — DOCUMENTATION ONLY (OUTPATIENT)
Dept: FAMILY MEDICINE | Facility: OTHER | Age: 68
End: 2018-02-20

## 2018-03-29 DIAGNOSIS — Z79.4 TYPE 2 DIABETES MELLITUS WITHOUT COMPLICATION, WITH LONG-TERM CURRENT USE OF INSULIN (H): Primary | ICD-10-CM

## 2018-03-29 DIAGNOSIS — E11.9 TYPE 2 DIABETES MELLITUS WITHOUT COMPLICATION, WITH LONG-TERM CURRENT USE OF INSULIN (H): Primary | ICD-10-CM

## 2018-03-30 RX ORDER — INSULIN GLARGINE 100 [IU]/ML
50 INJECTION, SOLUTION SUBCUTANEOUS AT BEDTIME
Status: CANCELLED | OUTPATIENT
Start: 2018-03-30

## 2018-04-03 NOTE — TELEPHONE ENCOUNTER
RN refill protocol failed as last microalbumin was completed on 12/19/13 as per chart review, although patient does have a HgbA1c completed on 12/11/17. Writer is unable to fill rx as requested as per RN refill protocol. Patient was seen by PCP last on 2/19/18 for diagnosis of diabetes. Use of rx as requested was noted in office visit notes on that date, at dosing as requested by pharmacy. No changes to rx as requested as per office visit notes on that date and patient is to follow up in June 2018. Writer will route rx request to PCP for his consideration/approval at this time.    Unable to complete prescription refill per RN Medication Refill Policy. Phan Wright 4/3/2018 1:48 PM

## 2018-04-04 RX ORDER — INSULIN GLARGINE 100 [IU]/ML
50 INJECTION, SOLUTION SUBCUTANEOUS AT BEDTIME
Qty: 60 ML | Refills: 11 | Status: SHIPPED | OUTPATIENT
Start: 2018-04-04 | End: 2018-05-03

## 2018-04-10 ENCOUNTER — ALLIED HEALTH/NURSE VISIT (OUTPATIENT)
Dept: EDUCATION SERVICES | Facility: OTHER | Age: 68
End: 2018-04-10
Attending: FAMILY MEDICINE
Payer: MEDICARE

## 2018-04-10 DIAGNOSIS — E11.22 TYPE 2 DIABETES MELLITUS WITH STAGE 3 CHRONIC KIDNEY DISEASE, WITH LONG-TERM CURRENT USE OF INSULIN (H): Primary | ICD-10-CM

## 2018-04-10 DIAGNOSIS — Z79.4 TYPE 2 DIABETES MELLITUS WITH STAGE 3 CHRONIC KIDNEY DISEASE, WITH LONG-TERM CURRENT USE OF INSULIN (H): Primary | ICD-10-CM

## 2018-04-10 DIAGNOSIS — N18.30 TYPE 2 DIABETES MELLITUS WITH STAGE 3 CHRONIC KIDNEY DISEASE, WITH LONG-TERM CURRENT USE OF INSULIN (H): Primary | ICD-10-CM

## 2018-04-10 PROCEDURE — G0108 DIAB MANAGE TRN  PER INDIV: HCPCS

## 2018-04-10 NOTE — MR AVS SNAPSHOT
"              After Visit Summary   4/10/2018    Bailey Sierra    MRN: [id number]           Patient Information     Date Of Birth          1950        Visit Information        Provider Department      4/10/2018 10:00 AM  DIABETES EDUCATION [facility]        Today's Diagnoses     Type 2 diabetes mellitus with stage 3 chronic kidney disease, with long-term current use of insulin (H)    -  1      Care Instructions    Continuous Glucose Monitoring      Congratulations! You have decided to utilize the DEXCOM G5 PLATINUM Continuous Glucose Monitoring System. We strongly believe in the use of the new technologies to better assist you in managing your diabetes. These technologies give both you and your provider a better understanding of your diabetes and current trends, which will enable you to improve control of your blood glucose levels.      Remember the DEXCOM G5 PLATINUM CGM is contraindicated with acetaminophen.  Low and high alerts are set 75 mg/dL and 210 mg/dL. Sensor inserted today at 10:40am.     You may download your CGM information from your iPhone or  at www.dexcom.com.  Choose the Dexcom Clarity software.    STEPS FOR SENSOR PLACEMENT  After cleaning skin with alcohol,                         1.  PEEL the adhesive from the sensor.                        2.  PLACE on skin.                        3.  POSITION sensor.                        4.  PULL safety latch out.                        5.  PUSH sensor  needle into skin.                        6.  PULL sensor  needle out of skin.                        7.  PINCH in sensor base on both sides.                         8.  POP sensor applicator off by pushing forward.  To place transmitter:  \"Feet first, use latch to crank transmitter into sensor and listen for 2 'clicks.'\"  See your Set Up guide for diagrams.        PLAN:      1. Calibration - Check BG and enter values into  twice at initial start up " "and once every 12 hours until sensor stop (7 days).     2. Events - You are encouraged to enter events such as Carbohydrates (grams), Insulin (units), Exercise (intensity and duration), Health (illness, stress, high/low symptoms, menstrual cycle, and alcohol).     3. Battery - Charge  with  1 - 3 hours every 3 - 5 days or as  battery depletes.      4. Please follow up if any questions or concerns.  Remember if you have Dexcom questions you can also call the 24 hour technical support line at [phone].    Hazel Del Rio RN, BSN, CDE  4/10/2018 10:06 AM               Follow-ups after your visit        Your next 10 appointments already scheduled     Jack 15, 2018 12:45 PM CDT   [facility]    [address]  [place] [address] [phone]              Who to contact     If you have questions or need follow up information about today's clinic visit or your schedule please contact [facility] directly at [phone].  Normal or non-critical lab and imaging results will be communicated to you by bettermarkshart, letter or phone within 4 business days after the clinic has received the results. If you do not hear from us within 7 days, please contact the clinic through bettermarkshart or phone. If you have a critical or abnormal lab result, we will notify you by phone as soon as possible.  Submit refill requests through [employer] or call your pharmacy and they will forward the refill request to us. Please allow 3 business days for your refill to be completed.          Additional Information About Your Visit        bettermarkshar[employer] Leaf [employer] Information     [employer] lets you send messages to your doctor, view your test results, renew your prescriptions, schedule appointments and more. To sign up, go to [email or web address] . Click on \"Log in\" on the left side of the screen, which will take you to the Welcome " "page. Then click on \"Sign up Now\" on the right side of the page.     You will be asked to enter the access code listed below, as well as some personal information. Please follow the directions to create your username and password.     Your access code is: [email or web address]  Expires: 2018  2:46 PM     Your access code will  in 90 days. If you need help or a new code, please call your [place] clinic or [phone].        Care EveryWhere ID     This is your Care EveryWhere ID. This could be used by other organizations to access your [place] medical records  [id number]         Blood Pressure from Last 3 Encounters:   18 124/60   12/15/17 135/65   17 120/60    Weight from Last 3 Encounters:   18 97.4 kg (214 lb 12.8 oz)   12/15/17 97.7 kg (215 lb 6.4 oz)   17 96.6 kg (213 lb)              Today, you had the following     No orders found for display       Primary Care Provider Office Phone # Fax #    Sam Leal [phone] [address] [facility] [address]        Equal Access to Services     PILAR MARTINEZ AH: Hadii angélica acevedoo Sodayana, waaxda luqadaha, qaybta kaalmada adeegyada, bill andrew. So [facility] [phone].    ATENCIÓN: Si habla español, tiene a johnson disposición servicios gratuitos de asistencia lingüística. Llame al [phone].    We comply with applicable federal civil rights laws and [place] laws. We do not discriminate on the basis of race, color, national origin, age, disability, sex, sexual orientation, or gender identity.            Thank you!     Thank you for choosing [facility]  for your care. Our goal is always to provide you with excellent care. Hearing back from our patients is one way we can continue to improve our services. Please take a few minutes to complete the written survey that you may receive in the mail after your visit with us. Thank you!             Your Updated " Medication List - Protect others around you: Learn how to safely use, store and throw away your medicines at www.disposemymeds.org.          This list is accurate as of 4/10/18 11:13 AM.  Always use your most recent med list.                   Brand Name Dispense Instructions for use Diagnosis    aspirin 81 MG tablet     30 tablet    Take 1 tablet (81 mg) by mouth daily        * BASAGLAR 100 UNIT/ML injection      Inject 50 Units Subcutaneous At Bedtime        * BASAGLAR 100 UNIT/ML injection     60 mL    Inject 50 Units Subcutaneous At Bedtime    Type 2 diabetes mellitus without complication, with long-term current use of insulin (H)       [employer] CONTOUR test strip   Generic drug:  blood glucose monitoring      TEST 3 TIMES A DAY        calcitRIOL 0.25 MCG capsule    ROCALTROL     Take 0.25 mcg by mouth Take 1 capsule by mouth every morning. Take one tablet weekly on Monday        Calcium Plus D3 Absorbable 600-2500 MG-UNIT Caps      Take 1 capsule by mouth daily        esomeprazole 40 MG CR capsule    nexIUM     Take 40 mg by mouth daily Before a meal.        furosemide 20 MG tablet    LASIX     Take 20 mg by mouth every morning        isosorbide mononitrate 30 MG 24 hr tablet    IMDUR     Take 60 mg by mouth daily        lisinopril 2.5 MG tablet    PRINIVIL/Zestril     Take 2.5 mg by mouth daily        metoprolol succinate 25 MG 24 hr tablet    TOPROL-XL     Take 25 mg by mouth daily        nitroGLYcerin 0.4 MG sublingual tablet    NITROSTAT     Place 0.4 mg under the tongue every 5 minutes as needed for chest pain        NovoLOG FLEXPEN 100 UNIT/ML injection   Generic drug:  insulin aspart           potassium chloride SA 20 MEQ CR tablet    K-DUR/KLOR-CON M     TK 1 T PO QD. DO NOT CRUSH        rosuvastatin 40 MG tablet    CRESTOR     Take 40 mg by mouth daily        ULTICARE SHORT 31G X 8 MM   Generic drug:  insulin pen needle      USE AS DIRECTED FOUR TIMES DAILY        zolpidem 10 MG tablet    AMBIEN     Take  10 mg by mouth nightly as needed for sleep        * Notice:  This list has 2 medication(s) that are the same as other medications prescribed for you. Read the directions carefully, and ask your doctor or other care provider to review them with you.

## 2018-04-10 NOTE — PROGRESS NOTES
"Diabetes Education Progress Note  Continuous Glucose Monitoring System (CGMS)     SUBJECTIVE: Bailey is a 68 y.o. female who has type 2 diabetes and is here for Personal Continuous Glucose Monitoring System (CGMS) training.      OBJECTIVE:  FBG today: 77 mg/dl.   Testing frequency: 4 times a day    Current Diabetes Medications: Basaglar and Novolog insulin      ASSESSMENT: Bailey was instructed in features of DEXCOM G5 PLATINUM CGMS.  She was instructed in programming a BG reading, events for meds, CHO, exercise and health such as stress, illness, high/low symptoms.  Bailey will wear the sensor for 7 days and if she would like, can download at home using Dexcom Clarity software found at www.dexcom.com.       Patient demonstrated understanding by inserting her own sensor (see below). Sensor inserted without difficulty,  receiving data.  Helped patient program her .  Reviewed use and calibrations needed every 12 hours.      Patient given step-by-step directions for placing sensor:  After cleaning skin with alcohol,                         1. PEEL the adhesive from the sensor.                        2.  PLACE on skin.                        3.  POSITION sensor.                        4.  PULL safety latch out.                        5.  PUSH sensor  needle into skin.                        6.  PULL sensor  needle out of skin.                        7.  PINCH in sensor base on both sides.                         8.  POP sensor applicator off by pushing forward.  Patient to place transmitter:  \"Feet first, use latch to crank transmitter into sensor and listen for 2 'clicks.'\"  Patient started new sensor on her .  Patient given home instruction sheet with diagrams.            Time spent with patient was 60 minutes.     See patient instructions for complete plan.     Hazel Del Rio RN, BSN, CDE  4/10/2018 10:05 AM          "

## 2018-04-10 NOTE — PATIENT INSTRUCTIONS
"Continuous Glucose Monitoring      Congratulations! You have decided to utilize the DEXCOM G5 PLATINUM Continuous Glucose Monitoring System. We strongly believe in the use of the new technologies to better assist you in managing your diabetes. These technologies give both you and your provider a better understanding of your diabetes and current trends, which will enable you to improve control of your blood glucose levels.      Remember the DEXCOM G5 PLATINUM CGM is contraindicated with acetaminophen.  Low and high alerts are set 75 mg/dL and 210 mg/dL. Sensor inserted today at 10:40am.     You may download your CGM information from your iPhone or  at www.dexcom.com.  Choose the Dexcom Clarity software.    STEPS FOR SENSOR PLACEMENT  After cleaning skin with alcohol,                         1.  PEEL the adhesive from the sensor.                        2.  PLACE on skin.                        3.  POSITION sensor.                        4.  PULL safety latch out.                        5.  PUSH sensor  needle into skin.                        6.  PULL sensor  needle out of skin.                        7.  PINCH in sensor base on both sides.                         8.  POP sensor applicator off by pushing forward.  To place transmitter:  \"Feet first, use latch to crank transmitter into sensor and listen for 2 'clicks.'\"  See your Set Up guide for diagrams.        PLAN:      1. Calibration - Check BG and enter values into  twice at initial start up and once every 12 hours until sensor stop (7 days).     2. Events - You are encouraged to enter events such as Carbohydrates (grams), Insulin (units), Exercise (intensity and duration), Health (illness, stress, high/low symptoms, menstrual cycle, and alcohol).     3. Battery - Charge  with  1 - 3 hours every 3 - 5 days or as  battery depletes.      4. Please follow up if any questions or concerns.  Remember if you have " Dexcom questions you can also call the 24 hour technical support line at [phone].    Hazel Del Rio RN, BSN, CDE  4/10/2018 10:06 AM

## 2018-04-19 ENCOUNTER — ALLIED HEALTH/NURSE VISIT (OUTPATIENT)
Dept: EDUCATION SERVICES | Facility: OTHER | Age: 68
End: 2018-04-19
Attending: FAMILY MEDICINE
Payer: MEDICARE

## 2018-04-19 DIAGNOSIS — Z79.4 TYPE 2 DIABETES MELLITUS WITH COMPLICATION, WITH LONG-TERM CURRENT USE OF INSULIN (H): Primary | ICD-10-CM

## 2018-04-19 DIAGNOSIS — E11.8 TYPE 2 DIABETES MELLITUS WITH COMPLICATION, WITH LONG-TERM CURRENT USE OF INSULIN (H): Primary | ICD-10-CM

## 2018-04-19 PROCEDURE — G0108 DIAB MANAGE TRN  PER INDIV: HCPCS

## 2018-04-19 NOTE — MR AVS SNAPSHOT
"              After Visit Summary   4/19/2018    Bailey Sierra    MRN: [id number]           Patient Information     Date Of Birth          1950        Visit Information        Provider Department      4/19/2018 10:30 AM  DIABETES EDUCATION [facility]        Care Instructions    CGM Report and Recommendations    CGM Report:    Your sensor glucose results measure a total of 1,832 readings over the past week.  Report shows all day totals:  69% in target (70 - 180), 28% High (181 - 401), and 2%  low (56 - 69) and 1% hypo (39 - 55).  Total average glucose during CGM use:  140 mg/dl.      Recommended insulin adjustments/recommendations:      Recommend giving your breakfast Novolog \"a head start\" by taking your injection 10 minutes before you eat.      Hazel Del Rio RN, BSN, CDE 4/19/2018 10:56 AM                   Follow-ups after your visit        Your next 10 appointments already scheduled     Jun 22, 2018 11:30 AM CDT   SHORT with Sam Leal MD   [facility] ([facility])    [address] [place] [address] [phone]              Who to contact     If you have questions or need follow up information about today's clinic visit or your schedule please contact [facility] directly at [phone].  Normal or non-critical lab and imaging results will be communicated to you by Cogheadhart, letter or phone within 4 business days after the clinic has received the results. If you do not hear from us within 7 days, please contact the clinic through Cogheadhart or phone. If you have a critical or abnormal lab result, we will notify you by phone as soon as possible.  Submit refill requests through [employer] or call your pharmacy and they will forward the refill request to us. Please allow 3 business days for your refill to be completed.          Additional Information About Your Visit        Cogheadhart Information     [employer] lets you send " "messages to your doctor, view your test results, renew your prescriptions, schedule appointments and more. To sign up, go to [email or web address] . Click on \"Log in\" on the left side of the screen, which will take you to the Welcome page. Then click on \"Sign up Now\" on the right side of the page.     You will be asked to enter the access code listed below, as well as some personal information. Please follow the directions to create your username and password.     Your access code is: [email or web address]  Expires: 2018  2:46 PM     Your access code will  in 90 days. If you need help or a new code, please call your [place] clinic or [phone].        Care EveryWhere ID     This is your Care EveryWhere ID. This could be used by other organizations to access your [place] medical records  [id number]         Blood Pressure from Last 3 Encounters:   18 124/60   12/15/17 135/65   17 120/60    Weight from Last 3 Encounters:   18 97.4 kg (214 lb 12.8 oz)   12/15/17 97.7 kg (215 lb 6.4 oz)   17 96.6 kg (213 lb)              Today, you had the following     No orders found for display       Primary Care Provider Office Phone # Fax #    Sam Leal [phone] [address] [facility] [address]        Equal Access to Services     [facility]: Hadii angélica rodney hadasho Sosharonali, waaxda luqadaha, qaybta kaalmada jose, bill choudhary . So [facility] [phone].    ATENCIÓN: Si rakesh cifuentes, tiene a johnson disposición servicios gratuitos de asistencia lingüística. Llame al [phone].    We comply with applicable federal civil rights laws and [place] laws. We do not discriminate on the basis of race, color, national origin, age, disability, sex, sexual orientation, or gender identity.            Thank you!     Thank you for choosing [facility]  for your care. Our goal is always to provide you with excellent care. " Hearing back from our patients is one way we can continue to improve our services. Please take a few minutes to complete the written survey that you may receive in the mail after your visit with us. Thank you!             Your Updated Medication List - Protect others around you: Learn how to safely use, store and throw away your medicines at www.disposemymeds.org.          This list is accurate as of 4/19/18 11:31 AM.  Always use your most recent med list.                   Brand Name Dispense Instructions for use Diagnosis    aspirin 81 MG tablet     30 tablet    Take 1 tablet (81 mg) by mouth daily        * BASAGLAR 100 UNIT/ML injection      Inject 50 Units Subcutaneous At Bedtime        * BASAGLAR 100 UNIT/ML injection     60 mL    Inject 50 Units Subcutaneous At Bedtime    Type 2 diabetes mellitus without complication, with long-term current use of insulin (H)       [employer] CONTOUR test strip   Generic drug:  blood glucose monitoring      TEST 3 TIMES A DAY        calcitRIOL 0.25 MCG capsule    ROCALTROL     Take 0.25 mcg by mouth Take 1 capsule by mouth every morning. Take one tablet weekly on Monday        Calcium Plus D3 Absorbable 600-2500 MG-UNIT Caps      Take 1 capsule by mouth daily        esomeprazole 40 MG CR capsule    nexIUM     Take 40 mg by mouth daily Before a meal.        furosemide 20 MG tablet    LASIX     Take 20 mg by mouth every morning        isosorbide mononitrate 30 MG 24 hr tablet    IMDUR     Take 60 mg by mouth daily        lisinopril 2.5 MG tablet    PRINIVIL/Zestril     Take 2.5 mg by mouth daily        metoprolol succinate 25 MG 24 hr tablet    TOPROL-XL     Take 25 mg by mouth daily        nitroGLYcerin 0.4 MG sublingual tablet    NITROSTAT     Place 0.4 mg under the tongue every 5 minutes as needed for chest pain        NovoLOG FLEXPEN 100 UNIT/ML injection   Generic drug:  insulin aspart           potassium chloride SA 20 MEQ CR tablet    K-DUR/KLOR-CON M     TK 1 T PO QD. DO NOT  CRUSH        rosuvastatin 40 MG tablet    CRESTOR     Take 40 mg by mouth daily        ULTICARE SHORT 31G X 8 MM   Generic drug:  insulin pen needle      USE AS DIRECTED FOUR TIMES DAILY        zolpidem 10 MG tablet    AMBIEN     Take 10 mg by mouth nightly as needed for sleep        * Notice:  This list has 2 medication(s) that are the same as other medications prescribed for you. Read the directions carefully, and ask your doctor or other care provider to review them with you.

## 2018-04-19 NOTE — PATIENT INSTRUCTIONS
"CGM Report and Recommendations    CGM Report:    Your sensor glucose results measure a total of 1,832 readings over the past week.  Report shows all day totals:  69% in target (70 - 180), 28% High (181 - 401), and 2%  low (56 - 69) and 1% hypo (39 - 55).  Total average glucose during CGM use:  140 mg/dl.      Recommended insulin adjustments/recommendations:      Recommend giving your breakfast Novolog \"a head start\" by taking your injection 10 minutes before you eat.      Hazel Del Rio RN, BSN, CDE 4/19/2018 10:56 AM           "

## 2018-04-24 DIAGNOSIS — E11.9 TYPE 2 DIABETES MELLITUS WITHOUT COMPLICATION (H): Primary | ICD-10-CM

## 2018-04-24 NOTE — TELEPHONE ENCOUNTER
Chart review shows that patient was last seen by PCP on 2/19/18. Diagnosis of diabetes was noted in office visit notes on that date, as well as PCP notes that patient is injecting insulin 4 times daily. No changes noted to injection frequency in office visit notes on that date. Writer will refill rx as requested at this time as per RN refill protocol for a 90 day supply. Patient is seeing PCP in June 2018 in follow up.    Prescription refilled per RN Medication Refill Policy..................Phan Wright 4/24/2018 9:35 AM

## 2018-05-03 ENCOUNTER — TELEPHONE (OUTPATIENT)
Dept: EDUCATION SERVICES | Facility: OTHER | Age: 68
End: 2018-05-03

## 2018-05-03 ENCOUNTER — ALLIED HEALTH/NURSE VISIT (OUTPATIENT)
Dept: EDUCATION SERVICES | Facility: OTHER | Age: 68
End: 2018-05-03
Attending: FAMILY MEDICINE
Payer: MEDICARE

## 2018-05-03 DIAGNOSIS — E11.9 TYPE 2 DIABETES MELLITUS WITHOUT COMPLICATION, WITH LONG-TERM CURRENT USE OF INSULIN (H): ICD-10-CM

## 2018-05-03 DIAGNOSIS — Z79.4 TYPE 2 DIABETES MELLITUS WITHOUT COMPLICATION, WITH LONG-TERM CURRENT USE OF INSULIN (H): ICD-10-CM

## 2018-05-03 DIAGNOSIS — E11.8 TYPE 2 DIABETES MELLITUS WITH COMPLICATION, WITH LONG-TERM CURRENT USE OF INSULIN (H): Primary | ICD-10-CM

## 2018-05-03 DIAGNOSIS — Z79.4 TYPE 2 DIABETES MELLITUS WITH COMPLICATION, WITH LONG-TERM CURRENT USE OF INSULIN (H): Primary | ICD-10-CM

## 2018-05-03 PROCEDURE — G0108 DIAB MANAGE TRN  PER INDIV: HCPCS

## 2018-05-03 NOTE — MR AVS SNAPSHOT
After Visit Summary   5/3/2018    Bailey Sierra    MRN: [id number]           Patient Information     Date Of Birth          1950        Visit Information        Provider Department      5/3/2018 11:00 AM  DIABETES EDUCATION [facility]        Care Instructions    CGM Report and Recommendations    CGM Report:    Your sensor glucose results measure a total of 1,832 readings over the past week.  Report shows all day totals:  57% in target (70 - 180), 34% High (181 - 401), and 5%  low (56 - 69) and 4% hypo (39 - 55).  Total average glucose during CGM use:  146 mg/dl.      Recommended insulin adjustments/recommendations:      1.  To help decrease risk of low BG after breakfast due to your new work schedule, recommend decreasing Novolog at breakfast from 12 to 10 units.    2.  To help decrease high blood sugar overnight, recommend Lantus increase from 50 to 52 units every evening at bedtime.    Hazel Del Rio RN, BSN, CDE  5/3/2018 6:52 PM                    Follow-ups after your visit        Your next 10 appointments already scheduled     May 16, 2018 10:30 AM CDT   Diabetic Education with  DIABETES [facility] ([facility])    [address]  [place] [address] [phone]            Jun 22, 2018 11:30 AM CDT   [facility] with [facility]al MD   [facility]    [address] [facility] [id number] [phone]              Who to contact     If you have questions or need follow up information about today's clinic visit or your schedule please contact [facility] directly at [phone].  Normal or non-critical lab and imaging results will be communicated to you by MyChart, letter or phone within 4 business days after the clinic has received the results. If you do not hear from us within 7 days, please contact the clinic through MyChart or phone.  "If you have a critical or abnormal lab result, we will notify you by phone as soon as possible.  Submit refill requests through Tunespeak or call your pharmacy and they will forward the refill request to us. Please allow 3 business days for your refill to be completed.          Additional Information About Your Visit        [employer] Information     Tunespeak lets you send messages to your doctor, view your test results, renew your prescriptions, schedule appointments and more. To sign up, go to [email or web address] . Click on \"Log in\" on the left side of the screen, which will take you to the Welcome page. Then click on \"Sign up Now\" on the right side of the page.     You will be asked to enter the access code listed below, as well as some personal information. Please follow the directions to create your username and password.     Your access code is: [email or web address]  Expires: 2018  2:46 PM     Your access code will  in 90 days. If you need help or a new code, please call your [place] clinic or [phone].        Care EveryWhere ID     This is your Care EveryWhere ID. This could be used by other organizations to access your [place] medical records  [id number]         Blood Pressure from Last 3 Encounters:   18 124/60   12/15/17 135/65   17 120/60    Weight from Last 3 Encounters:   18 97.4 kg (214 lb 12.8 oz)   12/15/17 97.7 kg (215 lb 6.4 oz)   17 96.6 kg (213 lb)              Today, you had the following     No orders found for display       Primary Care Provider Office Phone # Fax #    Sam Leal [phone] [address] [facility] [address]        Equal Access to Services     PILAR MARTINEZ : Hadjosefina Devine, wajuly joe, qaybta kaalmalee garcia, bill andrew. So [facility] [phone].    ATENCIÓN: Si habla español, tiene a johnson disposición servicios gratuitos de asistencia lingüística. Llame al " [phone].    We comply with applicable federal civil rights laws and [place] laws. We do not discriminate on the basis of race, color, national origin, age, disability, sex, sexual orientation, or gender identity.            Thank you!     Thank you for choosing [facility] AND \[facility]\""  for your care. Our goal is always to provide you with excellent care. Hearing back from our patients is one way we can continue to improve our services. Please take a few minutes to complete the written survey that you may receive in the mail after your visit with us. Thank you!             Your Updated Medication List - Protect others around you: Learn how to safely use, store and throw away your medicines at www.disposemymeds.org.          This list is accurate as of 5/3/18  6:53 PM.  Always use your most recent med list.                   Brand Name Dispense Instructions for use Diagnosis    aspirin 81 MG tablet     30 tablet    Take 1 tablet (81 mg) by mouth daily        * BASAGLAR 100 UNIT/ML injection      Inject 50 Units Subcutaneous At Bedtime        * BASAGLAR 100 UNIT/ML injection     60 mL    Inject 50 Units Subcutaneous At Bedtime    Type 2 diabetes mellitus without complication, with long-term current use of insulin (H)       TRICIA CONTOUR test strip   Generic drug:  blood glucose monitoring      TEST 3 TIMES A DAY        calcitRIOL 0.25 MCG capsule    ROCALTROL     Take 0.25 mcg by mouth Take 1 capsule by mouth every morning. Take one tablet weekly on Monday        Calcium Plus D3 Absorbable 600-2500 MG-UNIT Caps      Take 1 capsule by mouth daily        esomeprazole 40 MG CR capsule    nexIUM     Take 40 mg by mouth daily Before a meal.        furosemide 20 MG tablet    LASIX     Take 20 mg by mouth every morning        insulin pen needle 31G X 8 MM    ULTICARE SHORT    400 each    USE AS DIRECTED FOUR TIMES DAILY. Dx: E11.9    Type 2 diabetes mellitus without complication (H)       isosorbide  mononitrate 30 MG 24 hr tablet    IMDUR     Take 60 mg by mouth daily        lisinopril 2.5 MG tablet    PRINIVIL/Zestril     Take 2.5 mg by mouth daily        metoprolol succinate 25 MG 24 hr tablet    TOPROL-XL     Take 25 mg by mouth daily        nitroGLYcerin 0.4 MG sublingual tablet    NITROSTAT     Place 0.4 mg under the tongue every 5 minutes as needed for chest pain        NovoLOG FLEXPEN 100 UNIT/ML injection   Generic drug:  insulin aspart           potassium chloride SA 20 MEQ CR tablet    K-DUR/KLOR-CON M     TK 1 T PO QD. DO NOT CRUSH        rosuvastatin 40 MG tablet    CRESTOR     Take 40 mg by mouth daily        zolpidem 10 MG tablet    AMBIEN     Take 10 mg by mouth nightly as needed for sleep        * Notice:  This list has 2 medication(s) that are the same as other medications prescribed for you. Read the directions carefully, and ask your doctor or other care provider to review them with you.

## 2018-05-03 NOTE — PROGRESS NOTES
Diabetes Education Progress Note  Continuous Glucose Monitoring System (CGMS) Results    SUBJECTIVE:  Bailey is a 68 y.o. female who has type 2 diabetes and is here for Continuous Glucose Monitoring System (CGMS) download.     OBJECTIVE:  Current Diabetes Medications:  Basaglar and Novolog insulin     Patient shares she has started working new longer hours and my blood sugar is low during work.      CGM download glucose results:    Report shows 146 mg/dl average sensor glucose over the past two weeks.  Standard deviation (SD) is +- 58 mg/dl. Goal for SD is +-50 mg/dl or lower.  Patient calibrates the CGM an average of 2.2 times daily.      Glucose 57% in target (70 - 180), 34% above target (181 - 400), 5% below target (56 - 69) and 4% hypo (39 - 55).      BG report:  Fasting 156, preLunch 100, preSupper 133 and 2-hrPP/bedtime 174 mg/dl.    Most significant patterns of lows found (4 events) between 12:55pm and 1:20pm.    Most significant patterns of highs found (11 events) between 10:55pm and 4:10am.      PLAN:  1.  To help decrease risk of low BG after breakfast due to your new work schedule, recommend decreasing Novolog at breakfast from 12 to 10 units.    2.  To help decrease high blood sugar overnight, recommend Basaglar increase from 50 to 52 units every evening at bedtime.    Time spent with patient was 60 minutes.     Hazel Del Rio RN, BSN, CDE  5/3/2018 6:53 PM

## 2018-05-03 NOTE — PATIENT INSTRUCTIONS
CGM Report and Recommendations    CGM Report:    Your sensor glucose results measure a total of 1,832 readings over the past week.  Report shows all day totals:  57% in target (70 - 180), 34% High (181 - 401), and 5%  low (56 - 69) and 4% hypo (39 - 55).  Total average glucose during CGM use:  146 mg/dl.    BG report:  Fasting 156, preLunch 100, preSupper 133 and 2-hrPP/bedtime 174 mg/dl.      Recommended insulin adjustments/recommendations:      1.  To help decrease risk of low BG after breakfast due to your new work schedule, recommend decreasing Novolog at breakfast from 12 to 10 units.    2.  To help decrease high blood sugar overnight, recommend Basaglar increase from 50 to 52 units every evening at bedtime.    Hazel Del Rio RN, BSN, CDE  5/3/2018 6:52 PM

## 2018-05-04 NOTE — TELEPHONE ENCOUNTER
"Patient shares she has started working new longer hours and \"my blood sugar is low during work.\"       CGM download glucose results:    Report shows 146 mg/dl average sensor glucose over the past two weeks.  Standard deviation (SD) is +- 58 mg/dl. Goal for SD is +-50 mg/dl or lower.  Patient calibrates the CGM an average of 2.2 times daily.       Glucose 57% in target (70 - 180), 34% above target (181 - 400), 5% below target (56 - 69) and 4% hypo (39 - 55).       BG report:  Fasting 156, preLunch 100, preSupper 133 and 2-hr PP/bedtime 174 mg/dl.     Most significant patterns of lows found (4 events) between 12:55pm and 1:20pm.     Most significant patterns of highs found (11 events) between 10:55pm and 4:10am.        PLAN:  1.  To help decrease risk of low BG after breakfast due to your new work schedule, recommend decreasing her Novolog sliding scale at breakfast from 12 to 10 units.     2.  To help decrease high blood sugar overnight, recommend Basaglar increase from 50 to 52 units every evening at bedtime.    If accepted as written, please sign pending orders.    Thank you,    Hazel Del Rio RN, BSN, CDE  5/3/2018 7:12 PM         "

## 2018-05-07 RX ORDER — INSULIN GLARGINE 100 [IU]/ML
52 INJECTION, SOLUTION SUBCUTANEOUS AT BEDTIME
Qty: 60 ML | Refills: 11 | Status: SHIPPED | OUTPATIENT
Start: 2018-05-07 | End: 2019-04-05

## 2018-05-16 ENCOUNTER — TELEPHONE (OUTPATIENT)
Dept: EDUCATION SERVICES | Facility: OTHER | Age: 68
End: 2018-05-16

## 2018-05-16 ENCOUNTER — ALLIED HEALTH/NURSE VISIT (OUTPATIENT)
Dept: EDUCATION SERVICES | Facility: OTHER | Age: 68
End: 2018-05-16
Attending: FAMILY MEDICINE
Payer: MEDICARE

## 2018-05-16 DIAGNOSIS — Z79.4 TYPE 2 DIABETES MELLITUS WITH COMPLICATION, WITH LONG-TERM CURRENT USE OF INSULIN (H): Primary | ICD-10-CM

## 2018-05-16 DIAGNOSIS — E11.8 TYPE 2 DIABETES MELLITUS WITH COMPLICATION, WITH LONG-TERM CURRENT USE OF INSULIN (H): Primary | ICD-10-CM

## 2018-05-16 DIAGNOSIS — E78.5 HYPERLIPIDEMIA, UNSPECIFIED HYPERLIPIDEMIA TYPE: Primary | ICD-10-CM

## 2018-05-16 PROCEDURE — G0108 DIAB MANAGE TRN  PER INDIV: HCPCS

## 2018-05-16 NOTE — PROGRESS NOTES
Diabetes Education Progress Note  Continuous Glucose Monitoring System (CGMS) Results    SUBJECTIVE: Bailey is a 68 y.o. female who has type 2 diabetes and is here for Continuous Glucose Monitoring System (CGMS) download.     OBJECTIVE:  Current Diabetes Medications:  Novolog and Basaglar insulin      CGM download glucose results:    Report shows 151 mg/dl average sensor glucose over the past two weeks.  Standard deviation (SD) is +- 50 mg/dl. Goal for SD is +-50 mg/dl or lower.  Patient calibrates the CGM an average of 2.4 times daily.      Glucose 63% in target (70 - 180), 35% above target (181 - 400), 2% below target (56 - 69) and 0% hypo (39 - 55).    BG report:  Fasting 137, preLunch 121, preSupper 135 and 2-hrPP/bedtime 174 mg/dl.    Most significant patterns of highs found between 11:55pm and 4:50am.  No significant patterns of lows found.    Recommended insulin adjustments/recommendations:      To help decrease high blood sugar overnight, recommend Novolog increase for the bed snack from 5 to 8 units.       Time spent with patient was 60 minutes.     Hazel Del Rio RN, BSN, CDE  5/16/2018 10:42 AM

## 2018-05-16 NOTE — TELEPHONE ENCOUNTER
Writer was notified by DANICA Oliva that patient had been seen by her today in the office. During that visit, patient stated that she had called in for a refill to [facility] x 2 of her Crestor and had not gotten a response. States that pharmacy advised her that they had faxed facility x 2 for refills. Patient is wondering if facility had gotten a request, as well as if Rx could indeed be filled.     Writer notes no refill requests for patient from pharmacy, however does note active Rx on file as per Care everywhere as noted below for crestor:    rosuvastatin (CRESTOR) 40 mg tablet    Indications: Hyperlipidemia, unspecified hyperlipidemia type Take 40 mg by mouth daily 90 tablet   3 12/15/2017     [facility] Spoke to [facility] [facility] [employer] [facility] reports that they have no active refill requests for patient on file. States that they do indeed however have Rx as noted above on file that can be filled for patient. Writer asked if pharmacy tech would do so. Call placed to patient. Advised her of events as noted above. Patient happy with plan of care. States she will  her Rx as requested this afternoon. Nothing further is needed per this writer at this time. Writer will close this encounter.    Phan Wright RN on 5/16/2018 at 1:37 PM

## 2018-05-16 NOTE — PATIENT INSTRUCTIONS
CGM Report and Recommendations    CGM Report:    Your sensor glucose results measure a total of 1,832 readings over the past week.  Report shows all day totals:  63% in target (70 - 180), 35% High (181 - 401), and 2%  low (56 - 69) and 0% hypo (39 - 55).  Total average glucose during CGM use:  151 mg/dl.    BG report:  Fasting 137, preLunch 121, preSupper 135 and 2-hrPP/bedtime 174 mg/dl.      Recommended insulin adjustments/recommendations:      1. To help decrease high blood sugar overnight, recommend Novolog increase for the bed snack from 5 to 8 units.     2.  Follow up, as needed, for continued diabetes education.      Hazel Del Rio RN, BSN, CDE  5/16/2018 11:24 AM

## 2018-05-16 NOTE — MR AVS SNAPSHOT
After Visit Summary   5/16/2018    Bailey Sierra    MRN: [id number]           Patient Information     Date Of Birth          1950        Visit Information        Provider Department      5/16/2018 10:30 AM  DIABETES EDUCATION [facility]        Today's Diagnoses     Type 2 diabetes mellitus with complication, with long-term current use of insulin (H)    -  1      Care Instructions    CGM Report and Recommendations    CGM Report:    Your sensor glucose results measure a total of 1,832 readings over the past week.  Report shows all day totals:  63% in target (70 - 180), 35% High (181 - 401), and 2%  low (56 - 69) and 0% hypo (39 - 55).  Total average glucose during CGM use:  151 mg/dl.    BG report:  Fasting 137, preLunch 121, preSupper 135 and 2-hrPP/bedtime 174 mg/dl.      Recommended insulin adjustments/recommendations:      1. To help decrease high blood sugar overnight, recommend Novolog increase for the bed snack from 5 to 8 units.     2.  Follow up, as needed, for continued diabetes education.      Hazel Del Rio RN, BSN, CDE  5/16/2018 11:24 AM                    Follow-ups after your visit        Your next 10 appointments already scheduled     Jun 22, 2018 11:30 AM CDT   SHORT with Sam Leal MD   [facility]    [address] [place] [address] [phone]              Who to contact     If you have questions or need follow up information about today's clinic visit or your schedule please contact [facility] directly at [phone].  Normal or non-critical lab and imaging results will be communicated to you by MyChart, letter or phone within 4 business days after the clinic has received the results. If you do not hear from us within 7 days, please contact the clinic through MyChart or phone. If you have a critical or abnormal lab result, we will notify you by phone as soon as  "possible.  Submit refill requests through Youtuo or call your pharmacy and they will forward the refill request to us. Please allow 3 business days for your refill to be completed.          Additional Information About Your Visit        [employer] Information     Youtuo lets you send messages to your doctor, view your test results, renew your prescriptions, schedule appointments and more. To sign up, go to [email or web address] . Click on \"Log in\" on the left side of the screen, which will take you to the Welcome page. Then click on \"Sign up Now\" on the right side of the page.     You will be asked to enter the access code listed below, as well as some personal information. Please follow the directions to create your username and password.     Your access code is: [email or web address]  Expires: 2018  2:46 PM     Your access code will  in 90 days. If you need help or a new code, please call your [place] clinic or [phone].        Care EveryWhere ID     This is your Care EveryWhere ID. This could be used by other organizations to access your [place] medical records  [id number]         Blood Pressure from Last 3 Encounters:   18 124/60   12/15/17 135/65   17 120/60    Weight from Last 3 Encounters:   18 97.4 kg (214 lb 12.8 oz)   12/15/17 97.7 kg (215 lb 6.4 oz)   17 96.6 kg (213 lb)              Today, you had the following     No orders found for display       Primary Care Provider Office Phone # Fax #    Sam Leal [phone] [address] [facility] [address]        Equal Access to Services     [facility] : Hadii angélica Devine, kaylen joe, ivett valentinoalmalee garcia, bill andrew. So [facility] [phone].    ATENCIÓN: Si habla español, tiene a johnson disposición servicios gratuitos de asistencia lingüística. Llame al [phone].    We comply with applicable federal civil rights laws and [place] laws. " We do not discriminate on the basis of race, color, national origin, age, disability, sex, sexual orientation, or gender identity.            Thank you!     Thank you for choosing [facility] AND [facility]  for your care. Our goal is always to provide you with excellent care. Hearing back from our patients is one way we can continue to improve our services. Please take a few minutes to complete the written survey that you may receive in the mail after your visit with us. Thank you!             Your Updated Medication List - Protect others around you: Learn how to safely use, store and throw away your medicines at www.disposemymeds.org.          This list is accurate as of 5/16/18 11:24 AM.  Always use your most recent med list.                   Brand Name Dispense Instructions for use Diagnosis    aspirin 81 MG tablet     30 tablet    Take 1 tablet (81 mg) by mouth daily        BASAGLAR 100 UNIT/ML injection     60 mL    Inject 52 Units Subcutaneous At Bedtime    Type 2 diabetes mellitus without complication, with long-term current use of insulin (H)       TRICIA CONTOUR test strip   Generic drug:  blood glucose monitoring      TEST 3 TIMES A DAY        calcitRIOL 0.25 MCG capsule    ROCALTROL     Take 0.25 mcg by mouth Take 1 capsule by mouth every morning. Take one tablet weekly on Monday        Calcium Plus D3 Absorbable 600-2500 MG-UNIT Caps      Take 1 capsule by mouth daily        esomeprazole 40 MG CR capsule    nexIUM     Take 40 mg by mouth daily Before a meal.        furosemide 20 MG tablet    LASIX     Take 20 mg by mouth every morning        insulin pen needle 31G X 8 MM    ULTICARE SHORT    400 each    USE AS DIRECTED FOUR TIMES DAILY. Dx: E11.9    Type 2 diabetes mellitus without complication (H)       isosorbide mononitrate 30 MG 24 hr tablet    IMDUR     Take 60 mg by mouth daily        lisinopril 2.5 MG tablet    PRINIVIL/Zestril     Take 2.5 mg by mouth daily        metoprolol succinate 25  MG 24 hr tablet    TOPROL-XL     Take 25 mg by mouth daily        nitroGLYcerin 0.4 MG sublingual tablet    NITROSTAT     Place 0.4 mg under the tongue every 5 minutes as needed for chest pain        NovoLOG FLEXPEN 100 UNIT/ML injection   Generic drug:  insulin aspart           potassium chloride SA 20 MEQ CR tablet    K-DUR/KLOR-CON M     TK 1 T PO QD. DO NOT CRUSH        rosuvastatin 40 MG tablet    CRESTOR     Take 40 mg by mouth daily        zolpidem 10 MG tablet    AMBIEN     Take 10 mg by mouth nightly as needed for sleep

## 2018-05-16 NOTE — TELEPHONE ENCOUNTER
"Sensor glucose report:    Fasting 137, preLunch 121, preSupper 135 and 2-hrPP/bedtime 174 mg/dl.  No longer has issues with low BG during her new work schedule.     Most significant patterns of highs found between 11:55pm and 4:50am with upper glucose range 235 - 295 mg/dl.  Patient states she has a CHO bed snack \"about 4 times a week, 2 servings of chips or a small piece of cake, maybe a donut\" and takes Novolog 5 units for this snack.  She states understanding of no Novolog needed for \"my cauliflower or string cheese snack.\"      Recommendation:    To help decrease high blood sugar overnight, recommend Novolog increase for the bed snack from 5 to 8 units.     If accepted as written, please sign pending order.    Thank you,    Hazel Del Rio RN, BSN, CDE  5/16/2018 11:48 AM          "

## 2018-06-04 ENCOUNTER — TELEPHONE (OUTPATIENT)
Dept: FAMILY MEDICINE | Facility: OTHER | Age: 68
End: 2018-06-04

## 2018-06-04 DIAGNOSIS — Z79.4 TYPE 2 DIABETES MELLITUS WITH COMPLICATION, WITH LONG-TERM CURRENT USE OF INSULIN (H): Primary | ICD-10-CM

## 2018-06-04 DIAGNOSIS — E11.8 TYPE 2 DIABETES MELLITUS WITH COMPLICATION, WITH LONG-TERM CURRENT USE OF INSULIN (H): Primary | ICD-10-CM

## 2018-06-04 NOTE — TELEPHONE ENCOUNTER
DWS-Pt says she needs orders for A1C to be done prior to her scheduled appt. Please call. Thank You.  Rochelle Pelletier

## 2018-06-04 NOTE — TELEPHONE ENCOUNTER
Left message notifying patient of information below.  Ingrid Erazo LPN .............6/4/2018  10:01 AM

## 2018-06-20 DIAGNOSIS — E11.8 TYPE 2 DIABETES MELLITUS WITH COMPLICATION, WITH LONG-TERM CURRENT USE OF INSULIN (H): ICD-10-CM

## 2018-06-20 DIAGNOSIS — Z79.4 TYPE 2 DIABETES MELLITUS WITH COMPLICATION, WITH LONG-TERM CURRENT USE OF INSULIN (H): ICD-10-CM

## 2018-06-20 LAB — HBA1C MFR BLD: 7 % (ref 4–6)

## 2018-06-20 PROCEDURE — 83036 HEMOGLOBIN GLYCOSYLATED A1C: CPT | Performed by: FAMILY MEDICINE

## 2018-06-20 PROCEDURE — 36415 COLL VENOUS BLD VENIPUNCTURE: CPT | Performed by: FAMILY MEDICINE

## 2018-06-20 RX ORDER — CHOLECALCIFEROL (VITAMIN D3) 50 MCG
2000 TABLET ORAL DAILY
COMMUNITY
Start: 2016-10-25 | End: 2019-04-30

## 2018-06-22 ENCOUNTER — OFFICE VISIT (OUTPATIENT)
Dept: FAMILY MEDICINE | Facility: OTHER | Age: 68
End: 2018-06-22
Attending: FAMILY MEDICINE
Payer: COMMERCIAL

## 2018-06-22 VITALS
WEIGHT: 215 LBS | SYSTOLIC BLOOD PRESSURE: 122 MMHG | BODY MASS INDEX: 36.9 KG/M2 | DIASTOLIC BLOOD PRESSURE: 60 MMHG | HEART RATE: 60 BPM

## 2018-06-22 DIAGNOSIS — N18.30 CHRONIC KIDNEY DISEASE, STAGE III (MODERATE) (H): ICD-10-CM

## 2018-06-22 DIAGNOSIS — Z79.4 TYPE 2 DIABETES MELLITUS WITHOUT COMPLICATION, WITH LONG-TERM CURRENT USE OF INSULIN (H): Primary | ICD-10-CM

## 2018-06-22 DIAGNOSIS — E78.00 PURE HYPERCHOLESTEROLEMIA: ICD-10-CM

## 2018-06-22 DIAGNOSIS — E11.9 TYPE 2 DIABETES MELLITUS WITHOUT COMPLICATION, WITH LONG-TERM CURRENT USE OF INSULIN (H): Primary | ICD-10-CM

## 2018-06-22 PROCEDURE — G0463 HOSPITAL OUTPT CLINIC VISIT: HCPCS

## 2018-06-22 PROCEDURE — 99213 OFFICE O/P EST LOW 20 MIN: CPT | Performed by: FAMILY MEDICINE

## 2018-06-22 RX ORDER — CALCITRIOL 0.25 UG/1
0.25 CAPSULE, LIQUID FILLED ORAL DAILY
Qty: 90 CAPSULE | Refills: 3 | Status: SHIPPED | OUTPATIENT
Start: 2018-06-22 | End: 2018-09-28

## 2018-06-22 ASSESSMENT — PAIN SCALES - GENERAL: PAINLEVEL: NO PAIN (0)

## 2018-06-22 NOTE — NURSING NOTE
Patient presents for Diabetic Check and offers no other complaints.   Piedad Horton LPN........................6/22/2018  11:16 AM

## 2018-06-22 NOTE — MR AVS SNAPSHOT
"              After Visit Summary   2018    Bailey Sierra    MRN: [id number]           Patient Information     Date Of Birth          1950        Visit Information        Provider Department      2018 11:30 AM Sam Leal MD [facility]        Today's Diagnoses     Type 2 diabetes mellitus without complication, with long-term current use of insulin (H)    -  1    Chronic kidney disease, stage III (moderate)        Pure hypercholesterolemia           Follow-ups after your visit        Who to contact     If you have questions or need follow up information about today's clinic visit or your schedule please contact [facility] directly at [phone].  Normal or non-critical lab and imaging results will be communicated to you by [employer], letter or phone within 4 business days after the clinic has received the results. If you do not hear from us within 7 days, please contact the clinic through Archy[employer] or phone. If you have a critical or abnormal lab result, we will notify you by phone as soon as possible.  Submit refill requests through [employer] or call your pharmacy and they will forward the refill request to us. Please allow 3 business days for your refill to be completed.          Additional Information About Your Visit        MyChart Information     [employer] lets you send messages to your doctor, view your test results, renew your prescriptions, schedule appointments and more. To sign up, go to [email or web address] . Click on \"Log in\" on the left side of the screen, which will take you to the Welcome page. Then click on \"Sign up Now\" on the right side of the page.     You will be asked to enter the access code listed below, as well as some personal information. Please follow the directions to create your username and password.     Your access code is: [email or web address]  Expires: 2018 11:44 AM     Your access code will  in 90 days. If you need help or a new code, " please call your [facility] clinic or [phone].        Care EveryWhere ID     This is your Care EveryWhere ID. This could be used by other organizations to access your [facility] medical records  [id number]        Your Vitals Were     Pulse Breastfeeding? BMI (Body Mass Index)             60 No 36.9 kg/m2          Blood Pressure from Last 3 Encounters:   06/22/18 122/60   02/19/18 124/60   12/15/17 135/65    Weight from Last 3 Encounters:   06/22/18 215 lb (97.5 kg)   02/19/18 214 lb 12.8 oz (97.4 kg)   12/15/17 215 lb 6.4 oz (97.7 kg)              Today, you had the following     No orders found for display         Today's Medication Changes          These changes are accurate as of 6/22/18 11:44 AM.  If you have any questions, ask your nurse or doctor.               These medicines have changed or have updated prescriptions.        Dose/Directions    calcitRIOL 0.25 MCG capsule   Commonly known as:  ROCALTROL   This may have changed:    - when to take this  - additional instructions   Used for:  Chronic kidney disease, stage III (moderate)   Changed by:  Sam Leal MD        Dose:  0.25 mcg   Take 1 capsule (0.25 mcg) by mouth daily   Quantity:  90 capsule   Refills:  3            Where to get your medicines      These medications were sent to [id number] - [place], [place] - [address]  [address], [facility] [address]     Phone:  [phone]     calcitRIOL 0.25 MCG capsule                Primary Care Provider Office Phone # Fax #    Sam Leal [phone] [address] [facility] [address]        Equal Access to Services     [facility] : Hadii angélica Devine, waaxda luqadaha, qaybta kaalmada bill garcia. So [facility] [phone].    ATENCIÓN: Si habla español, tiene a johnson disposición servicios gratuitos de asistencia lingüística. Llame al [phone].    We comply with  applicable federal civil rights laws and [place] laws. We do not discriminate on the basis of race, color, national origin, age, disability, sex, sexual orientation, or gender identity.            Thank you!     [facility] you for [facility]  [facility]. Our goal is always to provide you with excellent care. Hearing back from our patients is one way we can continue to improve our services. Please take a few minutes to complete the written survey that you may receive in the mail after your visit with us. Thank you!             Your Updated Medication List - Protect others around you: Learn how to safely use, store and throw away your medicines at www.disposemymeds.org.          This list is accurate as of 6/22/18 11:44 AM.  Always use your most recent med list.                   Brand Name Dispense Instructions for use Diagnosis    aspirin 81 MG tablet     30 tablet    Take 1 tablet (81 mg) by mouth daily        BASAGLAR 100 UNIT/ML injection     60 mL    Inject 52 Units Subcutaneous At Bedtime    Type 2 diabetes mellitus without complication, with long-term current use of insulin (H)       TRICIA CONTOUR test strip   Generic drug:  blood glucose monitoring      TEST 3 TIMES A DAY        calcitRIOL 0.25 MCG capsule    ROCALTROL    90 capsule    Take 1 capsule (0.25 mcg) by mouth daily    Chronic kidney disease, stage III (moderate)       Calcium Plus D3 Absorbable 600-2500 MG-UNIT Caps      Take 1 capsule by mouth daily        esomeprazole 40 MG CR capsule    nexIUM     Take 40 mg by mouth daily Before a meal.        furosemide 20 MG tablet    LASIX     Take 20 mg by mouth every morning        insulin aspart 100 UNIT/ML injection    NovoLOG FLEXPEN    60 mL    Inject 7 - 16 units before each meal based on sliding scale.  Max daily dose 56 units.    Type 2 diabetes mellitus with complication, with long-term current use of insulin (H)       insulin pen needle 31G X 8 MM    [place] [id number]  each    USE AS DIRECTED FOUR TIMES DAILY. Dx: E11.9    Type 2 diabetes mellitus without complication (H)       isosorbide mononitrate 30 MG 24 hr tablet    IMDUR     Take 60 mg by mouth daily        lisinopril 2.5 MG tablet    PRINIVIL/Zestril     Take 2.5 mg by mouth daily        metoprolol succinate 25 MG 24 hr tablet    TOPROL-XL     Take 25 mg by mouth daily        nitroGLYcerin 0.4 MG sublingual tablet    NITROSTAT     Place 0.4 mg under the tongue every 5 minutes as needed for chest pain        potassium chloride SA 20 MEQ CR tablet    K-DUR/KLOR-CON M     TK 1 T PO QD. DO NOT CRUSH        rosuvastatin 40 MG tablet    CRESTOR     Take 40 mg by mouth daily        vitamin D 2000 units tablet      Take 2,000 Units by mouth daily        zolpidem 10 MG tablet    AMBIEN     Take 10 mg by mouth nightly as needed for sleep

## 2018-06-22 NOTE — PROGRESS NOTES
SUBJECTIVE:  Bailey Sierra is a 68 year old female here for follow-up.  She has a history of type 2 diabetes which has been controlled recently.  She recently had an A1c which was 7.0%.  She checks her sugars 4 times a day and generally they are much higher in the evening after dinner.  Other than that she is in her low to mid 100s.  She has had no hypoglycemia.    She is otherwise doing well and has no other concerns today.    Allergies:  No Known Allergies    ROS:    As above otherwise ROS is unremarkable.    OBJECTIVE:  /60 (BP Location: Right arm, Patient Position: Sitting, Cuff Size: Adult Large)  Pulse 60  Wt 215 lb (97.5 kg)  Breastfeeding? No  BMI 36.9 kg/m2    EXAM:  General Appearance: Pleasant, alert, appropriate appearance for age. No acute distress  Neurologic Exam: Monofilament testing is normal.    Results for orders placed or performed in visit on 06/20/18   **Hemoglobin A1C FUTURE anytime   Result Value Ref Range    Hemoglobin A1C 7.0 (H) 4.0 - 6.0 %        ASSESSEMENT AND PLAN:    1. Type 2 diabetes mellitus without complication, with long-term current use of insulin (H)    2. Chronic kidney disease, stage III (moderate)    3. Pure hypercholesterolemia      Overall her hemoglobin A1c is slightly elevated but still normal.  She will give herself 1-2 units of NovoLog extra on her sliding scale for her suppertime.  She will follow-up in 3 months for repeat hemoglobin A1c.    She will be due for repeat fasting lipids in December.    Sukumar Leal MD    This document was prepared using voice generated software.  While every attempt was made for accuracy, grammatical errors may exist.

## 2018-06-28 ENCOUNTER — DOCUMENTATION ONLY (OUTPATIENT)
Dept: OTHER | Facility: CLINIC | Age: 68
End: 2018-06-28

## 2018-06-28 DIAGNOSIS — Z71.89 ACP (ADVANCE CARE PLANNING): Chronic | ICD-10-CM

## 2018-07-23 NOTE — PROGRESS NOTES
Patient Information     Patient Name  Bailey Sierra MRN  [id number] Sex  Female   1950      Letter by Sam Leal MD at      Author:  Sam Leal MD Service:  (none) Author Type:  (none)    Filed:   Encounter Date:  2017 Status:  (Other)           Bailey Sierra  [address]  [place] [place] [facility] [address]          2017    Dear Ms. Sierra:    Your recent lab values can be seen below.     Your diabetes testing shows excellent diabetic control, continue your current regimen and we should check this again in 6 months.    Your kidney function is stable when compared to previous results.    If you have any questions, do not hesitate to contact me.    Results for orders placed or performed in visit on 17      Hgb A1c      Result  Value Ref Range    HEMOGLOBIN A1C MONITORING (POCT) 5.5 4.0 - 6.2 %    ESTIMATED AVERAGE GLUCOSE  111 mg/dL   RENAL FUNCTION PANEL      Result  Value Ref Range    SODIUM 139 133 - 143 mmol/L    POTASSIUM 3.5 3.5 - 5.1 mmol/L    CHLORIDE 111 (H) 98 - 107 mmol/L    CO2,TOTAL 24 21 - 31 mmol/L    ANION GAP 4 (L) 5 - 18                    GLUCOSE 126 (H) 70 - 105 mg/dL    CALCIUM 9.3 8.6 - 10.3 mg/dL    BUN 21 7 - 25 mg/dL    CREATININE 2.23 (H) 0.70 - 1.30 mg/dL    BUN/CREAT RATIO           9                    GFR if African American 26 (L) >60 ml/min/1.73m2    GFR if not African American 22 (L) >60 ml/min/1.73m2    PHOSPHORUS 3.0 2.5 - 5.0 mg/dL    ALBUMIN 3.7 3.5 - 5.7 g/dL         Sincerely,        Suukmar Leal MD  Family Medicine

## 2018-07-24 NOTE — PROGRESS NOTES
Patient Information     Patient Name  Bailey Sierra MRN  [id number] Sex  Female   1950      Letter by Sam Leal MD at      Author:  Sam Leal MD Service:  (none) Author Type:  (none)    Filed:   Encounter Date:  2017 Status:  (Other)           Bailey Sierra  [address]  [facility] [address]          2017    Dear Ms. Sierra:    Your recent lab values can be seen below.     Your diabetes testing, cholesterol panel and liver tests came back normal. Your kidney tests are stable from previous. We should check her labs again in 6 months.    If you have any questions, do not hesitate to contact me.    Results for orders placed or performed in visit on 17      Hgb A1c      Result  Value Ref Range    HEMOGLOBIN A1C MONITORING (POCT) 6.4 (H) 4.0 - 6.2 %    ESTIMATED AVERAGE GLUCOSE  137 mg/dL   LIPID PANEL      Result  Value Ref Range    CHOLESTEROL,TOTAL 151 <200 mg/dL    TRIGLYCERIDES 202 (H) <150 mg/dL    HDL CHOLESTEROL 39 23 - 92 mg/dL    NON-HDL CHOLESTEROL 112 <145 mg/dl    CHOL/HDL RATIO            3.87 <4.50                    LDL CHOLESTEROL 72 <100 mg/dL    PROVIDER ORDERED STATUS FASTING    COMPLETE METABOLIC PANEL      Result  Value Ref Range    SODIUM 141 133 - 143 mmol/L    POTASSIUM 4.0 3.5 - 5.1 mmol/L    CHLORIDE 107 98 - 107 mmol/L    CO2,TOTAL 24 21 - 31 mmol/L    ANION GAP 10 5 - 18                    GLUCOSE 175 (H) 70 - 105 mg/dL    CALCIUM 8.7 8.6 - 10.3 mg/dL    BUN 41 (H) 7 - 25 mg/dL    CREATININE 2.25 (H) 0.70 - 1.30 mg/dL    BUN/CREAT RATIO           18                    GFR if African American 26 (L) >60 ml/min/1.73m2    GFR if not African American 22 (L) >60 ml/min/1.73m2    ALBUMIN 3.7 3.5 - 5.7 g/dL    PROTEIN,TOTAL 6.2 (L) 6.4 - 8.9 g/dL    GLOBULIN                  2.5 2.0 - 3.7 g/dL    A/G RATIO 1.5 1.0 - 2.0                    BILIRUBIN,TOTAL 0.5 0.3 - 1.0 mg/dL    ALK PHOSPHATASE 66 34 - 104 IU/L    ALT (SGPT) 16 7 - 52 IU/L    AST  (SGOT) 16 13 - 39 IU/L         Sincerely,        Sukumar Leal MD  Family Medicine

## 2018-08-02 DIAGNOSIS — E11.9 TYPE 2 DIABETES MELLITUS WITHOUT COMPLICATION, WITH LONG-TERM CURRENT USE OF INSULIN (H): ICD-10-CM

## 2018-08-02 DIAGNOSIS — Z79.4 TYPE 2 DIABETES MELLITUS WITHOUT COMPLICATION, WITH LONG-TERM CURRENT USE OF INSULIN (H): ICD-10-CM

## 2018-08-02 NOTE — TELEPHONE ENCOUNTER
Prescription approved per [facility] Refill Protocol.  Jocelyn De Luna RN.............................8/2/2018 12:47 PM

## 2018-09-07 ENCOUNTER — TRANSFERRED RECORDS (OUTPATIENT)
Dept: HEALTH INFORMATION MANAGEMENT | Facility: OTHER | Age: 68
End: 2018-09-07

## 2018-09-14 ENCOUNTER — TRANSFERRED RECORDS (OUTPATIENT)
Dept: HEALTH INFORMATION MANAGEMENT | Facility: OTHER | Age: 68
End: 2018-09-14

## 2018-09-21 ENCOUNTER — TELEPHONE (OUTPATIENT)
Dept: FAMILY MEDICINE | Facility: OTHER | Age: 68
End: 2018-09-21

## 2018-09-21 DIAGNOSIS — E11.22 CONTROLLED TYPE 2 DIABETES MELLITUS WITH STAGE 3 CHRONIC KIDNEY DISEASE, WITH LONG-TERM CURRENT USE OF INSULIN (H): Primary | ICD-10-CM

## 2018-09-21 DIAGNOSIS — N18.30 CONTROLLED TYPE 2 DIABETES MELLITUS WITH STAGE 3 CHRONIC KIDNEY DISEASE, WITH LONG-TERM CURRENT USE OF INSULIN (H): Primary | ICD-10-CM

## 2018-09-21 DIAGNOSIS — Z79.4 CONTROLLED TYPE 2 DIABETES MELLITUS WITH STAGE 3 CHRONIC KIDNEY DISEASE, WITH LONG-TERM CURRENT USE OF INSULIN (H): Primary | ICD-10-CM

## 2018-09-21 NOTE — TELEPHONE ENCOUNTER
Patient is to follow up with PCP and have Hemoglobin A1C done. She is coming in Friday and she is wanting to get her lab done Wednesday. Lab order pended.    Noemy Holt LPN on 9/21/2018 at 1:22 PM

## 2018-09-21 NOTE — TELEPHONE ENCOUNTER
DWS - Patient called and wanted to do her labs before her appointment.  We scheduled her in the lab on 09/26/18.  Could you please place orders for her?  Thank you!      Gege Block

## 2018-09-21 NOTE — TELEPHONE ENCOUNTER
She was to follow up and have a hemoglobin A1C done. Is she wanting other labs?    Noemy Holt LPN on 9/21/2018 at 12:08 PM

## 2018-09-26 DIAGNOSIS — Z79.4 CONTROLLED TYPE 2 DIABETES MELLITUS WITH STAGE 3 CHRONIC KIDNEY DISEASE, WITH LONG-TERM CURRENT USE OF INSULIN (H): ICD-10-CM

## 2018-09-26 DIAGNOSIS — N18.30 CONTROLLED TYPE 2 DIABETES MELLITUS WITH STAGE 3 CHRONIC KIDNEY DISEASE, WITH LONG-TERM CURRENT USE OF INSULIN (H): ICD-10-CM

## 2018-09-26 DIAGNOSIS — E11.22 CONTROLLED TYPE 2 DIABETES MELLITUS WITH STAGE 3 CHRONIC KIDNEY DISEASE, WITH LONG-TERM CURRENT USE OF INSULIN (H): ICD-10-CM

## 2018-09-26 LAB — HBA1C MFR BLD: 6.6 % (ref 4–6)

## 2018-09-26 PROCEDURE — 83036 HEMOGLOBIN GLYCOSYLATED A1C: CPT | Performed by: FAMILY MEDICINE

## 2018-09-26 PROCEDURE — 36415 COLL VENOUS BLD VENIPUNCTURE: CPT | Performed by: FAMILY MEDICINE

## 2018-09-28 ENCOUNTER — OFFICE VISIT (OUTPATIENT)
Dept: FAMILY MEDICINE | Facility: OTHER | Age: 68
End: 2018-09-28
Attending: FAMILY MEDICINE
Payer: COMMERCIAL

## 2018-09-28 VITALS
BODY MASS INDEX: 36.19 KG/M2 | RESPIRATION RATE: 12 BRPM | WEIGHT: 212 LBS | SYSTOLIC BLOOD PRESSURE: 118 MMHG | DIASTOLIC BLOOD PRESSURE: 63 MMHG | HEART RATE: 56 BPM | HEIGHT: 64 IN

## 2018-09-28 DIAGNOSIS — D89.89 LIGHT CHAIN DISEASE, KAPPA TYPE (H): ICD-10-CM

## 2018-09-28 DIAGNOSIS — N25.81 HYPERPARATHYROIDISM DUE TO RENAL INSUFFICIENCY (H): ICD-10-CM

## 2018-09-28 DIAGNOSIS — E11.22 CONTROLLED TYPE 2 DIABETES MELLITUS WITH STAGE 3 CHRONIC KIDNEY DISEASE, WITH LONG-TERM CURRENT USE OF INSULIN (H): ICD-10-CM

## 2018-09-28 DIAGNOSIS — E66.01 MORBID OBESITY (H): ICD-10-CM

## 2018-09-28 DIAGNOSIS — N18.30 CONTROLLED TYPE 2 DIABETES MELLITUS WITH STAGE 3 CHRONIC KIDNEY DISEASE, WITH LONG-TERM CURRENT USE OF INSULIN (H): ICD-10-CM

## 2018-09-28 DIAGNOSIS — I25.118 ATHEROSCLEROTIC HEART DISEASE OF NATIVE CORONARY ARTERY WITH OTHER FORMS OF ANGINA PECTORIS (H): ICD-10-CM

## 2018-09-28 DIAGNOSIS — Z79.4 CONTROLLED TYPE 2 DIABETES MELLITUS WITH STAGE 3 CHRONIC KIDNEY DISEASE, WITH LONG-TERM CURRENT USE OF INSULIN (H): ICD-10-CM

## 2018-09-28 PROCEDURE — G0463 HOSPITAL OUTPT CLINIC VISIT: HCPCS

## 2018-09-28 PROCEDURE — 99214 OFFICE O/P EST MOD 30 MIN: CPT | Performed by: FAMILY MEDICINE

## 2018-09-28 NOTE — PROGRESS NOTES
SUBJECTIVE:  Bailey Sierra is a 68 year old female here for follow-up.  She has a history of type 2 diabetes.  She is currently using 52 units of Lantus at night and an insulin sliding scale during the day.  She also uses a continuous glucose monitor that seem to be working quite well.    She has a history of chronic kidney disease stage III and heart disease.  She is been seen by nephrology recently who did not make any changes however at her cardiology visit she was scheduled for an updated echocardiogram and a stress test which is scheduled for next month.      Patient Active Problem List    Diagnosis Date Noted     Atherosclerotic heart disease of native coronary artery with other forms of angina pectoris (H) 09/28/2018     Priority: Medium     Obesity (BMI 35.0-39.9) with comorbidity (H) 09/28/2018     Priority: Medium     ACP (advance care planning) 06/28/2018     Priority: Medium     Chronic kidney disease, stage III (moderate) 01/17/2018     Priority: Medium     Osteoarthrosis 01/17/2018     Priority: Medium     Heart murmur, systolic 01/17/2018     Priority: Medium     Hyperlipidemia 01/17/2018     Priority: Medium     Hypertension 01/17/2018     Priority: Medium     Edema 01/17/2018     Priority: Medium     Overview:   chronic       Plantar fasciitis 01/17/2018     Priority: Medium     S/P shoulder surgery 04/19/2017     Priority: Medium     AC (acromioclavicular) joint arthritis 03/10/2017     Priority: Medium     Impingement syndrome of right shoulder 03/10/2017     Priority: Medium     Right rotator cuff tendinitis 03/10/2017     Priority: Medium     Long-term insulin use in type 2 diabetes (H) 02/13/2017     Priority: Medium     Light chain disease, 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  Per Dr. Ortiz, if hypoalbuminemia not improving by Nov-Dec 2016, needs referral to Hematology.        Vitamin D deficiency 10/23/2016     Priority: Medium      Hyperparathyroidism due to renal insufficiency (H) 03/28/2016     Priority: Medium     Coronary atherosclerosis 09/03/2013     Priority: Medium     Overview:   2 vessel bypass, August 2013       Obesity 07/26/2013     Priority: Medium     Iron deficiency anemia 07/26/2013     Priority: Medium     Hiatal hernia 07/26/2013     Priority: Medium     Controlled type 2 diabetes mellitus with stage 3 chronic kidney disease, with long-term current use of insulin (H) 07/26/2013     Priority: Medium     Lumbar back pain 05/06/2013     Priority: Medium     GERD (gastroesophageal reflux disease) 01/08/2013     Priority: Medium     Neurogenic bladder 10/25/2012     Priority: Medium     Insomnia 01/25/2012     Priority: Medium       Past Medical History:   Diagnosis Date     Atherosclerotic heart disease of native coronary artery without angina pectoris     No Comments Provided     Cardiac murmur     No Comments Provided     Chronic kidney disease, stage III (moderate)     No Comments Provided     Edema     No Comments Provided     Essential (primary) hypertension     No Comments Provided     Gastro-esophageal reflux disease without esophagitis     No Comments Provided     Hyperlipidemia     No Comments Provided     Impingement syndrome of right shoulder     3/10/2017     Insomnia     1/25/2012     Iron deficiency anemia     No Comments Provided     Neuromuscular dysfunction of bladder     No Comments Provided     Osteoarthritis     No Comments Provided     Other shoulder lesions, right shoulder     3/10/2017     Other shoulder lesions, unspecified shoulder     No Comments Provided     Other specified postprocedural states     4/19/2017     Personal history of other medical treatment (CODE)     Three childbirths     Plantar fascial fibromatosis     No Comments Provided     Presence of aortocoronary bypass graft     07/13,[facility]     Primary osteoarthritis of shoulder     3/10/2017     Type 2 diabetes mellitus without  complications (H)     No Comments Provided     Urgency of urination     3/8/2011     Uterovaginal prolapse     No Comments Provided       Past Surgical History:   Procedure Laterality Date     ARTHROSCOPY SHOULDER      1997,Right     ARTHROSCOPY SHOULDER      2012,Left     CYSTOSCOPY      3/15/01     DENTAL SURGERY      Dental extractions     HYSTERECTOMY TOTAL ABDOMINAL, BILATERAL SALPINGO-OOPHORECTOMY, COMBINED      4/2/01,Vag vault suspension with Cortex mesh     LAMINECTOMY LUMBAR ONE LEVEL      1997     LAPAROSCOPIC CHOLECYSTECTOMY      04/2008     LAPAROSCOPIC TUBAL LIGATION      No Comments Provided     OTHER SURGICAL HISTORY      05/2008,714868,ERCP DIAGNOSTIC,Bile leak with ERCP and stenting and drainage of bile collection through abdominal wall.     OTHER SURGICAL HISTORY      07/2013,066929,OTHER     OTHER SURGICAL HISTORY      04/14/14,410240,OTHER,Dr. Dash WARD     OTHER SURGICAL HISTORY      04/19/2017,700645,OTHER,Right       Current Outpatient Prescriptions   Medication Sig Dispense Refill     aspirin 81 MG tablet Take 1 tablet (81 mg) by mouth daily 30 tablet      BASAGLAR 100 UNIT/ML injection Inject 52 Units Subcutaneous At Bedtime 60 mL 11     blood glucose monitoring ([employer] CONTOUR) test strip TEST 3 TIMES A  each 3     Calcium Carb-Cholecalciferol (CALCIUM PLUS D3 ABSORBABLE) 600-2500 MG-UNIT CAPS Take 1 capsule by mouth daily       Cholecalciferol (VITAMIN D) 2000 units tablet Take 2,000 Units by mouth daily       esomeprazole (NEXIUM) 40 MG CR capsule Take 40 mg by mouth daily Before a meal.       furosemide (LASIX) 20 MG tablet Take 20 mg by mouth every morning       insulin aspart (NOVOLOG FLEXPEN) 100 UNIT/ML injection Inject 7 - 16 units before each meal based on sliding scale.  Max daily dose 56 units. 60 mL 3     insulin pen needle ([employer] SHORT) 31G X 8 MM USE AS DIRECTED FOUR TIMES DAILY. Dx: E11.9 400 each 3     isosorbide mononitrate (IMDUR) 30 MG 24 hr tablet Take 60 mg  "by mouth daily       lisinopril (PRINIVIL/ZESTRIL) 2.5 MG tablet Take 2.5 mg by mouth daily       metoprolol succinate (TOPROL-XL) 25 MG 24 hr tablet Take 25 mg by mouth daily       nitroGLYcerin (NITROSTAT) 0.4 MG sublingual tablet Place 0.4 mg under the tongue every 5 minutes as needed for chest pain       potassium chloride SA (K-DUR/KLOR-CON M) 20 MEQ CR tablet TK 1 T PO QD. DO NOT CRUSH       rosuvastatin (CRESTOR) 40 MG tablet Take 40 mg by mouth daily       zolpidem (AMBIEN) 10 MG tablet Take 10 mg by mouth nightly as needed for sleep         Allergies:  No Known Allergies    Family History   Problem Relation Age of Onset     Other - See Comments Father      MI     Cancer Mother      Cancer,Some type of cancer, aneurysm     Cancer Sister      Cancer,unknown type     Diabetes Sister      Diabetes,Type II     Diabetes Sister      Diabetes,Type II     Diabetes Sister      Diabetes,Type II     Diabetes Paternal Grandmother      Diabetes,Type II     Other - See Comments Daughter      depression     Other - See Comments Daughter      ovarian cancer and depression     HEART DISEASE Brother      Heart Disease,CAD, MI       Social History   Substance Use Topics     Smoking status: Never Smoker     Smokeless tobacco: Never Used     Alcohol use No       ROS:    As above otherwise ROS is unremarkable.      OBJECTIVE:  /63 (BP Location: Right arm, Patient Position: Sitting, Cuff Size: Adult Large)  Pulse 56  Resp 12  Ht 5' 4\" (1.626 m)  Wt 212 lb (96.2 kg)  BMI 36.39 kg/m2    EXAM:  General Appearance: Pleasant, alert, appropriate appearance for age. No acute distress  Head: Normal. Normocephalic, atraumatic.  Lungs: Normal chest wall and respirations. Clear to auscultation, no wheezes or crackles.  Cardiovascular: Regular rate and rhythm. S1, S2, no murmurs.  Musculoskeletal: No edema.  Skin: no concerning or new rashes.  Neurologic Exam: CN 2-12 grossly intact.  Normal monofilament testing.    ASSESSEMENT AND " PLAN:    1. Light chain disease, kappa type (H)    2. Controlled type 2 diabetes mellitus with stage 3 chronic kidney disease, with long-term current use of insulin (H)    3. Hyperparathyroidism due to renal insufficiency (H)    4. Atherosclerotic heart disease of native coronary artery with other forms of angina pectoris (H)    5. Morbid obesity (H)      Reviewed her most recent hemoglobin A1c which is 6.6%.  This shows excellent control.  She will continue her current regimen and follow-up in 6 months.    Continue follow-up with cardiology for echocardiogram and stress test as scheduled.    Follow-up with nephrology as scheduled.    Patient declined flu shot today.    Patient currently uses a Dexcom G5 CGM and tests BG twice daily                      for calibrations.                         Patient injects or boluses insulin 3 or more times daily before breakfast,               before lunch, before dinner, and bedtime.                        Patient requires frequent adjustments to their insulin treatment regimen on the basis of therapeutic CGM testing results.    Sukumar Leal MD  Family Medicine      This document was prepared using voice generated software.  While every attempt was made for accuracy, grammatical errors may exist.

## 2018-09-28 NOTE — NURSING NOTE
"Chief Complaint   Patient presents with     Diabetes     follow up       Initial /63 (BP Location: Right arm, Patient Position: Sitting, Cuff Size: Adult Large)  Pulse 56  Resp 12  Ht 5' 4\" (1.626 m)  Wt 212 lb (96.2 kg)  BMI 36.39 kg/m2 Estimated body mass index is 36.39 kg/(m^2) as calculated from the following:    Height as of this encounter: 5' 4\" (1.626 m).    Weight as of this encounter: 212 lb (96.2 kg).  Medication Reconciliation: complete    Kesha Meza, [facility]  9/28/2018 11:32 AM   "

## 2018-09-28 NOTE — MR AVS SNAPSHOT
"              After Visit Summary   9/28/2018    Bailey Sierra    MRN: [id number]           Patient Information     Date Of Birth          1950        Visit Information        Provider Department      9/28/2018 11:30 AM Sam Leal MD [facility]        Today's Diagnoses     Light chain disease, kappa type (H)        Controlled type 2 diabetes mellitus with stage 3 chronic kidney disease, with long-term current use of insulin (H)        Hyperparathyroidism due to renal insufficiency (H)        Atherosclerotic heart disease of native coronary artery with other forms of angina pectoris (H)        Morbid obesity (H)           Follow-ups after your visit        Who to contact     If you have questions or need follow up information about today's clinic visit or your schedule please contact [facility] directly at [phone].  Normal or non-critical lab and imaging results will be communicated to you by MyChart, letter or phone within 4 business days after the clinic has received the results. If you do not hear from us within 7 days, please contact the clinic through MyChart or phone. If you have a critical or abnormal lab result, we will notify you by phone as soon as possible.  Submit refill requests through "[employer] or call your pharmacy and they will forward the refill request to us. Please allow 3 business days for your refill to be completed.          Additional Information About Your Visit        Care EveryWhere ID     This is your Care EveryWhere ID. This could be used by other organizations to access your [facility] medical records  [id number]        Your Vitals Were     Pulse Respirations Height BMI (Body Mass Index)          56 12 5' 4\" (1.626 m) 36.39 kg/m2         Blood Pressure from Last 3 Encounters:   09/28/18 118/63   06/22/18 122/60   02/19/18 124/60    Weight from Last 3 Encounters:   09/28/18 212 lb (96.2 kg)   06/22/18 215 lb (97.5 kg)   02/19/18 214 lb 12.8 oz " (97.4 kg)              Today, you had the following     No orders found for display       Primary Care Provider Office Phone # Fax #    Sam Leal [phone] [address] [place] [address]        Equal Access to Services     GAGANPILAR JUAN : Hadii aad ku haddeepo Soomaali, waaxda luqadaha, qaybta kaalmada adeegyada, waxnessa fuentes hayvictor manueln adekza diazladanaris andrew. So [facility] [phone].    ATENCIÓN: Si habla español, tiene a johnson disposición servicios gratuitos de asistencia lingüística. Llame al [phone].    We comply with applicable federal civil rights laws and [place] laws. We do not discriminate on the basis of race, color, national origin, age, disability, sex, sexual orientation, or gender identity.            Thank you!     Thank you for choosing [facility]  for your care. Our goal is always to provide you with excellent care. Hearing back from our patients is one way we can continue to improve our services. Please take a few minutes to complete the written survey that you may receive in the mail after your visit with us. Thank you!             Your Updated Medication List - Protect others around you: Learn how to safely use, store and throw away your medicines at www.disposemymeds.org.          This list is accurate as of 9/28/18 11:50 AM.  Always use your most recent med list.                   Brand Name Dispense Instructions for use Diagnosis    aspirin 81 MG tablet     30 tablet    Take 1 tablet (81 mg) by mouth daily        BASAGLAR 100 UNIT/ML injection     60 mL    Inject 52 Units Subcutaneous At Bedtime    Type 2 diabetes mellitus without complication, with long-term current use of insulin (H)       blood glucose monitoring test strip    TRICIA CONTOUR    300 each    TEST 3 TIMES A DAY    Type 2 diabetes mellitus without complication, with long-term current use of insulin (H)       Calcium Plus D3 Absorbable 600-2500 MG-UNIT Caps      Take 1 capsule  by mouth daily        esomeprazole 40 MG CR capsule    nexIUM     Take 40 mg by mouth daily Before a meal.        furosemide 20 MG tablet    LASIX     Take 20 mg by mouth every morning        insulin aspart 100 UNIT/ML injection    NovoLOG FLEXPEN    60 mL    Inject 7 - 16 units before each meal based on sliding scale.  Max daily dose 56 units.    Type 2 diabetes mellitus with complication, with long-term current use of insulin (H)       insulin pen needle 31G X 8 MM    ULTICARE SHORT    400 each    USE AS DIRECTED FOUR TIMES DAILY. Dx: E11.9    Type 2 diabetes mellitus without complication, with long-term current use of insulin (H)       isosorbide mononitrate 30 MG 24 hr tablet    IMDUR     Take 60 mg by mouth daily        lisinopril 2.5 MG tablet    PRINIVIL/Zestril     Take 2.5 mg by mouth daily        metoprolol succinate 25 MG 24 hr tablet    TOPROL-XL     Take 25 mg by mouth daily        nitroGLYcerin 0.4 MG sublingual tablet    NITROSTAT     Place 0.4 mg under the tongue every 5 minutes as needed for chest pain        potassium chloride SA 20 MEQ CR tablet    K-DUR/KLOR-CON M     TK 1 T PO QD. DO NOT CRUSH        rosuvastatin 40 MG tablet    CRESTOR     Take 40 mg by mouth daily        vitamin D 2000 units tablet      Take 2,000 Units by mouth daily        zolpidem 10 MG tablet    AMBIEN     Take 10 mg by mouth nightly as needed for sleep

## 2018-10-02 ENCOUNTER — TELEPHONE (OUTPATIENT)
Dept: FAMILY MEDICINE | Facility: OTHER | Age: 68
End: 2018-10-02

## 2018-10-02 NOTE — TELEPHONE ENCOUNTER
Patient is going to have fax sent over again. I gave her the fax number and I will follow up with her once I receive it.    Noemy Holt LPN on 10/2/2018 at 12:01 PM

## 2018-10-12 ENCOUNTER — TRANSFERRED RECORDS (OUTPATIENT)
Dept: HEALTH INFORMATION MANAGEMENT | Facility: OTHER | Age: 68
End: 2018-10-12

## 2018-10-12 LAB
EJECTION FRACTION: 68
EJECTION FRACTION: NORMAL
EJECTION FRACTION: NORMAL %

## 2018-10-17 ENCOUNTER — TELEPHONE (OUTPATIENT)
Dept: FAMILY MEDICINE | Facility: OTHER | Age: 68
End: 2018-10-17

## 2018-10-17 NOTE — TELEPHONE ENCOUNTER
Writer received a cold transfer from an unknown source with patient on the line. Patient reports that she is calling about a fax for her diabetic supplies through [employer] and states that [employer] did not receive the correct information on the fax as sent in by her PCP. Writer is unfamiliar with this situation but does see a limited conversation between CARLEY Green and patient on 10/2/18 telephone encounter.     Writer apologized to patient for the confusion but advised her that he is unable to see fax that was sent or what was completed via 10/2/18 telephone encounter. Writer advised patient that he would forward this encounter to PCP as well as his primary nurse CARLEY Green for their follow up upon their return on 10/18/18.     Patient is happy with plan of care. States she does work at 1000 A.M. Tomorrow so a return call would need to occur before then. Writer advised patient he would make note of that as well. Writer will route this encounter as noted above at this time.    Phan Wright RN on 10/17/2018 at 3:23 PM

## 2018-10-17 NOTE — TELEPHONE ENCOUNTER
Yes, please addend your 9/28/2018 visit re:  Diabetes with dot dexcomuser which states:     Patient currently uses a Dexcom G5 CGM and tests BG twice daily  for calibrations.    Patient injects or boluses insulin 3 or more times daily before breakfast,  before lunch, before dinner, and bedtime.   Patient requires frequent adjustments to their insulin treatment regimen on the basis of therapeutic CGM testing results.    This will fulfill Medicare requirements for Dexcom G5 CGM supplies.  Please CC her chart to me and I will fax notes to [employer].    Hazel Del Rio RN, BSN, CDE  10/17/2018 5:56 PM

## 2018-10-18 NOTE — TELEPHONE ENCOUNTER
[facility] of [facility]GM [facility].    Hazel Del Rio RN, BSN, CDE  10/18/2018 2:46 PM

## 2018-11-02 ENCOUNTER — HOSPITAL ENCOUNTER (OUTPATIENT)
Dept: MAMMOGRAPHY | Facility: OTHER | Age: 68
Discharge: HOME | End: 2018-11-02
Attending: FAMILY MEDICINE | Admitting: FAMILY MEDICINE
Payer: MEDICARE

## 2018-11-02 DIAGNOSIS — Z12.39 SCREENING BREAST EXAMINATION: ICD-10-CM

## 2018-11-02 PROCEDURE — 77067 SCR MAMMO BI INCL CAD: CPT

## 2018-11-16 ENCOUNTER — TRANSFERRED RECORDS (OUTPATIENT)
Dept: HEALTH INFORMATION MANAGEMENT | Facility: OTHER | Age: 68
End: 2018-11-16

## 2018-11-27 ENCOUNTER — ALLIED HEALTH/NURSE VISIT (OUTPATIENT)
Dept: UROLOGY | Facility: OTHER | Age: 68
End: 2018-11-27
Attending: UROLOGY
Payer: COMMERCIAL

## 2018-11-27 VITALS — HEIGHT: 65 IN | BODY MASS INDEX: 34.95 KG/M2 | RESPIRATION RATE: 14 BRPM | WEIGHT: 209.8 LBS | HEART RATE: 60 BPM

## 2018-11-27 DIAGNOSIS — N39.41 URGE INCONTINENCE: Primary | ICD-10-CM

## 2018-11-27 PROCEDURE — 99207 ZZC NO CHARGE LOS: CPT

## 2018-11-27 ASSESSMENT — PAIN SCALES - GENERAL: PAINLEVEL: MODERATE PAIN (4)

## 2018-11-27 NOTE — NURSING NOTE
Patient presents to the clinic for Medtronic. Last Medtronic visit was in January 2018. Patient met with Rubi today, and patient stated her device is not helping her when she laughs, coughs, or sneezes. Rubi states the device is not meant to help that, however would like patient to try this new regimen:  -Turn off device now for two weeks  -Navigate to program 1 and stay on program 1 from December 11th through 25th. Keep a diary of progress December 21st through 24th.  -Navigate to program 2 and stay on program 2 from December 25th through January 8th. Keep a diary of progress January 4th through January 7th.  -Navigate to program 3 and stay on program 3 January 8th through January 22nd. Keep a diary of progress January 18th through 21st.  -Navigate to program 4 and stay on program 4 January 22nd through February 5th. Keep a diary of progress February 1st through the 4th, and return at the end of February. Rubi states if patient finds a setting that works for them there is no need to continue changing programs. The patient indicates understanding of these issues and agrees with the plan.  Crista Modi RN on 11/27/2018 at 10:52 AM

## 2018-11-27 NOTE — MR AVS SNAPSHOT
"              After Visit Summary   11/27/2018    Bailey Sierra    MRN: [id number]           Patient Information     Date Of Birth          1950        Visit Information        Provider Department      11/27/2018 10:30 AM  UROLOGY NURSE [facility]        Today's Diagnoses     Urge incontinence    -  1       Follow-ups after your visit        Who to contact     If you have questions or need follow up information about today's clinic visit or your schedule please contact [facility] directly at [phone].  Normal or non-critical lab and imaging results will be communicated to you by MyChart, letter or phone within 4 business days after the clinic has received the results. If you do not hear from us within 7 days, please contact the clinic through MyChart or phone. If you have a critical or abnormal lab result, we will notify you by phone as soon as possible.  Submit refill requests through [employer] or call your pharmacy and they will forward the refill request to us. Please allow 3 business days for your refill to be completed.          Additional Information About Your Visit        Care EveryWhere ID     This is your Care EveryWhere ID. This could be used by other organizations to access your [facility] medical records  [id number]        Your Vitals Were     Pulse Respirations Height BMI (Body Mass Index)          60 14 1.651 m (5' 5\") 34.91 kg/m2         Blood Pressure from Last 3 Encounters:   09/28/18 118/63   06/22/18 122/60   02/19/18 124/60    Weight from Last 3 Encounters:   11/27/18 95.2 kg (209 lb 12.8 oz)   09/28/18 96.2 kg (212 lb)   06/22/18 97.5 kg (215 lb)              Today, you had the following     No orders found for display       Primary Care Provider Office Phone # Fax #    Sam Leal [phone] [address] [facility] [address]        Equal Access to Services     TABITHA MARTINEZ AH: Tami carlson " Michelledayana, maggyda luqadaha, qaybta kaangus garcia, bill sunniin hayaan omerokaz joegloria laClaricechasidy kamran. So [facility] [phone].    ATENCIÓN: Si rakesh cifuentes, tiene a johnson disposición servicios gratuitos de asistencia lingüística. Clau al [phone].    We comply with applicable federal civil rights laws and [place] laws. We do not discriminate on the basis of race, color, national origin, age, disability, sex, sexual orientation, or gender identity.            Thank you!     Thank you for choosing [facility]  for your care. Our goal is always to provide you with excellent care. Hearing back from our patients is one way we can continue to improve our services. Please take a few minutes to complete the written survey that you may receive in the mail after your visit with us. Thank you!             Your Updated Medication List - Protect others around you: Learn how to safely use, store and throw away your medicines at www.disposemymeds.org.          This list is accurate as of 11/27/18 11:26 AM.  Always use your most recent med list.                   Brand Name Dispense Instructions for use Diagnosis    aspirin 81 MG tablet    ASA    30 tablet    Take 1 tablet (81 mg) by mouth daily        blood glucose monitoring test strip    TRICIA CONTOUR    300 each    TEST 3 TIMES A DAY    Type 2 diabetes mellitus without complication, with long-term current use of insulin (H)       Calcium Plus D3 Absorbable 600-2500 MG-UNIT Caps      Take 1 capsule by mouth daily        esomeprazole 40 MG DR capsule    nexIUM     Take 40 mg by mouth daily Before a meal.        furosemide 20 MG tablet    LASIX     Take 20 mg by mouth every morning        insulin aspart 100 UNIT/ML pen    NovoLOG FLEXPEN    60 mL    Inject 7 - 16 units before each meal based on sliding scale.  Max daily dose 56 units.    Type 2 diabetes mellitus with complication, with long-term current use of insulin (H)       insulin glargine 100 UNIT/ML pen      60 mL    Inject 52 Units Subcutaneous At Bedtime    Type 2 diabetes mellitus without complication, with long-term current use of insulin (H)       insulin pen needle 31G X 8 MM miscellaneous    ULTICARE SHORT    400 each    USE AS DIRECTED FOUR TIMES DAILY. Dx: E11.9    Type 2 diabetes mellitus without complication, with long-term current use of insulin (H)       isosorbide mononitrate 30 MG 24 hr tablet    IMDUR     Take 75 mg by mouth daily        lisinopril 2.5 MG tablet    PRINIVIL/Zestril     Take 2.5 mg by mouth daily        metoprolol succinate 25 MG 24 hr tablet    TOPROL-XL     Take 25 mg by mouth daily        nitroGLYcerin 0.4 MG sublingual tablet    NITROSTAT     Place 0.4 mg under the tongue every 5 minutes as needed for chest pain        potassium chloride ER 20 MEQ CR tablet    K-DUR/KLOR-CON M     TK 1 T PO QD. DO NOT CRUSH        rosuvastatin 40 MG tablet    CRESTOR     Take 40 mg by mouth daily        vitamin D3 2000 units tablet    CHOLECALCIFEROL     Take 2,000 Units by mouth daily        zolpidem 10 MG tablet    AMBIEN     Take 10 mg by mouth nightly as needed for sleep

## 2018-12-06 DIAGNOSIS — K21.9 GASTROESOPHAGEAL REFLUX DISEASE WITHOUT ESOPHAGITIS: Primary | ICD-10-CM

## 2018-12-10 RX ORDER — ESOMEPRAZOLE MAGNESIUM 40 MG/1
40 CAPSULE, DELAYED RELEASE ORAL DAILY
Qty: 90 CAPSULE | Refills: 2 | Status: SHIPPED | OUTPATIENT
Start: 2018-12-10 | End: 2019-09-04

## 2018-12-10 NOTE — TELEPHONE ENCOUNTER
Last OV 09/28/18 with PCP. esomeprazole (NEXIUM) 40 MG DR capsule approved per [facility] Refill Protocol. Refill authorized for 90 days and 2 refill at this time.   PPI Protocol Ahnqyq84/6 3:59 PM   Not on Clopidogrel (unless Pantoprazole ordered)    No diagnosis of osteoporosis on record    Recent (12 mo) or future (30 days) visit within the authorizing provider's specialty    Patient is age 18 or older    No active pregnacy on record    No positive pregnancy test in past 12 months     Crista Modi RN on 12/10/2018 at 3:15 PM

## 2018-12-12 ENCOUNTER — TELEPHONE (OUTPATIENT)
Dept: FAMILY MEDICINE | Facility: OTHER | Age: 68
End: 2018-12-12

## 2018-12-12 DIAGNOSIS — I10 ESSENTIAL HYPERTENSION: Primary | ICD-10-CM

## 2018-12-12 RX ORDER — ISOSORBIDE MONONITRATE 30 MG/1
30 TABLET, EXTENDED RELEASE ORAL DAILY
Qty: 90 TABLET | Refills: 3 | Status: SHIPPED | OUTPATIENT
Start: 2018-12-12 | End: 2018-12-18

## 2018-12-12 NOTE — TELEPHONE ENCOUNTER
"S-(situation):   Refill request for Imdur 30 mg 24 hr tablet, sig: Take 2.5 tablets (75 mg) by mouth daily.    B-(background):     Historically noted in chart as: Imdur 30 mg 24 hr tablet, sig: Take 2 tablets by mouth daily.    Per [facility] note 11/6: \"Patient's Imdur was increased to 90 mg daily as recommended by Sheila Nevarez CNP.\" Pt reported feeling less SOB, wanted to continue dose.    A-(assessment):   Pt reports she is currently taking 2.5 tablets (75 mg) daily and denies every know to or taking 3 tablets (90 mg). Reports little SOB with dose as taking. States if she is SOB, she is supposed to call to have it increased to 3 tablets.    R-(recommendations):   Medication julia'd up as Pt states she is currently taking. Ok to wait for PCP's return on 12/14, for refill consideration. Pretty Rodríguez RN .............. 12/12/2018  4:23 PM    "

## 2018-12-14 NOTE — TELEPHONE ENCOUNTER
I called Bailey to try to clarify her Imdur dose with her. She said that on a phone call with [facility] she was told to increase to 2.5 tablets of the 30 mg. According to their medication list she was instructed to take 1.5 of the 60 mg tablets. I explained to her that 2.5 of the 30 mgs = 75 mg and 1.5 of the 60 = 90 mg daily.     She got her refill from [facility] which was sent in a couple of days ago by Dr Leal, but didn't realize the directions stated to take one tablet daily. She did notice #90 tablets so felt it was a normal one month supply.     I asked her to call cardiology and confirm the actual dose and ask them to send in the correct prescription to [facility]. I also asked her to call me back with the dose so I can update her [facility] chart.  Kesha Meza CMA(AAMA)..................12/14/2018   2:11 PM

## 2018-12-14 NOTE — TELEPHONE ENCOUNTER
Noted request was approved, with different sig than what was requested, with note notes from provider.    isosorbide mononitrate (IMDUR) 30 MG 24 hr tablet 90 tablet 3 12/12/2018  No   Sig - Route: Take 1 tablet (30 mg) by mouth daily - Oral     Dr. Leal has not prescribed this medication in the past. Pt last instructed to take 90 mg by Cardiology at [facility], Pt reports she has only been taking 75 mg. Dr. Leal wrote prescription as taking 30 mg daily.    Will route to PCP for clarification. Pretty Rodríguez RN .............. 12/14/2018  10:10 AM

## 2018-12-17 ENCOUNTER — TELEPHONE (OUTPATIENT)
Dept: FAMILY MEDICINE | Facility: OTHER | Age: 68
End: 2018-12-17

## 2018-12-18 DIAGNOSIS — I10 ESSENTIAL HYPERTENSION: ICD-10-CM

## 2018-12-18 RX ORDER — ISOSORBIDE MONONITRATE 60 MG/1
120 TABLET, EXTENDED RELEASE ORAL DAILY
COMMUNITY
Start: 2018-12-17 | End: 2019-10-04

## 2018-12-18 NOTE — TELEPHONE ENCOUNTER
Please see telephone encounter from [facility] for update on Imdur dose. I updated her medication list here. I also explained to Bailey that her current prescription from [facility] is for 30 mg tablets. She is to take 120 mg total daily. Current prescription through [facility] was for 60 mg (two daily). She understands that she will need to take four of the 30 mgs to make this dose.  Kesha Meza CMA([facility])..................12/18/2018   2:56 PM

## 2018-12-27 ENCOUNTER — TRANSFERRED RECORDS (OUTPATIENT)
Dept: HEALTH INFORMATION MANAGEMENT | Facility: OTHER | Age: 68
End: 2018-12-27

## 2018-12-27 LAB — HEMOCCULT STL QL IA: NEGATIVE

## 2019-01-10 ENCOUNTER — DOCUMENTATION ONLY (OUTPATIENT)
Dept: OTHER | Facility: CLINIC | Age: 69
End: 2019-01-10

## 2019-01-10 PROBLEM — Z71.89 ACP (ADVANCE CARE PLANNING): Chronic | Status: RESOLVED | Noted: 2018-06-28 | Resolved: 2019-01-10

## 2019-01-21 DIAGNOSIS — I10 BENIGN ESSENTIAL HYPERTENSION: Primary | ICD-10-CM

## 2019-01-23 RX ORDER — METOPROLOL SUCCINATE 25 MG/1
25 TABLET, EXTENDED RELEASE ORAL DAILY
Qty: 90 TABLET | Refills: 1 | Status: SHIPPED | OUTPATIENT
Start: 2019-01-23 | End: 2019-07-24

## 2019-01-23 NOTE — TELEPHONE ENCOUNTER
"Refill request from [facility] for:  metoprolol succinate ER (TOPROL-XL) 25 MG 24 hr tablet    Last filled for year supply 12/15/2017    LOV 9/28/2018 with PCP  She will continue her current regimen and follow-up in 6 months.  No changes noted to requested medication     Noted upcoming appt with PCP 3/22/2019    Will fill medication     Requested Prescriptions   Pending Prescriptions Disp Refills     metoprolol succinate ER (TOPROL-XL) 25 MG 24 hr tablet 90 tablet 3     Sig: Take 1 tablet (25 mg) by mouth daily    Beta-Blockers Protocol Passed - 1/23/2019  4:27 PM       Passed - Blood pressure under 140/90 in past 12 months    BP Readings from Last 3 Encounters:   09/28/18 118/63   06/22/18 122/60   02/19/18 124/60          Passed - Patient is age 6 or older       Passed - Recent (12 mo) or future (30 days) visit within the authorizing provider's specialty    Patient had office visit in the last 12 months or has a visit in the next 30 days with authorizing provider or within the authorizing provider's specialty.  See \"Patient Info\" tab in inbasket, or \"Choose Columns\" in Meds & Orders section of the refill encounter.             Passed - Medication is active on med list        Gris Allen RN  ....................  1/23/2019   4:31 PM      "

## 2019-02-04 DIAGNOSIS — Z79.4 TYPE 2 DIABETES MELLITUS WITH COMPLICATION, WITH LONG-TERM CURRENT USE OF INSULIN (H): ICD-10-CM

## 2019-02-04 DIAGNOSIS — E11.8 TYPE 2 DIABETES MELLITUS WITH COMPLICATION, WITH LONG-TERM CURRENT USE OF INSULIN (H): ICD-10-CM

## 2019-02-04 DIAGNOSIS — I10 ESSENTIAL HYPERTENSION: Primary | ICD-10-CM

## 2019-02-04 DIAGNOSIS — N18.30 CHRONIC KIDNEY DISEASE, STAGE III (MODERATE) (H): Primary | ICD-10-CM

## 2019-02-06 DIAGNOSIS — E78.5 HYPERLIPIDEMIA: ICD-10-CM

## 2019-02-06 RX ORDER — ROSUVASTATIN CALCIUM 40 MG/1
TABLET, COATED ORAL
Qty: 60 TABLET | Refills: 0 | Status: SHIPPED | OUTPATIENT
Start: 2019-02-06 | End: 2019-04-05

## 2019-02-06 RX ORDER — LISINOPRIL 2.5 MG/1
2.5 TABLET ORAL DAILY
Qty: 62 TABLET | Refills: 0 | Status: SHIPPED | OUTPATIENT
Start: 2019-02-06 | End: 2019-04-05

## 2019-02-06 RX ORDER — ROSUVASTATIN CALCIUM 40 MG/1
TABLET, COATED ORAL
Qty: 90 TABLET | Refills: 0 | OUTPATIENT
Start: 2019-02-06

## 2019-02-06 NOTE — TELEPHONE ENCOUNTER
Refill request from [facility] for:  rosuvastatin (CRESTOR) 40 MG tablet    Filled earlier today.    Prescription refused.  This is a duplicate request.  Gris Allen.......2/6/2019 4:03 PM

## 2019-02-06 NOTE — TELEPHONE ENCOUNTER
"Refill request from [facility] for:  rosuvastatin (CRESTOR) 40 MG tablet    Last filled for 12 month supply 12/15/2017    LOV 9/28/2018 with PCP  Advised to f/u in 6 months (diabetic)    Last labs 12/11/2017:  Letter out advised to recheck labs in 6 months    Diabetic labs were rechecked but no lipid on file.    Noted pt has upcoming appt on 3/22/2019 with PCP    Contacted pt and she would like to have labs drawn 1-2 days before appt    Will provide limited fill of medication and route message to PCP to order labs for future. Noted A1C is listed as standing     Will order labs and route message to PCP to determine if other labs are needed.    Requested Prescriptions   Pending Prescriptions Disp Refills     rosuvastatin (CRESTOR) 40 MG tablet       Sig: Take 40 mg by mouth daily    Statins Protocol Failed - 2/6/2019 10:45 AM       Failed - LDL on file in past 12 months    Recent Labs   Lab Test 12/11/17  0932   LDL 72            Passed - No abnormal creatine kinase in past 12 months    No lab results found.            Passed - Recent (12 mo) or future (30 days) visit within the authorizing provider's specialty    Patient had office visit in the last 12 months or has a visit in the next 30 days with authorizing provider or within the authorizing provider's specialty.  See \"Patient Info\" tab in inbasket, or \"Choose Columns\" in Meds & Orders section of the refill encounter.             Passed - Medication is active on med list       Passed - Patient is age 18 or older       Passed - No active pregnancy on record       Passed - No positive pregnancy test in past 12 months        Order Providers   Prescribing Provider Encounter Provider   Sam Leal MD Soular, Daniel, MD   Medication Detail    Disp Refills Start End YUAN   rosuvastatin (CRESTOR) 40 mg tablet 90 tablet 3 12/15/2017  No   Sig: Take 40 mg by mouth daily   Sent to pharmacy as: rosuvastatin 40 mg tablet   Class: eRx   E-Prescribing Status: Receipt " confirmed by pharmacy (12/15/2017 12:13 PM CST)   Associated Diagnoses   Hyperlipidemia, unspecified hyperlipidemia type       Gris Allen RN  ....................  2/6/2019   12:01 PM

## 2019-02-06 NOTE — TELEPHONE ENCOUNTER
[facility] sent Rx request for the following:      lisinopril (PRINIVIL/ZESTRIL) 2.5 MG tablet  Sig: Take 1 tablet (2.5 mg) by mouth daily  Last Written Prescription Date:  12/15/17  Last Fill Quantity: 90,   # refills: 3    Last Office Visit: 9/28/18  Future Office visit:   Next 5 appointments (look out 90 days)    Mar 22, 2019 11:15 AM CDT  Office Visit with Sam Leal MD  [facility] ([facility]) [address]  [place] [address]  [phone]        ACE Inhibitors (Including Combos) Protocol Failed2/6 8:25 AM   Normal serum creatinine on file in past 12 months    Normal serum potassium on file in past 12 months     With upcoming appointment, Prescription approved per [facility] Refill Protocol for 60 days at this time, with note to pharmacy. Pretty Rodríguez RN .............. 2/6/2019  8:34 AM

## 2019-02-13 DIAGNOSIS — I10 ESSENTIAL HYPERTENSION: ICD-10-CM

## 2019-02-14 RX ORDER — LISINOPRIL 2.5 MG/1
TABLET ORAL
Qty: 90 TABLET | Refills: 0 | OUTPATIENT
Start: 2019-02-14

## 2019-02-14 NOTE — TELEPHONE ENCOUNTER
"Refill request received from [facility] in [place] for: lisinopril (PRINIVIL/ZESTRIL) 2.5 MG tablet; take one tablet by mouth daily. As stated in note from pharmacy, \"Patient requests 90-day supply.\"      Last Written Prescription Date:  02/06/19  Last Fill Quantity: 62    # refills: 0  Last Office Visit: 09/28/18 with PCP  Future Office visit: 03/22/19 with PCP for A1C and refills for Lisinopril & Crestor as evidenced by note in appt desk.        Requested Prescriptions   Pending Prescriptions Disp Refills     lisinopril (PRINIVIL/ZESTRIL) 2.5 MG tablet [Pharmacy Med Name: LISINOPRIL 2.5MG TABLETS] 90 tablet 0     Sig: TAKE 1 TABLET BY MOUTH ONCE DAILY    ACE Inhibitors (Including Combos) Protocol Failed - 2/14/2019  3:29 PM       Failed - Normal serum creatinine on file in past 12 months    Recent Labs   Lab Test 12/11/17  0932   CR 2.25*          Failed - Normal serum potassium on file in past 12 months    Recent Labs   Lab Test 12/11/17  0932   POTASSIUM 4.0          Passed - Blood pressure under 140/90 in past 12 months    BP Readings from Last 3 Encounters:   09/28/18 118/63   06/22/18 122/60   02/19/18 124/60          Passed - Recent (12 mo) or future (30 days) visit within the authorizing provider's specialty    Patient had office visit in the last 12 months or has a visit in the next 30 days with authorizing provider or within the authorizing provider's specialty.  See \"Patient Info\" tab in inbasket, or \"Choose Columns\" in Meds & Orders section of the refill encounter.        Passed - Medication is active on med list       Passed - Patient is age 18 or older       Passed - No active pregnancy on record       Passed - No positive pregnancy test within past 12 months          Redundant refill request: refused, too soon. Patient has FOV scheduled for 03/22/19. Called patient to verify if she had enough Lisinopril pills until her upcoming appt with her PCP due to the request received from [facility] pharmacy " for a 90-day supply of Lisinopril. Patient denied initiating the Lisinopril refill herself. Patient stated that she has enough pills until her appt. Will wait for FOV with PCP for review of medications and refills.          Sondra Gutierrez RN on 2/14/2019 at 4:01 PM

## 2019-03-05 ENCOUNTER — ALLIED HEALTH/NURSE VISIT (OUTPATIENT)
Dept: UROLOGY | Facility: OTHER | Age: 69
End: 2019-03-05
Attending: UROLOGY
Payer: COMMERCIAL

## 2019-03-05 VITALS — BODY MASS INDEX: 35.28 KG/M2 | RESPIRATION RATE: 14 BRPM | HEART RATE: 68 BPM | WEIGHT: 212 LBS

## 2019-03-05 DIAGNOSIS — N39.41 URGE INCONTINENCE: Primary | ICD-10-CM

## 2019-03-05 PROCEDURE — 99207 ZZC NO CHARGE LOS: CPT

## 2019-03-05 ASSESSMENT — PAIN SCALES - GENERAL: PAINLEVEL: MILD PAIN (3)

## 2019-03-05 NOTE — NURSING NOTE
Pt is on program 4.  And has completed diaries.  Per Blanca, patient is do to see Dr Bowling and come up with a new plan.  Pt has had Interstim in for  5 years this April  Monie Hu LPN.......3/5/2019 9:51 AM

## 2019-03-22 ENCOUNTER — OFFICE VISIT (OUTPATIENT)
Dept: UROLOGY | Facility: OTHER | Age: 69
End: 2019-03-22
Attending: FAMILY MEDICINE
Payer: COMMERCIAL

## 2019-03-22 VITALS
WEIGHT: 212 LBS | BODY MASS INDEX: 35.28 KG/M2 | DIASTOLIC BLOOD PRESSURE: 62 MMHG | RESPIRATION RATE: 14 BRPM | SYSTOLIC BLOOD PRESSURE: 118 MMHG | HEART RATE: 60 BPM

## 2019-03-22 DIAGNOSIS — N39.41 URGE INCONTINENCE: Primary | ICD-10-CM

## 2019-03-22 PROCEDURE — 99214 OFFICE O/P EST MOD 30 MIN: CPT | Performed by: UROLOGY

## 2019-03-22 PROCEDURE — G0463 HOSPITAL OUTPT CLINIC VISIT: HCPCS

## 2019-03-22 ASSESSMENT — PAIN SCALES - GENERAL: PAINLEVEL: NO PAIN (0)

## 2019-03-22 NOTE — PROGRESS NOTES
Type of Visit  Established    Chief Complaint  Urge urinary incontinence    HPI  Ms. Sierra is a 68 y.o. female with history of urge urinary incontinence s/p Interstim implant 5 year(s) ago.  She reported over 90% improvement shortly after the implant was placed.  She eventually began experiencing difficulty with obtaining consistent benefit as she did in the postoperative period.  No history of a fall and an x-ray did reveal it was in position 2 years ago.  At this point the battery is depleted and requires replacement.  She denies dysuria and hematuria      Review of Systems  I reviewed the ROS the patient today.    Nursing Notes:   Monie Hu LPN  3/22/2019  9:35 AM  Signed  Pt presents to clinic for discussion about replacing Interstim battery  Review of Systems:    Weight loss:    Yes     Recent fever/chills:  No   Night sweats:   No  Current skin rash:  No   Recent hair loss:  No  Heat intolerance:  No   Cold intolerance:  No  Chest pain:   No   Palpitations:   No  Shortness of breath:  No   Wheezing:   No  Constipation:    No   Diarrhea:   No   Nausea:   No   Vomiting:   No   Kidney/side pain:  No   Back pain:   No  Frequent headaches:  No   Dizziness:     No  Leg swelling:   Yes   Calf pain:    No      Family History  Family History   Problem Relation Age of Onset     Cancer Mother      Some type of cancer, aneurysm      Other Father      MI     Cancer Sister      Diabetes Sister      Type II     Diabetes Sister      Type II     Diabetes Sister      Type II     Diabetes Paternal Grandmother      Type II     Other Daughter      depression     Other Daughter      depression     Heart Disease Brother      CAD, MI       Physical Exam  /62 (BP Location: Left arm, Patient Position: Sitting, Cuff Size: Adult Large)   Pulse 60   Resp 14   Wt 96.2 kg (212 lb)   BMI 35.28 kg/m    Constitutional: NAD, WDWN.  Cardiovascular: Regular rate.  Pulmonary/Chest: Respirations are even and non-labored  bilaterally.  Abdominal: Soft. No distension, tenderness, masses or guarding. No CVA tenderness.  Extremities: BETTE x 4, Warm. No clubbing.  No cyanosis.    Skin: Pink, warm and dry.  No rashes noted.  Palpable battery, no signs of infection of issues    Imaging  Sacral X ray  3/17/2017  IMPRESSION: Neural stimulator wire projects in the left lateral abdomen with electrode pack projecting over the posterior left ilium. Degenerative arthritis visualized lower lumbar spine. Vascular calcifications. Sacrum and coccyx otherwise normal.    Assessment & Plan  Ms. Sierra is a 68 y.o. female with history of urge urinary incontinence s/p Interstim implant 5 year(s) ago.  She presents today hoping to replace the InterStim generator.  Discussed the alternatives to InterStim.  She previously failed anticholinergics as well as Botox.  I recommended reassessing the lead position at the time of generator exchange.  Depending on intraoperative findings we may or may not replace the leads.  Preop through PCP for perioperative medication management is appreciated.

## 2019-03-22 NOTE — NURSING NOTE
Pt presents to clinic for discussion about replacing Interstim battery  Review of Systems:    Weight loss:    Yes     Recent fever/chills:  No   Night sweats:   No  Current skin rash:  No   Recent hair loss:  No  Heat intolerance:  No   Cold intolerance:  No  Chest pain:   No   Palpitations:   No  Shortness of breath:  No   Wheezing:   No  Constipation:    No   Diarrhea:   No   Nausea:   No   Vomiting:   No   Kidney/side pain:  No   Back pain:   No  Frequent headaches:  No   Dizziness:     No  Leg swelling:   Yes   Calf pain:    No

## 2019-03-28 ENCOUNTER — TRANSFERRED RECORDS (OUTPATIENT)
Dept: HEALTH INFORMATION MANAGEMENT | Facility: OTHER | Age: 69
End: 2019-03-28

## 2019-04-03 DIAGNOSIS — E11.8 TYPE 2 DIABETES MELLITUS WITH COMPLICATION, WITH LONG-TERM CURRENT USE OF INSULIN (H): ICD-10-CM

## 2019-04-03 DIAGNOSIS — Z79.4 TYPE 2 DIABETES MELLITUS WITH COMPLICATION, WITH LONG-TERM CURRENT USE OF INSULIN (H): ICD-10-CM

## 2019-04-03 DIAGNOSIS — N18.30 CHRONIC KIDNEY DISEASE, STAGE III (MODERATE) (H): ICD-10-CM

## 2019-04-03 LAB
ALBUMIN SERPL-MCNC: 3.8 G/DL (ref 3.5–5.7)
ALP SERPL-CCNC: 96 U/L (ref 34–104)
ALT SERPL W P-5'-P-CCNC: 11 U/L (ref 7–52)
ANION GAP SERPL CALCULATED.3IONS-SCNC: 8 MMOL/L (ref 3–14)
AST SERPL W P-5'-P-CCNC: 13 U/L (ref 13–39)
BILIRUB SERPL-MCNC: 0.4 MG/DL (ref 0.3–1)
BUN SERPL-MCNC: 20 MG/DL (ref 7–25)
CALCIUM SERPL-MCNC: 9.2 MG/DL (ref 8.6–10.3)
CHLORIDE SERPL-SCNC: 109 MMOL/L (ref 98–107)
CHOLEST SERPL-MCNC: 141 MG/DL
CO2 SERPL-SCNC: 25 MMOL/L (ref 21–31)
CREAT SERPL-MCNC: 1.95 MG/DL (ref 0.6–1.2)
GFR SERPL CREATININE-BSD FRML MDRD: 25 ML/MIN/{1.73_M2}
GLUCOSE SERPL-MCNC: 136 MG/DL (ref 70–105)
HBA1C MFR BLD: 6.5 % (ref 4–6)
HDLC SERPL-MCNC: 41 MG/DL (ref 23–92)
LDLC SERPL CALC-MCNC: 75 MG/DL
NONHDLC SERPL-MCNC: 100 MG/DL
POTASSIUM SERPL-SCNC: 4 MMOL/L (ref 3.5–5.1)
PROT SERPL-MCNC: 6.8 G/DL (ref 6.4–8.9)
SODIUM SERPL-SCNC: 142 MMOL/L (ref 134–144)
TRIGL SERPL-MCNC: 125 MG/DL

## 2019-04-03 PROCEDURE — 80061 LIPID PANEL: CPT | Performed by: FAMILY MEDICINE

## 2019-04-03 PROCEDURE — 83036 HEMOGLOBIN GLYCOSYLATED A1C: CPT | Performed by: FAMILY MEDICINE

## 2019-04-03 PROCEDURE — 80053 COMPREHEN METABOLIC PANEL: CPT | Performed by: FAMILY MEDICINE

## 2019-04-03 PROCEDURE — 36415 COLL VENOUS BLD VENIPUNCTURE: CPT | Performed by: FAMILY MEDICINE

## 2019-04-05 ENCOUNTER — OFFICE VISIT (OUTPATIENT)
Dept: FAMILY MEDICINE | Facility: OTHER | Age: 69
End: 2019-04-05
Attending: FAMILY MEDICINE
Payer: COMMERCIAL

## 2019-04-05 VITALS
WEIGHT: 209.2 LBS | BODY MASS INDEX: 35.71 KG/M2 | TEMPERATURE: 97.8 F | HEIGHT: 64 IN | DIASTOLIC BLOOD PRESSURE: 52 MMHG | HEART RATE: 72 BPM | SYSTOLIC BLOOD PRESSURE: 104 MMHG | RESPIRATION RATE: 18 BRPM

## 2019-04-05 DIAGNOSIS — Z79.4 TYPE 2 DIABETES MELLITUS WITH COMPLICATION, WITH LONG-TERM CURRENT USE OF INSULIN (H): ICD-10-CM

## 2019-04-05 DIAGNOSIS — I10 ESSENTIAL HYPERTENSION: ICD-10-CM

## 2019-04-05 DIAGNOSIS — E11.8 TYPE 2 DIABETES MELLITUS WITH COMPLICATION, WITH LONG-TERM CURRENT USE OF INSULIN (H): ICD-10-CM

## 2019-04-05 DIAGNOSIS — E11.9 TYPE 2 DIABETES MELLITUS WITHOUT COMPLICATION, WITH LONG-TERM CURRENT USE OF INSULIN (H): ICD-10-CM

## 2019-04-05 DIAGNOSIS — Z01.818 PRE-OPERATIVE EXAMINATION: Primary | ICD-10-CM

## 2019-04-05 DIAGNOSIS — N39.41 URGE INCONTINENCE: ICD-10-CM

## 2019-04-05 DIAGNOSIS — Z79.4 TYPE 2 DIABETES MELLITUS WITHOUT COMPLICATION, WITH LONG-TERM CURRENT USE OF INSULIN (H): ICD-10-CM

## 2019-04-05 DIAGNOSIS — N18.4 CKD (CHRONIC KIDNEY DISEASE) STAGE 4, GFR 15-29 ML/MIN (H): ICD-10-CM

## 2019-04-05 DIAGNOSIS — I35.0 NONRHEUMATIC AORTIC VALVE STENOSIS: ICD-10-CM

## 2019-04-05 PROBLEM — M19.90 OSTEOARTHROSIS: Status: RESOLVED | Noted: 2018-01-17 | Resolved: 2019-04-05

## 2019-04-05 PROBLEM — R60.9 EDEMA: Status: RESOLVED | Noted: 2018-01-17 | Resolved: 2019-04-05

## 2019-04-05 PROBLEM — M72.2 PLANTAR FASCIITIS: Status: RESOLVED | Noted: 2018-01-17 | Resolved: 2019-04-05

## 2019-04-05 PROBLEM — Z98.890 S/P SHOULDER SURGERY: Status: RESOLVED | Noted: 2017-04-19 | Resolved: 2019-04-05

## 2019-04-05 PROCEDURE — G0463 HOSPITAL OUTPT CLINIC VISIT: HCPCS

## 2019-04-05 PROCEDURE — 99215 OFFICE O/P EST HI 40 MIN: CPT | Performed by: FAMILY MEDICINE

## 2019-04-05 RX ORDER — LISINOPRIL 2.5 MG/1
2.5 TABLET ORAL DAILY
Qty: 90 TABLET | Refills: 3 | Status: SHIPPED | OUTPATIENT
Start: 2019-04-05 | End: 2020-04-28

## 2019-04-05 RX ORDER — INSULIN GLARGINE 100 [IU]/ML
42 INJECTION, SOLUTION SUBCUTANEOUS AT BEDTIME
Qty: 60 ML | Refills: 11 | COMMUNITY
Start: 2019-04-05 | End: 2019-06-26

## 2019-04-05 RX ORDER — ROSUVASTATIN CALCIUM 40 MG/1
TABLET, COATED ORAL
Qty: 90 TABLET | Refills: 3 | Status: SHIPPED | OUTPATIENT
Start: 2019-04-05 | End: 2020-03-17

## 2019-04-05 ASSESSMENT — PAIN SCALES - GENERAL: PAINLEVEL: NO PAIN (0)

## 2019-04-05 ASSESSMENT — MIFFLIN-ST. JEOR: SCORE: 1453.92

## 2019-04-05 NOTE — PROGRESS NOTES
SUBJECTIVE:  Bailey Sierra is a 69 year old female here for follow-up.  She has a history of type 2 diabetes with been well controlled, chronic kidney disease stage IV which is been stable.  She was recently seen by nephrology and her labs were stable.  They did not suggest any change to her treatment regimen.    In regards to diabetes this is also been stable on her most recent hemoglobin A1c came back at 6.5% yesterday.  She continues on Basaglar 42 units at night and NovoLog sliding scale 7-16 units before meals.  She has had no hypoglycemia.    She has a history of type 2 diabetes, she continues to check her blood sugars 4 times a day for home blood glucose monitor, she uses insulin for separate injections throughout the day.    She is scheduled to undergo generator replacement for her InterStim generator that she has in place for urinary urge incontinence that was placed approximately 5 years ago.  At that time they are planning on evaluating placements of her leads as well.  She has failed Botox and anticholinergics previously.    Patient Active Problem List    Diagnosis Date Noted     Atherosclerotic heart disease of native coronary artery with other forms of angina pectoris (H) 09/28/2018     Priority: Medium     Obesity (BMI 35.0-39.9) with comorbidity (H) 09/28/2018     Priority: Medium     CKD (chronic kidney disease) stage 4, GFR 15-29 ml/min (H) 01/17/2018     Priority: Medium     Heart murmur, systolic 01/17/2018     Priority: Medium     Hyperlipidemia 01/17/2018     Priority: Medium     Hypertension 01/17/2018     Priority: Medium     AC (acromioclavicular) joint arthritis 03/10/2017     Priority: Medium     Impingement syndrome of right shoulder 03/10/2017     Priority: Medium     Right rotator cuff tendinitis 03/10/2017     Priority: Medium     Long-term insulin use in type 2 diabetes (H) 02/13/2017     Priority: Medium     Light chain disease, 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  Per Dr. Ortiz, if hypoalbuminemia not improving by Nov-Dec 2016, needs referral to Hematology.        Vitamin D deficiency 10/23/2016     Priority: Medium     Hyperparathyroidism due to renal insufficiency (H) 03/28/2016     Priority: Medium     Coronary atherosclerosis 09/03/2013     Priority: Medium     Overview:   2 vessel bypass, August 2013       Obesity 07/26/2013     Priority: Medium     Hiatal hernia 07/26/2013     Priority: Medium     GERD (gastroesophageal reflux disease) 01/08/2013     Priority: Medium     Urge incontinence 10/25/2012     Priority: Medium     Insomnia 01/25/2012     Priority: Medium       Past Medical History:   Diagnosis Date     Atherosclerotic heart disease of native coronary artery without angina pectoris     No Comments Provided     Cardiac murmur     No Comments Provided     Chronic kidney disease, stage III (moderate) (H)     No Comments Provided     Controlled type 2 diabetes mellitus with stage 3 chronic kidney disease, with long-term current use of insulin (H) 7/26/2013     Edema     No Comments Provided     Essential (primary) hypertension     No Comments Provided     Gastro-esophageal reflux disease without esophagitis     No Comments Provided     Hyperlipidemia     No Comments Provided     Impingement syndrome of right shoulder 03/10/2017          Insomnia 01/25/2012          Iron deficiency anemia     No Comments Provided     Neuromuscular dysfunction of bladder     No Comments Provided     Osteoarthritis     No Comments Provided     Other shoulder lesions, right shoulder 03/10/2017          Other shoulder lesions, unspecified shoulder     No Comments Provided     Other specified postprocedural states 04/19/2017          Personal history of other medical treatment (CODE)     Three childbirths     Plantar fascial fibromatosis     No Comments Provided     Presence of aortocoronary bypass graft 07/2013    [facility]     Primary  osteoarthritis of shoulder 03/10/2017          Type 2 diabetes mellitus without complications (H)     No Comments Provided     Urgency of urination 03/08/2011          Uterovaginal prolapse     No Comments Provided       Past Surgical History:   Procedure Laterality Date     ARTHROSCOPY SHOULDER Right 1997          ARTHROSCOPY SHOULDER Left 2012          ARTHROSCOPY SHOULDER RT/LT Right [id number]          Cardiac Bypass  07/2013          COLONOSCOPY  11/24/2009 11/24/2009     CYSTOSCOPY  03/15/2001     DENTAL SURGERY      Dental extractions     ENDOSCOPIC RETROGRADE CHOLANGIOPANCREATOGRAPHY  05/2008    Bile leak with ERCP and stenting and drainage of bile collection through abdominal wall.     HYSTERECTOMY TOTAL ABDOMINAL, BILATERAL SALPINGO-OOPHORECTOMY, COMBINED  04/02/2001    Vag vault suspension with Cortex mesh     Interstim Device Placement  04/14/2014    Dr. Dash WARD     LAMINECTOMY LUMBAR ONE LEVEL  1997          LAPAROSCOPIC CHOLECYSTECTOMY  04/2008          LAPAROSCOPIC TUBAL LIGATION      No Comments Provided       Current Outpatient Medications   Medication Sig Dispense Refill     aspirin 81 MG tablet Take 1 tablet (81 mg) by mouth daily 30 tablet      blood glucose monitoring (TRICIA CONTOUR) test strip TEST 3 TIMES A  each 3     Calcium Carb-Cholecalciferol (CALCIUM PLUS D3 ABSORBABLE) 600-2500 MG-UNIT CAPS Take 1 capsule by mouth daily       Cholecalciferol (VITAMIN D) 2000 units tablet Take 2,000 Units by mouth daily       esomeprazole (NEXIUM) 40 MG DR capsule Take 1 capsule (40 mg) by mouth daily Before a meal. 90 capsule 2     furosemide (LASIX) 20 MG tablet Take 20 mg by mouth every morning       insulin aspart (NOVOLOG FLEXPEN) 100 UNIT/ML injection Inject 7 - 16 units before each meal based on sliding scale.  Max daily dose 56 units. 60 mL 3     insulin glargine (BASAGLAR KWIKPEN) 100 UNIT/ML pen Inject 42 Units Subcutaneous At Bedtime 60 mL 11     insulin pen needle (ULTICARE SHORT)  "31G X 8 MM USE AS DIRECTED FOUR TIMES DAILY. Dx: E11.9 400 each 3     isosorbide mononitrate (IMDUR) 60 MG 24 hr tablet Take 2 tablets (120 mg) by mouth daily       lisinopril (PRINIVIL/ZESTRIL) 2.5 MG tablet Take 1 tablet (2.5 mg) by mouth daily 90 tablet 3     metoprolol succinate ER (TOPROL-XL) 25 MG 24 hr tablet Take 1 tablet (25 mg) by mouth daily 90 tablet 1     nitroGLYcerin (NITROSTAT) 0.4 MG sublingual tablet Place 0.4 mg under the tongue every 5 minutes as needed for chest pain       potassium chloride SA (K-DUR/KLOR-CON M) 20 MEQ CR tablet TK 1 T PO QD. DO NOT CRUSH       rosuvastatin (CRESTOR) 40 MG tablet Take 40 mg by mouth daily 90 tablet 3     zolpidem (AMBIEN) 10 MG tablet Take 10 mg by mouth nightly as needed for sleep         Allergies:  No Known Allergies    Family History   Problem Relation Age of Onset     Other - See Comments Father         MI     Cancer Mother         Cancer,Some type of cancer, aneurysm     Diabetes Paternal Grandmother         Diabetes,Type II     Cancer Sister         Cancer,unknown type     Diabetes Sister         Diabetes,Type II     Diabetes Sister         Diabetes,Type II     Diabetes Sister         Diabetes,Type II     Other - See Comments Daughter         depression     Other - See Comments Daughter         ovarian cancer and depression     Heart Disease Brother         Heart Disease,CAD, MI       Social History     Tobacco Use     Smoking status: Never Smoker     Smokeless tobacco: Never Used   Substance Use Topics     Alcohol use: No     Alcohol/week: 0.0 oz     Drug use: No     Comment: Drug use: No       ROS:    As above otherwise ROS is unremarkable.      OBJECTIVE:  /52   Pulse 72   Temp 97.8  F (36.6  C) (Tympanic)   Resp 18   Ht 1.626 m (5' 4\")   Wt 94.9 kg (209 lb 3.2 oz)   BMI 35.91 kg/m      EXAM:  General Appearance: Pleasant, alert, appropriate appearance for age. No acute distress  Head: Normal. Normocephalic, atraumatic.  Eyes: PERRL, " EOMI  Ears: Normal TM's bilaterally. Normal auditory canals and external ears.   OroPharynx: Dental hygiene adequate. Normal buccal mucosa. Normal pharynx.  Neck: Supple, no masses or nodes, no lymphadenopathy.  No thyromegaly.  Lungs: Normal chest wall and respirations. Clear to auscultation, no wheezes or crackles.  Cardiovascular: Regular rate and rhythm.  Loud systolic murmur heard best in the right upper sternal border, this is chronic and unchanged.  Gastrointestinal: Soft, nontender, no abnormal masses or organomegaly. BS normal.  Musculoskeletal: No edema.  Skin: no concerning or new rashes.  Neurologic Exam: CN 2-12 grossly intact.  Normal gait.  Symmetric DTRs, No focal motor or sensory deficits. No tremor.  Psychiatric Exam: Alert and oriented, appropriate affect.    ASSESSEMENT AND PLAN:    1. Pre-operative examination    2. Essential hypertension    3. Type 2 diabetes mellitus with complication, with long-term current use of insulin (H)    4. CKD (chronic kidney disease) stage 4, GFR 15-29 ml/min (H)    5. Urge incontinence    6. Type 2 diabetes mellitus without complication, with long-term current use of insulin (H)      Labs were recently performed and can be seen above.  She had an echocardiogram which continues to show aortic valve stenosis in October 2018.  She also had a normal stress test at that time.  EKG was not repeated as she is asymptomatic and has had advanced cardiovascular testing within the last 6 months.    I would recommend that she take half her normal vascular dose the night before her procedure and skip her NovoLog dose in the morning as she will be fasting.  She will also stop aspirin 1 week prior to her procedure.  She does not need to make any other medication changes perioperatively.    Her yearly labs reviewed with her and are stable.  Would recommend repeating A1c in 6 months and the rest of her labs again in 1 year.    Follow-up with nephrology as scheduled.    Patient  currently uses a Dexcom G5 CGM and tests BG twice daily for calibrations.   Patient injects or boluses insulin 3 or more times daily before breakfast, before lunch, before dinner, and bedtime.  Patient requires frequent adjustments to their insulin treatment regimen on the basis of therapeutic CGM testing results.      Sukumar Leal MD  Family Medicine      This document was prepared using voice generated software.  While every attempt was made for accuracy, grammatical errors may exist.

## 2019-04-05 NOTE — PATIENT INSTRUCTIONS
1 week prior to procedure:  Stop aspirin, ok to restart after procedure.    Night before procedure: Take 1/2 your typical basaglar (glargine) dose    Morning of your procedure you will be fasting, so do not take you novolog dose.    Resume normal insulin regime after your procedure.

## 2019-04-05 NOTE — NURSING NOTE
"Patient presents today for pre op exam.  Date of Surgery: unknown   Type of Surgery:   Surgeon: Dr. Bowling  Hospital:  [facility]  Fax:      Fever/Chills or other infectious symptoms in past month: no  >10lb weight loss in past two months: no  O2 SAT: 99    Health Care Directive/Code status:  yes  Hx of blood transfusions:   yes  Td up to date:  yes  History of VRE/MRSA:  no Date:      Preoperative Evaluation: Obstructive Sleep Apnea screening    S: Snore -  Do you snore loudly? (louder than talking or loud enough to be heard through closed doors)yes  T: Tired - Do you often feel tired, fatigued, or sleepy during the daytime?no  O: Observed - Has anyone ever observed you stop breathing during your sleep?no  P: Pressure - Do you have or are you being treated for high blood pressure?yes  B: BMI - BMI greater than 35kg/m2?yes  A: Age - Age over 50 years old?yes  N: Neck - Neck circumference greater than 40 cm?yes  G: Gender - Gender: Male?no    Total number of \"YES\" responses:  5    Scoring: Low risk of DARIA 0-2  At Risk of DARIA: >3 High Risk of DARIA: 5-8    Noemy Holt 4/5/2019 11:29 AM    Medication Reconciliation Complete    Noemy Holt LPN  4/5/2019 11:29 AM  "

## 2019-04-09 ENCOUNTER — TELEPHONE (OUTPATIENT)
Dept: FAMILY MEDICINE | Facility: OTHER | Age: 69
End: 2019-04-09

## 2019-04-09 NOTE — TELEPHONE ENCOUNTER
Not from my perspective as she is doing well right now.  Would run this by the surgery department as I suspect their policy may be different.

## 2019-04-09 NOTE — TELEPHONE ENCOUNTER
Left message for surgery department to call back.  Kate Merida LPN ....................  4/9/2019   9:51 AM

## 2019-04-09 NOTE — TELEPHONE ENCOUNTER
Chief Complaint(s) and History of Present Illness(es)     Exotropia Follow Up     Laterality: both eyes    Quality: horizontal    Treatments tried: patching    Response to treatment: significant improvement    Comments: No glasses or patching, mom does not see eyes drifting out anymore.                Patient had preop with you 4-5 and surgery is now 5-7, will she need another preop.      Would you like to see her again for another pre-op exam?    Kate Merida LPN ....................  4/9/2019   9:14 AM

## 2019-04-09 NOTE — TELEPHONE ENCOUNTER
Spoke with Myrna in Surgery and pt does not need new pre-op.  Left message for patient to return call to clinic to update.  Kate Merida LPN ....................  4/9/2019   11:37 AM

## 2019-04-10 ENCOUNTER — TELEPHONE (OUTPATIENT)
Dept: FAMILY MEDICINE | Facility: OTHER | Age: 69
End: 2019-04-10

## 2019-04-10 NOTE — TELEPHONE ENCOUNTER
DWS- Patient missed the phone call from the nurse. She would like them to try to call her again. If she does not answer again it is ok to leave a message,     Oanh Franco on 4/10/2019 at 8:22 AM

## 2019-04-11 NOTE — TELEPHONE ENCOUNTER
Spoke with patient and we figured out that the message was from the 9th. Relayed message and she verbalized understanding.   Mica Marks LPN...................4/11/2019  9:23 AM

## 2019-05-01 DIAGNOSIS — Z79.4 TYPE 2 DIABETES MELLITUS WITH COMPLICATION, WITH LONG-TERM CURRENT USE OF INSULIN (H): ICD-10-CM

## 2019-05-01 DIAGNOSIS — E11.8 TYPE 2 DIABETES MELLITUS WITH COMPLICATION, WITH LONG-TERM CURRENT USE OF INSULIN (H): ICD-10-CM

## 2019-05-03 ENCOUNTER — TRANSFERRED RECORDS (OUTPATIENT)
Dept: HEALTH INFORMATION MANAGEMENT | Facility: OTHER | Age: 69
End: 2019-05-03

## 2019-05-03 RX ORDER — ROSUVASTATIN CALCIUM 40 MG/1
TABLET, COATED ORAL
Qty: 90 TABLET | Refills: 3 | OUTPATIENT
Start: 2019-05-03

## 2019-05-03 NOTE — TELEPHONE ENCOUNTER
Redundant refill request refused: Too soon:    rosuvastatin (CRESTOR) 40 MG tablet 90 tablet 3 4/5/2019  No   Sig: Take 40 mg by mouth daily   Sent to pharmacy as: rosuvastatin (CRESTOR) 40 MG tablet   Class: E-Prescribe   Order: [id number]   E-Prescribing Status: Receipt confirmed by pharmacy (4/5/2019 11:48 AM CDT)     [facility] [place] [address] - [place], [place] - [address]     Unable to complete prescription refill per RN Medication Refill Policy. Pretty Rodríguez RN .............. 5/3/2019  9:31 AM

## 2019-05-06 ENCOUNTER — ANESTHESIA EVENT (OUTPATIENT)
Dept: SURGERY | Facility: OTHER | Age: 69
End: 2019-05-06
Payer: MEDICARE

## 2019-05-06 ENCOUNTER — TELEPHONE (OUTPATIENT)
Dept: EDUCATION SERVICES | Facility: OTHER | Age: 69
End: 2019-05-06

## 2019-05-06 RX ORDER — AMPICILLIN 2 G/1
2 INJECTION, POWDER, FOR SOLUTION INTRAVENOUS
Status: COMPLETED | OUTPATIENT
Start: 2019-05-06 | End: 2019-05-07

## 2019-05-06 RX ORDER — FENTANYL CITRATE 50 UG/ML
25-50 INJECTION, SOLUTION INTRAMUSCULAR; INTRAVENOUS EVERY 5 MIN PRN
Status: CANCELLED | OUTPATIENT
Start: 2019-05-06

## 2019-05-06 NOTE — TELEPHONE ENCOUNTER
"Patient states she spoke with [employer] representative regarding her CGM supplies.  [employer] has faxed PCP for updated phone notes for continual use of Dexcom G5 CGM.    She asked if I would check with PCP/nurse.  \"I'm running low and need more supplies soon.\"    Hazel Del Rio RN, BSN, CDE  5/6/2019 4:11 PM   "

## 2019-05-06 NOTE — TELEPHONE ENCOUNTER
Paperwork received from [employer].  Phoned patient to clarify she is using the Dexcom and how many times daily is SMBG.  Paperwork waiting for PCP signature and office notes requested.    Will fax back to [employer], Monday, 5/13, or earlier if/when packet is complete.    Hazel Del Rio RN, BSN, CDE  5/6/2019 5:17 PM

## 2019-05-07 ENCOUNTER — HOSPITAL ENCOUNTER (OUTPATIENT)
Dept: GENERAL RADIOLOGY | Facility: OTHER | Age: 69
End: 2019-05-07
Attending: UROLOGY | Admitting: UROLOGY
Payer: MEDICARE

## 2019-05-07 ENCOUNTER — HOSPITAL ENCOUNTER (OUTPATIENT)
Facility: OTHER | Age: 69
Discharge: HOME | End: 2019-05-07
Attending: UROLOGY | Admitting: UROLOGY
Payer: MEDICARE

## 2019-05-07 ENCOUNTER — ANESTHESIA (OUTPATIENT)
Dept: SURGERY | Facility: OTHER | Age: 69
End: 2019-05-07
Payer: MEDICARE

## 2019-05-07 VITALS
BODY MASS INDEX: 35.87 KG/M2 | WEIGHT: 209 LBS | RESPIRATION RATE: 18 BRPM | TEMPERATURE: 97.3 F | SYSTOLIC BLOOD PRESSURE: 142 MMHG | HEART RATE: 55 BPM | DIASTOLIC BLOOD PRESSURE: 72 MMHG | OXYGEN SATURATION: 96 %

## 2019-05-07 DIAGNOSIS — R32 URINARY INCONTINENCE: ICD-10-CM

## 2019-05-07 DIAGNOSIS — N39.41 URGE INCONTINENCE: Primary | ICD-10-CM

## 2019-05-07 PROCEDURE — 71000027 ZZH RECOVERY PHASE 2 EACH 15 MINS: Performed by: UROLOGY

## 2019-05-07 PROCEDURE — 25000128 H RX IP 250 OP 636: Performed by: UROLOGY

## 2019-05-07 PROCEDURE — C1767 GENERATOR, NEURO NON-RECHARG: HCPCS | Performed by: UROLOGY

## 2019-05-07 PROCEDURE — 64581 OPN IMPLTJ NEA SACRAL NERVE: CPT | Mod: 51 | Performed by: UROLOGY

## 2019-05-07 PROCEDURE — 25000125 ZZHC RX 250: Performed by: NURSE ANESTHETIST, CERTIFIED REGISTERED

## 2019-05-07 PROCEDURE — 27210794 ZZH OR GENERAL SUPPLY STERILE: Performed by: UROLOGY

## 2019-05-07 PROCEDURE — 25800030 ZZH RX IP 258 OP 636: Performed by: UROLOGY

## 2019-05-07 PROCEDURE — 76000 FLUOROSCOPY <1 HR PHYS/QHP: CPT | Mod: 26 | Performed by: UROLOGY

## 2019-05-07 PROCEDURE — C1787 PATIENT PROGR, NEUROSTIM: HCPCS | Performed by: UROLOGY

## 2019-05-07 PROCEDURE — 25000132 ZZH RX MED GY IP 250 OP 250 PS 637: Mod: GY | Performed by: UROLOGY

## 2019-05-07 PROCEDURE — 27810325 ZZHC OR IMPLANT OTHER OPNP: Performed by: UROLOGY

## 2019-05-07 PROCEDURE — 64581 OPN IMPLTJ NEA SACRAL NERVE: CPT | Performed by: NURSE ANESTHETIST, CERTIFIED REGISTERED

## 2019-05-07 PROCEDURE — 40000306 ZZH STATISTIC PRE PROC ASSESS II: Performed by: UROLOGY

## 2019-05-07 PROCEDURE — 37000009 ZZH ANESTHESIA TECHNICAL FEE, EACH ADDTL 15 MIN: Performed by: UROLOGY

## 2019-05-07 PROCEDURE — C1778 LEAD, NEUROSTIMULATOR: HCPCS | Performed by: UROLOGY

## 2019-05-07 PROCEDURE — A9270 NON-COVERED ITEM OR SERVICE: HCPCS | Mod: GY | Performed by: UROLOGY

## 2019-05-07 PROCEDURE — 25800030 ZZH RX IP 258 OP 636: Performed by: NURSE ANESTHETIST, CERTIFIED REGISTERED

## 2019-05-07 PROCEDURE — 25000128 H RX IP 250 OP 636: Performed by: NURSE ANESTHETIST, CERTIFIED REGISTERED

## 2019-05-07 PROCEDURE — C1883 ADAPT/EXT, PACING/NEURO LEAD: HCPCS | Performed by: UROLOGY

## 2019-05-07 PROCEDURE — 25000125 ZZHC RX 250: Performed by: UROLOGY

## 2019-05-07 PROCEDURE — 64590 INS/RPL PRPH SAC/GSTR NPG/R: CPT | Performed by: UROLOGY

## 2019-05-07 PROCEDURE — 36000065 ZZH SURGERY LEVEL 4 W FLUORO 1ST 30 MIN: Performed by: UROLOGY

## 2019-05-07 PROCEDURE — 27211024 ZZHC OR SUPPLY OTHER OPNP: Performed by: UROLOGY

## 2019-05-07 PROCEDURE — 37000008 ZZH ANESTHESIA TECHNICAL FEE, 1ST 30 MIN: Performed by: UROLOGY

## 2019-05-07 PROCEDURE — 36000063 ZZH SURGERY LEVEL 4 EA 15 ADDTL MIN: Performed by: UROLOGY

## 2019-05-07 RX ORDER — HYDROCODONE BITARTRATE AND ACETAMINOPHEN 5; 325 MG/1; MG/1
TABLET ORAL
Qty: 12 TABLET | Refills: 0 | Status: SHIPPED | OUTPATIENT
Start: 2019-05-07 | End: 2019-10-04

## 2019-05-07 RX ORDER — ONDANSETRON 2 MG/ML
INJECTION INTRAMUSCULAR; INTRAVENOUS PRN
Status: DISCONTINUED | OUTPATIENT
Start: 2019-05-07 | End: 2019-05-07

## 2019-05-07 RX ORDER — SULFAMETHOXAZOLE/TRIMETHOPRIM 800-160 MG
1 TABLET ORAL 2 TIMES DAILY
Qty: 10 TABLET | Refills: 0 | Status: SHIPPED | OUTPATIENT
Start: 2019-05-07 | End: 2019-10-04

## 2019-05-07 RX ORDER — SODIUM CHLORIDE 9 MG/ML
INJECTION, SOLUTION INTRAVENOUS CONTINUOUS
Status: DISCONTINUED | OUTPATIENT
Start: 2019-05-07 | End: 2019-05-07 | Stop reason: HOSPADM

## 2019-05-07 RX ORDER — ONDANSETRON 2 MG/ML
4 INJECTION INTRAMUSCULAR; INTRAVENOUS EVERY 30 MIN PRN
Status: DISCONTINUED | OUTPATIENT
Start: 2019-05-07 | End: 2019-05-07 | Stop reason: HOSPADM

## 2019-05-07 RX ORDER — NALOXONE HYDROCHLORIDE 0.4 MG/ML
.1-.4 INJECTION, SOLUTION INTRAMUSCULAR; INTRAVENOUS; SUBCUTANEOUS
Status: DISCONTINUED | OUTPATIENT
Start: 2019-05-07 | End: 2019-05-07 | Stop reason: HOSPADM

## 2019-05-07 RX ORDER — MEPERIDINE HYDROCHLORIDE 50 MG/ML
12.5 INJECTION INTRAMUSCULAR; INTRAVENOUS; SUBCUTANEOUS
Status: DISCONTINUED | OUTPATIENT
Start: 2019-05-07 | End: 2019-05-07 | Stop reason: HOSPADM

## 2019-05-07 RX ORDER — PROPOFOL 10 MG/ML
INJECTION, EMULSION INTRAVENOUS CONTINUOUS PRN
Status: DISCONTINUED | OUTPATIENT
Start: 2019-05-07 | End: 2019-05-07

## 2019-05-07 RX ORDER — HYDROMORPHONE HYDROCHLORIDE 1 MG/ML
.3-.5 INJECTION, SOLUTION INTRAMUSCULAR; INTRAVENOUS; SUBCUTANEOUS EVERY 10 MIN PRN
Status: DISCONTINUED | OUTPATIENT
Start: 2019-05-07 | End: 2019-05-07 | Stop reason: HOSPADM

## 2019-05-07 RX ORDER — ONDANSETRON 4 MG/1
4 TABLET, ORALLY DISINTEGRATING ORAL EVERY 30 MIN PRN
Status: DISCONTINUED | OUTPATIENT
Start: 2019-05-07 | End: 2019-05-07 | Stop reason: HOSPADM

## 2019-05-07 RX ORDER — FENTANYL CITRATE 50 UG/ML
INJECTION, SOLUTION INTRAMUSCULAR; INTRAVENOUS PRN
Status: DISCONTINUED | OUTPATIENT
Start: 2019-05-07 | End: 2019-05-07

## 2019-05-07 RX ORDER — LIDOCAINE HYDROCHLORIDE 20 MG/ML
INJECTION, SOLUTION INFILTRATION; PERINEURAL PRN
Status: DISCONTINUED | OUTPATIENT
Start: 2019-05-07 | End: 2019-05-07

## 2019-05-07 RX ORDER — OXYCODONE AND ACETAMINOPHEN 5; 325 MG/1; MG/1
1-2 TABLET ORAL EVERY 4 HOURS PRN
Status: DISCONTINUED | OUTPATIENT
Start: 2019-05-07 | End: 2019-05-07 | Stop reason: HOSPADM

## 2019-05-07 RX ORDER — PROPOFOL 10 MG/ML
INJECTION, EMULSION INTRAVENOUS PRN
Status: DISCONTINUED | OUTPATIENT
Start: 2019-05-07 | End: 2019-05-07

## 2019-05-07 RX ORDER — LIDOCAINE 40 MG/G
CREAM TOPICAL
Status: DISCONTINUED | OUTPATIENT
Start: 2019-05-07 | End: 2019-05-07 | Stop reason: HOSPADM

## 2019-05-07 RX ADMIN — GENTAMICIN SULFATE 350 MG: 40 INJECTION, SOLUTION INTRAMUSCULAR; INTRAVENOUS at 07:32

## 2019-05-07 RX ADMIN — LIDOCAINE HYDROCHLORIDE 0.1 ML: 10 INJECTION, SOLUTION EPIDURAL; INFILTRATION; INTRACAUDAL; PERINEURAL at 07:31

## 2019-05-07 RX ADMIN — PROPOFOL 50 MG: 10 INJECTION, EMULSION INTRAVENOUS at 07:46

## 2019-05-07 RX ADMIN — OXYCODONE AND ACETAMINOPHEN 1 TABLET: 5; 325 TABLET ORAL at 09:15

## 2019-05-07 RX ADMIN — AMPICILLIN SODIUM 2 G: 2 INJECTION, POWDER, FOR SOLUTION INTRAMUSCULAR; INTRAVENOUS at 08:00

## 2019-05-07 RX ADMIN — FENTANYL CITRATE 25 MCG: 50 INJECTION, SOLUTION INTRAMUSCULAR; INTRAVENOUS at 08:51

## 2019-05-07 RX ADMIN — HYDROMORPHONE HYDROCHLORIDE 0.5 MG: 1 INJECTION, SOLUTION INTRAMUSCULAR; INTRAVENOUS; SUBCUTANEOUS at 09:44

## 2019-05-07 RX ADMIN — ONDANSETRON 4 MG: 2 INJECTION INTRAMUSCULAR; INTRAVENOUS at 07:50

## 2019-05-07 RX ADMIN — LIDOCAINE HYDROCHLORIDE 80 MG: 20 INJECTION, SOLUTION INFILTRATION; PERINEURAL at 07:46

## 2019-05-07 RX ADMIN — PROPOFOL 75 MCG/KG/MIN: 10 INJECTION, EMULSION INTRAVENOUS at 07:47

## 2019-05-07 RX ADMIN — SODIUM CHLORIDE: 9 INJECTION, SOLUTION INTRAVENOUS at 07:31

## 2019-05-07 RX ADMIN — FENTANYL CITRATE 25 MCG: 50 INJECTION, SOLUTION INTRAMUSCULAR; INTRAVENOUS at 08:44

## 2019-05-07 NOTE — OP NOTE
Preprocedure diagnosis  Urge Incontinence    Postprocedure diagnosis  Urge Incontinece    Procedure  InterStim implant with fluoroscopically guided lead placement, right side  Interstim generator placement  Removal of generator and tined lead    Surgeon  Honorio Bowling MD    Surgeon(s)/Proceduralist(s) and Assistants (if any)  Surgeon(s):  Honorio Bowling MD  Circulator: Gladis Escobedo RN; Minoo Arellano RN  Relief Circulator: Raina Cortez RN  Scrub Person: Gina Lopez  X-Ray Technologist: Oanh May  First Assistant: Noemy Albright RN  Pre-Op Nurse: Noreen Mercado RN    Specimen(s)  No    (EBL) Estimated blood loss (ml)  <5    Anesthesia  MAC    Complications  None    Indications  The patient previously failed anticholinergic medication for treatment of the above named indication.   Informed consent was obtained.      Findings  Electrode 0 Nikolas then toe curl  Electrode 1 Nikolas then toe curl  Electrode 2 Nikolas then toe curl  Electrode 3 Nikolas then toe curl  Fluoroscopic guidance revealed lead with characteristic appearance in S3 foramen in A-P and lateral view    Procedure  The patient was taken to the operating room and placed prone on the operating table.  Pre-operative antibiotics were administered.  Bilateral lower extremity SCDs were placed.  Induction of anesthesia was performed followed by a time-out was performed.  Patient was prepped and draped and an Ioban was placed.    Lidocaine 2% with epinephrine was instilled subcutaneously near the S3 foramen.  Using fluoroscopic guidance and sacral bony landmarks the foramen needle was placed on the right.  Testing revealed Nikolas followed by toe curl.  Fluoroscopy was used to confirm appropriate position.  Skin incision was made with a scalpel near the needle.  Stylet was removed and the directional guide was placed.  The introducer was passed over the directional guide.  The dilator was removed and the lead was placed using  fluoroscopic guidance in both the A-P and lateral view.  The 2-3 electrodes straddled the anterior sacrum at the point of elizabeth deployment.  Testing at this point again resulted in positive responses.  A lateral pocket was made with a scalpel and blunt dissection inferior to the iliac crest.  A tunneling tool was used to pass the lead to the pocket subcutaneously.  The pocket was irrigated with 1 liter of irrigant solution (1 gram Ancef and 1 Liter).  The generator was placed in the pocket with the correct side facing up.  The skin was closed in two layers with 3-0 Vicryl and 4-0 Moncryl.  The impedence was tested and within acceptable limits.  Dermabond was applied.    Next the previously placed generator and tined lead on the left was removed completely intact with the aid of fluoro guidance.  The incision over the lead was closed in one layer and pocket was closed in two layers after irrigation of both.    The patient tolerated the procedure well and was taken to the recovery room.    Plan  Follow up in 2 weeks for wound check

## 2019-05-07 NOTE — ANESTHESIA PREPROCEDURE EVALUATION
Anesthesia Pre-Procedure Evaluation    Patient: Bailey Sierra   MRN: [id number] : 1950          Preoperative Diagnosis: need for revision of interstim    Procedure(s):  Interstim Implant Revision    Past Medical History:   Diagnosis Date     Atherosclerotic heart disease of native coronary artery without angina pectoris     No Comments Provided     Cardiac murmur     No Comments Provided     Chronic kidney disease, stage III (moderate) (H)     No Comments Provided     Controlled type 2 diabetes mellitus with stage 3 chronic kidney disease, with long-term current use of insulin (H) 2013     Edema     No Comments Provided     Essential (primary) hypertension     No Comments Provided     Gastro-esophageal reflux disease without esophagitis     No Comments Provided     Hyperlipidemia     No Comments Provided     Impingement syndrome of right shoulder 03/10/2017          Insomnia 2012          Iron deficiency anemia     No Comments Provided     Neuromuscular dysfunction of bladder     No Comments Provided     Osteoarthritis     No Comments Provided     Other shoulder lesions, right shoulder 03/10/2017          Other shoulder lesions, unspecified shoulder     No Comments Provided     Other specified postprocedural states 2017          Personal history of other medical treatment (CODE)     Three childbirths     Plantar fascial fibromatosis     No Comments Provided     Presence of aortocoronary bypass graft 2013    [facility]     Primary osteoarthritis of shoulder 03/10/2017          Type 2 diabetes mellitus without complications (H)     No Comments Provided     Urgency of urination 2011          Uterovaginal prolapse     No Comments Provided     Past Surgical History:   Procedure Laterality Date     ARTHROSCOPY SHOULDER Right           ARTHROSCOPY SHOULDER Left           ARTHROSCOPY SHOULDER RT/LT Right [id number]          Cardiac Bypass  2013          COLONOSCOPY  2009     11/24/2009     CYSTOSCOPY  03/15/2001     DENTAL SURGERY      Dental extractions     ENDOSCOPIC RETROGRADE CHOLANGIOPANCREATOGRAPHY  05/2008    Bile leak with ERCP and stenting and drainage of bile collection through abdominal wall.     HYSTERECTOMY TOTAL ABDOMINAL, BILATERAL SALPINGO-OOPHORECTOMY, COMBINED  04/02/2001    Vag vault suspension with Cortex mesh     Interstim Device Placement  04/14/2014    Dr. Bowling - ED     LAMINECTOMY LUMBAR ONE LEVEL  1997          LAPAROSCOPIC CHOLECYSTECTOMY  04/2008          LAPAROSCOPIC TUBAL LIGATION      No Comments Provided       Anesthesia Evaluation     . Pt has had prior anesthetic.     No history of anesthetic complications          ROS/MED HX    ENT/Pulmonary:  - neg pulmonary ROS     Neurologic:  - neg neurologic ROS     Cardiovascular:     (+) hypertension--CAD, -CABG-. : . . . :. .       METS/Exercise Tolerance:  3 - Able to walk 1-2 blocks without stopping   Hematologic:  - neg hematologic  ROS       Musculoskeletal:  - neg musculoskeletal ROS       GI/Hepatic:     (+) GERD Asymptomatic on medication,       Renal/Genitourinary:     (+) chronic renal disease, type: CRI, Pt does not require dialysis, Pt has no history of transplant,       Endo:     (+) type II DM Obesity, .      Psychiatric:  - neg psychiatric ROS       Infectious Disease:  - neg infectious disease ROS       Malignancy:      - no malignancy   Other:    - neg other ROS                      Physical Exam  Normal systems: cardiovascular and pulmonary    Airway   Mallampati: I  TM distance: >3 FB  Neck ROM: full    Dental   (+) upper dentures    Cardiovascular   Rhythm and rate: regular and normal      Pulmonary    breath sounds clear to auscultation            Lab Results   Component Value Date    HGB 12.1 04/07/2017    HCT 37.2 09/30/2016     09/30/2016     04/03/2019    POTASSIUM 4.0 04/03/2019    CHLORIDE 109 (H) 04/03/2019    CO2 25 04/03/2019    BUN 20 04/03/2019    CR 1.95 (H)  "04/03/2019     (H) 04/03/2019    ELICIA 9.2 04/03/2019    PHOS 3.0 06/02/2017    MAG 2.1 07/23/2013    ALBUMIN 3.8 04/03/2019    PROTTOTAL 6.8 04/03/2019    ALT 11 04/03/2019    AST 13 04/03/2019    ALKPHOS 96 04/03/2019    BILITOTAL 0.4 04/03/2019    PTT 24 (L) 07/23/2013    INR 1.0 07/23/2013       Preop Vitals  BP Readings from Last 3 Encounters:   05/07/19 142/73   04/05/19 104/52   03/22/19 118/62    Pulse Readings from Last 3 Encounters:   05/07/19 63   04/05/19 72   03/22/19 60      Resp Readings from Last 3 Encounters:   05/07/19 18   04/05/19 18   03/22/19 14    SpO2 Readings from Last 3 Encounters:   05/07/19 97%   04/07/17 98%   09/14/15 96%      Temp Readings from Last 1 Encounters:   05/07/19 97.3  F (36.3  C) (Tympanic)    Ht Readings from Last 1 Encounters:   04/05/19 1.626 m (5' 4\")      Wt Readings from Last 1 Encounters:   05/07/19 94.8 kg (209 lb)    Estimated body mass index is 35.87 kg/m  as calculated from the following:    Height as of 4/5/19: 1.626 m (5' 4\").    Weight as of this encounter: 94.8 kg (209 lb).       Anesthesia Plan      History & Physical Review      ASA Status:  3 .    NPO Status:  > 6 hours    Plan for MAC with Intravenous induction.          Postoperative Care      Consents  Anesthetic plan, risks, benefits and alternatives discussed with:  Patient..                 BRITNEY SUAREZ CRNA  "

## 2019-05-07 NOTE — H&P
Type of Note  Pre-op    HPI  The patient is a 69 year old female who plans to undergo surgery.    Past Medical History  She  has a past medical history of Atherosclerotic heart disease of native coronary artery without angina pectoris, Cardiac murmur, Chronic kidney disease, stage III (moderate) (H), Controlled type 2 diabetes mellitus with stage 3 chronic kidney disease, with long-term current use of insulin (H) (7/26/2013), Edema, Essential (primary) hypertension, Gastro-esophageal reflux disease without esophagitis, Hyperlipidemia, Impingement syndrome of right shoulder (03/10/2017), Insomnia (01/25/2012), Iron deficiency anemia, Neuromuscular dysfunction of bladder, Osteoarthritis, Other shoulder lesions, right shoulder (03/10/2017), Other shoulder lesions, unspecified shoulder, Other specified postprocedural states (04/19/2017), Personal history of other medical treatment (CODE), Plantar fascial fibromatosis, Presence of aortocoronary bypass graft (07/2013), Primary osteoarthritis of shoulder (03/10/2017), Type 2 diabetes mellitus without complications (H), Urgency of urination (03/08/2011), and Uterovaginal prolapse.  Patient Active Problem List   Diagnosis     AC (acromioclavicular) joint arthritis     Coronary atherosclerosis     CKD (chronic kidney disease) stage 4, GFR 15-29 ml/min (H)     GERD (gastroesophageal reflux disease)     Nonrheumatic aortic valve stenosis     Hyperlipidemia     Hypertension     Impingement syndrome of right shoulder     Insomnia     Long-term insulin use in type 2 diabetes (H)     Urge incontinence     Right rotator cuff tendinitis     Vitamin D deficiency     Obesity     Light chain disease, kappa type (H)     Hyperparathyroidism due to renal insufficiency (H)     Hiatal hernia     Atherosclerotic heart disease of native coronary artery with other forms of angina pectoris (H)     Obesity (BMI 35.0-39.9) with comorbidity (H)       Past Surgical History  She  has a past surgical  history that includes Laminectomy lumbar one level (1997); Laparoscopic tubal ligation; Dental surgery; Arthroscopy shoulder (Right, 1997); Arthroscopy shoulder (Left, 2012); Hysterectomy total abdominal, bilateral salpingo-oophorectomy, combined (04/02/2001); Cystoscopy (03/15/2001); Laparoscopic cholecystectomy (04/2008); ercp (05/2008); Cardiac Bypass (07/2013); Interstim Device Placement (04/14/2014); arthroscopy shoulder rt/lt (Right, [id number]); and colonoscopy (11/24/2009).    Medications    Current Facility-Administered Medications:      ampicillin (OMNIPEN) 2 g vial to attach to  ml bag, 2 g, Intravenous, Pre-Op/Pre-procedure x 1 dose, Honorio Bowling MD     gentamicin (GARAMYCIN) 350 mg in sodium chloride 0.9 % 100 mL intermittent infusion, 5 mg/kg (Adjusted), Intravenous, Pre-Op/Pre-procedure x 1 dose, Honorio Bowling MD     lidocaine (LMX4) cream, , Topical, Q1H PRN, Edd Molina APRN CRNA     lidocaine 1 % 0.1-1 mL, 0.1-1 mL, Other, Q1H PRN, Edd Molina APRN CRNA     sodium chloride (PF) 0.9% PF flush 3 mL, 3 mL, Intracatheter, q1 min prn, Edd Molina APRN CRNA     sodium chloride (PF) 0.9% PF flush 3 mL, 3 mL, Intracatheter, Q8H, Edd Molina APRN CRNA     sodium chloride 0.9% infusion, , Intravenous, Continuous, Edd Molina APRN CRNA      Allergies  No Known Allergies    Social History  She  reports that she has never smoked. She has never used smokeless tobacco. She reports that she does not drink alcohol or use drugs.  No drug abuse.    Family History  Family History   Problem Relation Age of Onset     Other - See Comments Father         MI     Cancer Mother         Cancer,Some type of cancer, aneurysm     Diabetes Paternal Grandmother         Diabetes,Type II     Cancer Sister         Cancer,unknown type     Diabetes Sister         Diabetes,Type II     Diabetes Sister         Diabetes,Type II     Diabetes Sister         Diabetes,Type II     Other - See Comments Daughter          depression     Other - See Comments Daughter         ovarian cancer and depression     Heart Disease Brother         Heart Disease,CAD, MI       Review of Systems  I personally reviewed the ROS with the patient.    Unintentional weight loss:  No  ecent fever/chills: No   Night sweats: No   Current skin rash: No   Recent hair loss: No   Heat intolerance: No   Cold intolerance: No   Chest pain: No   Palpitations: No   Shortness of breath: No  Wheezing: No   Constipation: No   Diarrhea: No   Nausea: No    Vomiting: No   Kidney/side pain: No    Back pain: No  Frequent headaches: No  Dizziness: No   Leg swelling: No   Calf pain: No    Physical Exam  Vitals:    05/07/19 0701   BP: 142/73   Pulse: 63   Resp: 18   Temp: 97.3  F (36.3  C)   TempSrc: Tympanic   SpO2: 97%   Weight: 94.8 kg (209 lb)     Constitutional: NAD, WDWN.   Head: NCAT  Eyes: Conjunctivae normal  Cardiovascular: Regular rate and rhythm  Pulmonary/Chest: Clear to auscultation bilaterally  Abdominal: Soft. No distension, tenderness, masses or guarding.  Neurological: A + O x 3.  Cranial Nerves II-XII grossly intact.  Extremities: BETTE x 4, Warm. No clubbing.  No cyanosis.    Skin: Pink, warm and dry.  No rashes noted.  Psychiatric:  Normal mood and affect    Assessment & Plan  The patient is a 69 year old female planning to undergo surgery.

## 2019-05-07 NOTE — DISCHARGE INSTRUCTIONS
[facility]  Adult Discharge Orders & Instructions    ________________________________________________________________          For 12 hours after surgery  1. Get plenty of rest.  A responsible adult must stay with you for at least 12 hours after you leave the hospital.   2. You may feel lightheaded.  IF so, sit for a few minutes before standing.  Have someone help you get up.   3. You may have a slight fever. Call the doctor if your fever is over 101 F (38.3 C) (taken under the tongue) or lasts longer than 24 hours.  4. You may have a dry mouth, a sore throat, muscle aches or trouble sleeping.  These should go away after 24 hours.  5. Do not make important or legal decisions.  6.   Do not drive or use heavy equipment.  If you have weakness or tingling, don't drive or use heavy equipment until this feeling goes away.    To contact a doctor, call   [phone]_______________________

## 2019-05-07 NOTE — ANESTHESIA CARE TRANSFER NOTE
Patient: Bailey Sierra    Procedure(s):  Interstim Implant Revision, GENERATOR AND TYING LEAD EXCHANGE    Diagnosis: need for revision of interstim  Diagnosis Additional Information: No value filed.    Anesthesia Type:   MAC     Note:  Airway :Nasal Cannula  Patient transferred to:Phase II  Handoff Report: Identifed the Patient, Identified the Reponsible Provider, Reviewed the pertinent medical history, Discussed the surgical course, Reviewed Intra-OP anesthesia mangement and issues during anesthesia, Set expectations for post-procedure period and Allowed opportunity for questions and acknowledgement of understanding      Vitals: (Last set prior to Anesthesia Care Transfer)    CRNA VITALS  5/7/2019 0833 - 5/7/2019 0908      5/7/2019             Resp Rate (set):  10                Electronically Signed By: BRITNEY SUAREZ CRNA  May 7, 2019  9:08 AM

## 2019-05-07 NOTE — ANESTHESIA POSTPROCEDURE EVALUATION
Patient: Bailey Sierra    Procedure(s):  Interstim Implant Revision, GENERATOR AND TYING LEAD EXCHANGE    Diagnosis:need for revision of interstim  Diagnosis Additional Information: No value filed.    Anesthesia Type:  MAC    Note:  Anesthesia Post Evaluation    Patient location during evaluation: Phase 2  Patient participation: Able to fully participate in evaluation  Level of consciousness: awake and alert  Pain management: adequate  Airway patency: patent  Cardiovascular status: acceptable  Respiratory status: acceptable  Hydration status: acceptable  PONV: none     Anesthetic complications: None          Last vitals:  Vitals:    05/07/19 0930 05/07/19 0945 05/07/19 1000   BP: 127/56 147/67 144/66   Pulse: 56 58 51   Resp:      Temp:      SpO2: 99% 99% 93%         Electronically Signed By: BRITNEY CLAIRE CRNA  May 7, 2019  10:24 AM

## 2019-05-09 ENCOUNTER — NURSE TRIAGE (OUTPATIENT)
Dept: FAMILY MEDICINE | Facility: OTHER | Age: 69
End: 2019-05-09

## 2019-05-09 ENCOUNTER — TELEPHONE (OUTPATIENT)
Dept: FAMILY MEDICINE | Facility: OTHER | Age: 69
End: 2019-05-09

## 2019-05-09 DIAGNOSIS — R11.0 NAUSEA: Primary | ICD-10-CM

## 2019-05-09 RX ORDER — ONDANSETRON 4 MG/1
4 TABLET, FILM COATED ORAL EVERY 8 HOURS PRN
Qty: 30 TABLET | Refills: 3 | Status: SHIPPED | OUTPATIENT
Start: 2019-05-09 | End: 2019-10-04

## 2019-05-09 NOTE — TELEPHONE ENCOUNTER
Pt had a procedure with RTN on Tuesday.  She has an upset stomach.  Would like to discuss.  Sending to provider as RTN is out of the office this afternoon

## 2019-05-09 NOTE — TELEPHONE ENCOUNTER
Bailey called and is very nauseas, wants [facility] to fill a medication to help with this. Please call her with any questions

## 2019-05-09 NOTE — TELEPHONE ENCOUNTER
Contacted pt and relayed that Rx for Zofran was sent to [facility] and to call back with any questions/concerns or present to ED with worsening of symptoms.     Pt appreciative, verbalized understanding, and in agreement with plan.    Gris Allen RN  ....................  5/9/2019   4:55 PM    +

## 2019-05-09 NOTE — TELEPHONE ENCOUNTER
Pt states she had started to experience nausea shortly after leaving [facility] after interstim implant revision, generator and tying lead exchange procedure on 5/7/2019.  Pt states she vomited 2 x once she arrived home 5/7/2019 but denies vomiting since.  Pt denies diarrhea, dizziness, headache, bloating, weakness, fever, SOB.  Pt states she has been able to drink and tolerate fluids and has normal urine output several times in a 12 hour period.  Pt states she has also had normal BM's since procedure.  Pt states that she feels less nauseated while lying down and also when she has on looser fitted pants.  Eating food does not seem to alleviate nor exasperate nausea.  Denies having been around anybody who is ill.     Pt verified no known allergies and would use [facility] if needed.      Pt will have cell phone on her and is getting ready to leave for [facility].    Will route to [facility] for direction    Gris Allen RN  ....................  5/9/2019   3:41 PM

## 2019-05-09 NOTE — TELEPHONE ENCOUNTER
This is being addressed in triage encounter.      Gris Allen RN  ....................  5/9/2019   4:34 PM

## 2019-05-13 NOTE — TELEPHONE ENCOUNTER
CMN completed by PCP, notes with CMN faxed to [facility].  Patient notified.    Hazel Del Rio RN, BSN, CDE  5/13/2019 4:53 PM

## 2019-05-23 ENCOUNTER — OFFICE VISIT (OUTPATIENT)
Dept: UROLOGY | Facility: OTHER | Age: 69
End: 2019-05-23
Attending: UROLOGY
Payer: MEDICARE

## 2019-05-23 VITALS
TEMPERATURE: 97.5 F | DIASTOLIC BLOOD PRESSURE: 62 MMHG | WEIGHT: 206.2 LBS | RESPIRATION RATE: 16 BRPM | HEART RATE: 64 BPM | SYSTOLIC BLOOD PRESSURE: 144 MMHG | BODY MASS INDEX: 35.39 KG/M2

## 2019-05-23 DIAGNOSIS — N39.41 URGE INCONTINENCE: Primary | ICD-10-CM

## 2019-05-23 PROCEDURE — 99024 POSTOP FOLLOW-UP VISIT: CPT | Performed by: UROLOGY

## 2019-05-23 PROCEDURE — G0463 HOSPITAL OUTPT CLINIC VISIT: HCPCS

## 2019-05-23 ASSESSMENT — PAIN SCALES - GENERAL: PAINLEVEL: EXTREME PAIN (8)

## 2019-05-23 NOTE — PROGRESS NOTES
"Type of Visit  Established    HPI  Ms. Sierra is a 69 year old female s/p Interstim implant 2 weeks ago  The placement and post-operative course were uncomplicated.  She reports excellent management of her urinary symptoms.    Review of Systems  I reviewed the ROS with the patient.    Nursing Notes:   Judith Cortez LPN  5/23/2019 11:49 AM  Signed  Chief Complaint   Patient presents with     Follow Up     wound check,  interstim replacement       Initial /62 (BP Location: Right arm, Patient Position: Sitting, Cuff Size: Adult Regular)   Pulse 64   Temp 97.5  F (36.4  C) (Tympanic)   Resp 16   Wt 93.5 kg (206 lb 3.2 oz)   Breastfeeding? No   BMI 35.39 kg/m    Estimated body mass index is 35.39 kg/m  as calculated from the following:    Height as of 4/5/19: 1.626 m (5' 4\").    Weight as of this encounter: 93.5 kg (206 lb 3.2 oz).  Medication Reconciliation: Completed     Review of Systems:    Weight loss:    No     Recent fever/chills:  No   Night sweats:   No  Current skin rash:  No   Recent hair loss:  No  Heat intolerance:  No   Cold intolerance:  No  Chest pain:   No   Palpitations:   No  Shortness of breath:  No   Wheezing:   No  Constipation:    No   Diarrhea:   No   Nausea:   No   Vomiting:   No   Kidney/side pain:  No   Back pain:   Yes  Frequent headaches:  No   Dizziness:     No  Leg swelling:   No   Calf pain:    No        Judith Cortez LPN    Family History  Family History   Problem Relation Age of Onset     Other - See Comments Father         MI     Cancer Mother         Cancer,Some type of cancer, aneurysm     Diabetes Paternal Grandmother         Diabetes,Type II     Cancer Sister         Cancer,unknown type     Diabetes Sister         Diabetes,Type II     Diabetes Sister         Diabetes,Type II     Diabetes Sister         Diabetes,Type II     Other - See Comments Daughter         depression     Other - See Comments Daughter         ovarian cancer and depression     Heart Disease " Brother         Heart Disease,CAD, MI       Physical Exam  Vitals:    05/23/19 1126   BP: 144/62   BP Location: Right arm   Patient Position: Sitting   Cuff Size: Adult Regular   Pulse: 64   Resp: 16   Temp: 97.5  F (36.4  C)   TempSrc: Tympanic   Weight: 93.5 kg (206 lb 3.2 oz)     Constitutional: NAD, WDWN.  Cardiovascular: Regular rate.  Pulmonary/Chest: Respirations are even and non-labored bilaterally.  Abdominal: Soft. No distension, tenderness, masses or guarding. No CVA tenderness.  Extremities: BETTE x 4, Warm. No clubbing.  No cyanosis.    Skin: Pink, warm and dry.  No rashes noted.  Incision without signs of infection, healing well    Assessment  Ms. Sierra is a 69 year old female s/p Interstim implant 2 weeks ago.    Plan  Follow up annually

## 2019-05-23 NOTE — NURSING NOTE
"Chief Complaint   Patient presents with     Follow Up     wound check,  interstim replacement       Initial /62 (BP Location: Right arm, Patient Position: Sitting, Cuff Size: Adult Regular)   Pulse 64   Temp 97.5  F (36.4  C) (Tympanic)   Resp 16   Wt 93.5 kg (206 lb 3.2 oz)   Breastfeeding? No   BMI 35.39 kg/m   Estimated body mass index is 35.39 kg/m  as calculated from the following:    Height as of 4/5/19: 1.626 m (5' 4\").    Weight as of this encounter: 93.5 kg (206 lb 3.2 oz).  Medication Reconciliation: Completed     Review of Systems:    Weight loss:    No     Recent fever/chills:  No   Night sweats:   No  Current skin rash:  No   Recent hair loss:  No  Heat intolerance:  No   Cold intolerance:  No  Chest pain:   No   Palpitations:   No  Shortness of breath:  No   Wheezing:   No  Constipation:    No   Diarrhea:   No   Nausea:   No   Vomiting:   No   Kidney/side pain:  No   Back pain:   Yes  Frequent headaches:  No   Dizziness:     No  Leg swelling:   No   Calf pain:    No        Judith Cortez LPN  "

## 2019-06-25 DIAGNOSIS — Z79.4 TYPE 2 DIABETES MELLITUS WITHOUT COMPLICATION, WITH LONG-TERM CURRENT USE OF INSULIN (H): ICD-10-CM

## 2019-06-25 DIAGNOSIS — E11.9 TYPE 2 DIABETES MELLITUS WITHOUT COMPLICATION, WITH LONG-TERM CURRENT USE OF INSULIN (H): ICD-10-CM

## 2019-06-26 RX ORDER — INSULIN GLARGINE 100 [IU]/ML
42 INJECTION, SOLUTION SUBCUTANEOUS AT BEDTIME
Qty: 60 ML | Refills: 1 | Status: SHIPPED | OUTPATIENT
Start: 2019-06-26 | End: 2019-08-27

## 2019-06-26 NOTE — TELEPHONE ENCOUNTER
Chart review shows that Rx as requested is historical on patient's active med list as follows:    Outpatient Medication Detail      Disp Refills Start End YUAN   insulin glargine (BASAGLAR KWIKPEN) 100 UNIT/ML pen 60 mL 11 4/5/2019  No   Sig - Route: Inject 42 Units Subcutaneous At Bedtime Adjusts according to chemstrip - Subcutaneous   Class: Historical   Order: [id number]   Printout Tracking     External Result Report   Medication Administration Instructions     Adjusts according to chemstrip   Pharmacy     [facility] - [place], [place] - [address]   [facility]     However, writer does note that PCP did order Rx as noted above as he was the one to sign order. Writer will refill Rx as requested for a 6 month supply at this time.    Prescription refilled per RN Medication Refill Policy..................Phan Wright 6/26/2019 3:48 PM

## 2019-07-03 ENCOUNTER — ALLIED HEALTH/NURSE VISIT (OUTPATIENT)
Dept: FAMILY MEDICINE | Facility: OTHER | Age: 69
End: 2019-07-03
Attending: FAMILY MEDICINE
Payer: COMMERCIAL

## 2019-07-03 DIAGNOSIS — I10 ESSENTIAL HYPERTENSION: Primary | ICD-10-CM

## 2019-07-03 NOTE — PROGRESS NOTES
Pt here for BP check due to letter she received for a BP check . Pt last BP was elevated and she said she was going to have surgery this is why . Explained optimal management of her BP readings looked at as a number not as what being seen for as other blood pressures are in low ranges. Today BP is 120/60 with pulse 72. No reports of concern by pt. patient left ambulatory. Piedad Berger RN on 7/3/2019 at 11:08 AM

## 2019-07-15 ENCOUNTER — OFFICE VISIT (OUTPATIENT)
Dept: FAMILY MEDICINE | Facility: OTHER | Age: 69
End: 2019-07-15
Attending: FAMILY MEDICINE
Payer: COMMERCIAL

## 2019-07-15 VITALS
OXYGEN SATURATION: 97 % | BODY MASS INDEX: 35 KG/M2 | WEIGHT: 205 LBS | SYSTOLIC BLOOD PRESSURE: 130 MMHG | TEMPERATURE: 98.1 F | HEART RATE: 56 BPM | DIASTOLIC BLOOD PRESSURE: 64 MMHG | HEIGHT: 64 IN | RESPIRATION RATE: 18 BRPM

## 2019-07-15 DIAGNOSIS — R07.9 CHEST PAIN, UNSPECIFIED TYPE: Primary | ICD-10-CM

## 2019-07-15 DIAGNOSIS — I35.0 NONRHEUMATIC AORTIC VALVE STENOSIS: ICD-10-CM

## 2019-07-15 DIAGNOSIS — R06.9 ABNORMALITY OF BREATHING: ICD-10-CM

## 2019-07-15 DIAGNOSIS — Z79.4 TYPE 2 DIABETES MELLITUS WITH COMPLICATION, WITH LONG-TERM CURRENT USE OF INSULIN (H): ICD-10-CM

## 2019-07-15 DIAGNOSIS — E11.8 TYPE 2 DIABETES MELLITUS WITH COMPLICATION, WITH LONG-TERM CURRENT USE OF INSULIN (H): ICD-10-CM

## 2019-07-15 DIAGNOSIS — I25.118 ATHEROSCLEROTIC HEART DISEASE OF NATIVE CORONARY ARTERY WITH OTHER FORMS OF ANGINA PECTORIS (H): ICD-10-CM

## 2019-07-15 LAB
ALBUMIN SERPL-MCNC: 3.6 G/DL (ref 3.5–5.7)
ALP SERPL-CCNC: 96 U/L (ref 34–104)
ALT SERPL W P-5'-P-CCNC: 12 U/L (ref 7–52)
ANION GAP SERPL CALCULATED.3IONS-SCNC: 7 MMOL/L (ref 3–14)
AST SERPL W P-5'-P-CCNC: 12 U/L (ref 13–39)
BILIRUB SERPL-MCNC: 0.4 MG/DL (ref 0.3–1)
BUN SERPL-MCNC: 23 MG/DL (ref 7–25)
CALCIUM SERPL-MCNC: 9 MG/DL (ref 8.6–10.3)
CHLORIDE SERPL-SCNC: 110 MMOL/L (ref 98–107)
CO2 SERPL-SCNC: 23 MMOL/L (ref 21–31)
CREAT SERPL-MCNC: 2.13 MG/DL (ref 0.6–1.2)
D DIMER PPP DDU-MCNC: <200 NG/ML D-DU (ref 0–230)
GFR SERPL CREATININE-BSD FRML MDRD: 23 ML/MIN/{1.73_M2}
GLUCOSE SERPL-MCNC: 143 MG/DL (ref 70–105)
NT-PROBNP SERPL-MCNC: 143 PG/ML (ref 0–100)
POTASSIUM SERPL-SCNC: 3.3 MMOL/L (ref 3.5–5.1)
PROT SERPL-MCNC: 6 G/DL (ref 6.4–8.9)
SODIUM SERPL-SCNC: 140 MMOL/L (ref 134–144)
TROPONIN I SERPL-MCNC: <0.03 UG/L (ref 0–0.03)

## 2019-07-15 PROCEDURE — 93005 ELECTROCARDIOGRAM TRACING: CPT | Performed by: FAMILY MEDICINE

## 2019-07-15 PROCEDURE — 85379 FIBRIN DEGRADATION QUANT: CPT | Mod: ZL | Performed by: FAMILY MEDICINE

## 2019-07-15 PROCEDURE — 80053 COMPREHEN METABOLIC PANEL: CPT | Mod: ZL | Performed by: FAMILY MEDICINE

## 2019-07-15 PROCEDURE — G0463 HOSPITAL OUTPT CLINIC VISIT: HCPCS

## 2019-07-15 PROCEDURE — 36415 COLL VENOUS BLD VENIPUNCTURE: CPT | Mod: ZL | Performed by: FAMILY MEDICINE

## 2019-07-15 PROCEDURE — 93010 ELECTROCARDIOGRAM REPORT: CPT | Performed by: INTERNAL MEDICINE

## 2019-07-15 PROCEDURE — 99215 OFFICE O/P EST HI 40 MIN: CPT | Performed by: FAMILY MEDICINE

## 2019-07-15 PROCEDURE — 83880 ASSAY OF NATRIURETIC PEPTIDE: CPT | Mod: ZL | Performed by: FAMILY MEDICINE

## 2019-07-15 PROCEDURE — 84484 ASSAY OF TROPONIN QUANT: CPT | Mod: ZL | Performed by: FAMILY MEDICINE

## 2019-07-15 ASSESSMENT — PAIN SCALES - GENERAL: PAINLEVEL: WORST PAIN (10)

## 2019-07-15 ASSESSMENT — MIFFLIN-ST. JEOR: SCORE: 1434.87

## 2019-07-15 NOTE — NURSING NOTE
Patient presents today for right shoulder and neck pain. Patient reports going to the chiropractor with no improvement.   Medication Reconciliation Complete  Previous A1C is at goal of <8  Lab Results   Component Value Date    A1C 6.5 04/03/2019    A1C 6.6 09/26/2018    A1C 7.0 06/20/2018     Urine microalbumin:creatine:    Foot exam 09/28/18  Eye exam 11/16/18    Tobacco User no  Patient is on a daily aspirin  Patient is on a Statin.  Blood pressure today of:     BP Readings from Last 1 Encounters:   07/15/19 130/64      is at the goal of <139/89 for diabetics.    Noemy Holt LPN on 7/15/2019 at 11:36 AM

## 2019-07-15 NOTE — PROGRESS NOTES
SUBJECTIVE:  Bailey Sierra is a 69 year old female here with a past medical history significant for type 2 diabetes, hypertension, chronic kidney disease.  She has a history of coronary artery disease with two-vessel bypass in 2013 and medical management for her RCA disease she had atypical chest pain in 2016 which was worrisome for lateral ischemia, with angiogram in October 2016 showed no new occlusive disease.    She reports over the last couple weeks she has had dyspnea on exertion, chest pain, neck pain radiating to her shoulders worsened on her right side.  She has been seen by a chiropractor which was not helpful.  She has also tried some nitro at home with mild to moderate improvement of her symptoms.  Her symptoms have been fairly consistent and she also feels short of breath with sitting.      Patient Active Problem List    Diagnosis Date Noted     Atherosclerotic heart disease of native coronary artery with other forms of angina pectoris (H) 09/28/2018     Priority: Medium     Obesity (BMI 35.0-39.9) with comorbidity (H) 09/28/2018     Priority: Medium     CKD (chronic kidney disease) stage 4, GFR 15-29 ml/min (H) 01/17/2018     Priority: Medium     Nonrheumatic aortic valve stenosis 01/17/2018     Priority: Medium     Hyperlipidemia 01/17/2018     Priority: Medium     Hypertension 01/17/2018     Priority: Medium     AC (acromioclavicular) joint arthritis 03/10/2017     Priority: Medium     Impingement syndrome of right shoulder 03/10/2017     Priority: Medium     Right rotator cuff tendinitis 03/10/2017     Priority: Medium     Long-term insulin use in type 2 diabetes (H) 02/13/2017     Priority: Medium     Light chain disease, 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  Per Dr. Ortiz, if hypoalbuminemia not improving by Nov-Dec 2016, needs referral to Hematology.        Vitamin D deficiency 10/23/2016     Priority: Medium     Hyperparathyroidism  due to renal insufficiency (H) 03/28/2016     Priority: Medium     Coronary atherosclerosis 09/03/2013     Priority: Medium     Overview:   2 vessel bypass, August 2013       Obesity 07/26/2013     Priority: Medium     Hiatal hernia 07/26/2013     Priority: Medium     GERD (gastroesophageal reflux disease) 01/08/2013     Priority: Medium     Urge incontinence 10/25/2012     Priority: Medium     Insomnia 01/25/2012     Priority: Medium       Past Medical History:   Diagnosis Date     Atherosclerotic heart disease of native coronary artery without angina pectoris     No Comments Provided     Cardiac murmur     No Comments Provided     Chronic kidney disease, stage III (moderate) (H)     No Comments Provided     Controlled type 2 diabetes mellitus with stage 3 chronic kidney disease, with long-term current use of insulin (H) 7/26/2013     Edema     No Comments Provided     Essential (primary) hypertension     No Comments Provided     Gastro-esophageal reflux disease without esophagitis     No Comments Provided     Hyperlipidemia     No Comments Provided     Impingement syndrome of right shoulder 03/10/2017          Insomnia 01/25/2012          Iron deficiency anemia     No Comments Provided     Neuromuscular dysfunction of bladder     No Comments Provided     Osteoarthritis     No Comments Provided     Other shoulder lesions, right shoulder 03/10/2017          Other shoulder lesions, unspecified shoulder     No Comments Provided     Other specified postprocedural states 04/19/2017          Personal history of other medical treatment (CODE)     Three childbirths     Plantar fascial fibromatosis     No Comments Provided     Presence of aortocoronary bypass graft 07/2013    [facility]     Primary osteoarthritis of shoulder 03/10/2017          Type 2 diabetes mellitus without complications (H)     No Comments Provided     Urgency of urination 03/08/2011          Uterovaginal prolapse     No Comments Provided       Past  Surgical History:   Procedure Laterality Date     ARTHROSCOPY SHOULDER Right 1997          ARTHROSCOPY SHOULDER Left 2012          ARTHROSCOPY SHOULDER RT/LT Right [id number]          Cardiac Bypass  07/2013          COLONOSCOPY  11/24/2009 11/24/2009     CYSTOSCOPY  03/15/2001     DENTAL SURGERY      Dental extractions     ENDOSCOPIC RETROGRADE CHOLANGIOPANCREATOGRAPHY  05/2008    Bile leak with ERCP and stenting and drainage of bile collection through abdominal wall.     HYSTERECTOMY TOTAL ABDOMINAL, BILATERAL SALPINGO-OOPHORECTOMY, COMBINED  04/02/2001    Vag vault suspension with Cortex mesh     IMPLANT STIMULATOR AND LEADS SACRAL NERVE (STAGE ONE AND TWO) N/A 5/7/2019    Procedure: Interstim Implant Revision, GENERATOR AND TYING LEAD EXCHANGE;  Surgeon: Honorio Bowling MD;  Location: [facility] OR     Interstim Device Placement  04/14/2014    Dr. Bowling [facility]     LAMINECTOMY LUMBAR ONE LEVEL  1997          LAPAROSCOPIC CHOLECYSTECTOMY  04/2008          LAPAROSCOPIC TUBAL LIGATION      No Comments Provided       Current Outpatient Medications   Medication Sig Dispense Refill     aspirin 81 MG tablet Take 1 tablet (81 mg) by mouth daily 30 tablet      blood glucose monitoring (TRICIA CONTOUR) test strip TEST 3 TIMES A  each 3     Calcium Carb-Cholecalciferol (CALCIUM PLUS D3 ABSORBABLE) 600-2500 MG-UNIT CAPS Take 1 capsule by mouth daily       esomeprazole (NEXIUM) 40 MG DR capsule Take 1 capsule (40 mg) by mouth daily Before a meal. 90 capsule 2     furosemide (LASIX) 20 MG tablet Take 20 mg by mouth every morning       HYDROcodone-acetaminophen (NORCO) 5-325 MG tablet Take 1 tablet by mouth every 4-6 hours prn pain 12 tablet 0     insulin aspart (NOVOLOG FLEXPEN) 100 UNIT/ML injection Inject 7 - 16 units before each meal based on sliding scale.  Max daily dose 56 units. 60 mL 3     insulin glargine (BASAGLAR KWIKPEN) 100 UNIT/ML pen Inject 42 Units Subcutaneous At Bedtime Adjusts according to chemstrip 60 mL 1      "insulin pen needle (ULTICARE SHORT) 31G X 8 MM USE AS DIRECTED FOUR TIMES DAILY. Dx: E11.9 400 each 3     isosorbide mononitrate (IMDUR) 60 MG 24 hr tablet Take 2 tablets (120 mg) by mouth daily       lisinopril (PRINIVIL/ZESTRIL) 2.5 MG tablet Take 1 tablet (2.5 mg) by mouth daily 90 tablet 3     metoprolol succinate ER (TOPROL-XL) 25 MG 24 hr tablet Take 1 tablet (25 mg) by mouth daily 90 tablet 1     nitroGLYcerin (NITROSTAT) 0.4 MG sublingual tablet Place 0.4 mg under the tongue every 5 minutes as needed for chest pain       ondansetron (ZOFRAN) 4 MG tablet Take 1 tablet (4 mg) by mouth every 8 hours as needed for nausea 30 tablet 3     potassium chloride SA (K-DUR/KLOR-CON M) 20 MEQ CR tablet TK 1 T PO QD. DO NOT CRUSH       rosuvastatin (CRESTOR) 40 MG tablet Take 40 mg by mouth daily 90 tablet 3     zolpidem (AMBIEN) 10 MG tablet Take 10 mg by mouth nightly as needed for sleep         Allergies:  No Known Allergies    Family History   Problem Relation Age of Onset     Other - See Comments Father         MI     Cancer Mother         Cancer,Some type of cancer, aneurysm     Diabetes Paternal Grandmother         Diabetes,Type II     Cancer Sister         Cancer,unknown type     Diabetes Sister         Diabetes,Type II     Diabetes Sister         Diabetes,Type II     Diabetes Sister         Diabetes,Type II     Other - See Comments Daughter         depression     Other - See Comments Daughter         ovarian cancer and depression     Heart Disease Brother         Heart Disease,CAD, MI       Social History     Tobacco Use     Smoking status: Never Smoker     Smokeless tobacco: Never Used   Substance Use Topics     Alcohol use: No     Alcohol/week: 0.0 oz     Drug use: No     Comment: Drug use: No       ROS:    As above otherwise ROS is unremarkable.      OBJECTIVE:  /64   Pulse 56   Temp 98.1  F (36.7  C)   Resp 18   Ht 1.626 m (5' 4\")   Wt 93 kg (205 lb)   SpO2 97%   BMI 35.19 kg/m  "     EXAM:  General Appearance: Pleasant, alert, appropriate appearance for age. No acute distress  Lungs: Normal chest wall and respirations. Clear to auscultation, no wheezes or crackles.  Cardiovascular: Regular rate and rhythm, with occasional extra beat noted.  No significant murmurs.  Musculoskeletal: No edema.    Results for orders placed or performed in visit on 07/15/19   Brain Natriuretic Peptide (BNP)   Result Value Ref Range    N-Terminal Pro Bnp 143 (H) 0 - 100 pg/mL   Comprehensive metabolic panel   Result Value Ref Range    Sodium 140 134 - 144 mmol/L    Potassium 3.3 (L) 3.5 - 5.1 mmol/L    Chloride 110 (H) 98 - 107 mmol/L    Carbon Dioxide 23 21 - 31 mmol/L    Anion Gap 7 3 - 14 mmol/L    Glucose 143 (H) 70 - 105 mg/dL    Urea Nitrogen 23 7 - 25 mg/dL    Creatinine 2.13 (H) 0.60 - 1.20 mg/dL    GFR Estimate 23 (L) >60 mL/min/[1.73_m2]    GFR Estimate If Black 28 (L) >60 mL/min/[1.73_m2]    Calcium 9.0 8.6 - 10.3 mg/dL    Bilirubin Total 0.4 0.3 - 1.0 mg/dL    Albumin 3.6 3.5 - 5.7 g/dL    Protein Total 6.0 (L) 6.4 - 8.9 g/dL    Alkaline Phosphatase 96 34 - 104 U/L    ALT 12 7 - 52 U/L    AST 12 (L) 13 - 39 U/L   Troponin I   Result Value Ref Range    Troponin I ES <0.030 0.000 - 0.034 ug/L   D-Dimer,Quantitative   Result Value Ref Range    D-Dimer ng/mL <200 0 - 230 ng/ml D-DU        ASSESSEMENT AND PLAN:    1. Chest pain, unspecified type    2. Atherosclerotic heart disease of native coronary artery with other forms of angina pectoris (H)    3. Nonrheumatic aortic valve stenosis    4. Type 2 diabetes mellitus with complication, with long-term current use of insulin (H)      EKG is performed, personally reviewed and shows normal sinus rhythm.  There is no new changes when compared to 2017.  Labs can be seen above.  These are all reassuring and makes an acute cardiac syndrome unlikely.  We recommend she follow-up with cardiology once again as she may benefit from repeat stress testing.    50 minutes  were spent with the patient, greater than 50% was in coordination of further care and counseling of the above medical problems.      Sukumar Leal MD  Family Medicine      This document was prepared using voice generated software.  While every attempt was made for accuracy, grammatical errors may exist.

## 2019-07-16 ENCOUNTER — TELEPHONE (OUTPATIENT)
Dept: FAMILY MEDICINE | Facility: OTHER | Age: 69
End: 2019-07-16

## 2019-07-16 DIAGNOSIS — I25.118 ATHEROSCLEROTIC HEART DISEASE OF NATIVE CORONARY ARTERY WITH OTHER FORMS OF ANGINA PECTORIS (H): Primary | ICD-10-CM

## 2019-07-16 NOTE — TELEPHONE ENCOUNTER
Left message for patient to call back. Is patient going to same cardiologist as before?    Noemy Holt LPN on 7/16/2019 at 9:52 AM

## 2019-07-16 NOTE — TELEPHONE ENCOUNTER
Patient states she needs a referral to see the heart doctor from [place] who comes to [place].  She has an appointment scheduled with him in [place] on 9/27/2019.        Please fax referral to:  [phone] and call patient to advise when completed    Thank you

## 2019-07-17 NOTE — TELEPHONE ENCOUNTER
Patient called back and states that she is seeing the same cardiologist, Dr. Arsalan Green at [facility] in [facility]. Since patient has not been there in over a year, they need a referral sent. Fax number is [phone]. Patient is aware that PCP is out until Friday.  Emilio Kelly LPN ..............7/17/2019 10:56 AM

## 2019-07-18 ENCOUNTER — HOSPITAL ENCOUNTER (EMERGENCY)
Facility: OTHER | Age: 69
Discharge: HOME | End: 2019-07-18
Attending: PHYSICIAN ASSISTANT | Admitting: PHYSICIAN ASSISTANT
Payer: MEDICARE

## 2019-07-18 ENCOUNTER — APPOINTMENT (OUTPATIENT)
Dept: GENERAL RADIOLOGY | Facility: OTHER | Age: 69
End: 2019-07-18
Attending: PHYSICIAN ASSISTANT
Payer: MEDICARE

## 2019-07-18 VITALS
TEMPERATURE: 97.2 F | HEIGHT: 65 IN | WEIGHT: 204 LBS | HEART RATE: 54 BPM | SYSTOLIC BLOOD PRESSURE: 152 MMHG | DIASTOLIC BLOOD PRESSURE: 55 MMHG | RESPIRATION RATE: 13 BRPM | OXYGEN SATURATION: 97 % | BODY MASS INDEX: 33.99 KG/M2

## 2019-07-18 DIAGNOSIS — K44.9 HIATAL HERNIA: ICD-10-CM

## 2019-07-18 DIAGNOSIS — N39.0 UTI (URINARY TRACT INFECTION): ICD-10-CM

## 2019-07-18 DIAGNOSIS — Z79.4 LONG-TERM INSULIN USE IN TYPE 2 DIABETES (H): ICD-10-CM

## 2019-07-18 DIAGNOSIS — K21.9 GERD (GASTROESOPHAGEAL REFLUX DISEASE): ICD-10-CM

## 2019-07-18 DIAGNOSIS — K29.70 GASTRITIS: ICD-10-CM

## 2019-07-18 DIAGNOSIS — N18.4 CKD (CHRONIC KIDNEY DISEASE) STAGE 4, GFR 15-29 ML/MIN (H): ICD-10-CM

## 2019-07-18 DIAGNOSIS — K29.70 GASTRITIS, PRESENCE OF BLEEDING UNSPECIFIED, UNSPECIFIED CHRONICITY, UNSPECIFIED GASTRITIS TYPE: Primary | ICD-10-CM

## 2019-07-18 DIAGNOSIS — R07.89 ATYPICAL CHEST PAIN: ICD-10-CM

## 2019-07-18 DIAGNOSIS — I50.9 CHF EXACERBATION (H): ICD-10-CM

## 2019-07-18 DIAGNOSIS — E11.9 LONG-TERM INSULIN USE IN TYPE 2 DIABETES (H): ICD-10-CM

## 2019-07-18 LAB
ALBUMIN SERPL-MCNC: 3.5 G/DL (ref 3.5–5.7)
ALBUMIN UR-MCNC: >299 MG/DL
ALP SERPL-CCNC: 93 U/L (ref 34–104)
ALT SERPL W P-5'-P-CCNC: 13 U/L (ref 7–52)
AMORPH CRY #/AREA URNS HPF: ABNORMAL /HPF
ANION GAP SERPL CALCULATED.3IONS-SCNC: 7 MMOL/L (ref 3–14)
APPEARANCE UR: CLEAR
AST SERPL W P-5'-P-CCNC: 11 U/L (ref 13–39)
BACTERIA #/AREA URNS HPF: ABNORMAL /HPF
BASOPHILS # BLD AUTO: 0 10E9/L (ref 0–0.2)
BASOPHILS NFR BLD AUTO: 0.4 %
BILIRUB SERPL-MCNC: 0.5 MG/DL (ref 0.3–1)
BILIRUB UR QL STRIP: ABNORMAL
BUN SERPL-MCNC: 17 MG/DL (ref 7–25)
CALCIUM SERPL-MCNC: 9.1 MG/DL (ref 8.6–10.3)
CASTS #/AREA URNS LPF: ABNORMAL /LPF (ref 0–2)
CHLORIDE SERPL-SCNC: 110 MMOL/L (ref 98–107)
CO2 SERPL-SCNC: 24 MMOL/L (ref 21–31)
COLOR UR AUTO: YELLOW
CREAT SERPL-MCNC: 2.04 MG/DL (ref 0.6–1.2)
D DIMER PPP DDU-MCNC: <200 NG/ML D-DU (ref 0–230)
DIFFERENTIAL METHOD BLD: ABNORMAL
EOSINOPHIL # BLD AUTO: 0.2 10E9/L (ref 0–0.7)
EOSINOPHIL NFR BLD AUTO: 2.9 %
ERYTHROCYTE [DISTWIDTH] IN BLOOD BY AUTOMATED COUNT: 15.7 % (ref 10–15)
GFR SERPL CREATININE-BSD FRML MDRD: ABNORMAL ML/MIN/{1.73_M2}
GLUCOSE SERPL-MCNC: 157 MG/DL (ref 70–105)
GLUCOSE UR STRIP-MCNC: NEGATIVE MG/DL
HCT VFR BLD AUTO: 36.7 % (ref 35–47)
HGB BLD-MCNC: 11.6 G/DL (ref 11.7–15.7)
HGB UR QL STRIP: ABNORMAL
IMM GRANULOCYTES # BLD: 0 10E9/L (ref 0–0.4)
IMM GRANULOCYTES NFR BLD: 0.2 %
INR PPP: 0.95 (ref 0–1.3)
KETONES UR STRIP-MCNC: NEGATIVE MG/DL
LACTATE SERPL-SCNC: 1.4 MMOL/L (ref 0.5–2.2)
LEUKOCYTE ESTERASE UR QL STRIP: NEGATIVE
LIPASE SERPL-CCNC: 10 U/L (ref 11–82)
LYMPHOCYTES # BLD AUTO: 1.4 10E9/L (ref 0.8–5.3)
LYMPHOCYTES NFR BLD AUTO: 24.6 %
MCH RBC QN AUTO: 28.3 PG (ref 26.5–33)
MCHC RBC AUTO-ENTMCNC: 31.6 G/DL (ref 31.5–36.5)
MCV RBC AUTO: 90 FL (ref 78–100)
MONOCYTES # BLD AUTO: 0.7 10E9/L (ref 0–1.3)
MONOCYTES NFR BLD AUTO: 11.8 %
NEUTROPHILS # BLD AUTO: 3.3 10E9/L (ref 1.6–8.3)
NEUTROPHILS NFR BLD AUTO: 60.1 %
NITRATE UR QL: NEGATIVE
NON-SQ EPI CELLS #/AREA URNS LPF: ABNORMAL /LPF
NT-PROBNP SERPL-MCNC: 178 PG/ML (ref 0–100)
PH UR STRIP: 5.5 PH (ref 5–9)
PLATELET # BLD AUTO: 163 10E9/L (ref 150–450)
POTASSIUM SERPL-SCNC: 3.6 MMOL/L (ref 3.5–5.1)
PROT SERPL-MCNC: 5.4 G/DL (ref 6.4–8.9)
RBC # BLD AUTO: 4.1 10E12/L (ref 3.8–5.2)
RBC #/AREA URNS AUTO: ABNORMAL /HPF
SODIUM SERPL-SCNC: 141 MMOL/L (ref 134–144)
SOURCE: ABNORMAL
SP GR UR STRIP: >1.03 (ref 1–1.03)
TROPONIN I SERPL-MCNC: <0.03 UG/L (ref 0–0.03)
TROPONIN I SERPL-MCNC: <0.03 UG/L (ref 0–0.03)
UROBILINOGEN UR STRIP-ACNC: 1 EU/DL (ref 0.2–1)
WBC # BLD AUTO: 5.5 10E9/L (ref 4–11)
WBC #/AREA URNS AUTO: ABNORMAL /HPF

## 2019-07-18 PROCEDURE — 93005 ELECTROCARDIOGRAM TRACING: CPT | Performed by: PHYSICIAN ASSISTANT

## 2019-07-18 PROCEDURE — 81001 URINALYSIS AUTO W/SCOPE: CPT | Mod: XU | Performed by: PHYSICIAN ASSISTANT

## 2019-07-18 PROCEDURE — 80053 COMPREHEN METABOLIC PANEL: CPT | Performed by: PHYSICIAN ASSISTANT

## 2019-07-18 PROCEDURE — 36415 COLL VENOUS BLD VENIPUNCTURE: CPT | Mod: 91,GZ | Performed by: PHYSICIAN ASSISTANT

## 2019-07-18 PROCEDURE — 84484 ASSAY OF TROPONIN QUANT: CPT | Mod: 91 | Performed by: EMERGENCY MEDICINE

## 2019-07-18 PROCEDURE — 25000132 ZZH RX MED GY IP 250 OP 250 PS 637: Mod: GY | Performed by: PHYSICIAN ASSISTANT

## 2019-07-18 PROCEDURE — 84484 ASSAY OF TROPONIN QUANT: CPT | Performed by: PHYSICIAN ASSISTANT

## 2019-07-18 PROCEDURE — 87086 URINE CULTURE/COLONY COUNT: CPT | Performed by: PHYSICIAN ASSISTANT

## 2019-07-18 PROCEDURE — 25000125 ZZHC RX 250: Performed by: PHYSICIAN ASSISTANT

## 2019-07-18 PROCEDURE — 81001 URINALYSIS AUTO W/SCOPE: CPT | Performed by: PHYSICIAN ASSISTANT

## 2019-07-18 PROCEDURE — 83880 ASSAY OF NATRIURETIC PEPTIDE: CPT | Performed by: PHYSICIAN ASSISTANT

## 2019-07-18 PROCEDURE — 99285 EMERGENCY DEPT VISIT HI MDM: CPT | Mod: 25 | Performed by: PHYSICIAN ASSISTANT

## 2019-07-18 PROCEDURE — 83690 ASSAY OF LIPASE: CPT | Performed by: PHYSICIAN ASSISTANT

## 2019-07-18 PROCEDURE — 36415 COLL VENOUS BLD VENIPUNCTURE: CPT | Performed by: EMERGENCY MEDICINE

## 2019-07-18 PROCEDURE — 85610 PROTHROMBIN TIME: CPT | Performed by: PHYSICIAN ASSISTANT

## 2019-07-18 PROCEDURE — 36415 COLL VENOUS BLD VENIPUNCTURE: CPT | Performed by: PHYSICIAN ASSISTANT

## 2019-07-18 PROCEDURE — 99283 EMERGENCY DEPT VISIT LOW MDM: CPT | Mod: Z6 | Performed by: PHYSICIAN ASSISTANT

## 2019-07-18 PROCEDURE — 71045 X-RAY EXAM CHEST 1 VIEW: CPT

## 2019-07-18 PROCEDURE — 83605 ASSAY OF LACTIC ACID: CPT | Performed by: PHYSICIAN ASSISTANT

## 2019-07-18 PROCEDURE — 85025 COMPLETE CBC W/AUTO DIFF WBC: CPT | Performed by: EMERGENCY MEDICINE

## 2019-07-18 PROCEDURE — 93010 ELECTROCARDIOGRAM REPORT: CPT | Performed by: INTERNAL MEDICINE

## 2019-07-18 PROCEDURE — 85379 FIBRIN DEGRADATION QUANT: CPT | Performed by: PHYSICIAN ASSISTANT

## 2019-07-18 RX ORDER — SUCRALFATE 1 G/1
1 TABLET ORAL ONCE
Status: COMPLETED | OUTPATIENT
Start: 2019-07-18 | End: 2019-07-18

## 2019-07-18 RX ORDER — PANTOPRAZOLE SODIUM 40 MG/1
40 TABLET, DELAYED RELEASE ORAL ONCE
Status: COMPLETED | OUTPATIENT
Start: 2019-07-18 | End: 2019-07-18

## 2019-07-18 RX ORDER — FUROSEMIDE 20 MG
20 TABLET ORAL ONCE
Status: COMPLETED | OUTPATIENT
Start: 2019-07-18 | End: 2019-07-18

## 2019-07-18 RX ORDER — FUROSEMIDE 20 MG
20 TABLET ORAL 2 TIMES DAILY
Qty: 10 TABLET | Refills: 0 | Status: SHIPPED | OUTPATIENT
Start: 2019-07-18 | End: 2019-10-04

## 2019-07-18 RX ORDER — FUROSEMIDE 10 MG/ML
20 INJECTION INTRAMUSCULAR; INTRAVENOUS ONCE
Status: DISCONTINUED | OUTPATIENT
Start: 2019-07-18 | End: 2019-07-18 | Stop reason: ALTCHOICE

## 2019-07-18 RX ORDER — CIPROFLOXACIN 250 MG/1
500 TABLET, FILM COATED ORAL DAILY
Qty: 10 TABLET | Refills: 0 | Status: SHIPPED | OUTPATIENT
Start: 2019-07-18 | End: 2019-10-04

## 2019-07-18 RX ORDER — SIMETHICONE 125 MG
1 CAPSULE ORAL
Qty: 60 CAPSULE | Refills: 0 | Status: SHIPPED | OUTPATIENT
Start: 2019-07-18 | End: 2019-10-04

## 2019-07-18 RX ORDER — SUCRALFATE 1 G/1
1 TABLET ORAL 2 TIMES DAILY
Qty: 30 TABLET | Refills: 0 | Status: SHIPPED | OUTPATIENT
Start: 2019-07-18 | End: 2019-07-18

## 2019-07-18 RX ORDER — ALUMINA, MAGNESIA, AND SIMETHICONE 2400; 2400; 240 MG/30ML; MG/30ML; MG/30ML
15 SUSPENSION ORAL ONCE
Status: COMPLETED | OUTPATIENT
Start: 2019-07-18 | End: 2019-07-18

## 2019-07-18 RX ORDER — LIDOCAINE HYDROCHLORIDE 20 MG/ML
15 SOLUTION OROPHARYNGEAL ONCE
Status: COMPLETED | OUTPATIENT
Start: 2019-07-18 | End: 2019-07-18

## 2019-07-18 RX ADMIN — PANTOPRAZOLE SODIUM 40 MG: 40 TABLET, DELAYED RELEASE ORAL at 20:36

## 2019-07-18 RX ADMIN — SUCRALFATE 1 G: 1 TABLET ORAL at 20:36

## 2019-07-18 RX ADMIN — FUROSEMIDE 20 MG: 20 TABLET ORAL at 21:04

## 2019-07-18 RX ADMIN — ALUMINUM HYDROXIDE, MAGNESIUM HYDROXIDE, AND DIMETHICONE 15 ML: 400; 400; 40 SUSPENSION ORAL at 20:11

## 2019-07-18 RX ADMIN — LIDOCAINE HYDROCHLORIDE 15 ML: 20 SOLUTION ORAL; TOPICAL at 20:11

## 2019-07-18 ASSESSMENT — ENCOUNTER SYMPTOMS
MYALGIAS: 0
FEVER: 0
CHILLS: 0
VOMITING: 0
SHORTNESS OF BREATH: 0
ADENOPATHY: 0
CHEST TIGHTNESS: 1
BACK PAIN: 0
TROUBLE SWALLOWING: 0
CONFUSION: 0
HEMATURIA: 0
HEADACHES: 0
ABDOMINAL PAIN: 0
SEIZURES: 0
CONSTIPATION: 0
RHINORRHEA: 0
WOUND: 0
DIARRHEA: 0
NAUSEA: 0
DIZZINESS: 0
FACIAL SWELLING: 0
LIGHT-HEADEDNESS: 0
BRUISES/BLEEDS EASILY: 0

## 2019-07-18 ASSESSMENT — MIFFLIN-ST. JEOR: SCORE: 1446.22

## 2019-07-18 NOTE — ED AVS SNAPSHOT
[facility]  [address]  [place] [address]  Phone:  [phone]  Fax:  [phone]                                    Bailey Sierra   MRN: [id number]    Department:  [facility]   Date of Visit:  7/18/2019           After Visit Summary Signature Page    I have received my discharge instructions, and my questions have been answered. I have discussed any challenges I see with this plan with the nurse or doctor.    ..........................................................................................................................................  Patient/Patient Representative Signature      ..........................................................................................................................................  Patient Representative Print Name and Relationship to Patient    ..................................................               ................................................  Date                                   Time    ..........................................................................................................................................  Reviewed by Signature/Title    ...................................................              ..............................................  Date                                               Time          22EPIC Rev 08/18

## 2019-07-18 NOTE — ED TRIAGE NOTES
"Patient to ER reporting chest pain and SOB w/ exercise intolerance over \"the past week or so\" she reports a history of an MI and CABG. Pain is exacerbated with inspiration and palpation. Patient reports pain radiates to R shoulder, arm, and back   "

## 2019-07-18 NOTE — ED PROVIDER NOTES
History     Chief Complaint   Patient presents with     Chest Pain     x 1 week     Shortness of Breath     progressive, exercise intolerance     This is a 69-year-old female who has a history of MI as well as CABG done 6 years ago.  She reports she is been having increasing chest pain which has waxed and waned over the past week or so.  At times it seems to go away on its own.  At times it is worse with deep inspiration.  She has some radiation to her right shoulder area.  Denies any nausea.  No vomiting.  No fever or chills.  No lightheadedness or dizziness.  No diarrhea or constipation.  She is here for further evaluation at this time.  Rates her pain is maybe a 2 or 3 on a 1-10 scale at this time but reports that at times her pain is 10 out of 10.            Allergies:  No Known Allergies    Problem List:    Patient Active Problem List    Diagnosis Date Noted     Atherosclerotic heart disease of native coronary artery with other forms of angina pectoris (H) 09/28/2018     Priority: Medium     Obesity (BMI 35.0-39.9) with comorbidity (H) 09/28/2018     Priority: Medium     CKD (chronic kidney disease) stage 4, GFR 15-29 ml/min (H) 01/17/2018     Priority: Medium     Nonrheumatic aortic valve stenosis 01/17/2018     Priority: Medium     Hyperlipidemia 01/17/2018     Priority: Medium     Hypertension 01/17/2018     Priority: Medium     AC (acromioclavicular) joint arthritis 03/10/2017     Priority: Medium     Impingement syndrome of right shoulder 03/10/2017     Priority: Medium     Right rotator cuff tendinitis 03/10/2017     Priority: Medium     Long-term insulin use in type 2 diabetes (H) 02/13/2017     Priority: Medium     Light chain disease, 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  Per Dr. Ortiz, if hypoalbuminemia not improving by Nov-Dec 2016, needs referral to Hematology.        Vitamin D deficiency 10/23/2016     Priority: Medium      Hyperparathyroidism due to renal insufficiency (H) 03/28/2016     Priority: Medium     Coronary atherosclerosis 09/03/2013     Priority: Medium     Overview:   2 vessel bypass, August 2013       Obesity 07/26/2013     Priority: Medium     Hiatal hernia 07/26/2013     Priority: Medium     GERD (gastroesophageal reflux disease) 01/08/2013     Priority: Medium     Urge incontinence 10/25/2012     Priority: Medium     Insomnia 01/25/2012     Priority: Medium        Past Medical History:    Past Medical History:   Diagnosis Date     Atherosclerotic heart disease of native coronary artery without angina pectoris      Cardiac murmur      Chronic kidney disease, stage III (moderate) (H)      Controlled type 2 diabetes mellitus with stage 3 chronic kidney disease, with long-term current use of insulin (H) 7/26/2013     Edema      Essential (primary) hypertension      Gastro-esophageal reflux disease without esophagitis      Hyperlipidemia      Impingement syndrome of right shoulder 03/10/2017     Insomnia 01/25/2012     Iron deficiency anemia      Neuromuscular dysfunction of bladder      Osteoarthritis      Other shoulder lesions, right shoulder 03/10/2017     Other shoulder lesions, unspecified shoulder      Other specified postprocedural states 04/19/2017     Personal history of other medical treatment (CODE)      Plantar fascial fibromatosis      Presence of aortocoronary bypass graft 07/2013     Primary osteoarthritis of shoulder 03/10/2017     Type 2 diabetes mellitus without complications (H)      Urgency of urination 03/08/2011     Uterovaginal prolapse        Past Surgical History:    Past Surgical History:   Procedure Laterality Date     ARTHROSCOPY SHOULDER Right 1997          ARTHROSCOPY SHOULDER Left 2012          ARTHROSCOPY SHOULDER RT/LT Right [id number]          Cardiac Bypass  07/2013          COLONOSCOPY  11/24/2009 11/24/2009     CYSTOSCOPY  03/15/2001     DENTAL SURGERY      Dental extractions      ENDOSCOPIC RETROGRADE CHOLANGIOPANCREATOGRAPHY  05/2008    Bile leak with ERCP and stenting and drainage of bile collection through abdominal wall.     HYSTERECTOMY TOTAL ABDOMINAL, BILATERAL SALPINGO-OOPHORECTOMY, COMBINED  04/02/2001    Vag vault suspension with Cortex mesh     IMPLANT STIMULATOR AND LEADS SACRAL NERVE (STAGE ONE AND TWO) N/A 5/7/2019    Procedure: Interstim Implant Revision, GENERATOR AND TYING LEAD EXCHANGE;  Surgeon: Honorio Bowling MD;  Location: [facility] OR     Interstim Device Placement  04/14/2014    Dr. Bowling - [facility]     LAMINECTOMY LUMBAR ONE LEVEL  1997          LAPAROSCOPIC CHOLECYSTECTOMY  04/2008          LAPAROSCOPIC TUBAL LIGATION      No Comments Provided       Family History:    Family History   Problem Relation Age of Onset     Other - See Comments Father         MI     Cancer Mother         Cancer,Some type of cancer, aneurysm     Diabetes Paternal Grandmother         Diabetes,Type II     Cancer Sister         Cancer,unknown type     Diabetes Sister         Diabetes,Type II     Diabetes Sister         Diabetes,Type II     Diabetes Sister         Diabetes,Type II     Other - See Comments Daughter         depression     Other - See Comments Daughter         ovarian cancer and depression     Heart Disease Brother         Heart Disease,CAD, MI       Social History:  Marital Status:   [5]  Social History     Tobacco Use     Smoking status: Never Smoker     Smokeless tobacco: Never Used   Substance Use Topics     Alcohol use: No     Alcohol/week: 0.0 oz     Drug use: No     Comment: Drug use: No        Medications:      aspirin 81 MG tablet   blood glucose monitoring (TRICIA CONTOUR) test strip   Calcium Carb-Cholecalciferol (CALCIUM PLUS D3 ABSORBABLE) 600-2500 MG-UNIT CAPS   esomeprazole (NEXIUM) 40 MG DR capsule   furosemide (LASIX) 20 MG tablet   HYDROcodone-acetaminophen (NORCO) 5-325 MG tablet   insulin aspart (NOVOLOG FLEXPEN) 100 UNIT/ML injection   insulin glargine (BASAGLAR  "KWIKPEN) 100 UNIT/ML pen   insulin pen needle (ULTICARE SHORT) 31G X 8 MM   isosorbide mononitrate (IMDUR) 60 MG 24 hr tablet   lisinopril (PRINIVIL/ZESTRIL) 2.5 MG tablet   metoprolol succinate ER (TOPROL-XL) 25 MG 24 hr tablet   nitroGLYcerin (NITROSTAT) 0.4 MG sublingual tablet   ondansetron (ZOFRAN) 4 MG tablet   potassium chloride SA (K-DUR/KLOR-CON M) 20 MEQ CR tablet   rosuvastatin (CRESTOR) 40 MG tablet   zolpidem (AMBIEN) 10 MG tablet         Review of Systems   Constitutional: Negative for chills and fever.   HENT: Negative for congestion, facial swelling, nosebleeds, rhinorrhea and trouble swallowing.    Eyes: Negative for visual disturbance.   Respiratory: Positive for chest tightness. Negative for shortness of breath.    Cardiovascular: Positive for chest pain.   Gastrointestinal: Negative for abdominal pain, constipation, diarrhea, nausea and vomiting.   Genitourinary: Negative for hematuria.   Musculoskeletal: Negative for back pain and myalgias.   Skin: Negative for rash and wound.   Neurological: Negative for dizziness, seizures, syncope, light-headedness and headaches.   Hematological: Negative for adenopathy. Does not bruise/bleed easily.   Psychiatric/Behavioral: Negative for confusion.       Physical Exam   BP: 192/77  Heart Rate: 64  Temp: 97.2  F (36.2  C)  Resp: 18  Height: 165.1 cm (5' 5\")  Weight: 92.5 kg (204 lb)  SpO2: 97 %      Physical Exam   Constitutional: She is oriented to person, place, and time. She appears well-developed and well-nourished. No distress.   HENT:   Head: Normocephalic and atraumatic.   Eyes: Pupils are equal, round, and reactive to light. Conjunctivae and EOM are normal. No scleral icterus.   Neck: Neck supple.   Cardiovascular: Normal rate and regular rhythm.   Pulmonary/Chest: Effort normal and breath sounds normal.   Lung sounds clear.  SaO2 is 97% on room air.   Abdominal: Soft. There is no tenderness.   Musculoskeletal: She exhibits no deformity. "   Lymphadenopathy:     She has no cervical adenopathy.   Neurological: She is alert and oriented to person, place, and time.   Skin: Skin is warm and dry. Capillary refill takes less than 2 seconds. No rash noted. She is not diaphoretic.   Psychiatric: She has a normal mood and affect.       ED Course        Procedures          EKG shows normal sinus rhythm with sinus arrhythmia.  Inferior infarct, age undetermined.  Heart rate is 62.  This is essentially unchanged from previous EKG on 4/7/2017.           Results for orders placed or performed during the hospital encounter of 07/18/19 (from the past 24 hour(s))   CBC with platelets differential   Result Value Ref Range    WBC 5.5 4.0 - 11.0 10e9/L    RBC Count 4.10 3.8 - 5.2 10e12/L    Hemoglobin 11.6 (L) 11.7 - 15.7 g/dL    Hematocrit 36.7 35.0 - 47.0 %    MCV 90 78 - 100 fl    MCH 28.3 26.5 - 33.0 pg    MCHC 31.6 31.5 - 36.5 g/dL    RDW 15.7 (H) 10.0 - 15.0 %    Platelet Count 163 150 - 450 10e9/L    Diff Method Automated Method     % Neutrophils 60.1 %    % Lymphocytes 24.6 %    % Monocytes 11.8 %    % Eosinophils 2.9 %    % Basophils 0.4 %    % Immature Granulocytes 0.2 %    Absolute Neutrophil 3.3 1.6 - 8.3 10e9/L    Absolute Lymphocytes 1.4 0.8 - 5.3 10e9/L    Absolute Monocytes 0.7 0.0 - 1.3 10e9/L    Absolute Eosinophils 0.2 0.0 - 0.7 10e9/L    Absolute Basophils 0.0 0.0 - 0.2 10e9/L    Abs Immature Granulocytes 0.0 0 - 0.4 10e9/L   Troponin I   Result Value Ref Range    Troponin I ES <0.030 0.000 - 0.034 ug/L   Comprehensive metabolic panel   Result Value Ref Range    Sodium 141 134 - 144 mmol/L    Potassium 3.6 3.5 - 5.1 mmol/L    Chloride 110 (H) 98 - 107 mmol/L    Carbon Dioxide 24 21 - 31 mmol/L    Anion Gap 7 3 - 14 mmol/L    Glucose 157 (H) 70 - 105 mg/dL    Urea Nitrogen 17 7 - 25 mg/dL    Creatinine 2.04 (H) 0.60 - 1.20 mg/dL    GFR Estimate Not Calculated >60 mL/min/[1.73_m2]    GFR Estimate If Black Not Calculated >60 mL/min/[1.73_m2]    Calcium  9.1 8.6 - 10.3 mg/dL    Bilirubin Total 0.5 0.3 - 1.0 mg/dL    Albumin 3.5 3.5 - 5.7 g/dL    Protein Total 5.4 (L) 6.4 - 8.9 g/dL    Alkaline Phosphatase 93 34 - 104 U/L    ALT 13 7 - 52 U/L    AST 11 (L) 13 - 39 U/L   D-Dimer ([facility])   Result Value Ref Range    D-Dimer ng/mL <200 0 - 230 ng/ml D-DU   INR   Result Value Ref Range    INR 0.95 0 - 1.3   Lactic acid   Result Value Ref Range    Lactic Acid 1.4 0.5 - 2.2 mmol/L   Nt probnp inpatient (BNP)   Result Value Ref Range    N-Terminal Pro BNP Inpatient 178 (H) 0 - 100 pg/mL   Lipase   Result Value Ref Range    Lipase 10 (L) 11 - 82 U/L   XR Chest Port 1 View    Narrative    PROCEDURE:  XR CHEST PORT 1 VW    HISTORY:  chest pain.     COMPARISON:  2013    FINDINGS:   The heart is borderline in size. There is a large hiatal hernia seen  behind the heart. Postoperative changes are seen in the mediastinum  and in both shoulders. The pulmonary vasculature is normal.  The lungs  are clear. No pleural effusion or pneumothorax.      Impression    IMPRESSION:  Large hiatal hernia. Borderline cardiomegaly.      KRUNAL COMBS MD   *UA reflex to Microscopic   Result Value Ref Range    Color Urine Yellow     Appearance Urine Clear     Glucose Urine Negative NEG^Negative mg/dL    Bilirubin Urine Small (A) NEG^Negative    Ketones Urine Negative NEG^Negative mg/dL    Specific Gravity Urine >1.030 (H) 1.000 - 1.030    Blood Urine Moderate (A) NEG^Negative    pH Urine 5.5 5.0 - 9.0 pH    Protein Albumin Urine >299 (A) NEG^Negative mg/dL    Urobilinogen Urine 1.0 0.2 - 1.0 EU/dL    Nitrite Urine Negative NEG^Negative    Leukocyte Esterase Urine Negative NEG^Negative    Source Midstream Urine    Urine Microscopic   Result Value Ref Range    WBC Urine 5-10 (A) OTO5^0 - 5 /HPF    RBC Urine O - 2 OTO2^O - 2 /HPF    Cast Urine 2-5 0 - 2 /LPF    Squamous Epithelial /LPF Urine Moderate (A) FEW^Few /LPF    Bacteria Urine Few (A) NEG^Negative /HPF    Amorphous Crystals Moderate (A)  NEG^Negative /HPF   Troponin I   Result Value Ref Range    Troponin I ES <0.030 0.000 - 0.034 ug/L       Medications   alum & mag hydroxide-simethicone (MYLANTA ES/MAALOX  ES) suspension 15 mL (15 mLs Oral Given 7/18/19 2011)     And   lidocaine HCl (XYLOCAINE) 2 % solution 15 mL (15 mLs Mouth/Throat Given 7/18/19 2011)   furosemide (LASIX) tablet 20 mg (20 mg Oral Given 7/18/19 2104)   pantoprazole (PROTONIX) EC tablet 40 mg (40 mg Oral Given 7/18/19 2036)   sucralfate (CARAFATE) tablet 1 g (1 g Oral Given 7/18/19 2036)       Assessments & Plan (with Medical Decision Making)     I have reviewed the nursing notes.    I have reviewed the findings, diagnosis, plan and need for follow up with the patient.         Medication List      Started    ciprofloxacin 250 MG tablet  Commonly known as:  CIPRO  500 mg, Oral, DAILY     Simethicone 125 MG Caps  1 capsule, Oral, 4 TIMES DAILY WITH MEALS & NIGHTLY     sucralfate 1 GM tablet  Commonly known as:  CARAFATE  1 g, Oral, 2 TIMES DAILY        Modified    * furosemide 20 MG tablet  Commonly known as:  LASIX  What changed:  Another medication with the same name was added. Make sure you understand how and when to take each.     * furosemide 20 MG tablet  Commonly known as:  LASIX  20 mg, Oral, 2 TIMES DAILY  What changed:  You were already taking a medication with the same name, and this prescription was added. Make sure you understand how and when to take each.         * This list has 2 medication(s) that are the same as other medications prescribed for you. Read the directions carefully, and ask your doctor or other care provider to review them with you.            ASK your doctor about these medications    sulfamethoxazole-trimethoprim 800-160 MG tablet  Commonly known as:  BACTRIM DS  1 tablet, Oral, 2 TIMES DAILY  Ask about: Should I take this medication?            Final diagnoses:   Atypical chest pain   Gastritis   Hiatal hernia   UTI (urinary tract infection)   CHF  exacerbation (H)   GERD (gastroesophageal reflux disease)   CKD (chronic kidney disease) stage 4, GFR 15-29 ml/min (H)   Long-term insulin use in type 2 diabetes (H)     Afebrile.  Vital signs stable.  Patient with waxing and waning atypical chest pain over the past week.  Appears in no distress.  EKG shows normal sinus rhythm with sinus arrhythmia.  Inferior infarct, age undetermined.  Heart rate is 62.  This is essentially unchanged from previous EKG on 4/7/2017.  Initial troponin is normal.  D-dimer is normal.  BNP is only minimally elevated at 178.  CBC shows normal white blood cells no left shift.  Her hemoglobin is 11.6 but this is in her normal range area.  CMP shows her creatinine elevated at 2.04 given her CKD this is not common.  Chest x-ray shows  Large hiatal hernia. Borderline cardiomegaly.  She was given Protonix, GI cocktail and Carafate.  Furthermore she was given 20 mg of Lasix orally.  Her UA returns with 5-10 white blood cells and is suspicious for UTI.  UC is pending.  She is feeling much better after the above treatment.  Atypical chest pain/gastritis from hiatal hernia.  Rx for simethicone, and Carafate.  She does have Prilosec.  CHF exacerbation we will increase her Lasix to 20 mg twice daily x5 days and then she will return to her normal daily dose.  We will start her on Cipro for her UTI given her CKD this is a once daily.  Follow-up with her primary care provider within 1 week for further evaluation follow-up sooner if there is any other concerns problems or questions.  7/18/2019[facility] AND \[facility] [place] [facility]\""     Fredis Montes PA-C  07/18/19 [id number]

## 2019-07-20 LAB
BACTERIA SPEC CULT: NORMAL
SPECIMEN SOURCE: NORMAL

## 2019-07-23 DIAGNOSIS — I10 BENIGN ESSENTIAL HYPERTENSION: ICD-10-CM

## 2019-07-24 RX ORDER — METOPROLOL SUCCINATE 25 MG/1
25 TABLET, EXTENDED RELEASE ORAL DAILY
Qty: 90 TABLET | Refills: 3 | Status: SHIPPED | OUTPATIENT
Start: 2019-07-24 | End: 2020-06-09

## 2019-07-24 RX ORDER — SUCRALFATE 1 G/1
TABLET ORAL
Qty: 180 TABLET | Refills: 3 | Status: SHIPPED | OUTPATIENT
Start: 2019-07-24 | End: 2019-10-04

## 2019-07-24 NOTE — TELEPHONE ENCOUNTER
Prescription refilled per RN Medication Refill Policy..................Grisel Montoya 7/24/2019 3:31 PM

## 2019-07-24 NOTE — TELEPHONE ENCOUNTER
"Refill request from [facility] for:  sucralfate (CARAFATE) 1 GM tablet  \"Pt requesting 90 day supply\"    LOV 7/15/2019 with PCP    Medication prescribed in ED 7/18/2019  \"Follow-up with her primary care provider within 1 week for further evaluation follow-up sooner if there is any other concerns problems or questions.\"    Next OV with PCP 10/4/2019    Will julia up and route to teamlet for consideration    Requested Prescriptions   Pending Prescriptions Disp Refills     sucralfate (CARAFATE) 1 GM tablet [Pharmacy Med Name: SUCRALFATE 1GM TABLETS] 180 tablet 0     Sig: TAKE 1 TABLET BY MOUTH TWICE DAILY       Miscellaneous Gastrointestinal Agents Failed - 7/24/2019  8:13 AM        Failed - Recent (12 mo) or future (30 days) visit within the authorizing provider's specialty     Patient had office visit in the last 12 months or has a visit in the next 30 days with authorizing provider or within the authorizing provider's specialty.  See \"Patient Info\" tab in inbasket, or \"Choose Columns\" in Meds & Orders section of the refill encounter.            Passed - Medication is active on med list        Passed - Patient is 18 years of age or older        Unable to complete prescription refill per RN Medication Refill Policy.................... Gris Allen ....................  7/24/2019   8:30 AM        "

## 2019-08-27 ENCOUNTER — TELEPHONE (OUTPATIENT)
Dept: FAMILY MEDICINE | Facility: OTHER | Age: 69
End: 2019-08-27

## 2019-08-27 DIAGNOSIS — E11.8 TYPE 2 DIABETES MELLITUS WITH COMPLICATION, WITH LONG-TERM CURRENT USE OF INSULIN (H): ICD-10-CM

## 2019-08-27 DIAGNOSIS — Z79.4 TYPE 2 DIABETES MELLITUS WITHOUT COMPLICATION, WITH LONG-TERM CURRENT USE OF INSULIN (H): ICD-10-CM

## 2019-08-27 DIAGNOSIS — E11.9 TYPE 2 DIABETES MELLITUS WITHOUT COMPLICATION, WITH LONG-TERM CURRENT USE OF INSULIN (H): ICD-10-CM

## 2019-08-27 DIAGNOSIS — Z79.4 TYPE 2 DIABETES MELLITUS WITH COMPLICATION, WITH LONG-TERM CURRENT USE OF INSULIN (H): ICD-10-CM

## 2019-08-27 RX ORDER — INSULIN GLARGINE 100 [IU]/ML
42 INJECTION, SOLUTION SUBCUTANEOUS AT BEDTIME
Qty: 60 ML | Refills: 1 | Status: SHIPPED | OUTPATIENT
Start: 2019-08-27 | End: 2020-08-03

## 2019-08-27 NOTE — TELEPHONE ENCOUNTER
Pt had [facility] in [place] send over a needle request yesterday just wants to make sure she has these by or before Fri. Will be out. Thanks-No call back needed unless a problem

## 2019-08-27 NOTE — TELEPHONE ENCOUNTER
Patient is requesting diabetic supplies. Orders pended. Patient is scheduled to come in the beginning of October.    Noemy Holt LPN on 8/27/2019 at 1:36 PM

## 2019-08-30 DIAGNOSIS — K21.9 GASTROESOPHAGEAL REFLUX DISEASE WITHOUT ESOPHAGITIS: ICD-10-CM

## 2019-09-04 RX ORDER — ESOMEPRAZOLE MAGNESIUM 40 MG/1
40 CAPSULE, DELAYED RELEASE ORAL DAILY
Qty: 90 CAPSULE | Refills: 2 | Status: SHIPPED | OUTPATIENT
Start: 2019-09-04 | End: 2019-10-04

## 2019-09-04 NOTE — TELEPHONE ENCOUNTER
"Requested Prescriptions   Pending Prescriptions Disp Refills     esomeprazole (NEXIUM) 40 MG DR capsule 90 capsule 2     Sig: Take 1 capsule (40 mg) by mouth daily Before a meal.       PPI Protocol Passed - 8/30/2019 10:29 AM        Passed - Not on Clopidogrel (unless Pantoprazole ordered)        Passed - No diagnosis of osteoporosis on record        Passed - Recent (12 mo) or future (30 days) visit within the authorizing provider's specialty     Patient had office visit in the last 12 months or has a visit in the next 30 days with authorizing provider or within the authorizing provider's specialty.  See \"Patient Info\" tab in inbasket, or \"Choose Columns\" in Meds & Orders section of the refill encounter.              Passed - Medication is active on med list        Passed - Patient is age 18 or older        Passed - No active pregnacy on record        Passed - No positive pregnancy test in past 12 months        LOV 07/16/19  Prescription approved per [facility] Refill Protocol.    "

## 2019-09-26 ENCOUNTER — TELEPHONE (OUTPATIENT)
Dept: FAMILY MEDICINE | Facility: OTHER | Age: 69
End: 2019-09-26

## 2019-09-26 DIAGNOSIS — E11.8 TYPE 2 DIABETES MELLITUS WITH COMPLICATION, WITH LONG-TERM CURRENT USE OF INSULIN (H): Primary | ICD-10-CM

## 2019-09-26 DIAGNOSIS — Z79.4 TYPE 2 DIABETES MELLITUS WITH COMPLICATION, WITH LONG-TERM CURRENT USE OF INSULIN (H): Primary | ICD-10-CM

## 2019-09-27 ENCOUNTER — TRANSFERRED RECORDS (OUTPATIENT)
Dept: HEALTH INFORMATION MANAGEMENT | Facility: OTHER | Age: 69
End: 2019-09-27

## 2019-10-02 DIAGNOSIS — E11.8 TYPE 2 DIABETES MELLITUS WITH COMPLICATION, WITH LONG-TERM CURRENT USE OF INSULIN (H): ICD-10-CM

## 2019-10-02 DIAGNOSIS — Z79.4 TYPE 2 DIABETES MELLITUS WITH COMPLICATION, WITH LONG-TERM CURRENT USE OF INSULIN (H): ICD-10-CM

## 2019-10-02 LAB — HBA1C MFR BLD: 6.9 % (ref 4–6)

## 2019-10-02 PROCEDURE — 36415 COLL VENOUS BLD VENIPUNCTURE: CPT | Mod: ZL | Performed by: FAMILY MEDICINE

## 2019-10-02 PROCEDURE — 83036 HEMOGLOBIN GLYCOSYLATED A1C: CPT | Mod: ZL | Performed by: FAMILY MEDICINE

## 2019-10-04 ENCOUNTER — OFFICE VISIT (OUTPATIENT)
Dept: FAMILY MEDICINE | Facility: OTHER | Age: 69
End: 2019-10-04
Attending: FAMILY MEDICINE
Payer: COMMERCIAL

## 2019-10-04 VITALS
HEART RATE: 64 BPM | RESPIRATION RATE: 20 BRPM | HEIGHT: 65 IN | WEIGHT: 203.8 LBS | BODY MASS INDEX: 33.95 KG/M2 | SYSTOLIC BLOOD PRESSURE: 108 MMHG | TEMPERATURE: 97 F | DIASTOLIC BLOOD PRESSURE: 64 MMHG | OXYGEN SATURATION: 98 %

## 2019-10-04 DIAGNOSIS — E11.9 TYPE 2 DIABETES MELLITUS WITHOUT COMPLICATION, WITH LONG-TERM CURRENT USE OF INSULIN (H): Primary | ICD-10-CM

## 2019-10-04 DIAGNOSIS — E66.01 MORBID OBESITY (H): ICD-10-CM

## 2019-10-04 DIAGNOSIS — I10 ESSENTIAL HYPERTENSION: ICD-10-CM

## 2019-10-04 DIAGNOSIS — Z79.4 TYPE 2 DIABETES MELLITUS WITHOUT COMPLICATION, WITH LONG-TERM CURRENT USE OF INSULIN (H): Primary | ICD-10-CM

## 2019-10-04 DIAGNOSIS — K44.9 HIATAL HERNIA: ICD-10-CM

## 2019-10-04 DIAGNOSIS — I25.118 ATHEROSCLEROTIC HEART DISEASE OF NATIVE CORONARY ARTERY WITH OTHER FORMS OF ANGINA PECTORIS (H): ICD-10-CM

## 2019-10-04 DIAGNOSIS — E78.00 PURE HYPERCHOLESTEROLEMIA: ICD-10-CM

## 2019-10-04 PROCEDURE — G0463 HOSPITAL OUTPT CLINIC VISIT: HCPCS

## 2019-10-04 PROCEDURE — 99214 OFFICE O/P EST MOD 30 MIN: CPT | Performed by: FAMILY MEDICINE

## 2019-10-04 RX ORDER — FUROSEMIDE 20 MG
20 TABLET ORAL DAILY
Qty: 90 TABLET | Refills: 3 | Status: SHIPPED | OUTPATIENT
Start: 2019-10-04 | End: 2020-10-14

## 2019-10-04 RX ORDER — ISOSORBIDE MONONITRATE 60 MG/1
120 TABLET, EXTENDED RELEASE ORAL DAILY
Qty: 180 TABLET | Refills: 3 | Status: SHIPPED | OUTPATIENT
Start: 2019-10-04 | End: 2020-12-21

## 2019-10-04 RX ORDER — PANTOPRAZOLE SODIUM 40 MG/1
40 TABLET, DELAYED RELEASE ORAL DAILY
Qty: 90 TABLET | Refills: 3 | Status: SHIPPED | OUTPATIENT
Start: 2019-10-04 | End: 2020-07-08

## 2019-10-04 ASSESSMENT — PAIN SCALES - GENERAL: PAINLEVEL: NO PAIN (0)

## 2019-10-04 ASSESSMENT — MIFFLIN-ST. JEOR: SCORE: 1445.31

## 2019-10-04 NOTE — NURSING NOTE
Patient presents today for diabetic check up.  Previous A1C is at goal of <8  Lab Results   Component Value Date    A1C 6.9 10/02/2019    A1C 6.5 04/03/2019    A1C 6.6 09/26/2018    A1C 7.0 06/20/2018     Urine microalbumin:creatine:    Foot exam due  Eye exam 11/2018    Tobacco User no  Patient is on a daily aspirin  Patient is on a Statin.  Blood pressure today of:108/64     BP Readings from Last 1 Encounters:   07/18/19 152/55      is at the goal of <139/89 for diabetics.    Noemy Holt LPN on 10/4/2019 at 10:38 AM      Medication Reconciliation Complete

## 2019-10-04 NOTE — PROGRESS NOTES
"SUBJECTIVE:  Bailey Sierra is a 69 year old female here for follow-up.  She has a history of type 2 diabetes which is been well controlled.  She has had no hypoglycemic episodes at home.  She needs checking blood sugars twice daily.      She has a history of hiatal hernia.  She is been using Nexium for several years but she reports that her symptoms seem to be worsening.  She does not recall any particular medication, food or time of day that seems to be consistent with her symptoms.    She was recently seen by cardiology and doing well, no medication changes were made.    Allergies:  No Known Allergies    ROS:    As above otherwise ROS is unremarkable.    OBJECTIVE:  /64   Pulse 64   Temp 97  F (36.1  C)   Resp 20   Ht 1.651 m (5' 5\")   Wt 92.4 kg (203 lb 12.8 oz)   SpO2 98%   BMI 33.91 kg/m      EXAM:  General Appearance: Pleasant, alert, appropriate appearance for age. No acute distress  Head: Normal. Normocephalic, atraumatic.  Cardiovascular: Regular rate and rhythm. S1, S2.  1 out of 6 systolic murmur heard best right upper sternal border.  No carotid bruits.  Musculoskeletal: No edema.    ASSESSEMENT AND PLAN:    1. Type 2 diabetes mellitus without complication, with long-term current use of insulin (H)    2. Essential hypertension    3. Atherosclerotic heart disease of native coronary artery with other forms of angina pectoris (H)    4. Obesity (BMI 35.0-39.9) with comorbidity (H)    5. Pure hypercholesterolemia    6. Hiatal hernia      We will have her stop Nexium and trial Protonix.  Potential side effects were discussed.  She is considering having hernia repair in [place] sometime in the spring.    Blood pressures controlled, continue current regimen.    We reviewed her most recent hemoglobin A1c which continues to be at goal.  Continue current regimen.  She will follow-up in April for fasting yearly labs.    She declined flu shot today.    Patient currently uses a Dexcom G5 CGM and tests BG " twice daily for calibrations.   Patient injects or boluses insulin 3 or more times daily before breakfast, before lunch, before dinner, and bedtime.  Patient requires frequent adjustments to their insulin treatment regimen on the basis of therapeutic CGM testing results.      Sukumar Leal MD    This document was prepared using voice generated software.  While every attempt was made for accuracy, grammatical errors may exist.

## 2019-12-02 ENCOUNTER — TELEPHONE (OUTPATIENT)
Dept: FAMILY MEDICINE | Facility: OTHER | Age: 69
End: 2019-12-02
Payer: COMMERCIAL

## 2019-12-02 NOTE — TELEPHONE ENCOUNTER
DWS-need corrected orders for dexcom. Should state: IS checking blood sugars, not NEEDS. Please fax to [phone]. Thak you.  Rochelle Pelletier

## 2019-12-04 ENCOUNTER — TRANSFERRED RECORDS (OUTPATIENT)
Dept: HEALTH INFORMATION MANAGEMENT | Facility: OTHER | Age: 69
End: 2019-12-04

## 2019-12-04 LAB — RETINOPATHY: NEGATIVE

## 2019-12-30 ENCOUNTER — OFFICE VISIT (OUTPATIENT)
Dept: CARDIOLOGY | Facility: OTHER | Age: 69
End: 2019-12-30
Attending: NURSE PRACTITIONER
Payer: COMMERCIAL

## 2019-12-30 VITALS
RESPIRATION RATE: 20 BRPM | SYSTOLIC BLOOD PRESSURE: 108 MMHG | TEMPERATURE: 98.5 F | DIASTOLIC BLOOD PRESSURE: 60 MMHG | WEIGHT: 199 LBS | OXYGEN SATURATION: 98 % | HEIGHT: 65 IN | HEART RATE: 52 BPM | BODY MASS INDEX: 33.15 KG/M2

## 2019-12-30 DIAGNOSIS — Z79.4 TYPE 2 DIABETES MELLITUS WITHOUT COMPLICATION, WITH LONG-TERM CURRENT USE OF INSULIN (H): ICD-10-CM

## 2019-12-30 DIAGNOSIS — I25.82 CHRONIC TOTAL OCCLUSION OF CORONARY ARTERY: ICD-10-CM

## 2019-12-30 DIAGNOSIS — I25.118 CORONARY ARTERY DISEASE OF NATIVE HEART WITH STABLE ANGINA PECTORIS, UNSPECIFIED VESSEL OR LESION TYPE (H): Primary | ICD-10-CM

## 2019-12-30 DIAGNOSIS — E78.00 PURE HYPERCHOLESTEROLEMIA: ICD-10-CM

## 2019-12-30 DIAGNOSIS — E11.9 TYPE 2 DIABETES MELLITUS WITHOUT COMPLICATION, WITH LONG-TERM CURRENT USE OF INSULIN (H): ICD-10-CM

## 2019-12-30 DIAGNOSIS — I10 ESSENTIAL HYPERTENSION: ICD-10-CM

## 2019-12-30 DIAGNOSIS — I65.29 STENOSIS OF CAROTID ARTERY, UNSPECIFIED LATERALITY: ICD-10-CM

## 2019-12-30 DIAGNOSIS — N18.4 CKD (CHRONIC KIDNEY DISEASE) STAGE 4, GFR 15-29 ML/MIN (H): ICD-10-CM

## 2019-12-30 DIAGNOSIS — I35.0 NONRHEUMATIC AORTIC VALVE STENOSIS: ICD-10-CM

## 2019-12-30 DIAGNOSIS — Z95.1 HISTORY OF CORONARY ARTERY BYPASS GRAFT X 2: ICD-10-CM

## 2019-12-30 DIAGNOSIS — K44.9 HIATAL HERNIA: ICD-10-CM

## 2019-12-30 PROBLEM — I99.8: Status: RESOLVED | Noted: 2019-12-30 | Resolved: 2019-12-30

## 2019-12-30 PROBLEM — I99.8: Status: ACTIVE | Noted: 2019-12-30

## 2019-12-30 LAB
ANION GAP SERPL CALCULATED.3IONS-SCNC: 7 MMOL/L (ref 3–14)
BUN SERPL-MCNC: 20 MG/DL (ref 7–25)
CALCIUM SERPL-MCNC: 9.1 MG/DL (ref 8.6–10.3)
CHLORIDE SERPL-SCNC: 106 MMOL/L (ref 98–107)
CO2 SERPL-SCNC: 29 MMOL/L (ref 21–31)
CREAT SERPL-MCNC: 2.46 MG/DL (ref 0.6–1.2)
GFR SERPL CREATININE-BSD FRML MDRD: 19 ML/MIN/{1.73_M2}
GLUCOSE SERPL-MCNC: 175 MG/DL (ref 70–105)
POTASSIUM SERPL-SCNC: 2.9 MMOL/L (ref 3.5–5.1)
SODIUM SERPL-SCNC: 142 MMOL/L (ref 134–144)

## 2019-12-30 PROCEDURE — 99203 OFFICE O/P NEW LOW 30 MIN: CPT | Performed by: NURSE PRACTITIONER

## 2019-12-30 PROCEDURE — 80048 BASIC METABOLIC PNL TOTAL CA: CPT | Mod: ZL | Performed by: NURSE PRACTITIONER

## 2019-12-30 PROCEDURE — 93005 ELECTROCARDIOGRAM TRACING: CPT | Performed by: NURSE PRACTITIONER

## 2019-12-30 PROCEDURE — 93010 ELECTROCARDIOGRAM REPORT: CPT | Performed by: INTERNAL MEDICINE

## 2019-12-30 PROCEDURE — G0463 HOSPITAL OUTPT CLINIC VISIT: HCPCS | Mod: 25

## 2019-12-30 PROCEDURE — G0463 HOSPITAL OUTPT CLINIC VISIT: HCPCS

## 2019-12-30 PROCEDURE — 36415 COLL VENOUS BLD VENIPUNCTURE: CPT | Mod: ZL | Performed by: NURSE PRACTITIONER

## 2019-12-30 RX ORDER — SUCRALFATE 1 G/1
1 TABLET ORAL 2 TIMES DAILY
Refills: 3 | COMMUNITY
Start: 2019-10-29 | End: 2020-07-06

## 2019-12-30 ASSESSMENT — PAIN SCALES - GENERAL: PAINLEVEL: WORST PAIN (10)

## 2019-12-30 ASSESSMENT — MIFFLIN-ST. JEOR: SCORE: 1423.54

## 2019-12-30 NOTE — NURSING NOTE
"Chief Complaint   Patient presents with     Consult     s/p coronary artery bypass graft x2       Initial /60 (BP Location: Right arm, Patient Position: Sitting, Cuff Size: Adult Large)   Pulse 52   Temp 98.5  F (36.9  C) (Tympanic)   Resp 20   Ht 1.651 m (5' 5\")   Wt 90.3 kg (199 lb)   SpO2 98%   BMI 33.12 kg/m   Estimated body mass index is 33.12 kg/m  as calculated from the following:    Height as of this encounter: 1.651 m (5' 5\").    Weight as of this encounter: 90.3 kg (199 lb).  Meds Reconciled: complete  Pt is on Aspirin  Pt is on a Statin  PHQ and/or CHARIS reviewed. Pt referred to PCP/MH Provider as appropriate.    Kalee Barone LPN      "

## 2019-12-30 NOTE — LETTER
December 30, 2019      Bailey Sierra  [address] [place] [id number]        To Whom It May Concern:    Bailey Sierra  was seen on 12/30/19. Patient is medically advised to not be performing any heavy lifting/ significant exertional activity such as shoveling due to her chronic ischemic heart disease with chronic angina. Please call with any further questions or concerns.     Sincerely,        Bailey Dallas, APRN CNP [facility]  [facility]

## 2019-12-30 NOTE — PROGRESS NOTES
"[facility] HEART CARE   CARDIOLOGY CONSULT     Bailey Sierra   [address]  [facility] [id number]      Sam Leal     Chief Complaint   Patient presents with     Consult     s/p coronary artery bypass graft x2        HPI:   Ms. Sierra is a 69 year old female who presents for cardiology evaluation with known ischemic heart disease with 2V CABG (10/24/16). She has followed cardiology at [facility], Dr. Green. She was last seen on 9/27/19. Additionally, she has a history of HTN, HLD, insulin dependant DMII, light chain disease- Kappa type, chronic CKD stage IV, secondary hyperparathyroidism, DARIA, obesity, hiatal hernia, GERD, neurogenic bladder, plantar fascitis, history lumbar laminectomy, s/p RICCARDO/BSO for uterine prolapse, DJD and vit D deficiency.     She has a history of coronary artery disease, presented with acute coronary syndrome in 2013. Heart cath with 60-70% left main disease, contiguous with ostial 80-90% LAD disease. LCx 70% proximal stenosis, RCA with 60-70% mid vessel stenosis and 90\" dRCA stenosis with left and right collateral filling of PDA/ PVBr. Patient underwent 2V CABG on 7/26/13- ALEXEY-LAD, RSVG-OM1 and LAAL by Dr. Monique at [facility] in [place]. Incomplete revascularization with no identifiable distal RCA targets found due to fat. She has been on medication management for RCA stenosis due to renal disease.     Following CABG, patient developed postoperative atrial fibrillation without recurrence.     In 2016 she underwent Cardiolite stress test secondary to chest pain which was suggestive of lateral ischemia. She had repeat coronary angiography on 10/24/16 with no new occlusive disease since 2013. Widely patent grafts, occluded RCA and diagonal 1 and 2.     She denies any changes since her last visit in September. Admits to chronic dyspnea on exertion which is unchanged. No palpitations, syncope or collapse. No PND or orthopnea. Positive for chronic LE edema which has not worsened. Weight " has remained stable. She denies much for chest discomfort. Has had GERD with hiatal hernia and is scheduled to see a specialist at [facility] in [place] to discuss surgical correction.     Today she does report shoveling at work and with this has trouble tolerating with worsening dyspnea and angina pectoris, denies this to the point of needing to take nitroglycerin, describes as mild. She has not noted this with exertional activity to lesser extent. 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 She is requesting note for work to no longer shovel as part of her job duty.     Last TTE in 2018 reviewed today with preserved LVEF, hyperkinetic at 65-70%, no RWMA, mild diastolic dysfunction and possible mild aortic stenosis.     PAST MEDICAL HISTORY:   Past Medical History:   Diagnosis Date     Atherosclerotic heart disease of native coronary artery without angina pectoris     No Comments Provided     Cardiac murmur     No Comments Provided     Chronic kidney disease, stage III (moderate) (H)     No Comments Provided     Controlled type 2 diabetes mellitus with stage 3 chronic kidney disease, with long-term current use of insulin (H) 7/26/2013     Edema     No Comments Provided     Essential (primary) hypertension     No Comments Provided     Gastro-esophageal reflux disease without esophagitis     No Comments Provided     Hyperlipidemia     No Comments Provided     Impingement syndrome of right shoulder 03/10/2017          Insomnia 01/25/2012          Iron deficiency anemia     No Comments Provided     Neuromuscular dysfunction of bladder     No Comments Provided     Osteoarthritis     No Comments Provided     Other shoulder lesions, right shoulder 03/10/2017          Other shoulder lesions, unspecified shoulder     No Comments Provided     Other specified postprocedural states  04/19/2017          Personal history of other medical treatment (CODE)     Three childbirths     Plantar fascial fibromatosis     No Comments Provided     Presence of aortocoronary bypass graft 07/2013    [facility]     Primary osteoarthritis of shoulder 03/10/2017          Type 2 diabetes mellitus without complications (H)     No Comments Provided     Urgency of urination 03/08/2011          Uterovaginal prolapse     No Comments Provided          FAMILY HISTORY:   Family History   Problem Relation Age of Onset     Other - See Comments Father         MI     Cancer Mother         Cancer,Some type of cancer, aneurysm     Diabetes Paternal Grandmother         Diabetes,Type II     Cancer Sister         Cancer,unknown type     Diabetes Sister         Diabetes,Type II     Diabetes Sister         Diabetes,Type II     Diabetes Sister         Diabetes,Type II     Other - See Comments Daughter         depression     Other - See Comments Daughter         ovarian cancer and depression     Heart Disease Brother         Heart Disease,CAD, MI          PAST SURGICAL HISTORY:   Past Surgical History:   Procedure Laterality Date     ARTHROSCOPY SHOULDER Right 1997          ARTHROSCOPY SHOULDER Left 2012          ARTHROSCOPY SHOULDER RT/LT Right [id number]          Cardiac Bypass  07/2013          COLONOSCOPY  11/24/2009 11/24/2009     CYSTOSCOPY  03/15/2001     DENTAL SURGERY      Dental extractions     ENDOSCOPIC RETROGRADE CHOLANGIOPANCREATOGRAPHY  05/2008    Bile leak with ERCP and stenting and drainage of bile collection through abdominal wall.     HYSTERECTOMY TOTAL ABDOMINAL, BILATERAL SALPINGO-OOPHORECTOMY, COMBINED  04/02/2001    Vag vault suspension with Cortex mesh     IMPLANT STIMULATOR AND LEADS SACRAL NERVE (STAGE ONE AND TWO) N/A 5/7/2019    Procedure: Interstim Implant Revision, GENERATOR AND TYING LEAD EXCHANGE;  Surgeon: Honorio Bowling MD;  Location: [facility] OR     Interstim Device Placement  04/14/2014    Dr. Bowling [facility]      LAMINECTOMY LUMBAR ONE LEVEL  1997          LAPAROSCOPIC CHOLECYSTECTOMY  04/2008          LAPAROSCOPIC TUBAL LIGATION      No Comments Provided          SOCIAL HISTORY:   Social History     Socioeconomic History     Marital status:      Spouse name: Marky SHIPMAN     Number of children: None     Years of education: None     Highest education level: None   Occupational History     None   Social Needs     Financial resource strain: None     Food insecurity:     Worry: None     Inability: None     Transportation needs:     Medical: None     Non-medical: None   Tobacco Use     Smoking status: Never Smoker     Smokeless tobacco: Never Used   Substance and Sexual Activity     Alcohol use: No     Alcohol/week: 0.0 standard drinks     Drug use: No     Comment: Drug use: No     Sexual activity: Not Currently   Lifestyle     Physical activity:     Days per week: None     Minutes per session: None     Stress: None   Relationships     Social connections:     Talks on phone: None     Gets together: None     Attends Sabianism service: None     Active member of club or organization: None     Attends meetings of clubs or organizations: None     Relationship status: None     Intimate partner violence:     Fear of current or ex partner: None     Emotionally abused: None     Physically abused: None     Forced sexual activity: None   Other Topics Concern     Parent/sibling w/ CABG, MI or angioplasty before 65F 55M? Not Asked   Social History Narrative    She is .  Retired.   She had worked in a [employer].    Patient has never smoked.  Alcohol Use - no       Marky ENAMORADO has sleep apnea.  3 children.  Preload  8/5/2013.          CURRENT MEDICATIONS:   Prior to Admission medications    Medication Sig Start Date End Date Taking? Authorizing Provider   aspirin 81 MG tablet Take 1 tablet (81 mg) by mouth daily 2/19/18  Yes Sam Leal MD   blood glucose monitoring (TRICIA CONTOUR) test strip TEST 3 TIMES A DAY 8/2/18  Yes  Sam Leal MD   Calcium Carb-Cholecalciferol (CALCIUM PLUS D3 ABSORBABLE) 600-2500 MG-UNIT CAPS Take 1 capsule by mouth daily 3/8/17  Yes Reported, Patient   furosemide (LASIX) 20 MG tablet Take 1 tablet (20 mg) by mouth daily 10/4/19  Yes Sam Leal MD   insulin aspart (NOVOLOG FLEXPEN) 100 UNIT/ML pen Inject 7 - 16 units before each meal based on sliding scale.  Max daily dose 56 units. 8/27/19  Yes Sam Leal MD   insulin glargine (BASAGLAR KWIKPEN) 100 UNIT/ML pen Inject 42 Units Subcutaneous At Bedtime Adjusts according to chemstrip 8/27/19  Yes Sam Leal MD   insulin pen needle (ULTICARE SHORT) 31G X 8 MM miscellaneous USE AS DIRECTED FOUR TIMES DAILY. Dx: E11.9 8/27/19  Yes Sam Leal MD   isosorbide mononitrate (IMDUR) 60 MG 24 hr tablet Take 2 tablets (120 mg) by mouth daily 10/4/19  Yes Sam Leal MD   lisinopril (PRINIVIL/ZESTRIL) 2.5 MG tablet Take 1 tablet (2.5 mg) by mouth daily 4/5/19  Yes Sam Leal MD   metoprolol succinate ER (TOPROL-XL) 25 MG 24 hr tablet Take 1 tablet (25 mg) by mouth daily 7/24/19  Yes Sam Leal MD   nitroGLYcerin (NITROSTAT) 0.4 MG sublingual tablet Place 0.4 mg under the tongue every 5 minutes as needed for chest pain 3/31/16  Yes Reported, Patient   pantoprazole (PROTONIX) 40 MG EC tablet Take 1 tablet (40 mg) by mouth daily 10/4/19  Yes Sam Leal MD   potassium chloride SA (K-DUR/KLOR-CON M) 20 MEQ CR tablet TK 1 T PO QD. DO NOT CRUSH 5/25/17  Yes Reported, Patient   rosuvastatin (CRESTOR) 40 MG tablet Take 40 mg by mouth daily 4/5/19  Yes Sam Leal MD   sucralfate (CARAFATE) 1 GM tablet Take 1 tablet by mouth 2 times daily 10/29/19  Yes Reported, Patient   zolpidem (AMBIEN) 10 MG tablet Take 10 mg by mouth nightly as needed for sleep 3/31/16  Yes Reported, Patient          ALLERGIES:   No Known Allergies       ROS:   CONSTITUTIONAL: No reported fever or chills. No changes in weight.  ENT: No visual disturbance, ear ache,  "epistaxis or sore throat.   CARDIOVASCULAR: Positive for exertional chest discomfort. No palpitations. Positive for chronic lower extremity edema.   RESPIRATORY: Positive for chronic shortness of breath and dyspnea upon exertion. No cough, wheezing or hemoptysis. No orthopnea or PND.   GI: No reported abdominal pain, nausea or vomiting. No reported melena or hematochezia.  : No reported hematuria or dysuria.   NEUROLOGICAL: No lightheadedness, dizziness, syncope, ataxia, paresthesias or weakness.   HEMATOLOGIC: No history of anemia. No bleeding or excessive bruising. No history of blood clots.   MUSCULOSKELETAL: No reported joint pain or swelling, no muscle pain. Positive for pain in right hand and mild swelling following fall.   ENDOCRINOLOGIC: No temperature intolerance. No hair or skin changes.  SKIN: No abnormal rashes or sores, no unusual itching.  PSYCHIATRIC: No history of depression or anxiety. No changes in mood, feeling down or anxious. No changes in sleep.      PHYSICAL EXAM:   /60 (BP Location: Right arm, Patient Position: Sitting, Cuff Size: Adult Large)   Pulse 52   Temp 98.5  F (36.9  C) (Tympanic)   Resp 20   Ht 1.651 m (5' 5\")   Wt 90.3 kg (199 lb)   SpO2 98%   BMI 33.12 kg/m    GENERAL: The patient is a well-developed, well-nourished, in no apparent distress.  HEENT: Head is normocephalic and atraumatic. Eyes are symmetrical with normal visual tracking. No icterus, no xanthelasmas. Nares appeared normal without nasal drainage. Mucous membranes are moist, no cyanosis.  NECK: Supple. Possible cervical bruits heard bilaterally, no JVP not visible.   CHEST/ LUNGS: Lungs clear to auscultation, no rales, rhonchi or wheezes, no use of accessory muscles, no retractions, respirations unlabored and normal respiratory rate.   CARDIO: Regular rate and rhythm normal with S1 and S2, no S3 or S4 and no murmur, click or rub.   ABD: Abdomen is nondistended.   EXTREMITIES: Positive for 1+ bilateral LE " "edema present.  MUSCULOSKELETAL: No visible joint swelling.   NEUROLOGIC: Alert and oriented X3. Normal speech, gait and affect. No focal neurologic deficits.   SKIN: No jaundice. No rashes or visible skin lesions present. No ecchymosis.       EKG:    Sinus bradycardia, rate 47 bpm.    LAB RESULTS:   Transferred Records on 12/04/2019   Component Date Value Ref Range Status     RETINOPATHY 12/04/2019 NEGATIVE   Final   Orders Only on 10/02/2019   Component Date Value Ref Range Status     Hemoglobin A1C 10/02/2019 6.9* 4.0 - 6.0 % Final          ASSESSMENT:   Bailey Sierra presents for cardiology evaluation with known ischemic heart disease with 2V CABG (10/24/16). She has followed cardiology at [facility], Dr. Green. She was last seen on 9/27/19. Additionally, she has a history of HTN, HLD, insulin dependant DMII, light chain disease- Kappa type, chronic CKD stage IV, secondary hyperparathyroidism, DARIA, obesity, hiatal hernia, GERD, neurogenic bladder, plantar fascitis, history lumbar laminectomy, s/p RICCARDO/BSO for uterine prolapse, DJD and vit D deficiency.     She has a history of coronary artery disease, presented with acute coronary syndrome in 2013. Heart cath with 60-70% left main disease, contiguous with ostial 80-90% LAD disease. LCx 70% proximal stenosis, RCA with 60-70% mid vessel stenosis and 90\" dRCA stenosis with left and right collateral filling of PDA/ PVBr. Patient underwent 2V CABG on 7/26/13- ALEXEY-LAD, RSVG-OM1 and LAAL by Dr. Monique at [facility] in [place]. Incomplete revascularization with no identifiable distal RCA targets found due to fat. She has been on medication management for RCA stenosis due to renal disease.     PLAN:   1. Coronary artery disease of native heart with stable angina pectoris, unspecified vessel or lesion type (H)  Patient with known ischemic heart disease with 2V CABG in 2013 with residual RCA disease with incomplete revascularization ( RCA). Controlled on medical therapy " with ASA indefinitely, beta blocker and Imdur for chronic stable angina.    Remain on high intensity statin for plaque stabilization and graft protection.   Work noted provided to avoid significant strenuous exertion such as shoveling with chronic stable angina and ischemic heart disease with  of RCA.   Follow-up with cardiology in 6 months, certainly sooner if needed.     - Basic metabolic panel    2. Pure hypercholesterolemia  See above.     3. Type 2 diabetes mellitus without complication, with long-term current use of insulin (H)  Continued management by PCP.  Hemoglobin A1C   Date Value Ref Range Status   10/02/2019 6.9 (H) 4.0 - 6.0 % Final   04/03/2019 6.5 (H) 4.0 - 6.0 % Final     Last Comprehensive Metabolic Panel:  Sodium   Date Value Ref Range Status   07/18/2019 141 134 - 144 mmol/L Final     Potassium   Date Value Ref Range Status   07/18/2019 3.6 3.5 - 5.1 mmol/L Final     Chloride   Date Value Ref Range Status   07/18/2019 110 (H) 98 - 107 mmol/L Final     Carbon Dioxide   Date Value Ref Range Status   07/18/2019 24 21 - 31 mmol/L Final     Anion Gap   Date Value Ref Range Status   07/18/2019 7 3 - 14 mmol/L Final     Glucose   Date Value Ref Range Status   07/18/2019 157 (H) 70 - 105 mg/dL Final     Urea Nitrogen   Date Value Ref Range Status   07/18/2019 17 7 - 25 mg/dL Final     Creatinine   Date Value Ref Range Status   07/18/2019 2.04 (H) 0.60 - 1.20 mg/dL Final     GFR Estimate   Date Value Ref Range Status   07/18/2019 Not Calculated >60 mL/min/[1.73_m2] Final     Calcium   Date Value Ref Range Status   07/18/2019 9.1 8.6 - 10.3 mg/dL Final         4. Essential hypertension  BP well controlled.     - Basic metabolic panel    5. Nonrheumatic aortic valve stenosis  Repeat TTE again in October 2020.    6. Hiatal hernia  She has followed at [facility] in [place] for this.   Considering possible surgical correction, will require cardiac preoperative clearance prior.     7. CKD (chronic kidney  disease) stage 4, GFR 15-29 ml/min (H)  CKD, labs today.     - Basic metabolic panel    8. Stenosis of carotid artery, unspecified laterality  Repeat carotid US ordered.   Approx 50% stenosis or less in 2013.     9. History of coronary artery bypass graft x 2  See above.       Thank you for allowing me to participate in the care of your patient. Please do not hesitate to contact me if you have any questions.     Bailey Dallas, APRN CNP CHFN

## 2019-12-30 NOTE — PATIENT INSTRUCTIONS
You were seen by  BRITNEY Tai CNP       1. Laboratory blood work has been ordered.  You will be notified by phone call or [employer] message when the results are available.       2. A Carotid Ultrasound has been ordered. You will be called to schedule this. You will receive instructions for testing at that time. You will be contacted with results.      3. No other changes.             Please call the cardiology office with problems, questions, or concerns at [phone].    If you experience chest pain, chest pressure, chest tightness, shortness of breath, fainting, lightheadedness, nausea, vomiting, or other concerning symptoms, please report to the Emergency Department or call 911. These symptoms may be emergent, and best treated in the Emergency Department.     Cardiology Nurses  DANICA Leigh LPN Amy M., LPN  [facility] (Unit 3C)  [phone]

## 2019-12-31 DIAGNOSIS — E87.6 LOW BLOOD POTASSIUM: Primary | ICD-10-CM

## 2019-12-31 RX ORDER — POTASSIUM CHLORIDE 1500 MG/1
20 TABLET, EXTENDED RELEASE ORAL 2 TIMES DAILY
Qty: 180 TABLET | Refills: 1 | Status: SHIPPED | OUTPATIENT
Start: 2019-12-31 | End: 2020-06-09

## 2020-01-06 ENCOUNTER — HOSPITAL ENCOUNTER (OUTPATIENT)
Dept: ULTRASOUND IMAGING | Facility: OTHER | Age: 70
Discharge: HOME | End: 2020-01-06
Attending: NURSE PRACTITIONER | Admitting: NURSE PRACTITIONER
Payer: MEDICARE

## 2020-01-06 ENCOUNTER — HOSPITAL ENCOUNTER (OUTPATIENT)
Dept: MAMMOGRAPHY | Facility: OTHER | Age: 70
End: 2020-01-06
Attending: FAMILY MEDICINE
Payer: MEDICARE

## 2020-01-06 DIAGNOSIS — E87.6 LOW BLOOD POTASSIUM: ICD-10-CM

## 2020-01-06 DIAGNOSIS — Z12.31 VISIT FOR SCREENING MAMMOGRAM: ICD-10-CM

## 2020-01-06 DIAGNOSIS — I65.29 STENOSIS OF CAROTID ARTERY, UNSPECIFIED LATERALITY: ICD-10-CM

## 2020-01-06 LAB — POTASSIUM SERPL-SCNC: 3.9 MMOL/L (ref 3.5–5.1)

## 2020-01-06 PROCEDURE — 77067 SCR MAMMO BI INCL CAD: CPT

## 2020-01-06 PROCEDURE — 84132 ASSAY OF SERUM POTASSIUM: CPT | Mod: ZL | Performed by: NURSE PRACTITIONER

## 2020-01-06 PROCEDURE — 93880 EXTRACRANIAL BILAT STUDY: CPT

## 2020-01-17 ENCOUNTER — OFFICE VISIT (OUTPATIENT)
Dept: FAMILY MEDICINE | Facility: OTHER | Age: 70
End: 2020-01-17
Attending: FAMILY MEDICINE
Payer: MEDICARE

## 2020-01-17 ENCOUNTER — HOSPITAL ENCOUNTER (OUTPATIENT)
Dept: GENERAL RADIOLOGY | Facility: OTHER | Age: 70
Discharge: HOME | End: 2020-01-17
Attending: FAMILY MEDICINE | Admitting: FAMILY MEDICINE
Payer: MEDICARE

## 2020-01-17 VITALS
DIASTOLIC BLOOD PRESSURE: 64 MMHG | OXYGEN SATURATION: 99 % | HEART RATE: 72 BPM | HEIGHT: 65 IN | WEIGHT: 197.8 LBS | RESPIRATION RATE: 20 BRPM | TEMPERATURE: 97.6 F | SYSTOLIC BLOOD PRESSURE: 110 MMHG | BODY MASS INDEX: 32.96 KG/M2

## 2020-01-17 DIAGNOSIS — M79.641 PAIN OF RIGHT HAND: ICD-10-CM

## 2020-01-17 DIAGNOSIS — M79.641 PAIN OF RIGHT HAND: Primary | ICD-10-CM

## 2020-01-17 PROCEDURE — 73130 X-RAY EXAM OF HAND: CPT | Mod: RT

## 2020-01-17 PROCEDURE — G0463 HOSPITAL OUTPT CLINIC VISIT: HCPCS | Mod: 25

## 2020-01-17 PROCEDURE — 99213 OFFICE O/P EST LOW 20 MIN: CPT | Performed by: FAMILY MEDICINE

## 2020-01-17 PROCEDURE — G0463 HOSPITAL OUTPT CLINIC VISIT: HCPCS

## 2020-01-17 ASSESSMENT — MIFFLIN-ST. JEOR: SCORE: 1418.09

## 2020-01-17 ASSESSMENT — PAIN SCALES - GENERAL: PAINLEVEL: MODERATE PAIN (5)

## 2020-01-17 NOTE — LETTER
January 17, 2020      Bailey Sierra  [address] [place] [id number]        To Whom It May Concern,     Bailey Sierra attended clinic here on Jan 17, 2020.  She can return to work with the following restrictions:    No shoveling.    These will be in place for the next 3 weeks.    If you have questions or concerns, please call the clinic at the number listed above.    Sincerely,         Sam Leal MD

## 2020-01-17 NOTE — NURSING NOTE
Patient presents today for injury to her right hand. She reports falling over a month ago and landing on her hand. Patient complains of swelling and pain in her hand.    Medication Reconciliation Complete    Noemy Holt LPN  1/17/2020 12:48 PM

## 2020-01-17 NOTE — PROGRESS NOTES
"SUBJECTIVE:  Bailey Sierra is a 69 year old female here for right hand pain.  She reports that approximate 1 month ago she slipped and fell on some ice landing on her right hand.  She has had pain ever since.  She is right-hand dominant.  She has not anything for this at home.    ROS:    As above otherwise ROS is unremarkable.    OBJECTIVE:  /64   Pulse 72   Temp 97.6  F (36.4  C)   Resp 20   Ht 1.651 m (5' 5\")   Wt 89.7 kg (197 lb 12.8 oz)   SpO2 99%   BMI 32.92 kg/m      EXAM:  General Appearance: Pleasant, alert, appropriate appearance for age. No acute distress  Musculoskeletal: She is swelling and tenderness over the dorsal aspect of her right hand especially over her first, second and third metacarpals.  Range of motion seems to be intact at the wrist and MCP.  No tenderness over her scaphoid.  Skin: No bruising or erythema.  Neurologic Exam: Normal distal sensation to light touch.    ASSESSEMENT AND PLAN:    1. Pain of right hand      X-ray was performed, personally reviewed and shows no acute bony abnormality.  She has widespread degeneration noted.  We will place her in a wrist immobilizer to work on mobilization and allow this to calm down.  She will also use anti-inflammatories and ice.  If no improvement would consider occupational therapy.  A note for work was requested for no shoveling.        Sukumar Leal MD    This document was prepared using voice generated software.  While every attempt was made for accuracy, grammatical errors may exist.  "

## 2020-02-10 ENCOUNTER — ALLIED HEALTH/NURSE VISIT (OUTPATIENT)
Dept: EDUCATION SERVICES | Facility: OTHER | Age: 70
End: 2020-02-10
Attending: FAMILY MEDICINE
Payer: COMMERCIAL

## 2020-02-10 DIAGNOSIS — Z79.4 TYPE 2 DIABETES MELLITUS WITHOUT COMPLICATION, WITH LONG-TERM CURRENT USE OF INSULIN (H): Primary | ICD-10-CM

## 2020-02-10 DIAGNOSIS — E11.9 TYPE 2 DIABETES MELLITUS WITHOUT COMPLICATION, WITH LONG-TERM CURRENT USE OF INSULIN (H): Primary | ICD-10-CM

## 2020-02-10 PROCEDURE — G0108 DIAB MANAGE TRN  PER INDIV: HCPCS

## 2020-02-10 PROCEDURE — 99211 OFF/OP EST MAY X REQ PHY/QHP: CPT | Performed by: REGISTERED NURSE

## 2020-02-10 NOTE — PATIENT INSTRUCTIONS
Continuous Glucose Monitoring Personal Start      Congratulations! You have decided to utilize the DEXCOM G6 Continuous Glucose Monitoring (CGM) System (G6). We strongly believe in the use of the new technologies to better assist you in managing your diabetes. These technologies give both you and your provider a better understanding of your diabetes and current trends, which will enable you to improve control of your blood glucose levels.      Low and high alerts are set 80 mg/dL and 220 mg/dL. You inserted your sensor today at 2:20 pm.  You should begin receiving sensor glucose readings in 2 hours after insertion.  Your sensor lasts 10 days and you will be prompted when the sensor is close to expiration and a new sensor can then be started.    [employer] is a team of trainers and certified  diabetes educators dedicated to helping you with  your new G6. They provide live, interactive support  and webinars to get you started and keep you  informed.     You may download your CGM information from your  at www.[employer].com.  Choose the Dexcom Clarity software.     Dexcom CLARITY software is an important part of your G6.  Clarity highlights your glucose patterns, trends and statistics. Share  with your clinic and monitor improvements between visits.    To use Dexcom CLARITY:    1. Log in at [employer].  Use your [employer]! G6 login or create an account.    2. Get weekly notifications with Dexcom CLARITY Reports Dick.  Notifications available to Dexcom G6 users.       PLAN:      1. No need for calibrations if, at the time of insertion, you enter the sensor code into your dick or .  If you do not have a code or choose not to enter the code, you will need to calibrate the G6 dick/.  Follow calibration instructions on your dick or  for sensor period (10 days).     2. Events - You are encouraged to enter events such as Carbohydrates (grams), Insulin (units), Exercise (intensity and  duration), Health (illness, stress, high/low symptoms, and alcohol).     3. Battery - Charge  with  1 - 3 hours every 3 - 5 days or as  battery depletes.      4. Please follow up if any questions or concerns.    Dexcom Technical Support is available 24 hours a day/7 days a week at:        [email or web address]    [email or web address]    Toll free: [phone]    5.  Discussed blood sugar management and high glucose at bedtime and low glucose, at times, early morning before breakfast.    6.  Recommend increasing your supper Novolog (lasts 4 hours) by 1 - 2 units to help decrease risk of high glucose at bedtime.      7.  Recommend taking less Basaglar (lasts 24 hours) to help decrease risk of low glucose early morning before breakfast.       Hazel Del Rio RN, BSN, CDE  2/10/2020 2:22 PM

## 2020-02-11 NOTE — PROGRESS NOTES
Diabetes Education Progress Note  Continuous Glucose Monitoring System (CGMS)     SUBJECTIVE:  Bailey is a 69 y.o. female who has type 2 diabetes and is here for Personal Continuous Glucose Monitoring System (CGMS) training on Dexcom G6 CGM.      OBJECTIVE:  BG today: 132 mg/dl.   Currently utilizes Dexcom G5 CGM, calibrating twice daily.  Current Diabetes Medications:  Insulin - Novolog and Basaglar      ASSESSMENT:  Bailey was instructed in features of DEXCOM G6 CGMS.  She was instructed in programming a BG reading, events for meds, CHO, exercise and health such as stress, illness, high/low symptoms.  Patient will wear the sensor for 10 days and if she would like, can download at home using Dexcom Clarity software found at www.dexcom.com.       Patient demonstrated understanding by inserting her own sensor.  Sensor inserted without difficulty,  receiving data.  Helped patient program her .       Discussed blood sugar management and high glucose at bedtime and low glucose, at times, early morning before breakfast.    Recommend increasing supper Novolog (lasts 4 hours) by 1 - 2 units to help decrease risk of high glucose at bedtime.      Recommend taking less Basaglar (lasts 24 hours) to help decrease risk of low glucose early morning before breakfast.      Time spent with patient was 60 minutes.     See patient instructions for complete plan.     Hazel Del Rio RN, BSN, CDE  2/11/2020 5:50 PM

## 2020-03-17 DIAGNOSIS — Z79.4 TYPE 2 DIABETES MELLITUS WITH COMPLICATION, WITH LONG-TERM CURRENT USE OF INSULIN (H): ICD-10-CM

## 2020-03-17 DIAGNOSIS — E11.8 TYPE 2 DIABETES MELLITUS WITH COMPLICATION, WITH LONG-TERM CURRENT USE OF INSULIN (H): ICD-10-CM

## 2020-03-17 RX ORDER — ROSUVASTATIN CALCIUM 40 MG/1
TABLET, COATED ORAL
Qty: 90 TABLET | Refills: 1 | Status: SHIPPED | OUTPATIENT
Start: 2020-03-17 | End: 2020-09-03

## 2020-03-17 NOTE — TELEPHONE ENCOUNTER
Refill Request for: Rosuvastatin (CRESTOR) 40 MG tablet    Received From: [facility]  Last Written Prescription Date: 4/5/2019 for 90 tablets and 3 refills  LOV: 1/17/2020 with PCP  Next Appointment: 4/13/2020 with PCP  Protocol: Statins Protocol Passed - 03/17/2020 03:01 PM    Prescription approved per [facility] Medication Refill Protocol.      Sondra MENESESN, RN on 3/17/2020 at 3:03 PM

## 2020-04-14 ENCOUNTER — VIRTUAL VISIT (OUTPATIENT)
Dept: FAMILY MEDICINE | Facility: OTHER | Age: 70
End: 2020-04-14
Attending: FAMILY MEDICINE
Payer: COMMERCIAL

## 2020-04-14 DIAGNOSIS — Z79.4 TYPE 2 DIABETES MELLITUS WITHOUT COMPLICATION, WITH LONG-TERM CURRENT USE OF INSULIN (H): Primary | ICD-10-CM

## 2020-04-14 DIAGNOSIS — E11.9 TYPE 2 DIABETES MELLITUS WITHOUT COMPLICATION, WITH LONG-TERM CURRENT USE OF INSULIN (H): Primary | ICD-10-CM

## 2020-04-14 PROCEDURE — 99212 OFFICE O/P EST SF 10 MIN: CPT | Mod: 95 | Performed by: FAMILY MEDICINE

## 2020-04-14 ASSESSMENT — ANXIETY QUESTIONNAIRES
GAD7 TOTAL SCORE: 0
1. FEELING NERVOUS, ANXIOUS, OR ON EDGE: NOT AT ALL
3. WORRYING TOO MUCH ABOUT DIFFERENT THINGS: NOT AT ALL
2. NOT BEING ABLE TO STOP OR CONTROL WORRYING: NOT AT ALL
IF YOU CHECKED OFF ANY PROBLEMS ON THIS QUESTIONNAIRE, HOW DIFFICULT HAVE THESE PROBLEMS MADE IT FOR YOU TO DO YOUR WORK, TAKE CARE OF THINGS AT HOME, OR GET ALONG WITH OTHER PEOPLE: NOT DIFFICULT AT ALL
6. BECOMING EASILY ANNOYED OR IRRITABLE: NOT AT ALL
7. FEELING AFRAID AS IF SOMETHING AWFUL MIGHT HAPPEN: NOT AT ALL
5. BEING SO RESTLESS THAT IT IS HARD TO SIT STILL: NOT AT ALL

## 2020-04-14 ASSESSMENT — PAIN SCALES - GENERAL: PAINLEVEL: NO PAIN (0)

## 2020-04-14 ASSESSMENT — PATIENT HEALTH QUESTIONNAIRE - PHQ9: 5. POOR APPETITE OR OVEREATING: NOT AT ALL

## 2020-04-14 NOTE — PROGRESS NOTES
"Bailey Sierra is a 70 year old female who is being evaluated via a billable telephone visit.      The patient has been notified of following:     \"This telephone visit will be conducted via a call between you and your physician/provider. We have found that certain health care needs can be provided without the need for a physical exam.  This service lets us provide the care you need with a short phone conversation.  If a prescription is necessary we can send it directly to your pharmacy.  If lab work is needed we can place an order for that and you can then stop by our lab to have the test done at a later time.    Telephone visits are billed at different rates depending on your insurance coverage. During this emergency period, for some insurers they may be billed the same as an in-person visit.  Please reach out to your insurance provider with any questions.    If during the course of the call the physician/provider feels a telephone visit is not appropriate, you will not be charged for this service.\"    Patient has given verbal consent for Telephone visit?  Yes    How would you like to obtain your AVS?     Subjective     Bailey Sierra is a 70 year old female who presents to clinic today for the following health issues:    Diabetes check, with labs needed     Patient states she is doing okay in the quarantine but lives alone. She has the Dexcom sensor for her diabetes, but that is not working currently, so she is checking her blood sugars 4x daily. She states they run  but yesterday morning it was 218. She has no episodes of hypoglycemia or hyperglycemia. She notes she felt no different yesterday when her sugar was 218. She states the insulin has been working well for her. She also says she has adequate feeling in her feet with no complaints. She sees an eye doctor every year, and already had an appointment this year. She is not getting a lot of exercise because she states she does not walk great.   She is " concerned about her Dexcom not working and was wondering if Dr. Hoyos could send another prescription over. She closely works with Hazel, the diabetes educator, and would like us to contact her to see if she can get the Dexcom.   Due to the coronavirus, the patient feels she cannot afford her aspirin 81mg OTC and would like a prescription sent to [facility] for that.       Recent Labs   Lab Test 01/06/20  1446 12/30/19  1508 10/02/19  1005 07/18/19  1810 07/15/19  1156 04/03/19  0910  09/26/18  0944  12/11/17  0932  11/11/15  0856   A1C  --   --  6.9*  --   --  6.5*  --  6.6*   < >  --   --   --    LDL  --   --   --   --   --  75  --   --   --  72  --  89   HDL  --   --   --   --   --  41  --   --   --  39  --  42   TRIG  --   --   --   --   --  125  --   --   --  202*  --  126   ALT  --   --   --  13 12 11  --   --   --   --   --   --    CR  --  2.46*  --  2.04* 2.13* 1.95*  --   --   --  2.25*   < >  --    GFRESTIMATED  --  19*  --  Not Calculated 23* 25*   < >  --   --   --   --   --    GFRESTBLACK  --  23*  --  Not Calculated 28* 31*  --   --   --  26*   < >  --    POTASSIUM 3.9 2.9*  --  3.6 3.3* 4.0  --   --   --  4.0   < >  --     < > = values in this interval not displayed.            Patient Active Problem List   Diagnosis     AC (acromioclavicular) joint arthritis     Coronary atherosclerosis     CKD (chronic kidney disease) stage 4, GFR 15-29 ml/min (H)     GERD (gastroesophageal reflux disease)     Nonrheumatic aortic valve stenosis     Hyperlipidemia     Essential hypertension     Impingement syndrome of right shoulder     Insomnia     Long-term insulin use in type 2 diabetes (H)     Urge incontinence     Right rotator cuff tendinitis     Vitamin D deficiency     Obesity     Light chain disease, kappa type (H)     Hyperparathyroidism due to renal insufficiency (H)     Hiatal hernia     Atherosclerotic heart disease of native coronary artery with other forms of angina pectoris (H)     Obesity (BMI  35.0-39.9) with comorbidity (H)     History of coronary artery bypass graft x 2     Stenosis of carotid artery, unspecified laterality     Chronic total occlusion of coronary artery (RCA)     Past Surgical History:   Procedure Laterality Date     ARTHROSCOPY SHOULDER Right 1997          ARTHROSCOPY SHOULDER Left 2012          ARTHROSCOPY SHOULDER RT/LT Right [id number]          Cardiac Bypass  07/2013          COLONOSCOPY  11/24/2009 11/24/2009     CYSTOSCOPY  03/15/2001     DENTAL SURGERY      Dental extractions     ENDOSCOPIC RETROGRADE CHOLANGIOPANCREATOGRAPHY  05/2008    Bile leak with ERCP and stenting and drainage of bile collection through abdominal wall.     HYSTERECTOMY TOTAL ABDOMINAL, BILATERAL SALPINGO-OOPHORECTOMY, COMBINED  04/02/2001    Vag vault suspension with Cortex mesh     IMPLANT STIMULATOR AND LEADS SACRAL NERVE (STAGE ONE AND TWO) N/A 5/7/2019    Procedure: Interstim Implant Revision, GENERATOR AND TYING LEAD EXCHANGE;  Surgeon: Honorio Bowling MD;  Location: [facility] OR     Interstim Device Placement  04/14/2014    Dr. Bowling - [facility]     LAMINECTOMY LUMBAR ONE LEVEL  1997          LAPAROSCOPIC CHOLECYSTECTOMY  04/2008          LAPAROSCOPIC TUBAL LIGATION      No Comments Provided       Social History     Tobacco Use     Smoking status: Never Smoker     Smokeless tobacco: Never Used   Substance Use Topics     Alcohol use: No     Alcohol/week: 0.0 standard drinks     Family History   Problem Relation Age of Onset     Other - See Comments Father         MI     Cancer Mother         Cancer,Some type of cancer, aneurysm     Diabetes Paternal Grandmother         Diabetes,Type II     Cancer Sister         Cancer,unknown type     Diabetes Sister         Diabetes,Type II     Diabetes Sister         Diabetes,Type II     Diabetes Sister         Diabetes,Type II     Other - See Comments Daughter         depression     Other - See Comments Daughter         ovarian cancer and depression     Heart Disease Brother          Heart Disease,CAD, MI         Current Outpatient Medications   Medication Sig Dispense Refill     aspirin (ASA) 81 MG tablet Take 1 tablet (81 mg) by mouth daily 90 tablet 3     aspirin 81 MG tablet Take 1 tablet (81 mg) by mouth daily 30 tablet      blood glucose monitoring (TRICIA CONTOUR) test strip TEST 3 TIMES A  each 3     Calcium Carb-Cholecalciferol (CALCIUM PLUS D3 ABSORBABLE) 600-2500 MG-UNIT CAPS Take 1 capsule by mouth daily       furosemide (LASIX) 20 MG tablet Take 1 tablet (20 mg) by mouth daily 90 tablet 3     insulin aspart (NOVOLOG FLEXPEN) 100 UNIT/ML pen Inject 7 - 16 units before each meal based on sliding scale.  Max daily dose 56 units. 60 mL 3     insulin glargine (BASAGLAR KWIKPEN) 100 UNIT/ML pen Inject 42 Units Subcutaneous At Bedtime Adjusts according to chemstrip 60 mL 1     insulin pen needle (ULTICARE SHORT) 31G X 8 MM miscellaneous USE AS DIRECTED FOUR TIMES DAILY. Dx: E11.9 400 each 3     isosorbide mononitrate (IMDUR) 60 MG 24 hr tablet Take 2 tablets (120 mg) by mouth daily 180 tablet 3     lisinopril (PRINIVIL/ZESTRIL) 2.5 MG tablet Take 1 tablet (2.5 mg) by mouth daily 90 tablet 3     metoprolol succinate ER (TOPROL-XL) 25 MG 24 hr tablet Take 1 tablet (25 mg) by mouth daily 90 tablet 3     nitroGLYcerin (NITROSTAT) 0.4 MG sublingual tablet Place 0.4 mg under the tongue every 5 minutes as needed for chest pain       pantoprazole (PROTONIX) 40 MG EC tablet Take 1 tablet (40 mg) by mouth daily 90 tablet 3     potassium chloride ER (K-DUR/KLOR-CON M) 20 MEQ CR tablet Take 1 tablet (20 mEq) by mouth 2 times daily 180 tablet 1     rosuvastatin (CRESTOR) 40 MG tablet Take 40 mg by mouth daily 90 tablet 1     sucralfate (CARAFATE) 1 GM tablet Take 1 tablet by mouth 2 times daily  3     zolpidem (AMBIEN) 10 MG tablet Take 10 mg by mouth nightly as needed for sleep       Recent Labs   Lab Test 01/06/20  1446 12/30/19  1508 10/02/19  1005 07/18/19  1810 07/15/19  1156  04/03/19  0910  09/26/18  0944  12/11/17  0932  11/11/15  0856   A1C  --   --  6.9*  --   --  6.5*  --  6.6*   < >  --   --   --    LDL  --   --   --   --   --  75  --   --   --  72  --  89   HDL  --   --   --   --   --  41  --   --   --  39  --  42   TRIG  --   --   --   --   --  125  --   --   --  202*  --  126   ALT  --   --   --  13 12 11  --   --   --   --   --   --    CR  --  2.46*  --  2.04* 2.13* 1.95*  --   --   --  2.25*   < >  --    GFRESTIMATED  --  19*  --  Not Calculated 23* 25*   < >  --   --   --   --   --    GFRESTBLACK  --  23*  --  Not Calculated 28* 31*  --   --   --  26*   < >  --    POTASSIUM 3.9 2.9*  --  3.6 3.3* 4.0  --   --   --  4.0   < >  --     < > = values in this interval not displayed.        Reviewed and updated as needed this visit by Provider         Review of Systems   ROS COMP: Constitutional, HEENT, cardiovascular, pulmonary, gi and gu systems are negative, except as otherwise noted.       Objective   Reported vitals:  There were no vitals taken for this visit.   healthy, alert and no distress  PSYCH: Alert and oriented times 3; coherent speech, normal   rate and volume, able to articulate logical thoughts, able   to abstract reason, no tangential thoughts, no hallucinations   or delusions  Her affect is pleasant  RESP: No cough, no audible wheezing, able to talk in full sentences  Remainder of exam unable to be completed due to telephone visits    Diagnostic Test Results:  Labs reviewed in Epic        Assessment/Plan:  (E11.9,  Z79.4) Type 2 diabetes mellitus without complication, with long-term current use of insulin (H)  (primary encounter diagnosis)  Comment: Stable. Her last A1c was in 10/2019 and was 6.9. Her blood glucoses range from . She has no episodes of hypo/hyperglycemia. She has eye appointments every year. Her Dexcom stopped working recently, so she has had to take her blood sugars manually 4x daily. She would like a new prescription for that. She follows  with Hazel, the diabetes educator regularly. Her creatinine was 2.46 in 12/2019, would like a repeat. Most likely due to her renal insufficiency but also could be diabetic nephropathy. No concern for diabetic neuropathy at this time.   Plan: Dexcom prescription. Diabetes labs in future.        Return in about 3 months (around 7/14/2020).      Phone call duration:  12 minutes    Adelia Braxton, MS3, RPAP student   Working under the supervision of Dr. Jayme Hoyos MD    She has a DEXCOM and says the sensor has stopped working.  She called the factory to get it fixed.  Now back to qid checks.  She says I need to send in a new prescription DEXCOM.  I believe for her DEXCOM she will need an updated A1c, so will have her come in for a lab only visit. Orders placed.      Regarding her renal disease, sees nephrology but with COVID has not been able to get her usual follow up.    Joshua Hoyos MD on 4/14/2020 at 10:19 AM      Patient currently uses a Dexcom G6 CGM for continuous monitoring of glucose.   Patient injects or boluses insulin 3 or more times daily before breakfast, before lunch, before dinner, and bedtime.   Patient requires frequent adjustments to their insulin treatment regimen on the basis of therapeutic CGM testing results.     Joshua Hoyos MD on 4/15/2020 at 9:47 AM

## 2020-04-14 NOTE — NURSING NOTE
Calling regarding a diabetes labs that needs to be done    Salud Nagy LPN on 4/14/2020 at 9:40 AM

## 2020-04-15 ASSESSMENT — ANXIETY QUESTIONNAIRES: GAD7 TOTAL SCORE: 0

## 2020-04-16 ENCOUNTER — TELEPHONE (OUTPATIENT)
Dept: EDUCATION SERVICES | Facility: OTHER | Age: 70
End: 2020-04-16

## 2020-04-16 NOTE — TELEPHONE ENCOUNTER
Message left, paperwork completed.  [employer] should now be able to send her continued G6 CGM supplies.    Hazel Del Rio RN, BSN, [facility][note 3]ES  4/16/2020 2:17 PM

## 2020-04-20 DIAGNOSIS — Z79.4 TYPE 2 DIABETES MELLITUS WITHOUT COMPLICATION, WITH LONG-TERM CURRENT USE OF INSULIN (H): ICD-10-CM

## 2020-04-20 DIAGNOSIS — E11.9 TYPE 2 DIABETES MELLITUS WITHOUT COMPLICATION, WITH LONG-TERM CURRENT USE OF INSULIN (H): ICD-10-CM

## 2020-04-20 LAB
ANION GAP SERPL CALCULATED.3IONS-SCNC: 8 MMOL/L (ref 3–14)
BUN SERPL-MCNC: 24 MG/DL (ref 7–25)
CALCIUM SERPL-MCNC: 9.2 MG/DL (ref 8.6–10.3)
CHLORIDE SERPL-SCNC: 108 MMOL/L (ref 98–107)
CHOLEST SERPL-MCNC: 118 MG/DL
CO2 SERPL-SCNC: 28 MMOL/L (ref 21–31)
CREAT SERPL-MCNC: 2.15 MG/DL (ref 0.6–1.2)
GFR SERPL CREATININE-BSD FRML MDRD: 23 ML/MIN/{1.73_M2}
GLUCOSE SERPL-MCNC: 91 MG/DL (ref 70–105)
HBA1C MFR BLD: 6.8 % (ref 4–6)
HDLC SERPL-MCNC: 41 MG/DL (ref 23–92)
LDLC SERPL CALC-MCNC: 58 MG/DL
NONHDLC SERPL-MCNC: 77 MG/DL
POTASSIUM SERPL-SCNC: 3.7 MMOL/L (ref 3.5–5.1)
SODIUM SERPL-SCNC: 144 MMOL/L (ref 134–144)
TRIGL SERPL-MCNC: 96 MG/DL

## 2020-04-20 PROCEDURE — 83036 HEMOGLOBIN GLYCOSYLATED A1C: CPT | Mod: ZL | Performed by: FAMILY MEDICINE

## 2020-04-20 PROCEDURE — 80061 LIPID PANEL: CPT | Mod: ZL | Performed by: FAMILY MEDICINE

## 2020-04-20 PROCEDURE — 82043 UR ALBUMIN QUANTITATIVE: CPT | Mod: ZL | Performed by: FAMILY MEDICINE

## 2020-04-20 PROCEDURE — 36415 COLL VENOUS BLD VENIPUNCTURE: CPT | Mod: ZL | Performed by: FAMILY MEDICINE

## 2020-04-20 PROCEDURE — 80048 BASIC METABOLIC PNL TOTAL CA: CPT | Mod: ZL | Performed by: FAMILY MEDICINE

## 2020-04-21 LAB
CREAT UR-MCNC: 155 MG/DL
MICROALBUMIN UR-MCNC: 273 MG/L
MICROALBUMIN/CREAT UR: 176.13 MG/G CR (ref 0–25)

## 2020-04-27 ENCOUNTER — TELEPHONE (OUTPATIENT)
Dept: FAMILY MEDICINE | Facility: OTHER | Age: 70
End: 2020-04-27

## 2020-04-27 NOTE — TELEPHONE ENCOUNTER
Reason for call: Request for results.    Name of test or procedure: blood work    Date of test or procedure: Does not remember, last week    Location of test or procedure: [facility]    Preferred method for responding to this message: Telephone Call    Phone number patient can be reached at: Home number on file [phone] (home)    If we can't reach you directly, may we leave a detailed response at the number you provided?Yes

## 2020-04-28 DIAGNOSIS — I10 ESSENTIAL HYPERTENSION: ICD-10-CM

## 2020-04-28 RX ORDER — LISINOPRIL 2.5 MG/1
2.5 TABLET ORAL DAILY
Qty: 90 TABLET | Refills: 3 | Status: ON HOLD | OUTPATIENT
Start: 2020-04-28 | End: 2021-02-23

## 2020-04-28 NOTE — TELEPHONE ENCOUNTER
Patient needs refill of Lisinopril 2.5 mg - Wants a 90 day supply not 11 days.  Please call her to advise.  She uses [facility] for pharmacy.   Kellee Mccain on 4/28/2020 at 9:48 AM

## 2020-04-28 NOTE — TELEPHONE ENCOUNTER
Patient sent Rx request for the following:   lisinopril (ZESTRIL) 2.5 MG tablet  Sig:  Take 1 tablet (2.5 mg) by mouth daily    Last Prescription Date:   4/5/2019  Last Fill Qty/Refills:         90, R-3    Last Office Visit:              4/14/2020   Future Office visit:           None    Routing refill request to provider for review/approval because:  ACE Inhibitors (Including Combos) Protocol Failed   Normal serum creatinine on file in past 12 months  4/20/2020    2.15 (H)       Gris Allen RN  ....................  4/28/2020   11:10 AM

## 2020-05-04 ENCOUNTER — TELEPHONE (OUTPATIENT)
Dept: FAMILY MEDICINE | Facility: OTHER | Age: 70
End: 2020-05-04

## 2020-05-04 DIAGNOSIS — K40.90 INGUINAL HERNIA WITHOUT OBSTRUCTION OR GANGRENE, RECURRENCE NOT SPECIFIED, UNSPECIFIED LATERALITY: Primary | ICD-10-CM

## 2020-05-04 NOTE — TELEPHONE ENCOUNTER
Referral placed.  I do not see an ED visit or any imaging.  I have no idea what [facility] process is for non urgent consults but they will contact her.

## 2020-05-04 NOTE — TELEPHONE ENCOUNTER
Patient name and date of birth verified. After verification, patient was informed of information below.   She was pleased with this.     Siria Ro LPN 5/4/2020 2:10 PM

## 2020-05-04 NOTE — TELEPHONE ENCOUNTER
Patient called and stated she has a hernia, she states they found it in the ED and had an xray done at that time. She would like a referral to [facility] to a Dr. Fercho Espinal. She wanted to know if a referral could just be placed by looking at her records or if she needs to come in to be seen by someone first. Please call patient to advise.  Shiva Jones on 5/4/2020 at 11:54 AM

## 2020-05-19 ENCOUNTER — TELEPHONE (OUTPATIENT)
Dept: FAMILY MEDICINE | Facility: OTHER | Age: 70
End: 2020-05-19

## 2020-05-19 NOTE — TELEPHONE ENCOUNTER
Left message for Selene to call back, she is gone for the day.    Noemy Holt LPN on 5/19/2020 at 4:21 PM

## 2020-05-19 NOTE — TELEPHONE ENCOUNTER
Selene from [facility] called and left a message that she received a referral from Dr. Leal for this patient and she needs much more information in order to process the referral. She mentioned she has spoken with several people and would like the correct person to return her call at [phone].     Sharri Traore on 5/19/2020 at 3:36 PM

## 2020-05-21 ENCOUNTER — OFFICE VISIT (OUTPATIENT)
Dept: FAMILY MEDICINE | Facility: OTHER | Age: 70
End: 2020-05-21
Attending: FAMILY MEDICINE
Payer: COMMERCIAL

## 2020-05-21 VITALS
OXYGEN SATURATION: 98 % | RESPIRATION RATE: 16 BRPM | TEMPERATURE: 98.3 F | BODY MASS INDEX: 32.88 KG/M2 | WEIGHT: 197.6 LBS | SYSTOLIC BLOOD PRESSURE: 136 MMHG | HEART RATE: 64 BPM | DIASTOLIC BLOOD PRESSURE: 62 MMHG

## 2020-05-21 DIAGNOSIS — E11.9 TYPE 2 DIABETES MELLITUS WITHOUT COMPLICATION, WITH LONG-TERM CURRENT USE OF INSULIN (H): Primary | ICD-10-CM

## 2020-05-21 DIAGNOSIS — Z79.4 TYPE 2 DIABETES MELLITUS WITHOUT COMPLICATION, WITH LONG-TERM CURRENT USE OF INSULIN (H): Primary | ICD-10-CM

## 2020-05-21 DIAGNOSIS — Z79.4 TYPE 2 DIABETES MELLITUS WITH COMPLICATION, WITH LONG-TERM CURRENT USE OF INSULIN (H): Primary | ICD-10-CM

## 2020-05-21 DIAGNOSIS — E11.8 TYPE 2 DIABETES MELLITUS WITH COMPLICATION, WITH LONG-TERM CURRENT USE OF INSULIN (H): Primary | ICD-10-CM

## 2020-05-21 PROCEDURE — 99213 OFFICE O/P EST LOW 20 MIN: CPT | Performed by: FAMILY MEDICINE

## 2020-05-21 PROCEDURE — G0463 HOSPITAL OUTPT CLINIC VISIT: HCPCS

## 2020-05-21 ASSESSMENT — ANXIETY QUESTIONNAIRES
IF YOU CHECKED OFF ANY PROBLEMS ON THIS QUESTIONNAIRE, HOW DIFFICULT HAVE THESE PROBLEMS MADE IT FOR YOU TO DO YOUR WORK, TAKE CARE OF THINGS AT HOME, OR GET ALONG WITH OTHER PEOPLE: NOT DIFFICULT AT ALL
5. BEING SO RESTLESS THAT IT IS HARD TO SIT STILL: NOT AT ALL
3. WORRYING TOO MUCH ABOUT DIFFERENT THINGS: NOT AT ALL
GAD7 TOTAL SCORE: 0
6. BECOMING EASILY ANNOYED OR IRRITABLE: NOT AT ALL
1. FEELING NERVOUS, ANXIOUS, OR ON EDGE: NOT AT ALL
2. NOT BEING ABLE TO STOP OR CONTROL WORRYING: NOT AT ALL
7. FEELING AFRAID AS IF SOMETHING AWFUL MIGHT HAPPEN: NOT AT ALL

## 2020-05-21 ASSESSMENT — ENCOUNTER SYMPTOMS
WOUND: 0
SHORTNESS OF BREATH: 0
NUMBNESS: 0

## 2020-05-21 ASSESSMENT — PATIENT HEALTH QUESTIONNAIRE - PHQ9: 5. POOR APPETITE OR OVEREATING: NOT AT ALL

## 2020-05-21 ASSESSMENT — PAIN SCALES - GENERAL: PAINLEVEL: WORST PAIN (10)

## 2020-05-21 NOTE — NURSING NOTE
"Coming in for a face to face for diabetic shoes    Chief Complaint   Patient presents with     Clinic Care Coordination - Face To Face     diabetic shoes       Initial /62   Pulse 64   Temp 98.3  F (36.8  C) (Tympanic)   Resp 16   Wt 89.6 kg (197 lb 9.6 oz)   SpO2 98%   BMI 32.88 kg/m   Estimated body mass index is 32.88 kg/m  as calculated from the following:    Height as of 1/17/20: 1.651 m (5' 5\").    Weight as of this encounter: 89.6 kg (197 lb 9.6 oz).  Medication Reconciliation: complete    Salud Nagy LPN  "

## 2020-05-21 NOTE — PROGRESS NOTES
SUBJECTIVE:   Bailey Sierra is a 70 year old female who presents to clinic today for the following health issues:    HPI    Follow up on diabetes and new diabetes shoes. She has cal already, needs a face to face visit. Has had them for about 8 years now.  No foot numbness, does have calloses but no ulcers.    Recent Labs   Lab Test 04/20/20  1024 01/06/20  1446 12/30/19  1508 10/02/19  1005 07/18/19  1810 07/15/19  1156 04/03/19  0910  12/11/17  0932   A1C 6.8*  --   --  6.9*  --   --  6.5*   < >  --    LDL 58  --   --   --   --   --  75  --  72   HDL 41  --   --   --   --   --  41  --  39   TRIG 96  --   --   --   --   --  125  --  202*   ALT  --   --   --   --  13 12 11  --   --    CR 2.15*  --  2.46*  --  2.04* 2.13* 1.95*  --  2.25*   GFRESTIMATED 23*  --  19*  --  Not Calculated 23* 25*   < >  --    GFRESTBLACK 27*  --  23*  --  Not Calculated 28* 31*  --  26*   POTASSIUM 3.7 3.9 2.9*  --  3.6 3.3* 4.0  --  4.0    < > = values in this interval not displayed.            Patient Active Problem List    Diagnosis Date Noted     History of coronary artery bypass graft x 2 12/30/2019     Priority: Medium     Stenosis of carotid artery, unspecified laterality 12/30/2019     Priority: Medium     Chronic total occlusion of coronary artery (RCA) 12/30/2019     Priority: Medium     Atherosclerotic heart disease of native coronary artery with other forms of angina pectoris (H) 09/28/2018     Priority: Medium     Obesity (BMI 35.0-39.9) with comorbidity (H) 09/28/2018     Priority: Medium     CKD (chronic kidney disease) stage 4, GFR 15-29 ml/min (H) 01/17/2018     Priority: Medium     Nonrheumatic aortic valve stenosis 01/17/2018     Priority: Medium     Hyperlipidemia 01/17/2018     Priority: Medium     Essential hypertension 01/17/2018     Priority: Medium     AC (acromioclavicular) joint arthritis 03/10/2017     Priority: Medium     Impingement syndrome of right shoulder 03/10/2017     Priority: Medium     Right  rotator cuff tendinitis 03/10/2017     Priority: Medium     Long-term insulin use in type 2 diabetes (H) 02/13/2017     Priority: Medium     Light chain disease, 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  Per Dr. Ortiz, if hypoalbuminemia not improving by Nov-Dec 2016, needs referral to Hematology.        Vitamin D deficiency 10/23/2016     Priority: Medium     Hyperparathyroidism due to renal insufficiency (H) 03/28/2016     Priority: Medium     Coronary atherosclerosis 09/03/2013     Priority: Medium     Overview:   2 vessel bypass, August 2013       Obesity 07/26/2013     Priority: Medium     Hiatal hernia 07/26/2013     Priority: Medium     GERD (gastroesophageal reflux disease) 01/08/2013     Priority: Medium     Urge incontinence 10/25/2012     Priority: Medium     Insomnia 01/25/2012     Priority: Medium     Past Surgical History:   Procedure Laterality Date     ARTHROSCOPY SHOULDER Right 1997          ARTHROSCOPY SHOULDER Left 2012          ARTHROSCOPY SHOULDER RT/LT Right [id number]          Cardiac Bypass  07/2013          COLONOSCOPY  11/24/2009 11/24/2009     CYSTOSCOPY  03/15/2001     DENTAL SURGERY      Dental extractions     ENDOSCOPIC RETROGRADE CHOLANGIOPANCREATOGRAPHY  05/2008    Bile leak with ERCP and stenting and drainage of bile collection through abdominal wall.     HYSTERECTOMY TOTAL ABDOMINAL, BILATERAL SALPINGO-OOPHORECTOMY, COMBINED  04/02/2001    Vag vault suspension with Cortex mesh     IMPLANT STIMULATOR AND LEADS SACRAL NERVE (STAGE ONE AND TWO) N/A 5/7/2019    Procedure: Interstim Implant Revision, GENERATOR AND TYING LEAD EXCHANGE;  Surgeon: Honorio Bowling MD;  Location: [facility] OR     Interstim Device Placement  04/14/2014    Dr. Bowling - [facility]     LAMINECTOMY LUMBAR ONE LEVEL  1997          LAPAROSCOPIC CHOLECYSTECTOMY  04/2008          LAPAROSCOPIC TUBAL LIGATION      No Comments Provided     Social History     Tobacco Use      Smoking status: Never Smoker     Smokeless tobacco: Never Used   Substance Use Topics     Alcohol use: No     Alcohol/week: 0.0 standard drinks     Current Outpatient Medications   Medication Sig Dispense Refill     aspirin (ASA) 81 MG tablet Take 1 tablet (81 mg) by mouth daily 90 tablet 3     blood glucose monitoring (TRICIA CONTOUR) test strip TEST 3 TIMES A  each 3     Calcium Carb-Cholecalciferol (CALCIUM PLUS D3 ABSORBABLE) 600-2500 MG-UNIT CAPS Take 1 capsule by mouth daily       furosemide (LASIX) 20 MG tablet Take 1 tablet (20 mg) by mouth daily 90 tablet 3     insulin aspart (NOVOLOG FLEXPEN) 100 UNIT/ML pen Inject 7 - 16 units before each meal based on sliding scale.  Max daily dose 56 units. 60 mL 3     insulin glargine (BASAGLAR KWIKPEN) 100 UNIT/ML pen Inject 42 Units Subcutaneous At Bedtime Adjusts according to chemstrip 60 mL 1     insulin pen needle (ULTICARE SHORT) 31G X 8 MM miscellaneous USE AS DIRECTED FOUR TIMES DAILY. Dx: E11.9 400 each 3     isosorbide mononitrate (IMDUR) 60 MG 24 hr tablet Take 2 tablets (120 mg) by mouth daily 180 tablet 3     lisinopril (ZESTRIL) 2.5 MG tablet Take 1 tablet (2.5 mg) by mouth daily 90 tablet 3     metoprolol succinate ER (TOPROL-XL) 25 MG 24 hr tablet Take 1 tablet (25 mg) by mouth daily 90 tablet 3     nitroGLYcerin (NITROSTAT) 0.4 MG sublingual tablet Place 0.4 mg under the tongue every 5 minutes as needed for chest pain       pantoprazole (PROTONIX) 40 MG EC tablet Take 1 tablet (40 mg) by mouth daily 90 tablet 3     rosuvastatin (CRESTOR) 40 MG tablet Take 40 mg by mouth daily 90 tablet 1     sucralfate (CARAFATE) 1 GM tablet Take 1 tablet by mouth 2 times daily  3     zolpidem (AMBIEN) 10 MG tablet Take 10 mg by mouth nightly as needed for sleep       potassium chloride ER (K-DUR/KLOR-CON M) 20 MEQ CR tablet Take 1 tablet (20 mEq) by mouth 2 times daily (Patient not taking: Reported on 5/21/2020) 180 tablet 1     No Known Allergies    Review of  Systems   Respiratory: Negative for shortness of breath.    Cardiovascular: Negative for chest pain.   Skin: Negative for wound.   Neurological: Negative for numbness.        OBJECTIVE:     /62   Pulse 64   Temp 98.3  F (36.8  C) (Tympanic)   Resp 16   Wt 89.6 kg (197 lb 9.6 oz)   SpO2 98%   BMI 32.88 kg/m    Body mass index is 32.88 kg/m .  Physical Exam  Constitutional:       Appearance: Normal appearance.   Cardiovascular:      Rate and Rhythm: Normal rate and regular rhythm.      Heart sounds: Murmur present.   Pulmonary:      Effort: Pulmonary effort is normal. No respiratory distress.      Breath sounds: No stridor.   Neurological:      Mental Status: She is alert.   Psychiatric:         Mood and Affect: Mood normal.         Behavior: Behavior normal.         Thought Content: Thought content normal.     Sensory exam of the foot is normal, tested with the monofilament. Good pulses, good peripheral pulses. Has preulcerative callous on medial 1st MTPs.        Diagnostic Test Results:  none     ASSESSMENT/PLAN:         (E11.8,  Z79.4) Type 2 diabetes mellitus with complication, with long-term current use of insulin (H)  (primary encounter diagnosis)  Comment: she has no neuropathy but does have per ulcerative callus medial 1st MTPs.    Plan: order for DME        Follow up in 3-6 months.         Joshua Hoyos MD  [facility] [place]

## 2020-05-22 ASSESSMENT — ANXIETY QUESTIONNAIRES: GAD7 TOTAL SCORE: 0

## 2020-05-27 ENCOUNTER — TELEPHONE (OUTPATIENT)
Dept: SURGERY | Facility: OTHER | Age: 70
End: 2020-05-27

## 2020-05-27 NOTE — TELEPHONE ENCOUNTER
Screening Questions for the Scheduling of Screening Colonoscopies   (If Colonoscopy is diagnostic, Provider should review the chart before scheduling.)  Are you younger than 50 or older than 80?  NO   Do you take aspirin or fish oil?  YES - ASPIRIN   (if yes, tell patient to stop 1 week prior to Colonoscopy)  Do you take warfarin (Coumadin), clopidogrel (Plavix), apixaban (Eliquis), dabigatram (Pradaxa), rivaroxaban (Xarelto) or any blood thinner? NO   Do you use oxygen at home?  NO   Do you have kidney disease? NO   Are you on dialysis? NO   Have you had a stroke or heart attack in the last year? NO   Have you had a stent in your heart or any blood vessel in the last year? NO   Have you had a transplant of any organ? NO   Have you had a colonoscopy or upper endoscopy (EGD) before? YES          When?  OVER 10 YRS   Date of scheduled EGD  06/25/2020  Provider Psychiatric   Pharmacy

## 2020-06-01 ENCOUNTER — TELEPHONE (OUTPATIENT)
Dept: FAMILY MEDICINE | Facility: OTHER | Age: 70
End: 2020-06-01

## 2020-06-01 DIAGNOSIS — E11.9 TYPE 2 DIABETES MELLITUS WITHOUT COMPLICATION, WITH LONG-TERM CURRENT USE OF INSULIN (H): Primary | ICD-10-CM

## 2020-06-01 DIAGNOSIS — Z79.4 TYPE 2 DIABETES MELLITUS WITHOUT COMPLICATION, WITH LONG-TERM CURRENT USE OF INSULIN (H): Primary | ICD-10-CM

## 2020-06-01 NOTE — TELEPHONE ENCOUNTER
Patient is scheduled for a CT on 6/12/20 but needs a current creatinine. Can a lab order be placed?    Cathy Carlos on 6/1/2020 at 4:22 PM

## 2020-06-04 ENCOUNTER — TELEPHONE (OUTPATIENT)
Dept: FAMILY MEDICINE | Facility: OTHER | Age: 70
End: 2020-06-04

## 2020-06-09 DIAGNOSIS — E87.6 LOW BLOOD POTASSIUM: ICD-10-CM

## 2020-06-09 DIAGNOSIS — I10 BENIGN ESSENTIAL HYPERTENSION: ICD-10-CM

## 2020-06-09 RX ORDER — METOPROLOL SUCCINATE 25 MG/1
25 TABLET, EXTENDED RELEASE ORAL DAILY
Qty: 90 TABLET | Refills: 3 | Status: ON HOLD | OUTPATIENT
Start: 2020-06-09 | End: 2021-02-23

## 2020-06-09 RX ORDER — POTASSIUM CHLORIDE 1500 MG/1
TABLET, EXTENDED RELEASE ORAL
Qty: 180 TABLET | Refills: 0 | Status: SHIPPED | OUTPATIENT
Start: 2020-06-09 | End: 2020-12-21

## 2020-06-09 NOTE — TELEPHONE ENCOUNTER
"Requested Prescriptions   Pending Prescriptions Disp Refills     metoprolol succinate ER (TOPROL-XL) 25 MG 24 hr tablet 90 tablet 3     Sig: Take 1 tablet (25 mg) by mouth daily       Beta-Blockers Protocol Passed - 6/9/2020  9:25 AM        Passed - Blood pressure under 140/90 in past 12 months     BP Readings from Last 3 Encounters:   05/21/20 136/62   01/17/20 110/64   12/30/19 108/60                 Passed - Patient is age 6 or older        Passed - Recent (12 mo) or future (30 days) visit within the authorizing provider's specialty     Patient has had an office visit with the authorizing provider or a provider within the authorizing providers department within the previous 12 mos or has a future within next 30 days. See \"Patient Info\" tab in inbasket, or \"Choose Columns\" in Meds & Orders section of the refill encounter.              Passed - Medication is active on med list         LOV 5/21/250 [facility]  Prescription approved per [facility] Refill Protocol.  Brenda J. Goodell, RN on 6/9/2020 at 3:32 PM      "

## 2020-06-12 ENCOUNTER — HOSPITAL ENCOUNTER (OUTPATIENT)
Dept: CT IMAGING | Facility: OTHER | Age: 70
End: 2020-06-12
Attending: SURGERY
Payer: MEDICARE

## 2020-06-12 DIAGNOSIS — K44.9 HIATAL HERNIA: ICD-10-CM

## 2020-06-12 DIAGNOSIS — E11.9 TYPE 2 DIABETES MELLITUS WITHOUT COMPLICATION, WITH LONG-TERM CURRENT USE OF INSULIN (H): ICD-10-CM

## 2020-06-12 DIAGNOSIS — Z79.4 TYPE 2 DIABETES MELLITUS WITHOUT COMPLICATION, WITH LONG-TERM CURRENT USE OF INSULIN (H): ICD-10-CM

## 2020-06-12 LAB
CREAT SERPL-MCNC: 2.07 MG/DL (ref 0.6–1.2)
GFR SERPL CREATININE-BSD FRML MDRD: 24 ML/MIN/{1.73_M2}

## 2020-06-12 PROCEDURE — 82565 ASSAY OF CREATININE: CPT | Mod: ZL | Performed by: FAMILY MEDICINE

## 2020-06-12 PROCEDURE — 74176 CT ABD & PELVIS W/O CONTRAST: CPT

## 2020-06-12 PROCEDURE — 36415 COLL VENOUS BLD VENIPUNCTURE: CPT | Mod: ZL | Performed by: FAMILY MEDICINE

## 2020-06-20 ENCOUNTER — ALLIED HEALTH/NURSE VISIT (OUTPATIENT)
Dept: FAMILY MEDICINE | Facility: OTHER | Age: 70
End: 2020-06-20
Attending: SURGERY
Payer: MEDICARE

## 2020-06-20 DIAGNOSIS — Z20.822 COVID-19 RULED OUT: Primary | ICD-10-CM

## 2020-06-20 PROCEDURE — C9803 HOPD COVID-19 SPEC COLLECT: HCPCS

## 2020-06-20 PROCEDURE — U0003 INFECTIOUS AGENT DETECTION BY NUCLEIC ACID (DNA OR RNA); SEVERE ACUTE RESPIRATORY SYNDROME CORONAVIRUS 2 (SARS-COV-2) (CORONAVIRUS DISEASE [COVID-19]), AMPLIFIED PROBE TECHNIQUE, MAKING USE OF HIGH THROUGHPUT TECHNOLOGIES AS DESCRIBED BY CMS-2020-01-R: HCPCS | Mod: ZL | Performed by: SURGERY

## 2020-06-21 LAB
SARS-COV-2 RNA SPEC QL NAA+PROBE: NOT DETECTED
SPECIMEN SOURCE: NORMAL

## 2020-06-23 ENCOUNTER — ANESTHESIA (OUTPATIENT)
Dept: SURGERY | Facility: OTHER | Age: 70
End: 2020-06-23
Payer: MEDICARE

## 2020-06-23 ENCOUNTER — ANESTHESIA EVENT (OUTPATIENT)
Dept: SURGERY | Facility: OTHER | Age: 70
End: 2020-06-23
Payer: MEDICARE

## 2020-06-23 ENCOUNTER — HOSPITAL ENCOUNTER (OUTPATIENT)
Facility: OTHER | Age: 70
Discharge: HOME | End: 2020-06-23
Attending: SURGERY | Admitting: SURGERY
Payer: MEDICARE

## 2020-06-23 VITALS
BODY MASS INDEX: 32.82 KG/M2 | WEIGHT: 197 LBS | OXYGEN SATURATION: 98 % | HEIGHT: 65 IN | RESPIRATION RATE: 16 BRPM | TEMPERATURE: 97.3 F | DIASTOLIC BLOOD PRESSURE: 79 MMHG | SYSTOLIC BLOOD PRESSURE: 172 MMHG | HEART RATE: 47 BPM

## 2020-06-23 DIAGNOSIS — K44.9 HIATAL HERNIA: Primary | ICD-10-CM

## 2020-06-23 DIAGNOSIS — K21.00 GASTROESOPHAGEAL REFLUX DISEASE WITH ESOPHAGITIS: ICD-10-CM

## 2020-06-23 PROCEDURE — 25000125 ZZHC RX 250: Performed by: NURSE ANESTHETIST, CERTIFIED REGISTERED

## 2020-06-23 PROCEDURE — 25800030 ZZH RX IP 258 OP 636: Performed by: SURGERY

## 2020-06-23 PROCEDURE — 25000128 H RX IP 250 OP 636: Performed by: NURSE ANESTHETIST, CERTIFIED REGISTERED

## 2020-06-23 PROCEDURE — 43239 EGD BIOPSY SINGLE/MULTIPLE: CPT | Performed by: SURGERY

## 2020-06-23 PROCEDURE — 25000125 ZZHC RX 250: Performed by: SURGERY

## 2020-06-23 PROCEDURE — 43239 EGD BIOPSY SINGLE/MULTIPLE: CPT | Performed by: NURSE ANESTHETIST, CERTIFIED REGISTERED

## 2020-06-23 PROCEDURE — 88305 TISSUE EXAM BY PATHOLOGIST: CPT

## 2020-06-23 PROCEDURE — 99100 ANES PT EXTEME AGE<1 YR&>70: CPT | Performed by: NURSE ANESTHETIST, CERTIFIED REGISTERED

## 2020-06-23 RX ORDER — PROPOFOL 10 MG/ML
INJECTION, EMULSION INTRAVENOUS CONTINUOUS PRN
Status: DISCONTINUED | OUTPATIENT
Start: 2020-06-23 | End: 2020-06-23

## 2020-06-23 RX ORDER — SODIUM CHLORIDE, SODIUM LACTATE, POTASSIUM CHLORIDE, CALCIUM CHLORIDE 600; 310; 30; 20 MG/100ML; MG/100ML; MG/100ML; MG/100ML
INJECTION, SOLUTION INTRAVENOUS CONTINUOUS
Status: DISCONTINUED | OUTPATIENT
Start: 2020-06-23 | End: 2020-06-23 | Stop reason: HOSPADM

## 2020-06-23 RX ORDER — PROPOFOL 10 MG/ML
INJECTION, EMULSION INTRAVENOUS PRN
Status: DISCONTINUED | OUTPATIENT
Start: 2020-06-23 | End: 2020-06-23

## 2020-06-23 RX ORDER — LIDOCAINE HYDROCHLORIDE 20 MG/ML
INJECTION, SOLUTION INFILTRATION; PERINEURAL PRN
Status: DISCONTINUED | OUTPATIENT
Start: 2020-06-23 | End: 2020-06-23

## 2020-06-23 RX ORDER — NALOXONE HYDROCHLORIDE 0.4 MG/ML
.1-.4 INJECTION, SOLUTION INTRAMUSCULAR; INTRAVENOUS; SUBCUTANEOUS
Status: DISCONTINUED | OUTPATIENT
Start: 2020-06-23 | End: 2020-06-23 | Stop reason: HOSPADM

## 2020-06-23 RX ORDER — LIDOCAINE 40 MG/G
CREAM TOPICAL
Status: DISCONTINUED | OUTPATIENT
Start: 2020-06-23 | End: 2020-06-23 | Stop reason: HOSPADM

## 2020-06-23 RX ORDER — FLUMAZENIL 0.1 MG/ML
0.2 INJECTION, SOLUTION INTRAVENOUS
Status: DISCONTINUED | OUTPATIENT
Start: 2020-06-23 | End: 2020-06-23 | Stop reason: HOSPADM

## 2020-06-23 RX ADMIN — SODIUM CHLORIDE, POTASSIUM CHLORIDE, SODIUM LACTATE AND CALCIUM CHLORIDE: 600; 310; 30; 20 INJECTION, SOLUTION INTRAVENOUS at 10:14

## 2020-06-23 RX ADMIN — PROPOFOL 80 MG: 10 INJECTION, EMULSION INTRAVENOUS at 11:47

## 2020-06-23 RX ADMIN — PROPOFOL 140 MCG/KG/MIN: 10 INJECTION, EMULSION INTRAVENOUS at 11:47

## 2020-06-23 RX ADMIN — LIDOCAINE HYDROCHLORIDE 60 MG: 20 INJECTION, SOLUTION INFILTRATION; PERINEURAL at 11:47

## 2020-06-23 ASSESSMENT — MIFFLIN-ST. JEOR: SCORE: 1409.47

## 2020-06-23 NOTE — ANESTHESIA CARE TRANSFER NOTE
Patient: Bailey Sierra    Procedure(s):  ESOPHAGOGASTRODUODENOSCOPY, WITH BIOPSY    Diagnosis: Hiatal hernia [K44.9]  Diagnosis Additional Information: No value filed.    Anesthesia Type:   MAC     Note:  Airway :Room Air  Patient transferred to:Phase II  Handoff Report: Identifed the Patient, Identified the Reponsible Provider, Reviewed the pertinent medical history, Discussed the surgical course, Reviewed Intra-OP anesthesia mangement and issues during anesthesia, Set expectations for post-procedure period and Allowed opportunity for questions and acknowledgement of understanding      Vitals: (Last set prior to Anesthesia Care Transfer)    CRNA VITALS  6/23/2020 1127 - 6/23/2020 1157      6/23/2020             Resp Rate (set):  10                Electronically Signed By: BRITNEY Rascon CRNA  June 23, 2020  11:57 AM

## 2020-06-23 NOTE — ANESTHESIA PREPROCEDURE EVALUATION
Anesthesia Pre-Procedure Evaluation    Patient: Bailey Sierra   MRN: [id number] : 1950          Preoperative Diagnosis: Hiatal hernia [K44.9]    Procedure(s):  ESOPHAGOGASTRODUODENOSCOPY (EGD)    Past Medical History:   Diagnosis Date     Atherosclerotic heart disease of native coronary artery without angina pectoris     No Comments Provided     Cardiac murmur     No Comments Provided     Chronic kidney disease, stage III (moderate) (H)     No Comments Provided     Controlled type 2 diabetes mellitus with stage 3 chronic kidney disease, with long-term current use of insulin (H) 2013     Edema     No Comments Provided     Essential (primary) hypertension     No Comments Provided     Gastro-esophageal reflux disease without esophagitis     No Comments Provided     Hyperlipidemia     No Comments Provided     Impingement syndrome of right shoulder 03/10/2017          Insomnia 2012          Iron deficiency anemia     No Comments Provided     Neuromuscular dysfunction of bladder     No Comments Provided     Osteoarthritis     No Comments Provided     Other shoulder lesions, right shoulder 03/10/2017          Other shoulder lesions, unspecified shoulder     No Comments Provided     Other specified postprocedural states 2017          Personal history of other medical treatment (CODE)     Three childbirths     Plantar fascial fibromatosis     No Comments Provided     Presence of aortocoronary bypass graft 2013    [facility]     Primary osteoarthritis of shoulder 03/10/2017          Type 2 diabetes mellitus without complications (H)     No Comments Provided     Urgency of urination 2011          Uterovaginal prolapse     No Comments Provided     Past Surgical History:   Procedure Laterality Date     ARTHROSCOPY SHOULDER Right           ARTHROSCOPY SHOULDER Left           ARTHROSCOPY SHOULDER RT/LT Right [id number]          Cardiac Bypass  2013          COLONOSCOPY  2009     11/24/2009     CYSTOSCOPY  03/15/2001     DENTAL SURGERY      Dental extractions     ENDOSCOPIC RETROGRADE CHOLANGIOPANCREATOGRAPHY  05/2008    Bile leak with ERCP and stenting and drainage of bile collection through abdominal wall.     HYSTERECTOMY TOTAL ABDOMINAL, BILATERAL SALPINGO-OOPHORECTOMY, COMBINED  04/02/2001    Vag vault suspension with Cortex mesh     IMPLANT STIMULATOR AND LEADS SACRAL NERVE (STAGE ONE AND TWO) N/A 5/7/2019    Procedure: Interstim Implant Revision, GENERATOR AND TYING LEAD EXCHANGE;  Surgeon: Honorio Bowling MD;  Location: [facility] OR     Interstim Device Placement  04/14/2014    Dr. Bowling - [facility]     LAMINECTOMY LUMBAR ONE LEVEL  1997          LAPAROSCOPIC CHOLECYSTECTOMY  04/2008          LAPAROSCOPIC TUBAL LIGATION      No Comments Provided       Anesthesia Evaluation     . Pt has had prior anesthetic.     No history of anesthetic complications          ROS/MED HX    ENT/Pulmonary:  - neg pulmonary ROS     Neurologic:       Cardiovascular: Comment: edema    (+) Dyslipidemia, hypertension--CAD, -CABG-. : . . MCKEON, . :. .       METS/Exercise Tolerance:  4 - Raking leaves, gardening   Hematologic:     (+) Anemia, -      Musculoskeletal:   (+) arthritis,  -       GI/Hepatic:     (+) GERD hiatal hernia,       Renal/Genitourinary: Comment: Bladder incontinence    (+) chronic renal disease, type: CRI,       Endo:     (+) type II DM Obesity, .      Psychiatric:         Infectious Disease:  - neg infectious disease ROS       Malignancy:      - no malignancy   Other:    - neg other ROS                      Physical Exam  Normal systems: pulmonary    Airway   Mallampati: II  TM distance: >3 FB  Neck ROM: full  Comment: Large neck    Dental   (+) upper dentures    Cardiovascular   Rhythm and rate: regular and normal  (+) murmur       Pulmonary    breath sounds clear to auscultation            Lab Results   Component Value Date    WBC 5.5 07/18/2019    HGB 11.6 (L) 07/18/2019    HCT 36.7 07/18/2019    PLT  "163 07/18/2019     04/20/2020    POTASSIUM 3.7 04/20/2020    CHLORIDE 108 (H) 04/20/2020    CO2 28 04/20/2020    BUN 24 04/20/2020    CR 2.07 (H) 06/12/2020    GLC 91 04/20/2020    ELICIA 9.2 04/20/2020    PHOS 3.0 06/02/2017    MAG 2.1 07/23/2013    ALBUMIN 3.5 07/18/2019    PROTTOTAL 5.4 (L) 07/18/2019    ALT 13 07/18/2019    AST 11 (L) 07/18/2019    ALKPHOS 93 07/18/2019    BILITOTAL 0.5 07/18/2019    LIPASE 10 (L) 07/18/2019    PTT 24 (L) 07/23/2013    INR 0.95 07/18/2019       Preop Vitals  BP Readings from Last 3 Encounters:   06/23/20 (!) 196/71   05/21/20 136/62   01/17/20 110/64    Pulse Readings from Last 3 Encounters:   06/23/20 64   05/21/20 64   01/17/20 72      Resp Readings from Last 3 Encounters:   06/23/20 18   05/21/20 16   01/17/20 20    SpO2 Readings from Last 3 Encounters:   06/23/20 97%   05/21/20 98%   01/17/20 99%      Temp Readings from Last 1 Encounters:   06/23/20 97.6  F (36.4  C) (Temporal)    Ht Readings from Last 1 Encounters:   06/23/20 1.651 m (5' 5\")      Wt Readings from Last 1 Encounters:   06/23/20 89.4 kg (197 lb)    Estimated body mass index is 32.78 kg/m  as calculated from the following:    Height as of this encounter: 1.651 m (5' 5\").    Weight as of this encounter: 89.4 kg (197 lb).       Anesthesia Plan      History & Physical Review      ASA Status:  3 .    NPO Status:  > 8 hours    Plan for MAC Reason for MAC:  Chronic cardiopulmonary disease (G9)           Postoperative Care      Consents  Anesthetic plan, risks, benefits and alternatives discussed with:  Patient.  Use of blood products discussed: Yes.   Use of blood products discussed with Patient.  Consented to blood products.  .                 BRITNEY Martines CRNA  "

## 2020-06-23 NOTE — ANESTHESIA POSTPROCEDURE EVALUATION
Patient: Bailey Sierra    Procedure(s):  ESOPHAGOGASTRODUODENOSCOPY, WITH BIOPSY    Diagnosis:Hiatal hernia [K44.9]  Diagnosis Additional Information: No value filed.    Anesthesia Type:  MAC    Note:  Anesthesia Post Evaluation    Patient location during evaluation: Phase 2  Patient participation: Able to fully participate in evaluation  Level of consciousness: awake and alert  Pain management: adequate  Airway patency: patent  Cardiovascular status: acceptable  Respiratory status: acceptable  Hydration status: acceptable  PONV: none     Anesthetic complications: None          Last vitals:  Vitals:    06/23/20 1300 06/23/20 1315 06/23/20 1330   BP: (!) 158/61 (!) 159/64 (!) 172/79   Pulse: (!) 47 (!) 47 (!) 47   Resp:      Temp:      SpO2:            Electronically Signed By: BRITNEY SUAREZ CRNA  June 23, 2020  2:17 PM

## 2020-06-23 NOTE — DISCHARGE INSTRUCTIONS
[facility]  Adult Discharge Orders & Instructions      For 24 hours after surgery:  1. Get plenty of rest.  A responsible adult must stay with you for at least 24 hours after you leave the hospital.   2. You may feel lightheaded.  IF so, sit for a few minutes before standing.  Have someone help you get up.   3. You may have a slight fever. Call the doctor if your fever is over 101 F (38.3 C) (taken under the tongue) or lasts longer than 24 hours.  4. You may have a dry mouth, a sore throat, muscle aches or trouble sleeping.  These should go away after 24 hours.  5. Do not make important or legal decisions.  6.   Do not drive or use heavy equipment.  If you have weakness or tingling, don't drive or use heavy equipment until this feeling goes away.                                                                                                                                                                         To contact a doctor, call    [phone]______________

## 2020-06-23 NOTE — H&P
GENERAL SURGERY CONSULTATION NOTE    Bailey Sierra   [address]  [facility] [id number]  70 year old  female    Primary Care Provider:  Sam Leal      HPI: Bailey Sierra presents to day surgery in need of EGD for hiatal hernia.   Bailey Sierra has chest pain and uncontrolled reflux. She also has abdominal pain when she eats. Known hiatal hernia.     REVIEW OF SYSTEMS:    GENERAL: No fevers or chills. Denies fatigue, recent weight loss.  HEENT: No sinus drainage. No changes with vision or hearing. No difficulty swallowing.   LYMPHATICS:  Noswollen nodes in axilla, neck or groin.  CARDIOVASCULAR: Denies chest pain, palpitations and dyspnea on exertion.  PULMONARY: No shortness of breath or cough. No increase in sputum production.  GI: Denies melena,bright red blood in stools. No hematemesis. No constipation or diarrhea.  : No dysuria or hematuria.  SKIN: No recent rashes or ulcers.   HEMATOLOGY:  No history of easy bruising or bleeding.  ENDOCRINE:  No history of diabetes or thyroid problems.  NEUROLOGY:  No history of seizures or headaches. No motor or sensory changes.        Patient Active Problem List   Diagnosis     AC (acromioclavicular) joint arthritis     Coronary atherosclerosis     CKD (chronic kidney disease) stage 4, GFR 15-29 ml/min (H)     GERD (gastroesophageal reflux disease)     Nonrheumatic aortic valve stenosis     Hyperlipidemia     Essential hypertension     Impingement syndrome of right shoulder     Insomnia     Long-term insulin use in type 2 diabetes (H)     Urge incontinence     Right rotator cuff tendinitis     Vitamin D deficiency     Obesity     Light chain disease, kappa type (H)     Hyperparathyroidism due to renal insufficiency (H)     Hiatal hernia     Atherosclerotic heart disease of native coronary artery with other forms of angina pectoris (H)     Obesity (BMI 35.0-39.9) with comorbidity (H)     History of coronary artery bypass graft x 2     Stenosis of carotid  artery, unspecified laterality     Chronic total occlusion of coronary artery (RCA)       Past Medical History:   Diagnosis Date     Atherosclerotic heart disease of native coronary artery without angina pectoris     No Comments Provided     Cardiac murmur     No Comments Provided     Chronic kidney disease, stage III (moderate) (H)     No Comments Provided     Controlled type 2 diabetes mellitus with stage 3 chronic kidney disease, with long-term current use of insulin (H) 7/26/2013     Edema     No Comments Provided     Essential (primary) hypertension     No Comments Provided     Gastro-esophageal reflux disease without esophagitis     No Comments Provided     Hyperlipidemia     No Comments Provided     Impingement syndrome of right shoulder 03/10/2017          Insomnia 01/25/2012          Iron deficiency anemia     No Comments Provided     Neuromuscular dysfunction of bladder     No Comments Provided     Osteoarthritis     No Comments Provided     Other shoulder lesions, right shoulder 03/10/2017          Other shoulder lesions, unspecified shoulder     No Comments Provided     Other specified postprocedural states 04/19/2017          Personal history of other medical treatment (CODE)     Three childbirths     Plantar fascial fibromatosis     No Comments Provided     Presence of aortocoronary bypass graft 07/2013    [facility]     Primary osteoarthritis of shoulder 03/10/2017          Type 2 diabetes mellitus without complications (H)     No Comments Provided     Urgency of urination 03/08/2011          Uterovaginal prolapse     No Comments Provided       Past Surgical History:   Procedure Laterality Date     ARTHROSCOPY SHOULDER Right 1997          ARTHROSCOPY SHOULDER Left 2012          ARTHROSCOPY SHOULDER RT/LT Right [id number]          Cardiac Bypass  07/2013          COLONOSCOPY  11/24/2009 11/24/2009     CYSTOSCOPY  03/15/2001     DENTAL SURGERY      Dental extractions     ENDOSCOPIC RETROGRADE  CHOLANGIOPANCREATOGRAPHY  05/2008    Bile leak with ERCP and stenting and drainage of bile collection through abdominal wall.     HYSTERECTOMY TOTAL ABDOMINAL, BILATERAL SALPINGO-OOPHORECTOMY, COMBINED  04/02/2001    Vag vault suspension with Cortex mesh     IMPLANT STIMULATOR AND LEADS SACRAL NERVE (STAGE ONE AND TWO) N/A 5/7/2019    Procedure: Interstim Implant Revision, GENERATOR AND TYING LEAD EXCHANGE;  Surgeon: Honorio Bowling MD;  Location: [facility] OR     Interstim Device Placement  04/14/2014    Dr. Bowling - [facility]     LAMINECTOMY LUMBAR ONE LEVEL  1997          LAPAROSCOPIC CHOLECYSTECTOMY  04/2008          LAPAROSCOPIC TUBAL LIGATION      No Comments Provided       Family History   Problem Relation Age of Onset     Other - See Comments Father         MI     Cancer Mother         Cancer,Some type of cancer, aneurysm     Diabetes Paternal Grandmother         Diabetes,Type II     Cancer Sister         Cancer,unknown type     Diabetes Sister         Diabetes,Type II     Diabetes Sister         Diabetes,Type II     Diabetes Sister         Diabetes,Type II     Other - See Comments Daughter         depression     Other - See Comments Daughter         ovarian cancer and depression     Heart Disease Brother         Heart Disease,CAD, MI       Social History     Social History Narrative    She is .  Retired.   She had worked in a [employer].    Patient has never smoked.  Alcohol Use - no       Marky ENAMORADO has sleep apnea.  3 children.  Preload  8/5/2013.       Social History     Socioeconomic History     Marital status:      Spouse name: Marky SHIPMAN     Number of children: Not on file     Years of education: Not on file     Highest education level: Not on file   Occupational History     Not on file   Social Needs     Financial resource strain: Not on file     Food insecurity     Worry: Not on file     Inability: Not on file     Transportation needs     Medical: Not on file     Non-medical: Not on file   Tobacco Use      Smoking status: Never Smoker     Smokeless tobacco: Never Used   Substance and Sexual Activity     Alcohol use: No     Alcohol/week: 0.0 standard drinks     Drug use: No     Comment: Drug use: No     Sexual activity: Not Currently   Lifestyle     Physical activity     Days per week: Not on file     Minutes per session: Not on file     Stress: Not on file   Relationships     Social connections     Talks on phone: Not on file     Gets together: Not on file     Attends [employer] service: Not on file     Active member of club or organization: Not on file     Attends meetings of clubs or organizations: Not on file     Relationship status: Not on file     Intimate partner violence     Fear of current or ex partner: Not on file     Emotionally abused: Not on file     Physically abused: Not on file     Forced sexual activity: Not on file   Other Topics Concern     Parent/sibling w/ CABG, MI or angioplasty before 65F 55M? Not Asked   Social History Narrative    She is .  Retired.   She had worked in a [employer].    Patient has never smoked.  Alcohol Use - no       Marky ENAMORADO has sleep apnea.  3 children.  Preload  8/5/2013.       No current facility-administered medications on file prior to encounter.   Calcium Carb-Cholecalciferol (CALCIUM PLUS D3 ABSORBABLE) 600-2500 MG-UNIT CAPS, Take 1 capsule by mouth daily  furosemide (LASIX) 20 MG tablet, Take 1 tablet (20 mg) by mouth daily  insulin aspart (NOVOLOG FLEXPEN) 100 UNIT/ML pen, Inject 7 - 16 units before each meal based on sliding scale.  Max daily dose 56 units.  insulin glargine (BASAGLAR KWIKPEN) 100 UNIT/ML pen, Inject 42 Units Subcutaneous At Bedtime Adjusts according to chemstrip  isosorbide mononitrate (IMDUR) 60 MG 24 hr tablet, Take 2 tablets (120 mg) by mouth daily  lisinopril (ZESTRIL) 2.5 MG tablet, Take 1 tablet (2.5 mg) by mouth daily  pantoprazole (PROTONIX) 40 MG EC tablet, Take 1 tablet (40 mg) by mouth daily  rosuvastatin (CRESTOR) 40 MG  "tablet, Take 40 mg by mouth daily  sucralfate (CARAFATE) 1 GM tablet, Take 1 tablet by mouth 2 times daily  zolpidem (AMBIEN) 10 MG tablet, Take 10 mg by mouth nightly as needed for sleep  aspirin (ASA) 81 MG tablet, Take 1 tablet (81 mg) by mouth daily  blood glucose monitoring (TRICIA CONTOUR) test strip, TEST 3 TIMES A DAY  insulin pen needle (31G X 8 MM) 31G X 8 MM miscellaneous, Use 4 pen needles daily or as directed.  insulin pen needle (ULTICARE SHORT) 31G X 8 MM miscellaneous, USE AS DIRECTED FOUR TIMES DAILY. Dx: E11.9  nitroGLYcerin (NITROSTAT) 0.4 MG sublingual tablet, Place 0.4 mg under the tongue every 5 minutes as needed for chest pain  order for DME, Equipment being ordered: Diabetic shoes, pre-ulcer callous formation          ALLERGIES/SENSITIVITIES: No Known Allergies    PHYSICAL EXAM:     BP (!) 196/71   Pulse 64   Temp 97.6  F (36.4  C) (Temporal)   Resp 18   Ht 1.651 m (5' 5\")   Wt 89.4 kg (197 lb)   SpO2 97%   BMI 32.78 kg/m      General Appearance:   Sitting up in bed, no apparent distress  HEENT: Pupils are equal and reactive, no scleral icterus   Heart & CV:  RRR, no murmur.  LUNGS: No increased work of breathing. Lungs are CTA B/L, no wheezing or crackles.  Abd:  soft, non-tender, no masses   Ext: no lower extremity edema   Neuro: alert and oriented, normal speech and mentation         CONSULTATION ASSESSMENT AND PLAN:    70 year old female with known hiatal hernia in need of diagnostic EGD     The technical details of EGD were discussed with the patient along with the risks and benefits to include bleeding, perforation and aspiration. Bailey Sierra demonstrated understanding and is willing to proceed.       Vipul Cowart MD on 6/23/2020 at 11:38 AM      "

## 2020-06-23 NOTE — OP NOTE
PROCEDURE NOTE    DATE OF SERVICE: 6/23/2020    SURGEON: FREDA Cowart MD     PRE-OP DIAGNOSIS:  GERD and known hiatal hernia     POST-OP DIAGNOSIS:  Giant hiatal hernia, esophagitis     PROCEDURE:   EGD with biopsy    ASSISTANT:  Circulator: Liz Ziegler RN  Relief Circulator: Minoo Arellano RN  Scrub Person: Ne Cardenas  Pre-Op Nurse: Rachel Sierra RN  Post-Op Nurse: Mahogany Menchaca RN    ANESTHESIA:  MAC                            Monitor Anesthesia CareCRNA Independent: Edd Molina APRN CRNA    INDICATION FOR THE PROCEDURE: Bailey Sierra is a 70 year old female. The patient presents with chest pain, severe GERD and known hiatal hernia. I explained to the patient the risks, benefits and alternatives to diagnostic EGD. We specifically discussed the risks of bleeding, infection,perforation and the risks of sedation. The patient's questions were answered and the patient wished to proceed. Informed consent paperwork was completed.    PROCEDURE:The patient was taken to the endoscopy suite. Appropriate monitors were attached. The patient was placed in the left lateral decubitus position. Bite block was positioned.Timeout was performed confirming the patient's identity and procedure to be performed. After appropriate sedation was confirmed, the flexible endoscope was advanced into the oropharynx. The posterior oropharynx appeared grossly normal. The scope was advanced into the proximal esophagus. The esophagus was insufflated with air. The scope was advanced under direct visualization. Giant hiatal hernia was encountered at 32cm and diaphragmatic pinch at 38cm.   The scope was advanced into the stomach. The scope was advanced through the pylorus into the duodenal bulb. The bulb and distal duodenum appeared grossly normal.  The scope was withdrawn back into the stomach. The scope was retroflexed and the GE junction inspected, hiatal hernia again noted. Diaphragmatic opening is 2-3cm.  The scope was returned to a neutral position and the stomach was decompressed. The scope was withdrawn to the GE junction and biopsy obtained. LA grade D esophagitis extended proximal 6cm from the z line at 32cm.  No abnormalities were noted in the proximal esophagus. The scope was withdrawn from the patient. The bite block was removed. The patient tolerated the procedure with no immediately apparent complication. The patient was taken to recovery instable condition.     ESTIMATED BLOOD LOSS: none    COMPLICATIONS:  None    TISSUE REMOVED:  Yes    RECOMMEND:    Continue PPI, can increase to 40mg BID   Follow up in surgery clinic to discuss hiatal hernia repair       FREDA Cowart MD

## 2020-07-02 DIAGNOSIS — Z01.818 PRE-OP EXAM: Primary | ICD-10-CM

## 2020-07-03 DIAGNOSIS — K21.9 GASTROESOPHAGEAL REFLUX DISEASE WITHOUT ESOPHAGITIS: Primary | ICD-10-CM

## 2020-07-06 RX ORDER — SUCRALFATE 1 G/1
TABLET ORAL
Qty: 180 TABLET | Refills: 3 | Status: SHIPPED | OUTPATIENT
Start: 2020-07-06 | End: 2020-12-21

## 2020-07-06 NOTE — TELEPHONE ENCOUNTER
Demarcus MCGUIRE sent Rx request for the following:   sucralfate (CARAFATE) 1 GM tablet  Sig: TAKE 1 TABLET BY MOUTH TWICE DAILY  Last Prescription Date:   12/30/2019  Last Fill Qty/Refills:         180, R-3    Last Office Visit:              5/21/2020  Future Office visit:           7/8/2020    Routing refill request to provider for review/approval because:  Medication is reported/historical  Tessie Wang RN on 7/6/2020 at 12:46 PM

## 2020-07-08 ENCOUNTER — OFFICE VISIT (OUTPATIENT)
Dept: FAMILY MEDICINE | Facility: OTHER | Age: 70
End: 2020-07-08
Attending: FAMILY MEDICINE
Payer: COMMERCIAL

## 2020-07-08 VITALS
OXYGEN SATURATION: 98 % | SYSTOLIC BLOOD PRESSURE: 104 MMHG | HEIGHT: 65 IN | BODY MASS INDEX: 32.09 KG/M2 | DIASTOLIC BLOOD PRESSURE: 58 MMHG | HEART RATE: 51 BPM | TEMPERATURE: 97.3 F | RESPIRATION RATE: 16 BRPM | WEIGHT: 192.6 LBS

## 2020-07-08 DIAGNOSIS — K44.9 HIATAL HERNIA: ICD-10-CM

## 2020-07-08 DIAGNOSIS — Z01.818 PREOP GENERAL PHYSICAL EXAM: Primary | ICD-10-CM

## 2020-07-08 DIAGNOSIS — E11.9 TYPE 2 DIABETES MELLITUS WITHOUT COMPLICATION, WITH LONG-TERM CURRENT USE OF INSULIN (H): ICD-10-CM

## 2020-07-08 DIAGNOSIS — N18.4 CKD (CHRONIC KIDNEY DISEASE) STAGE 4, GFR 15-29 ML/MIN (H): ICD-10-CM

## 2020-07-08 DIAGNOSIS — Z79.4 TYPE 2 DIABETES MELLITUS WITHOUT COMPLICATION, WITH LONG-TERM CURRENT USE OF INSULIN (H): ICD-10-CM

## 2020-07-08 DIAGNOSIS — K22.70 BARRETT'S ESOPHAGUS WITHOUT DYSPLASIA: ICD-10-CM

## 2020-07-08 DIAGNOSIS — K21.00 GASTROESOPHAGEAL REFLUX DISEASE WITH ESOPHAGITIS: ICD-10-CM

## 2020-07-08 DIAGNOSIS — Z95.1 HISTORY OF CORONARY ARTERY BYPASS GRAFT X 2: ICD-10-CM

## 2020-07-08 DIAGNOSIS — I25.10 ATHEROSCLEROSIS OF CORONARY ARTERY OF NATIVE HEART WITHOUT ANGINA PECTORIS, UNSPECIFIED VESSEL OR LESION TYPE: ICD-10-CM

## 2020-07-08 DIAGNOSIS — Z01.818 PRE-OP EXAM: ICD-10-CM

## 2020-07-08 LAB
ALBUMIN SERPL-MCNC: 3.5 G/DL (ref 3.5–5.7)
ALP SERPL-CCNC: 67 U/L (ref 34–104)
ALT SERPL W P-5'-P-CCNC: 10 U/L (ref 7–52)
ANION GAP SERPL CALCULATED.3IONS-SCNC: 8 MMOL/L (ref 3–14)
AST SERPL W P-5'-P-CCNC: 13 U/L (ref 13–39)
BASOPHILS # BLD AUTO: 0 10E9/L (ref 0–0.2)
BASOPHILS NFR BLD AUTO: 0.4 %
BILIRUB SERPL-MCNC: 0.6 MG/DL (ref 0.3–1)
BUN SERPL-MCNC: 22 MG/DL (ref 7–25)
CALCIUM SERPL-MCNC: 9.2 MG/DL (ref 8.6–10.3)
CHLORIDE SERPL-SCNC: 109 MMOL/L (ref 98–107)
CO2 SERPL-SCNC: 26 MMOL/L (ref 21–31)
CREAT SERPL-MCNC: 2.17 MG/DL (ref 0.6–1.2)
DIFFERENTIAL METHOD BLD: ABNORMAL
EOSINOPHIL # BLD AUTO: 0.2 10E9/L (ref 0–0.7)
EOSINOPHIL NFR BLD AUTO: 3 %
ERYTHROCYTE [DISTWIDTH] IN BLOOD BY AUTOMATED COUNT: 15.1 % (ref 10–15)
GFR SERPL CREATININE-BSD FRML MDRD: 22 ML/MIN/{1.73_M2}
GLUCOSE SERPL-MCNC: 139 MG/DL (ref 70–105)
HBA1C MFR BLD: 7.1 % (ref 4–6)
HCT VFR BLD AUTO: 36.3 % (ref 35–47)
HGB BLD-MCNC: 11 G/DL (ref 11.7–15.7)
IMM GRANULOCYTES # BLD: 0 10E9/L (ref 0–0.4)
IMM GRANULOCYTES NFR BLD: 0.3 %
LYMPHOCYTES # BLD AUTO: 1.8 10E9/L (ref 0.8–5.3)
LYMPHOCYTES NFR BLD AUTO: 26.8 %
MCH RBC QN AUTO: 26.9 PG (ref 26.5–33)
MCHC RBC AUTO-ENTMCNC: 30.3 G/DL (ref 31.5–36.5)
MCV RBC AUTO: 89 FL (ref 78–100)
MONOCYTES # BLD AUTO: 0.7 10E9/L (ref 0–1.3)
MONOCYTES NFR BLD AUTO: 9.9 %
NEUTROPHILS # BLD AUTO: 4 10E9/L (ref 1.6–8.3)
NEUTROPHILS NFR BLD AUTO: 59.6 %
PLATELET # BLD AUTO: 181 10E9/L (ref 150–450)
POTASSIUM SERPL-SCNC: 3.1 MMOL/L (ref 3.5–5.1)
PROT SERPL-MCNC: 6.3 G/DL (ref 6.4–8.9)
RBC # BLD AUTO: 4.09 10E12/L (ref 3.8–5.2)
SODIUM SERPL-SCNC: 143 MMOL/L (ref 134–144)
WBC # BLD AUTO: 6.7 10E9/L (ref 4–11)

## 2020-07-08 PROCEDURE — 99214 OFFICE O/P EST MOD 30 MIN: CPT | Performed by: FAMILY MEDICINE

## 2020-07-08 PROCEDURE — 80053 COMPREHEN METABOLIC PANEL: CPT | Mod: ZL | Performed by: FAMILY MEDICINE

## 2020-07-08 PROCEDURE — 85025 COMPLETE CBC W/AUTO DIFF WBC: CPT | Mod: ZL | Performed by: FAMILY MEDICINE

## 2020-07-08 PROCEDURE — G0463 HOSPITAL OUTPT CLINIC VISIT: HCPCS | Mod: 25

## 2020-07-08 PROCEDURE — 36415 COLL VENOUS BLD VENIPUNCTURE: CPT | Mod: ZL | Performed by: FAMILY MEDICINE

## 2020-07-08 PROCEDURE — G0463 HOSPITAL OUTPT CLINIC VISIT: HCPCS

## 2020-07-08 PROCEDURE — 83036 HEMOGLOBIN GLYCOSYLATED A1C: CPT | Mod: ZL | Performed by: FAMILY MEDICINE

## 2020-07-08 PROCEDURE — 93005 ELECTROCARDIOGRAM TRACING: CPT

## 2020-07-08 PROCEDURE — 93010 ELECTROCARDIOGRAM REPORT: CPT | Performed by: INTERNAL MEDICINE

## 2020-07-08 RX ORDER — OMEPRAZOLE 40 MG/1
40 CAPSULE, DELAYED RELEASE ORAL
COMMUNITY
Start: 2020-06-30 | End: 2020-12-21

## 2020-07-08 ASSESSMENT — ANXIETY QUESTIONNAIRES
6. BECOMING EASILY ANNOYED OR IRRITABLE: NOT AT ALL
GAD7 TOTAL SCORE: 0
5. BEING SO RESTLESS THAT IT IS HARD TO SIT STILL: NOT AT ALL
7. FEELING AFRAID AS IF SOMETHING AWFUL MIGHT HAPPEN: NOT AT ALL
3. WORRYING TOO MUCH ABOUT DIFFERENT THINGS: NOT AT ALL
1. FEELING NERVOUS, ANXIOUS, OR ON EDGE: NOT AT ALL
2. NOT BEING ABLE TO STOP OR CONTROL WORRYING: NOT AT ALL

## 2020-07-08 ASSESSMENT — PATIENT HEALTH QUESTIONNAIRE - PHQ9
SUM OF ALL RESPONSES TO PHQ QUESTIONS 1-9: 0
5. POOR APPETITE OR OVEREATING: NOT AT ALL

## 2020-07-08 ASSESSMENT — MIFFLIN-ST. JEOR: SCORE: 1389.51

## 2020-07-08 ASSESSMENT — PAIN SCALES - GENERAL: PAINLEVEL: EXTREME PAIN (8)

## 2020-07-08 NOTE — LETTER
July 15, 2020 enclosed      Bailey Sierra  [address] [address]        Dear ,    We are writing to inform you of your test results.    Enclosed is a copy of your lab for your records.  Since you have your nephrology consult and they clearly I think we can arrange surgery.    Resulted Orders   CBC and Differential   Result Value Ref Range    WBC 6.7 4.0 - 11.0 10e9/L    RBC Count 4.09 3.8 - 5.2 10e12/L    Hemoglobin 11.0 (L) 11.7 - 15.7 g/dL    Hematocrit 36.3 35.0 - 47.0 %    MCV 89 78 - 100 fl    MCH 26.9 26.5 - 33.0 pg    MCHC 30.3 (L) 31.5 - 36.5 g/dL    RDW 15.1 (H) 10.0 - 15.0 %    Platelet Count 181 150 - 450 10e9/L    Diff Method Automated Method     % Neutrophils 59.6 %    % Lymphocytes 26.8 %    % Monocytes 9.9 %    % Eosinophils 3.0 %    % Basophils 0.4 %    % Immature Granulocytes 0.3 %    Absolute Neutrophil 4.0 1.6 - 8.3 10e9/L    Absolute Lymphocytes 1.8 0.8 - 5.3 10e9/L    Absolute Monocytes 0.7 0.0 - 1.3 10e9/L    Absolute Eosinophils 0.2 0.0 - 0.7 10e9/L    Absolute Basophils 0.0 0.0 - 0.2 10e9/L    Abs Immature Granulocytes 0.0 0 - 0.4 10e9/L   Comprehensive Metabolic Panel   Result Value Ref Range    Sodium 143 134 - 144 mmol/L    Potassium 3.1 (L) 3.5 - 5.1 mmol/L    Chloride 109 (H) 98 - 107 mmol/L    Carbon Dioxide 26 21 - 31 mmol/L    Anion Gap 8 3 - 14 mmol/L    Glucose 139 (H) 70 - 105 mg/dL    Urea Nitrogen 22 7 - 25 mg/dL    Creatinine 2.17 (H) 0.60 - 1.20 mg/dL    GFR Estimate 22 (L) >60 mL/min/[1.73_m2]    GFR Estimate If Black 27 (L) >60 mL/min/[1.73_m2]    Calcium 9.2 8.6 - 10.3 mg/dL    Bilirubin Total 0.6 0.3 - 1.0 mg/dL    Albumin 3.5 3.5 - 5.7 g/dL    Protein Total 6.3 (L) 6.4 - 8.9 g/dL    Alkaline Phosphatase 67 34 - 104 U/L    ALT 10 7 - 52 U/L    AST 13 13 - 39 U/L   Hemoglobin A1c   Result Value Ref Range    Hemoglobin A1C 7.1 (H) 4.0 - 6.0 %       If you have any questions or concerns, please call the clinic at the number listed above.        Sincerely,        Sammy Umana MD

## 2020-07-08 NOTE — NURSING NOTE
"Date of Surgery: 07/22/2020   Type of Surgery: Fundoplication   Surgeon: Dr.Annabelle Robertson  Hospital:  [facility]   Fax:     Fever/Chills or other infectious symptoms in past month: no  >10lb weight loss in past two months: no  O2 SAT: 98    Health Care Directive/Code status:  yes  Hx of blood transfusions:   yes  Td up to date:  yes  History of VRE/MRSA:  no Date:     Preoperative Evaluation: Obstructive Sleep Apnea screening    S: Snore -  Do you snore loudly? (louder than talking or loud enough to be heard through closed doors)no  T: Tired - Do you often feel tired, fatigued, or sleepy during the daytime?no  O: Observed - Has anyone ever observed you stop breathing during your sleep?no  P: Pressure - Do you have or are you being treated for high blood pressure?yes  B: BMI - BMI greater than 35kg/m2?no  A: Age - Age over 50 years old?yes  N: Neck - Neck circumference greater than 40 cm?no  G: Gender - Gender: Male?no    Total number of \"YES\" responses:  2    Scoring: Low risk of DARIA 0-2  At Risk of DARIA: >3 High Risk of DARIA: 5-8    Martita Sofia LPN 7/8/2020 8:42 AM    Medication Reconciliation: complete.    Martita Sofia LPN  7/8/2020 8:37 AM  "

## 2020-07-08 NOTE — PROGRESS NOTES
----------------- PREOPERATIVE EXAM ------------------  7/8/2020    SUBJECTIVE:  Bailey Sierra is a 70 year old female here for preoperative optimization.    I was asked to see Bailey Sierra by Dr. Robertson for preoperative evaluation    Date of Surgery: 07/22  Type of Surgery: fundoplication    Hospital:  [facility]     HPI:  Patient arrives here for preop.  He will be undergoing surgery July 22 with a fundoplication at [place].  Patient has a history of heartburn.  And did undergo endoscopy.  She was found to have Pavon's.  She reports that PPIs do not seem to help.  She has been having symptoms since December.  And reports is getting worse.  She also had a recent CT scan showing 50% of her stomach is above her diaphragm.      Fever/Chills or other infectious symptoms in past month:  no  >10lb weight loss in past two months:  no    Patient Active Problem List    Diagnosis Date Noted     Pavon's esophagus without dysplasia 07/08/2020     Priority: Medium     History of coronary artery bypass graft x 2 12/30/2019     Priority: Medium     Stenosis of carotid artery, unspecified laterality 12/30/2019     Priority: Medium     Chronic total occlusion of coronary artery (RCA) 12/30/2019     Priority: Medium     Atherosclerotic heart disease of native coronary artery with other forms of angina pectoris (H) 09/28/2018     Priority: Medium     Obesity (BMI 35.0-39.9) with comorbidity (H) 09/28/2018     Priority: Medium     CKD (chronic kidney disease) stage 4, GFR 15-29 ml/min (H) 01/17/2018     Priority: Medium     Nonrheumatic aortic valve stenosis 01/17/2018     Priority: Medium     Hyperlipidemia 01/17/2018     Priority: Medium     Essential hypertension 01/17/2018     Priority: Medium     AC (acromioclavicular) joint arthritis 03/10/2017     Priority: Medium     Impingement syndrome of right shoulder 03/10/2017     Priority: Medium     Right rotator cuff tendinitis 03/10/2017     Priority: Medium     Long-term  insulin use in type 2 diabetes (H) 02/13/2017     Priority: Medium     Light chain disease, 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  Per Dr. Ortiz, if hypoalbuminemia not improving by Nov-Dec 2016, needs referral to Hematology.        Vitamin D deficiency 10/23/2016     Priority: Medium     Hyperparathyroidism due to renal insufficiency (H) 03/28/2016     Priority: Medium     Coronary atherosclerosis 09/03/2013     Priority: Medium     Overview:   2 vessel bypass, August 2013       Hiatal hernia 07/26/2013     Priority: Medium     GERD (gastroesophageal reflux disease) 01/08/2013     Priority: Medium     Urge incontinence 10/25/2012     Priority: Medium     Insomnia 01/25/2012     Priority: Medium       Past Medical History:   Diagnosis Date     Atherosclerotic heart disease of native coronary artery without angina pectoris     No Comments Provided     Cardiac murmur     No Comments Provided     Chronic kidney disease, stage III (moderate) (H)     No Comments Provided     Controlled type 2 diabetes mellitus with stage 3 chronic kidney disease, with long-term current use of insulin (H) 7/26/2013     Edema     No Comments Provided     Essential (primary) hypertension     No Comments Provided     Gastro-esophageal reflux disease without esophagitis     No Comments Provided     Hyperlipidemia     No Comments Provided     Impingement syndrome of right shoulder 03/10/2017          Insomnia 01/25/2012          Iron deficiency anemia     No Comments Provided     Neuromuscular dysfunction of bladder     No Comments Provided     Osteoarthritis     No Comments Provided     Other shoulder lesions, right shoulder 03/10/2017          Other shoulder lesions, unspecified shoulder     No Comments Provided     Other specified postprocedural states 04/19/2017          Personal history of other medical treatment (CODE)     Three childbirths     Plantar fascial fibromatosis      No Comments Provided     Presence of aortocoronary bypass graft 07/2013    [facility]     Primary osteoarthritis of shoulder 03/10/2017          Type 2 diabetes mellitus without complications (H)     No Comments Provided     Urgency of urination 03/08/2011          Uterovaginal prolapse     No Comments Provided       Past Surgical History:   Procedure Laterality Date     ARTHROSCOPY SHOULDER Right 1997          ARTHROSCOPY SHOULDER Left 2012          ARTHROSCOPY SHOULDER RT/LT Right [id number]          Cardiac Bypass  07/2013          COLONOSCOPY  11/24/2009 11/24/2009     CYSTOSCOPY  03/15/2001     DENTAL SURGERY      Dental extractions     ENDOSCOPIC RETROGRADE CHOLANGIOPANCREATOGRAPHY  05/2008    Bile leak with ERCP and stenting and drainage of bile collection through abdominal wall.     ESOPHAGOSCOPY, GASTROSCOPY, DUODENOSCOPY (EGD), COMBINED N/A 6/23/2020    Pavon's follow up 5 years, 6/23/2025     HYSTERECTOMY TOTAL ABDOMINAL, BILATERAL SALPINGO-OOPHORECTOMY, COMBINED  04/02/2001    Vag vault suspension with Cortex mesh     IMPLANT STIMULATOR AND LEADS SACRAL NERVE (STAGE ONE AND TWO) N/A 5/7/2019    Procedure: Interstim Implant Revision, GENERATOR AND TYING LEAD EXCHANGE;  Surgeon: Honorio Bowling MD;  Location: [facility] OR     Interstim Device Placement  04/14/2014    Dr. Bowling - [facility]     LAMINECTOMY LUMBAR ONE LEVEL  1997          LAPAROSCOPIC CHOLECYSTECTOMY  04/2008          LAPAROSCOPIC TUBAL LIGATION      No Comments Provided       Family History   Problem Relation Age of Onset     Other - See Comments Father         MI     Cancer Mother         Cancer,Some type of cancer, aneurysm     Diabetes Paternal Grandmother         Diabetes,Type II     Cancer Sister         Cancer,unknown type     Diabetes Sister         Diabetes,Type II     Diabetes Sister         Diabetes,Type II     Diabetes Sister         Diabetes,Type II     Other - See Comments Daughter         depression     Other - See Comments Daughter          ovarian cancer and depression     Heart Disease Brother         Heart Disease,CAD, MI       Social History     Tobacco Use     Smoking status: Never Smoker     Smokeless tobacco: Never Used   Substance Use Topics     Alcohol use: No     Alcohol/week: 0.0 standard drinks     Drug use: No     Comment: Drug use: No       Current Outpatient Medications   Medication Sig Dispense Refill     aspirin (ASA) 81 MG tablet Take 1 tablet (81 mg) by mouth daily 90 tablet 3     blood glucose monitoring (TRICIA CONTOUR) test strip TEST 3 TIMES A  each 3     Calcium Carb-Cholecalciferol (CALCIUM PLUS D3 ABSORBABLE) 600-2500 MG-UNIT CAPS Take 1 capsule by mouth daily       furosemide (LASIX) 20 MG tablet Take 1 tablet (20 mg) by mouth daily 90 tablet 3     insulin aspart (NOVOLOG FLEXPEN) 100 UNIT/ML pen Inject 7 - 16 units before each meal based on sliding scale.  Max daily dose 56 units. 60 mL 3     insulin glargine (BASAGLAR KWIKPEN) 100 UNIT/ML pen Inject 42 Units Subcutaneous At Bedtime Adjusts according to chemstrip 60 mL 1     insulin pen needle (31G X 8 MM) 31G X 8 MM miscellaneous Use 4 pen needles daily or as directed. 400 each 3     insulin pen needle (ULTICARE SHORT) 31G X 8 MM miscellaneous USE AS DIRECTED FOUR TIMES DAILY. Dx: E11.9 400 each 3     isosorbide mononitrate (IMDUR) 60 MG 24 hr tablet Take 2 tablets (120 mg) by mouth daily 180 tablet 3     lisinopril (ZESTRIL) 2.5 MG tablet Take 1 tablet (2.5 mg) by mouth daily 90 tablet 3     metoprolol succinate ER (TOPROL-XL) 25 MG 24 hr tablet Take 1 tablet (25 mg) by mouth daily 90 tablet 3     nitroGLYcerin (NITROSTAT) 0.4 MG sublingual tablet Place 0.4 mg under the tongue every 5 minutes as needed for chest pain       omeprazole (PRILOSEC) 40 MG DR capsule Take 40 mg by mouth       order for DME Equipment being ordered: Diabetic shoes, pre-ulcer callous formation 2 each 3     potassium chloride ER (KLOR-CON M) 20 MEQ CR tablet TAKE 1 TABLET(20 MEQ) BY MOUTH  "TWICE DAILY 180 tablet 0     rosuvastatin (CRESTOR) 40 MG tablet Take 40 mg by mouth daily 90 tablet 1     sucralfate (CARAFATE) 1 GM tablet TAKE 1 TABLET BY MOUTH TWICE DAILY 180 tablet 3     zolpidem (AMBIEN) 10 MG tablet Take 10 mg by mouth nightly as needed for sleep         Allergies:  No Known Allergies    ROS:    Surgical:  patient denies previous complications from prior surgeries including but not limited to prolonged bleeding, anesthesia complications, dysrhythmias, surgical wound infections, or prolonged hospital stay.    Denies family hx of bleeding tendencies, anesthesia complications, or other problems with surgery.    For review of systems please see copied forms.  She does have a history of bypass 7 years ago without any further complications or symptoms.  Blood transfusion over 50 years ago.  Weight loss due to GERD.     -------------------------------------------------------------    PHYSICAL EXAM:  /58   Pulse 51   Temp 97.3  F (36.3  C)   Resp 16   Ht 1.651 m (5' 5\")   Wt 87.4 kg (192 lb 9.6 oz)   SpO2 98%   BMI 32.05 kg/m      EXAM:  General Appearance: Pleasant, alert, appropriate appearance for age. No acute distress  Head Exam: Normal. Normocephalic, atraumatic.  Eyes: PERRL, EOMI  Ears: Normal TM's bilaterally. Normal auditory canals and external ears.   OroPharynx: Normal buccal mucosa. Normal pharynx.  Neck: Supple,   Lungs: Normal chest wall and respirations. Clear to auscultation, no wheezes or crackles.  Cardiovascular: Regular rate and rhythm. S1, S2, no murmurs.  Gastrointestinal: Soft, nontender, no abnormal masses or organomegaly. BS normal   Musculoskeletal: No edema.  Skin: no concerning or new rashes.  Neurologic Exam: CN 2-12 grossly intact.  Normal gait.   Psychiatric Exam: Alert and oriented, appropriate affect.      EKG:  normal EKG, normal sinus rhythm  ---------------------------------------------------------------  LABS  Results for orders placed or performed " in visit on 07/08/20   EKG 12-lead, tracing only (Same Day)     Status: None (Preliminary result)   Result Value Ref Range    Interpretation ECG Click View Image link to view waveform and result      Results for orders placed or performed in visit on 07/08/20   CBC and Differential     Status: Abnormal   Result Value Ref Range    WBC 6.7 4.0 - 11.0 10e9/L    RBC Count 4.09 3.8 - 5.2 10e12/L    Hemoglobin 11.0 (L) 11.7 - 15.7 g/dL    Hematocrit 36.3 35.0 - 47.0 %    MCV 89 78 - 100 fl    MCH 26.9 26.5 - 33.0 pg    MCHC 30.3 (L) 31.5 - 36.5 g/dL    RDW 15.1 (H) 10.0 - 15.0 %    Platelet Count 181 150 - 450 10e9/L    Diff Method Automated Method     % Neutrophils 59.6 %    % Lymphocytes 26.8 %    % Monocytes 9.9 %    % Eosinophils 3.0 %    % Basophils 0.4 %    % Immature Granulocytes 0.3 %    Absolute Neutrophil 4.0 1.6 - 8.3 10e9/L    Absolute Lymphocytes 1.8 0.8 - 5.3 10e9/L    Absolute Monocytes 0.7 0.0 - 1.3 10e9/L    Absolute Eosinophils 0.2 0.0 - 0.7 10e9/L    Absolute Basophils 0.0 0.0 - 0.2 10e9/L    Abs Immature Granulocytes 0.0 0 - 0.4 10e9/L   Comprehensive Metabolic Panel     Status: Abnormal   Result Value Ref Range    Sodium 143 134 - 144 mmol/L    Potassium 3.1 (L) 3.5 - 5.1 mmol/L    Chloride 109 (H) 98 - 107 mmol/L    Carbon Dioxide 26 21 - 31 mmol/L    Anion Gap 8 3 - 14 mmol/L    Glucose 139 (H) 70 - 105 mg/dL    Urea Nitrogen 22 7 - 25 mg/dL    Creatinine 2.17 (H) 0.60 - 1.20 mg/dL    GFR Estimate 22 (L) >60 mL/min/[1.73_m2]    GFR Estimate If Black 27 (L) >60 mL/min/[1.73_m2]    Calcium 9.2 8.6 - 10.3 mg/dL    Bilirubin Total 0.6 0.3 - 1.0 mg/dL    Albumin 3.5 3.5 - 5.7 g/dL    Protein Total 6.3 (L) 6.4 - 8.9 g/dL    Alkaline Phosphatase 67 34 - 104 U/L    ALT 10 7 - 52 U/L    AST 13 13 - 39 U/L   Hemoglobin A1c     Status: Abnormal   Result Value Ref Range    Hemoglobin A1C 7.1 (H) 4.0 - 6.0 %   EKG 12-lead, tracing only (Same Day)     Status: None (Preliminary result)   Result Value Ref Range     Interpretation ECG Click View Image link to view waveform and result        ASSESSEMENT AND PLAN:    (Z01.818) Preop general physical exam  (primary encounter diagnosis)  We will chart shows that patient has not had a nephrology consult over the last year.  Will have proceeding with surgery patient see nephrology prior to proceeding with surgery    (E11.9,  Z79.4) Type 2 diabetes mellitus without complication, with long-term current use of insulin (H)  Under good control    (I25.10) Atherosclerosis of coronary artery of native heart without angina pectoris, unspecified vessel or lesion type  Asymptomatic    (Z95.1) History of coronary artery bypass graft x 2  Asymptomatic    (Z01.818) Pre-op exam      (K21.0) Gastroesophageal reflux disease with esophagitis      (K44.9) Hiatal hernia      (K22.70) Pavon's esophagus without dysplasia  On recent endoscopy  Chronic renal failure stage IV.  Patient will see nephrology prior to proceeding with surgery.  Last visit was over 1 year ago    PRE OP RECOMMENDATIONS:  Patient is on chronic pain medications no   Patient is on antiplatlet/anticoagulation medication hold asa one week prior to surg   Other medications that need adjustment perioperatively  No insulin in the morning and hold lisinopril in the morning     Other:  Patient was advised to call our office and the surgical services with any change in condition or new symptoms if they were to develop between today and their surgical date.  Especially any cardiopulmonary symptoms or symptoms concerning for an infection.    Sammy Umana MD 7/8/2020

## 2020-07-09 LAB — INTERPRETATION ECG - MUSE: NORMAL

## 2020-07-09 ASSESSMENT — ANXIETY QUESTIONNAIRES: GAD7 TOTAL SCORE: 0

## 2020-07-10 ENCOUNTER — TELEPHONE (OUTPATIENT)
Dept: FAMILY MEDICINE | Facility: OTHER | Age: 70
End: 2020-07-10

## 2020-07-10 NOTE — TELEPHONE ENCOUNTER
[employer] will fax over wanting to know how many times she tests a day, 4 times a day    Salud Nagy LPN on 7/10/2020 at 10:48 AM

## 2020-07-13 ENCOUNTER — TELEPHONE (OUTPATIENT)
Dept: FAMILY MEDICINE | Facility: OTHER | Age: 70
End: 2020-07-13

## 2020-07-13 NOTE — TELEPHONE ENCOUNTER
Vinod from [facility] called to speak to Charla - please call her back. [phone]  Kellee Mccain on 7/13/2020 at 3:42 PM

## 2020-07-15 NOTE — TELEPHONE ENCOUNTER
Contacted [employer] representative, Tresa ACEVEDO, to inquire about form and documentation.    Patient currently uses Dexcom G6 CGM for glucose readings.  She is looking for additional sensor and transmitter supplies.    Hazel Del Rio RN, BSN, [facility]  7/15/2020 9:40 AM

## 2020-07-20 ENCOUNTER — TRANSFERRED RECORDS (OUTPATIENT)
Dept: HEALTH INFORMATION MANAGEMENT | Facility: OTHER | Age: 70
End: 2020-07-20

## 2020-07-21 NOTE — TELEPHONE ENCOUNTER
Message received, supplies have been shipped and delivered on 7/17/ [facility] tracking # [id number].    Hazel Del Rio RN, BSN, [facility]  7/21/2020 5:15 PM

## 2020-07-22 ENCOUNTER — TRANSFERRED RECORDS (OUTPATIENT)
Dept: HEALTH INFORMATION MANAGEMENT | Facility: OTHER | Age: 70
End: 2020-07-22

## 2020-07-23 LAB
CREAT SERPL-MCNC: 1.93 MG/DL (ref 0.4–1)
GFR SERPL CREATININE-BSD FRML MDRD: 26 ML/MIN/1.73M2
GLUCOSE SERPL-MCNC: 166 MG/DL (ref 70–99)
POTASSIUM SERPL-SCNC: 3.7 MEQ/L (ref 3.4–5.1)

## 2020-08-03 ENCOUNTER — TELEPHONE (OUTPATIENT)
Dept: FAMILY MEDICINE | Facility: OTHER | Age: 70
End: 2020-08-03

## 2020-08-03 NOTE — TELEPHONE ENCOUNTER
Her hemoglobin A1c in July looked great.  I am happy to see her if she has any concerns that she wants to discuss otherwise we can plan on seeing each other sometime this winter.

## 2020-08-28 ENCOUNTER — TELEPHONE (OUTPATIENT)
Dept: FAMILY MEDICINE | Facility: OTHER | Age: 70
End: 2020-08-28

## 2020-09-02 ENCOUNTER — TRANSFERRED RECORDS (OUTPATIENT)
Dept: HEALTH INFORMATION MANAGEMENT | Facility: OTHER | Age: 70
End: 2020-09-02

## 2020-09-03 DIAGNOSIS — K21.9 GASTROESOPHAGEAL REFLUX DISEASE, ESOPHAGITIS PRESENCE NOT SPECIFIED: Primary | ICD-10-CM

## 2020-09-03 DIAGNOSIS — Z79.4 TYPE 2 DIABETES MELLITUS WITH COMPLICATION, WITH LONG-TERM CURRENT USE OF INSULIN (H): ICD-10-CM

## 2020-09-03 DIAGNOSIS — E11.8 TYPE 2 DIABETES MELLITUS WITH COMPLICATION, WITH LONG-TERM CURRENT USE OF INSULIN (H): ICD-10-CM

## 2020-09-03 RX ORDER — PANTOPRAZOLE SODIUM 40 MG/1
40 TABLET, DELAYED RELEASE ORAL DAILY
Qty: 90 TABLET | Refills: 3 | Status: SHIPPED | OUTPATIENT
Start: 2020-09-03 | End: 2020-12-21

## 2020-09-03 RX ORDER — ROSUVASTATIN CALCIUM 40 MG/1
TABLET, COATED ORAL
Qty: 90 TABLET | Refills: 1 | Status: SHIPPED | OUTPATIENT
Start: 2020-09-03 | End: 2021-03-09

## 2020-09-03 NOTE — TELEPHONE ENCOUNTER
[facility] sent Rx request for the following:   rosuvastatin (CRESTOR) 40 MG tablet  Sig:  Take 40 mg by mouth daily    Last Prescription Date:   3/17/2020  Last Fill Qty/Refills:         90, R-1    Last Office Visit:              7/8/2020   Future Office visit:           None    pantoprazole (PROTONIX) 40 MG EC tablet  Sig:  Take 1 tablet (40 mg) by mouth daily    Last Prescription Date:   Not on med list  Last Fill Qty/Refills:         Not on med list    Last Office Visit:              7/8/2020   Future Office visit:           None  Routing refill request to provider for review/approval because:  Drug not active on patient's medication list    Will fill Crestor and route Protonix to PCP    Gris Allen RN  ....................  9/3/2020   12:21 PM

## 2020-09-08 ENCOUNTER — ALLIED HEALTH/NURSE VISIT (OUTPATIENT)
Dept: UROLOGY | Facility: OTHER | Age: 70
End: 2020-09-08
Attending: UROLOGY
Payer: COMMERCIAL

## 2020-09-08 DIAGNOSIS — N32.81 OVERACTIVE BLADDER: Primary | ICD-10-CM

## 2020-09-08 PROCEDURE — 99207 ZZC NO CHARGE LOS: CPT

## 2020-09-08 NOTE — NURSING NOTE
Pt presents to clinic for yearly [employer] follow up.  Pt was on Program 4.  Still having urge to go, right away.  Changed program to #2  2.2  Will return in one year.

## 2020-09-09 ENCOUNTER — TELEPHONE (OUTPATIENT)
Dept: FAMILY MEDICINE | Facility: OTHER | Age: 70
End: 2020-09-09

## 2020-09-09 NOTE — TELEPHONE ENCOUNTER
Janis from [facility] called to see if we received forms regarding patients diabetic status - it was sent to Soha or Elvis on 9/3/20 -- Please call Janis to discuss [phone]  Kellee Mccain on 9/9/2020 at 10:43 AM

## 2020-09-09 NOTE — TELEPHONE ENCOUNTER
Returned call to Janis at [facility] and she is looking for forms they sent to Dr. Leal but never received back. I asked her to send the forms again and I will watch for them.    Noemy Holt LPN on 9/9/2020 at 1:28 PM

## 2020-09-11 NOTE — TELEPHONE ENCOUNTER
Paperwork was signed by provider. I will follow up with diabetic educator to see where the paperwork is.    Noemy Holt LPN on 9/11/2020 at 4:08 PM

## 2020-09-14 NOTE — TELEPHONE ENCOUNTER
Hazel is out all week. I will follow up with PCP and fax paperwork over.      Noemy Holt LPN on 9/14/2020 at 4:29 PM

## 2020-09-18 ENCOUNTER — TELEPHONE (OUTPATIENT)
Dept: FAMILY MEDICINE | Facility: OTHER | Age: 70
End: 2020-09-18

## 2020-09-18 NOTE — TELEPHONE ENCOUNTER
Received fax regarding dexcom and completed paperwork. Fax was returned.    Noemy Holt LPN on 9/18/2020 at 3:26 PM

## 2020-09-18 NOTE — TELEPHONE ENCOUNTER
"Returned call to patient. Patient states,\"[employer]com paperwork was filled out incorrectly and they are faxing Dr. Leal new paperwork\".    Noemy Holt LPN on 9/18/2020 at 3:11 PM    "

## 2020-09-25 ENCOUNTER — TELEPHONE (OUTPATIENT)
Dept: EDUCATION SERVICES | Facility: OTHER | Age: 70
End: 2020-09-25

## 2020-09-25 NOTE — TELEPHONE ENCOUNTER
Quang-Patient would like you to give her a call no reason was given     Thank You    Flory Arellano on 9/25/2020 at 8:38 AM

## 2020-09-28 NOTE — TELEPHONE ENCOUNTER
Patient states [facility] mail pharmacy has not received the CMN prescription for Dexcom G6 sensors and transmitter.    Patient is out of supplies.  She has BG testing strips and is using for SMBG.      Phoned [facility], they have not received CMN dated/initialled changes, as requested.    This form was updated last week per nursing and ready to send after PCP initial/dates.  Will contact PCP, perhaps form needs to be refaxed.    Patient notified.    Hazel Del Rio RN, BSN, [facility]  9/28/2020 2:04 PM

## 2020-09-29 NOTE — TELEPHONE ENCOUNTER
CMN had been faxed 9/24/2020, refaxed CMN again today.      Hazel Del Rio RN, BSN, [facility]  9/29/2020 8:48 AM     Patient notified.    Hazel Del Rio RN, BSN, [facility]  9/29/2020 4:49 PM

## 2020-10-14 DIAGNOSIS — I25.118 ATHEROSCLEROTIC HEART DISEASE OF NATIVE CORONARY ARTERY WITH OTHER FORMS OF ANGINA PECTORIS (H): ICD-10-CM

## 2020-10-14 RX ORDER — FUROSEMIDE 20 MG
20 TABLET ORAL DAILY
Qty: 90 TABLET | Refills: 3 | Status: SHIPPED | OUTPATIENT
Start: 2020-10-14 | End: 2021-03-09

## 2020-10-14 NOTE — TELEPHONE ENCOUNTER
[facility] sent Rx request for the following:   furosemide (LASIX) 20 MG tablet  Sig:Take 1 tablet (20 mg) by mouth daily    Last Prescription Date:   10/04/2019  Last Fill Qty/Refills:         90, R-3    Last Office Visit:              07/08/2020 (Lyndsay)   Future Office visit:           None noted     Diuretics (Including Combos) Protocol Failed - 10/14/2020 10:00 AM        Failed - Normal serum creatinine on file in past 12 months     Recent Labs   Lab Test 07/23/20   CR 1.93*           Unable to complete prescription refill per RN Medication Refill Policy.................... Carolyn Burns RN ....................  10/14/2020   4:34 PM

## 2020-11-02 ENCOUNTER — TELEPHONE (OUTPATIENT)
Dept: FAMILY MEDICINE | Facility: OTHER | Age: 70
End: 2020-11-02

## 2020-11-02 DIAGNOSIS — E11.9 TYPE 2 DIABETES MELLITUS WITHOUT COMPLICATION, WITH LONG-TERM CURRENT USE OF INSULIN (H): Primary | ICD-10-CM

## 2020-11-02 DIAGNOSIS — Z79.4 TYPE 2 DIABETES MELLITUS WITHOUT COMPLICATION, WITH LONG-TERM CURRENT USE OF INSULIN (H): Primary | ICD-10-CM

## 2020-11-02 NOTE — TELEPHONE ENCOUNTER
Reason for call: Request a lab order.    Order requested for what kind of lab? A1c & whatever else you want prior to her A1c appt on 12/21/2020 @ 11:00    Who is your PCP? [facility]    Preferred method for responding to this message: Telephone Call    Phone number patient can be reached at: Home number on file [phone] (home) in the morning    If we cannot reach you directly, may we leave a detailed response at the number you provided? Yes - say who you are please

## 2020-11-03 DIAGNOSIS — Z79.4 TYPE 2 DIABETES MELLITUS WITH COMPLICATION, WITH LONG-TERM CURRENT USE OF INSULIN (H): ICD-10-CM

## 2020-11-03 DIAGNOSIS — E11.8 TYPE 2 DIABETES MELLITUS WITH COMPLICATION, WITH LONG-TERM CURRENT USE OF INSULIN (H): ICD-10-CM

## 2020-11-03 LAB — HBA1C MFR BLD: NORMAL % (ref 0–5.6)

## 2020-11-03 RX ORDER — INSULIN ASPART 100 [IU]/ML
INJECTION, SOLUTION INTRAVENOUS; SUBCUTANEOUS
Qty: 60 ML | Refills: 0 | Status: SHIPPED | OUTPATIENT
Start: 2020-11-03 | End: 2020-12-21

## 2020-11-03 NOTE — TELEPHONE ENCOUNTER
[facility] sent Rx request for the following:   insulin aspart (NOVOLOG FLEXPEN) 100 UNIT/ML pen  Sig: Inject 7 - 16 units before each meal based on sliding scale.  Max daily dose 56 units.    Last Prescription Date:   08/27/2019  Last Fill Qty/Refills:         60 mL, R-3    Last Office Visit:              07/08/2020 (Lyndsay)   Future Office visit:           12/21/2020 (Elvis)   Short Acting Insulin Protocol Passed - 11/3/2020 12:23 PM     Prescription approved per [facility] Refill Protocol.  Carolyn Burns RN ....................  11/3/2020   1:31 PM

## 2020-12-04 ENCOUNTER — TRANSFERRED RECORDS (OUTPATIENT)
Dept: HEALTH INFORMATION MANAGEMENT | Facility: OTHER | Age: 70
End: 2020-12-04

## 2020-12-17 DIAGNOSIS — Z01.818 PRE-OP EXAM: ICD-10-CM

## 2020-12-17 LAB
ALBUMIN SERPL-MCNC: 3.7 G/DL (ref 3.5–5.7)
ALP SERPL-CCNC: 70 U/L (ref 34–104)
ALT SERPL W P-5'-P-CCNC: 50 U/L (ref 7–52)
ANION GAP SERPL CALCULATED.3IONS-SCNC: 9 MMOL/L (ref 3–14)
AST SERPL W P-5'-P-CCNC: 48 U/L (ref 13–39)
BILIRUB SERPL-MCNC: 0.6 MG/DL (ref 0.3–1)
BUN SERPL-MCNC: 17 MG/DL (ref 7–25)
CALCIUM SERPL-MCNC: 8.9 MG/DL (ref 8.6–10.3)
CHLORIDE SERPL-SCNC: 109 MMOL/L (ref 98–107)
CO2 SERPL-SCNC: 26 MMOL/L (ref 21–31)
CREAT SERPL-MCNC: 2.13 MG/DL (ref 0.6–1.2)
ERYTHROCYTE [DISTWIDTH] IN BLOOD BY AUTOMATED COUNT: 16.3 % (ref 10–15)
GFR SERPL CREATININE-BSD FRML MDRD: 23 ML/MIN/{1.73_M2}
GLUCOSE SERPL-MCNC: 121 MG/DL (ref 70–105)
HBA1C MFR BLD: 7 % (ref 4–6)
HCT VFR BLD AUTO: 40.3 % (ref 35–47)
HGB BLD-MCNC: 13.1 G/DL (ref 11.7–15.7)
MCH RBC QN AUTO: 28.7 PG (ref 26.5–33)
MCHC RBC AUTO-ENTMCNC: 32.5 G/DL (ref 31.5–36.5)
MCV RBC AUTO: 88 FL (ref 78–100)
PLATELET # BLD AUTO: 154 10E9/L (ref 150–450)
POTASSIUM SERPL-SCNC: 3.3 MMOL/L (ref 3.5–5.1)
PROT SERPL-MCNC: 6 G/DL (ref 6.4–8.9)
RBC # BLD AUTO: 4.57 10E12/L (ref 3.8–5.2)
SODIUM SERPL-SCNC: 144 MMOL/L (ref 134–144)
WBC # BLD AUTO: 7 10E9/L (ref 4–11)

## 2020-12-17 PROCEDURE — 36415 COLL VENOUS BLD VENIPUNCTURE: CPT | Mod: ZL

## 2020-12-17 PROCEDURE — 80053 COMPREHEN METABOLIC PANEL: CPT | Mod: ZL

## 2020-12-17 PROCEDURE — 83036 HEMOGLOBIN GLYCOSYLATED A1C: CPT | Mod: ZL

## 2020-12-17 PROCEDURE — 85027 COMPLETE CBC AUTOMATED: CPT | Mod: ZL

## 2020-12-21 ENCOUNTER — OFFICE VISIT (OUTPATIENT)
Dept: FAMILY MEDICINE | Facility: OTHER | Age: 70
End: 2020-12-21
Attending: FAMILY MEDICINE
Payer: COMMERCIAL

## 2020-12-21 VITALS
SYSTOLIC BLOOD PRESSURE: 110 MMHG | HEIGHT: 65 IN | DIASTOLIC BLOOD PRESSURE: 60 MMHG | RESPIRATION RATE: 20 BRPM | HEART RATE: 56 BPM | OXYGEN SATURATION: 99 % | BODY MASS INDEX: 28.66 KG/M2 | WEIGHT: 172 LBS | TEMPERATURE: 97.5 F

## 2020-12-21 DIAGNOSIS — Z79.4 TYPE 2 DIABETES MELLITUS WITH COMPLICATION, WITH LONG-TERM CURRENT USE OF INSULIN (H): Primary | ICD-10-CM

## 2020-12-21 DIAGNOSIS — E66.01 MORBID OBESITY (H): ICD-10-CM

## 2020-12-21 DIAGNOSIS — I25.118 CORONARY ARTERY DISEASE OF NATIVE HEART WITH STABLE ANGINA PECTORIS, UNSPECIFIED VESSEL OR LESION TYPE (H): ICD-10-CM

## 2020-12-21 DIAGNOSIS — Z95.1 HISTORY OF CORONARY ARTERY BYPASS GRAFT X 2: ICD-10-CM

## 2020-12-21 DIAGNOSIS — I10 ESSENTIAL HYPERTENSION: ICD-10-CM

## 2020-12-21 DIAGNOSIS — N18.4 CKD (CHRONIC KIDNEY DISEASE) STAGE 4, GFR 15-29 ML/MIN (H): ICD-10-CM

## 2020-12-21 DIAGNOSIS — N25.81 HYPERPARATHYROIDISM DUE TO RENAL INSUFFICIENCY (H): ICD-10-CM

## 2020-12-21 DIAGNOSIS — E11.8 TYPE 2 DIABETES MELLITUS WITH COMPLICATION, WITH LONG-TERM CURRENT USE OF INSULIN (H): Primary | ICD-10-CM

## 2020-12-21 DIAGNOSIS — F51.01 PRIMARY INSOMNIA: ICD-10-CM

## 2020-12-21 DIAGNOSIS — E87.6 HYPOKALEMIA: ICD-10-CM

## 2020-12-21 PROBLEM — R80.9 PROTEINURIA, UNSPECIFIED TYPE: Status: ACTIVE | Noted: 2020-07-20

## 2020-12-21 PROBLEM — D64.9 ANEMIA, UNSPECIFIED TYPE: Status: ACTIVE | Noted: 2020-07-20

## 2020-12-21 PROCEDURE — G0463 HOSPITAL OUTPT CLINIC VISIT: HCPCS

## 2020-12-21 PROCEDURE — 99213 OFFICE O/P EST LOW 20 MIN: CPT | Performed by: FAMILY MEDICINE

## 2020-12-21 RX ORDER — ZOLPIDEM TARTRATE 10 MG/1
10 TABLET ORAL
Qty: 30 TABLET | Refills: 5 | Status: SHIPPED | OUTPATIENT
Start: 2020-12-21 | End: 2021-06-08

## 2020-12-21 RX ORDER — ISOSORBIDE MONONITRATE 60 MG/1
120 TABLET, EXTENDED RELEASE ORAL DAILY
Qty: 180 TABLET | Refills: 3 | Status: SHIPPED | OUTPATIENT
Start: 2020-12-21 | End: 2022-01-04

## 2020-12-21 RX ORDER — INSULIN ASPART 100 [IU]/ML
INJECTION, SOLUTION INTRAVENOUS; SUBCUTANEOUS
Qty: 60 ML | Refills: 11 | Status: SHIPPED | OUTPATIENT
Start: 2020-12-21 | End: 2022-01-17

## 2020-12-21 ASSESSMENT — PAIN SCALES - GENERAL: PAINLEVEL: NO PAIN (0)

## 2020-12-21 ASSESSMENT — MIFFLIN-ST. JEOR: SCORE: 1296.07

## 2020-12-21 NOTE — NURSING NOTE
Patient presents today for diabetic check up.  Previous A1C is at goal of <8  Lab Results   Component Value Date    A1C 7.0 12/17/2020    A1C Canceled, Test credited 11/03/2020    A1C 7.1 07/08/2020    A1C 6.8 04/20/2020    A1C 6.9 10/02/2019     Urine microalbumin:creatine:    Foot exam 05/21/20  Eye exam 12/04/20    Tobacco User no  Patient is on a daily aspirin  Patient is on a Statin.  Blood pressure today of:     BP Readings from Last 1 Encounters:   07/08/20 104/58      is at the goal of <139/89 for diabetics.    Noemy Holt LPN on 12/21/2020 at 1:36 PM      Medication Reconciliation Complete    Noemy Holt LPN  12/21/2020 1:36 PM

## 2020-12-21 NOTE — PROGRESS NOTES
"SUBJECTIVE:  Bailey Sierra is a 70 year old female here for follow-up.  She had hiatal hernia repair earlier this summer and reports that her overall eating and abdominal pain has improved.  She does have quite a bit of constipation right now.    She is a history of type 2 diabetes and comes today for follow-up.  She had hemoglobin A1c drawn last week.    She otherwise has no new concerns.    Allergies:  No Known Allergies    ROS:    As above otherwise ROS is unremarkable.    OBJECTIVE:  /60   Pulse 56   Temp 97.5  F (36.4  C)   Resp 20   Ht 1.651 m (5' 5\")   Wt 78 kg (172 lb)   SpO2 99%   BMI 28.62 kg/m      EXAM:  General Appearance: Pleasant, alert, appropriate appearance for age. No acute distress  Exam was not repeated today.    ASSESSEMENT AND PLAN:    1. Type 2 diabetes mellitus with complication, with long-term current use of insulin (H)    2. History of coronary artery bypass graft x 2    3. CKD (chronic kidney disease) stage 4, GFR 15-29 ml/min (H)    4. Hypokalemia    5. Hyperparathyroidism due to renal insufficiency (H)    6. Essential hypertension    7. Primary insomnia    8. Morbid obesity (H)    9. Coronary artery disease of native heart with stable angina pectoris, unspecified vessel or lesion type (H)      Labs reviewed which continues to show good control of her blood sugars.  Her kidney function is reduced but stable as well as her low potassium.  She will follow-up with nephrology as previously scheduled.    Her blood pressure is well controlled, continue current regimen.    She has a history of insomnia and Ambien is once again refilled, her last prescription was filled a couple of years ago.    For her constipation I recommend that she increase her fiber and water intake.  If this not helpful we discussed over-the-counter options for her as well.    She will follow-up in June 2021 for her diabetes, sooner if she has any new concerns.    Sukumar Leal MD    This document was prepared " using voice generated software.  While every attempt was made for accuracy, grammatical errors may exist.

## 2021-02-16 ENCOUNTER — OFFICE VISIT (OUTPATIENT)
Dept: FAMILY MEDICINE | Facility: OTHER | Age: 71
End: 2021-02-16
Attending: FAMILY MEDICINE
Payer: MEDICARE

## 2021-02-16 ENCOUNTER — HOSPITAL ENCOUNTER (OUTPATIENT)
Dept: GENERAL RADIOLOGY | Facility: OTHER | Age: 71
End: 2021-02-16
Attending: FAMILY MEDICINE
Payer: MEDICARE

## 2021-02-16 VITALS
RESPIRATION RATE: 20 BRPM | WEIGHT: 165 LBS | TEMPERATURE: 97.8 F | BODY MASS INDEX: 27.49 KG/M2 | DIASTOLIC BLOOD PRESSURE: 60 MMHG | HEIGHT: 65 IN | HEART RATE: 72 BPM | OXYGEN SATURATION: 99 % | SYSTOLIC BLOOD PRESSURE: 104 MMHG

## 2021-02-16 DIAGNOSIS — R10.84 ABDOMINAL PAIN, GENERALIZED: Primary | ICD-10-CM

## 2021-02-16 DIAGNOSIS — K21.9 GASTROESOPHAGEAL REFLUX DISEASE, UNSPECIFIED WHETHER ESOPHAGITIS PRESENT: ICD-10-CM

## 2021-02-16 DIAGNOSIS — K44.9 HIATAL HERNIA: ICD-10-CM

## 2021-02-16 DIAGNOSIS — K59.00 CONSTIPATION, UNSPECIFIED CONSTIPATION TYPE: ICD-10-CM

## 2021-02-16 DIAGNOSIS — R10.84 ABDOMINAL PAIN, GENERALIZED: ICD-10-CM

## 2021-02-16 PROCEDURE — 74019 RADEX ABDOMEN 2 VIEWS: CPT

## 2021-02-16 PROCEDURE — G0463 HOSPITAL OUTPT CLINIC VISIT: HCPCS

## 2021-02-16 PROCEDURE — 99214 OFFICE O/P EST MOD 30 MIN: CPT | Performed by: FAMILY MEDICINE

## 2021-02-16 RX ORDER — POLYETHYLENE GLYCOL 3350 17 G/17G
17 POWDER, FOR SOLUTION ORAL DAILY
Qty: 510 G | Refills: 1 | Status: SHIPPED | OUTPATIENT
Start: 2021-02-16 | End: 2021-03-09

## 2021-02-16 ASSESSMENT — MIFFLIN-ST. JEOR: SCORE: 1264.32

## 2021-02-16 ASSESSMENT — PAIN SCALES - GENERAL: PAINLEVEL: WORST PAIN (10)

## 2021-02-16 NOTE — NURSING NOTE
"Patient presents today for abdominal pain for the last month. Patient states, \"when she has a bowel movement, she is also vomiting at the same time\".    Medication Reconciliation Complete    Noemy Holt LPN  2/16/2021 12:46 PM  "

## 2021-02-18 ENCOUNTER — TELEPHONE (OUTPATIENT)
Dept: FAMILY MEDICINE | Facility: OTHER | Age: 71
End: 2021-02-18

## 2021-02-18 NOTE — TELEPHONE ENCOUNTER
Annabel is wanting to know if you have concerns regarding patient's hiatal hernia that she needs to discuss with the surgeon Dr Robertson    Please call to advise    Thank you

## 2021-02-23 ENCOUNTER — HOSPITAL ENCOUNTER (INPATIENT)
Facility: OTHER | Age: 71
LOS: 1 days | Discharge: ANOTHER HOSPITAL | DRG: 308 | End: 2021-02-24
Attending: FAMILY MEDICINE | Admitting: FAMILY MEDICINE
Payer: MEDICARE

## 2021-02-23 ENCOUNTER — APPOINTMENT (OUTPATIENT)
Dept: CT IMAGING | Facility: OTHER | Age: 71
DRG: 308 | End: 2021-02-23
Attending: FAMILY MEDICINE
Payer: MEDICARE

## 2021-02-23 ENCOUNTER — OFFICE VISIT (OUTPATIENT)
Dept: FAMILY MEDICINE | Facility: OTHER | Age: 71
DRG: 308 | End: 2021-02-23
Attending: FAMILY MEDICINE
Payer: MEDICARE

## 2021-02-23 VITALS
HEIGHT: 65 IN | TEMPERATURE: 97.8 F | BODY MASS INDEX: 27.02 KG/M2 | WEIGHT: 162.2 LBS | RESPIRATION RATE: 20 BRPM | HEART RATE: 64 BPM | DIASTOLIC BLOOD PRESSURE: 68 MMHG | OXYGEN SATURATION: 98 % | SYSTOLIC BLOOD PRESSURE: 100 MMHG

## 2021-02-23 DIAGNOSIS — E11.9 TYPE 2 DIABETES MELLITUS WITHOUT COMPLICATION, WITH LONG-TERM CURRENT USE OF INSULIN (H): ICD-10-CM

## 2021-02-23 DIAGNOSIS — Z79.4 TYPE 2 DIABETES MELLITUS WITHOUT COMPLICATION, WITH LONG-TERM CURRENT USE OF INSULIN (H): ICD-10-CM

## 2021-02-23 DIAGNOSIS — K21.9 GASTROESOPHAGEAL REFLUX DISEASE, UNSPECIFIED WHETHER ESOPHAGITIS PRESENT: ICD-10-CM

## 2021-02-23 DIAGNOSIS — Z87.19 H/O HIATAL HERNIA: ICD-10-CM

## 2021-02-23 DIAGNOSIS — R68.81 EARLY SATIETY: Primary | ICD-10-CM

## 2021-02-23 DIAGNOSIS — E87.6 HYPOKALEMIA: ICD-10-CM

## 2021-02-23 DIAGNOSIS — R11.0 NAUSEA: ICD-10-CM

## 2021-02-23 PROBLEM — D64.9 ANEMIA, UNSPECIFIED TYPE: Status: RESOLVED | Noted: 2020-07-20 | Resolved: 2021-02-23

## 2021-02-23 PROBLEM — R10.9 ABDOMINAL PAIN: Status: ACTIVE | Noted: 2021-02-23

## 2021-02-23 PROBLEM — Z95.1 HISTORY OF CORONARY ARTERY BYPASS GRAFT X 2: Status: ACTIVE | Noted: 2019-12-30

## 2021-02-23 PROBLEM — E11.65 TYPE 2 DIABETES MELLITUS WITH HYPERGLYCEMIA, WITH LONG-TERM CURRENT USE OF INSULIN (H): Status: ACTIVE | Noted: 2017-02-13

## 2021-02-23 PROBLEM — R10.13 ABDOMINAL PAIN, EPIGASTRIC: Status: ACTIVE | Noted: 2021-02-23

## 2021-02-23 PROBLEM — N18.4 CKD (CHRONIC KIDNEY DISEASE) STAGE 4, GFR 15-29 ML/MIN (H): Status: ACTIVE | Noted: 2018-01-17

## 2021-02-23 PROBLEM — U07.1 COVID-19 VIRUS DETECTED: Status: ACTIVE | Noted: 2021-02-23

## 2021-02-23 PROBLEM — R11.2 NON-INTRACTABLE VOMITING WITH NAUSEA: Status: ACTIVE | Noted: 2021-02-23

## 2021-02-23 LAB
ALBUMIN SERPL-MCNC: 3.7 G/DL (ref 3.5–5.7)
ALP SERPL-CCNC: 52 U/L (ref 34–104)
ALT SERPL W P-5'-P-CCNC: 50 U/L (ref 7–52)
ANION GAP SERPL CALCULATED.3IONS-SCNC: 11 MMOL/L (ref 3–14)
AST SERPL W P-5'-P-CCNC: 51 U/L (ref 13–39)
BASOPHILS # BLD AUTO: 0 10E9/L (ref 0–0.2)
BASOPHILS NFR BLD AUTO: 0.2 %
BILIRUB SERPL-MCNC: 1.4 MG/DL (ref 0.3–1)
BUN SERPL-MCNC: 19 MG/DL (ref 7–25)
CALCIUM SERPL-MCNC: 10 MG/DL (ref 8.6–10.3)
CHLORIDE SERPL-SCNC: 103 MMOL/L (ref 98–107)
CO2 SERPL-SCNC: 30 MMOL/L (ref 21–31)
CREAT SERPL-MCNC: 2.1 MG/DL (ref 0.6–1.2)
DIFFERENTIAL METHOD BLD: ABNORMAL
EOSINOPHIL # BLD AUTO: 0.1 10E9/L (ref 0–0.7)
EOSINOPHIL NFR BLD AUTO: 2 %
ERYTHROCYTE [DISTWIDTH] IN BLOOD BY AUTOMATED COUNT: 13.9 % (ref 10–15)
GFR SERPL CREATININE-BSD FRML MDRD: 23 ML/MIN/{1.73_M2}
GLUCOSE SERPL-MCNC: 189 MG/DL (ref 70–105)
HCT VFR BLD AUTO: 38.5 % (ref 35–47)
HGB BLD-MCNC: 12.6 G/DL (ref 11.7–15.7)
IMM GRANULOCYTES # BLD: 0 10E9/L (ref 0–0.4)
IMM GRANULOCYTES NFR BLD: 0.5 %
LABORATORY COMMENT REPORT: ABNORMAL
LYMPHOCYTES # BLD AUTO: 1.2 10E9/L (ref 0.8–5.3)
LYMPHOCYTES NFR BLD AUTO: 27 %
MCH RBC QN AUTO: 28.8 PG (ref 26.5–33)
MCHC RBC AUTO-ENTMCNC: 32.7 G/DL (ref 31.5–36.5)
MCV RBC AUTO: 88 FL (ref 78–100)
MONOCYTES # BLD AUTO: 0.4 10E9/L (ref 0–1.3)
MONOCYTES NFR BLD AUTO: 9.2 %
NEUTROPHILS # BLD AUTO: 2.7 10E9/L (ref 1.6–8.3)
NEUTROPHILS NFR BLD AUTO: 61.1 %
PLATELET # BLD AUTO: 116 10E9/L (ref 150–450)
POTASSIUM SERPL-SCNC: 2.7 MMOL/L (ref 3.5–5.1)
POTASSIUM SERPL-SCNC: 2.9 MMOL/L (ref 3.5–5.1)
PROT SERPL-MCNC: 5.9 G/DL (ref 6.4–8.9)
RBC # BLD AUTO: 4.37 10E12/L (ref 3.8–5.2)
SARS-COV-2 RNA RESP QL NAA+PROBE: POSITIVE
SODIUM SERPL-SCNC: 144 MMOL/L (ref 134–144)
SPECIMEN SOURCE: ABNORMAL
WBC # BLD AUTO: 4.4 10E9/L (ref 4–11)

## 2021-02-23 PROCEDURE — 36415 COLL VENOUS BLD VENIPUNCTURE: CPT | Mod: ZL | Performed by: FAMILY MEDICINE

## 2021-02-23 PROCEDURE — 250N000012 HC RX MED GY IP 250 OP 636 PS 637: Performed by: FAMILY MEDICINE

## 2021-02-23 PROCEDURE — 258N000003 HC RX IP 258 OP 636: Performed by: FAMILY MEDICINE

## 2021-02-23 PROCEDURE — 85025 COMPLETE CBC W/AUTO DIFF WBC: CPT | Mod: ZL | Performed by: FAMILY MEDICINE

## 2021-02-23 PROCEDURE — 99219 PR INITIAL OBSERVATION CARE,LEVEL II: CPT | Performed by: FAMILY MEDICINE

## 2021-02-23 PROCEDURE — 250N000011 HC RX IP 250 OP 636: Performed by: FAMILY MEDICINE

## 2021-02-23 PROCEDURE — 96372 THER/PROPH/DIAG INJ SC/IM: CPT | Performed by: FAMILY MEDICINE

## 2021-02-23 PROCEDURE — U0003 INFECTIOUS AGENT DETECTION BY NUCLEIC ACID (DNA OR RNA); SEVERE ACUTE RESPIRATORY SYNDROME CORONAVIRUS 2 (SARS-COV-2) (CORONAVIRUS DISEASE [COVID-19]), AMPLIFIED PROBE TECHNIQUE, MAKING USE OF HIGH THROUGHPUT TECHNOLOGIES AS DESCRIBED BY CMS-2020-01-R: HCPCS | Performed by: FAMILY MEDICINE

## 2021-02-23 PROCEDURE — 36415 COLL VENOUS BLD VENIPUNCTURE: CPT | Performed by: FAMILY MEDICINE

## 2021-02-23 PROCEDURE — G0378 HOSPITAL OBSERVATION PER HR: HCPCS

## 2021-02-23 PROCEDURE — G0463 HOSPITAL OUTPT CLINIC VISIT: HCPCS

## 2021-02-23 PROCEDURE — 84132 ASSAY OF SERUM POTASSIUM: CPT | Performed by: FAMILY MEDICINE

## 2021-02-23 PROCEDURE — 74176 CT ABD & PELVIS W/O CONTRAST: CPT

## 2021-02-23 PROCEDURE — 99215 OFFICE O/P EST HI 40 MIN: CPT | Mod: 25 | Performed by: FAMILY MEDICINE

## 2021-02-23 PROCEDURE — U0005 INFEC AGEN DETEC AMPLI PROBE: HCPCS | Performed by: FAMILY MEDICINE

## 2021-02-23 PROCEDURE — 80053 COMPREHEN METABOLIC PANEL: CPT | Mod: ZL | Performed by: FAMILY MEDICINE

## 2021-02-23 RX ORDER — ACETAMINOPHEN 650 MG/1
650 SUPPOSITORY RECTAL EVERY 4 HOURS PRN
Status: DISCONTINUED | OUTPATIENT
Start: 2021-02-23 | End: 2021-02-24 | Stop reason: HOSPADM

## 2021-02-23 RX ORDER — NALOXONE HYDROCHLORIDE 0.4 MG/ML
0.2 INJECTION, SOLUTION INTRAMUSCULAR; INTRAVENOUS; SUBCUTANEOUS
Status: DISCONTINUED | OUTPATIENT
Start: 2021-02-23 | End: 2021-02-24 | Stop reason: HOSPADM

## 2021-02-23 RX ORDER — PROCHLORPERAZINE 25 MG
12.5 SUPPOSITORY, RECTAL RECTAL EVERY 12 HOURS PRN
Status: DISCONTINUED | OUTPATIENT
Start: 2021-02-23 | End: 2021-02-24 | Stop reason: HOSPADM

## 2021-02-23 RX ORDER — METOPROLOL SUCCINATE 25 MG/1
25 TABLET, EXTENDED RELEASE ORAL EVERY EVENING
COMMUNITY
End: 2021-03-09

## 2021-02-23 RX ORDER — PROCHLORPERAZINE MALEATE 5 MG
5 TABLET ORAL EVERY 6 HOURS PRN
Status: DISCONTINUED | OUTPATIENT
Start: 2021-02-23 | End: 2021-02-24 | Stop reason: HOSPADM

## 2021-02-23 RX ORDER — ISOSORBIDE MONONITRATE 60 MG/1
120 TABLET, EXTENDED RELEASE ORAL DAILY
Status: DISCONTINUED | OUTPATIENT
Start: 2021-02-24 | End: 2021-02-24 | Stop reason: HOSPADM

## 2021-02-23 RX ORDER — LISINOPRIL 2.5 MG/1
2.5 TABLET ORAL EVERY EVENING
COMMUNITY
End: 2021-04-07

## 2021-02-23 RX ORDER — ONDANSETRON 4 MG/1
4 TABLET, ORALLY DISINTEGRATING ORAL EVERY 6 HOURS PRN
Status: DISCONTINUED | OUTPATIENT
Start: 2021-02-23 | End: 2021-02-24 | Stop reason: HOSPADM

## 2021-02-23 RX ORDER — METOPROLOL SUCCINATE 25 MG/1
25 TABLET, EXTENDED RELEASE ORAL EVERY EVENING
Status: DISCONTINUED | OUTPATIENT
Start: 2021-02-24 | End: 2021-02-24 | Stop reason: CLARIF

## 2021-02-23 RX ORDER — HYDROMORPHONE HYDROCHLORIDE 1 MG/ML
0.3 INJECTION, SOLUTION INTRAMUSCULAR; INTRAVENOUS; SUBCUTANEOUS
Status: DISCONTINUED | OUTPATIENT
Start: 2021-02-23 | End: 2021-02-24 | Stop reason: HOSPADM

## 2021-02-23 RX ORDER — SODIUM CHLORIDE 9 MG/ML
INJECTION, SOLUTION INTRAVENOUS CONTINUOUS
Status: DISCONTINUED | OUTPATIENT
Start: 2021-02-23 | End: 2021-02-24

## 2021-02-23 RX ORDER — ACETAMINOPHEN 325 MG/1
650 TABLET ORAL EVERY 4 HOURS PRN
Status: DISCONTINUED | OUTPATIENT
Start: 2021-02-23 | End: 2021-02-24 | Stop reason: HOSPADM

## 2021-02-23 RX ORDER — DEXTROSE MONOHYDRATE 25 G/50ML
25-50 INJECTION, SOLUTION INTRAVENOUS
Status: DISCONTINUED | OUTPATIENT
Start: 2021-02-23 | End: 2021-02-24 | Stop reason: HOSPADM

## 2021-02-23 RX ORDER — POTASSIUM CHLORIDE 7.45 MG/ML
10 INJECTION INTRAVENOUS
Status: DISPENSED | OUTPATIENT
Start: 2021-02-23 | End: 2021-02-23

## 2021-02-23 RX ORDER — NICOTINE POLACRILEX 4 MG
15-30 LOZENGE BUCCAL
Status: DISCONTINUED | OUTPATIENT
Start: 2021-02-23 | End: 2021-02-24 | Stop reason: HOSPADM

## 2021-02-23 RX ORDER — LIDOCAINE 40 MG/G
CREAM TOPICAL
Status: DISCONTINUED | OUTPATIENT
Start: 2021-02-23 | End: 2021-02-24 | Stop reason: HOSPADM

## 2021-02-23 RX ORDER — LISINOPRIL 2.5 MG/1
2.5 TABLET ORAL EVERY EVENING
Status: DISCONTINUED | OUTPATIENT
Start: 2021-02-24 | End: 2021-02-24 | Stop reason: HOSPADM

## 2021-02-23 RX ORDER — INSULIN GLARGINE 100 [IU]/ML
25 INJECTION, SOLUTION SUBCUTANEOUS AT BEDTIME
Status: DISCONTINUED | OUTPATIENT
Start: 2021-02-23 | End: 2021-02-24 | Stop reason: HOSPADM

## 2021-02-23 RX ORDER — NALOXONE HYDROCHLORIDE 0.4 MG/ML
0.4 INJECTION, SOLUTION INTRAMUSCULAR; INTRAVENOUS; SUBCUTANEOUS
Status: DISCONTINUED | OUTPATIENT
Start: 2021-02-23 | End: 2021-02-24 | Stop reason: HOSPADM

## 2021-02-23 RX ORDER — ONDANSETRON 2 MG/ML
4 INJECTION INTRAMUSCULAR; INTRAVENOUS EVERY 6 HOURS PRN
Status: DISCONTINUED | OUTPATIENT
Start: 2021-02-23 | End: 2021-02-24 | Stop reason: HOSPADM

## 2021-02-23 RX ADMIN — POTASSIUM CHLORIDE 10 MEQ: 7.46 INJECTION, SOLUTION INTRAVENOUS at 21:44

## 2021-02-23 RX ADMIN — SODIUM CHLORIDE 1000 ML: 9 INJECTION, SOLUTION INTRAVENOUS at 17:30

## 2021-02-23 RX ADMIN — INSULIN ASPART 1 UNITS: 100 INJECTION, SOLUTION INTRAVENOUS; SUBCUTANEOUS at 17:35

## 2021-02-23 RX ADMIN — SODIUM CHLORIDE: 9 INJECTION, SOLUTION INTRAVENOUS at 19:20

## 2021-02-23 RX ADMIN — ONDANSETRON 4 MG: 2 INJECTION INTRAMUSCULAR; INTRAVENOUS at 17:51

## 2021-02-23 RX ADMIN — INSULIN GLARGINE 25 UNITS: 100 INJECTION, SOLUTION SUBCUTANEOUS at 21:52

## 2021-02-23 ASSESSMENT — ACTIVITIES OF DAILY LIVING (ADL): ADLS_ACUITY_SCORE: 13

## 2021-02-23 ASSESSMENT — MIFFLIN-ST. JEOR: SCORE: 1251.61

## 2021-02-23 ASSESSMENT — PAIN SCALES - GENERAL: PAINLEVEL: WORST PAIN (10)

## 2021-02-23 NOTE — NURSING NOTE
Patient presents today for follow up on abdominal pain. Patient complains she is feeling worse and she can't keep anything down including water.    Medication Reconciliation Complete    Noemy Holt LPN  2/23/2021 1:33 PM

## 2021-02-23 NOTE — ASSESSMENT & PLAN NOTE
Controlled at home taking insulin, last hemoglobin A1c 7.0  Continue basal insulin, decrease dosage due to inability to eat  Monitor, sliding scale coverage with meals

## 2021-02-23 NOTE — PROGRESS NOTES
Patient is alert and orientated. Complaints of abdominal pain. LUQ and LLQ tender but soft to palpation. Blood glucose monitor applied to abdomen. Reports not having BM in at least 14 days that has been accompanied with n/v. Intermittent nausea present, gave prn zofran. Clear liquid diet. LULand RUL exp grunting. Trace bilat hand/wrist edema, BLE 2+ edema with weak pedal pulses. Heart regular. Reports new onset weakness with n/v, SBA. VSS. Will continue to monitor.     BP (!) 142/71   Pulse 52   Temp 97.4  F (36.3  C) (Tympanic)   Resp 16   SpO2 100%

## 2021-02-23 NOTE — PROGRESS NOTES
[facility] ADMISSION NOTE    Patient admitted to room 357 at approximately 1600 via wheel chair from clinic. Patient was accompanied by nurse.     Verbal SBAR report received from LPN at clinic prior to patient arrival.     Patient ambulated to bed with stand-by assist. Patient alert and oriented X 3. The patient is not having any pain.  . Admission vital signs: Blood pressure (!) 142/71, pulse 52, temperature 97.4  F (36.3  C), temperature source Tympanic, resp. rate 16, SpO2 100 %, not currently breastfeeding. Patient was oriented to plan of care, call light, bed controls, tv, telephone, bathroom and visiting hours.     Risk Assessment    The following safety risks were identified during admission: fall. Yellow risk band applied: YES.     Skin Initial Assessment    This writer admitted this patient and completed a full skin assessment and [place] score in the Adult PCS flowsheet. Appropriate interventions initiated as needed.        Education    Patient has a [place] to Observation order: Yes  Observation education completed and documented: N/A      Cornelio Denise RN

## 2021-02-23 NOTE — PROGRESS NOTES
"SUBJECTIVE:  Bailye Sierra is a 70 year old female here for follow-up.  Bailey was seen on February 16 where she is having worsening abdominal pain, early satiety, nausea and vomiting.  At that time she is having difficulty keeping down any solids or liquids.  At that time x-rays were performed which showed a large amount of stool in her colon but is otherwise unremarkable.  She had been using fiber and water and we increased her to MiraLAX.  She reports that she has been having difficulty keeping that down and has had no significant bowel movements.  I requested that she follow-up with her surgeon who performed her hiatal hernia repair.  Contact was made with her surgical team who recommended gastric emptying study.    Allergies:  No Known Allergies    ROS:    As above otherwise ROS is unremarkable.    OBJECTIVE:  /68   Pulse 64   Temp 97.8  F (36.6  C)   Resp 20   Ht 1.651 m (5' 5\")   Wt 73.6 kg (162 lb 3.2 oz)   SpO2 98%   BMI 26.99 kg/m      EXAM:  General Appearance: She appears quite comfortable, bending over at the waist.  She is tearful.    Gastrointestinal: She is diffusely tender to palpation.  Decreased bowel sounds throughout.  No hepatosplenomegaly.  Psychiatric Exam: She is tearful.    Results for orders placed or performed in visit on 02/23/21   CBC and Differential     Status: Abnormal   Result Value Ref Range    WBC 4.4 4.0 - 11.0 10e9/L    RBC Count 4.37 3.8 - 5.2 10e12/L    Hemoglobin 12.6 11.7 - 15.7 g/dL    Hematocrit 38.5 35.0 - 47.0 %    MCV 88 78 - 100 fl    MCH 28.8 26.5 - 33.0 pg    MCHC 32.7 31.5 - 36.5 g/dL    RDW 13.9 10.0 - 15.0 %    Platelet Count 116 (L) 150 - 450 10e9/L    Diff Method Automated Method     % Neutrophils 61.1 %    % Lymphocytes 27.0 %    % Monocytes 9.2 %    % Eosinophils 2.0 %    % Basophils 0.2 %    % Immature Granulocytes 0.5 %    Absolute Neutrophil 2.7 1.6 - 8.3 10e9/L    Absolute Lymphocytes 1.2 0.8 - 5.3 10e9/L    Absolute Monocytes 0.4 0.0 - 1.3 " 10e9/L    Absolute Eosinophils 0.1 0.0 - 0.7 10e9/L    Absolute Basophils 0.0 0.0 - 0.2 10e9/L    Abs Immature Granulocytes 0.0 0 - 0.4 10e9/L   Comprehensive Metabolic Panel     Status: Abnormal   Result Value Ref Range    Sodium 144 134 - 144 mmol/L    Potassium 2.7 (L) 3.5 - 5.1 mmol/L    Chloride 103 98 - 107 mmol/L    Carbon Dioxide 30 21 - 31 mmol/L    Anion Gap 11 3 - 14 mmol/L    Glucose 189 (H) 70 - 105 mg/dL    Urea Nitrogen 19 7 - 25 mg/dL    Creatinine 2.10 (H) 0.60 - 1.20 mg/dL    GFR Estimate 23 (L) >60 mL/min/[1.73_m2]    GFR Estimate If Black 28 (L) >60 mL/min/[1.73_m2]    Calcium 10.0 8.6 - 10.3 mg/dL    Bilirubin Total 1.4 (H) 0.3 - 1.0 mg/dL    Albumin 3.7 3.5 - 5.7 g/dL    Protein Total 5.9 (L) 6.4 - 8.9 g/dL    Alkaline Phosphatase 52 34 - 104 U/L    ALT 50 7 - 52 U/L    AST 51 (H) 13 - 39 U/L        ASSESSEMENT AND PLAN:    1. Early satiety    2. Nausea    3. H/O hiatal hernia    4. Gastroesophageal reflux disease, unspecified whether esophagitis present    5. Type 2 diabetes mellitus without complication, with long-term current use of insulin (H)      Records from [facility] were reviewed.  I was able to connect with Annabel Wang who is a nurse with [facility]'s surgery department to discuss the case with her.    Her kidney function is stable with a creatinine of 2.1 which is her baseline.  Bilirubin is slightly elevated as well as her potassium slightly decreased.  Based on her overall clinical worsening over the past 1 to 2 weeks, inability to take in fluids, underlying diabetes and history of hiatal hernia repair I discussed her case with Dr. Schilling who accepted her for observation in the hospital for IV fluids, symptom relief and imaging including gastric emptying and potential CT of her abdomen pelvis.    Total of 60 min were spent with the patient including reviewing outside records, contacting outside specialist, internal arrangement for hospitalization.    Sukumar Leal MD    This document  was prepared using voice generated software.  While every attempt was made for accuracy, grammatical errors may exist.

## 2021-02-23 NOTE — ASSESSMENT & PLAN NOTE
Presenting to clinic with 1 to 2-week history of worsening abdominal pain associate with nausea and vomiting, unable to eat or drink  Past history significant for hiatal hernia repair in July 2020  X-ray imaging on February 16 showed nonspecific pattern with no clear signs of obstruction  [place] observation, IV fluids, pain management  CT imaging of the abdomen showing no obvious obstruction, but increased stool  Today will proceed with gastrografin smal bowel follow through  If better will advance diet, hopefully home later today or tomorrow

## 2021-02-23 NOTE — H&P
[facility]    History and Physical - Hospitalist Service       Date of Admission:  2/23/2021    Assessment & Plan   Bailey Sierra is a 70 year old female admitted on 2/23/2021. She she presents from clinic with concerns of ongoing abdominal pain associate with nausea and vomiting.  Past history significant for hiatal hernia repair in July of last year although has done well since.  Potassium 2 weeks with increasing abdominal pain and not able keep fluids and food down.  Was seen in the clinic on February 16 and was told that she might be constipated, unable to tolerate MiraLAX.  In the clinic her vital signs were stable with lab work showing a chronic elevation of creatinine otherwise no acute changes on lab work.  Patient be registered observation.  The plan is to rehydrate her, control her pain and nausea.  We will proceed with a noncontrast abdominal CT due to renal insufficiency.  Depending on results proceed with treatment.  Consider possible bowel obstruction although may all be possible constipation.  Her surgeons that performed the hiatal hernia repair have been contacted and they recommended consider possible Gastrografin small bowel follow-through study.  Of note on admission Covid-19 testing was done and testing is come back positive.  She is asymptomatic and will place on special precautions for now.    Abdominal pain, epigastric  Presenting to clinic with 1 to 2-week history of worsening abdominal pain associate with nausea and vomiting, unable to eat or drink  Past history significant for hiatal hernia repair in July 2020  X-ray imaging on February 16 showed nonspecific pattern with no clear signs of obstruction  [place] observation, IV fluids, pain management  CT imaging of the abdomen without contrast ordered, depending on results consider Gastrografin small bowel follow-through tomorrow  If obstructed, may need surgical intervention or consult    Non-intractable vomiting with  nausea  Present for the past 2 weeks associated with the abdominal pain  As above, nausea medication ordered, IV fluids    CKD (chronic kidney disease) stage 4, GFR 15-29 ml/min (H)  Chronic issue, followed by nephrology at baseline  IV fluids, follow    Essential hypertension  Controlled taking Imdur, Zestril and metoprolol  Continue these, hold Lasix for now    Type 2 diabetes mellitus with hyperglycemia, with long-term current use of insulin (H)  Controlled at home taking insulin, last hemoglobin A1c 7.0  Continue basal insulin, decrease dosage due to inability to eat  Monitor, sliding scale coverage with meals    History of coronary artery bypass graft x 2  History of drug-eluting stents, completely occluded RCA being managed medically  Asymptomatic at this time  Continue treatments using Imdur, Zestril, metoprolol and is on Crestor for lipid control    Hyperlipidemia  Taking Crestor with good control of LDL  Restart at discharge    COVID-19 virus detected  Asymptomatic, will place on special precautions         Diet: Advance Diet as Tolerated: Clear Liquid Diet; 9987-5299 Calories: Moderate Consistent CHO (4-6 CHO units/meal)    DVT Prophylaxis: Observation status  Lopez Catheter: not present  Code Status: Full Code           Disposition Plan   Expected discharge: 1 to 2 days, recommended to prior living arrangement once adequate pain management/ tolerating PO medications and Improved abdominal pain.  Entered: Fly Schilling MD 02/23/2021, 5:41 PM     The patient's care was discussed with the Patient.    Fly Schilling MD  [facility]  Contact information available via [facility] Paging/Directory      ______________________________________________________________________    Chief Complaint    70-year-old female with abdominal pain associate nausea and vomiting    History is obtained from the patient, electronic health record and Dr. Leal    History of Present Illness   Bailey HAMPTON  Rigo is a 70 year old female who presented back to clinic today with ongoing abdominal pain associate nausea and vomiting.  States of having worsening abdominal pain over the past 2 weeks located in the upper abdomen without radiation.  It is constant but will worsen with attempt to eat and has been unable to keep any fluids or foods down.  Have been seen in the clinic on February 16.  X-ray imaging showed a nonspecific bowel gas pattern and was thought might be constipation and was sent home with treatment for MiraLAX which she was not able to tolerate.  She denies fever, chills.  She is passing gas but denies any stools passing for the past week.  There has been no blood in her emesis.  She denies any fever, chills, cough, shortness of breath.  Patient has a history of hiatal hernia repair in July 2020, did well after that with no problems.  She has history of coronary artery disease, status post drug-eluting stents x2 but has a chronically occluded RCA on medical management.  She denies any recent history of heart pain.    Review of Systems    The 10 point Review of Systems is negative other than noted in the HPI or here.     Past Medical History    I have reviewed this patient's medical history and updated it with pertinent information if needed.   Past Medical History:   Diagnosis Date     Atherosclerotic heart disease of native coronary artery without angina pectoris     No Comments Provided     Cardiac murmur     No Comments Provided     Chronic kidney disease, stage III (moderate)     No Comments Provided     Controlled type 2 diabetes mellitus with stage 3 chronic kidney disease, with long-term current use of insulin (H) 7/26/2013     Edema     No Comments Provided     Essential (primary) hypertension     No Comments Provided     Gastro-esophageal reflux disease without esophagitis     No Comments Provided     Hyperlipidemia     No Comments Provided     Impingement syndrome of right shoulder 03/10/2017           Insomnia 01/25/2012          Iron deficiency anemia     No Comments Provided     Neuromuscular dysfunction of bladder     No Comments Provided     Osteoarthritis     No Comments Provided     Other shoulder lesions, right shoulder 03/10/2017          Other shoulder lesions, unspecified shoulder     No Comments Provided     Other specified postprocedural states 04/19/2017          Personal history of other medical treatment (CODE)     Three childbirths     Plantar fascial fibromatosis     No Comments Provided     Presence of aortocoronary bypass graft 07/2013    [facility]     Primary osteoarthritis of shoulder 03/10/2017          Type 2 diabetes mellitus without complications (H)     No Comments Provided     Urgency of urination 03/08/2011          Uterovaginal prolapse     No Comments Provided       Past Surgical History   I have reviewed this patient's surgical history and updated it with pertinent information if needed.  Past Surgical History:   Procedure Laterality Date     ARTHROSCOPY SHOULDER Right 1997          ARTHROSCOPY SHOULDER Left 2012          ARTHROSCOPY SHOULDER RT/LT Right [id number]          Cardiac Bypass  07/2013          COLONOSCOPY  11/24/2009 11/24/2009     CYSTOSCOPY  03/15/2001     DENTAL SURGERY      Dental extractions     ENDOSCOPIC RETROGRADE CHOLANGIOPANCREATOGRAPHY  05/2008    Bile leak with ERCP and stenting and drainage of bile collection through abdominal wall.     ESOPHAGOSCOPY, GASTROSCOPY, DUODENOSCOPY (EGD), COMBINED N/A 6/23/2020    Pavon's follow up 5 years, 6/23/2025     HYSTERECTOMY TOTAL ABDOMINAL, BILATERAL SALPINGO-OOPHORECTOMY, COMBINED  04/02/2001    Vag vault suspension with Cortex mesh     IMPLANT STIMULATOR AND LEADS SACRAL NERVE (STAGE ONE AND TWO) N/A 5/7/2019    Procedure: Interstim Implant Revision, GENERATOR AND TYING LEAD EXCHANGE;  Surgeon: Honorio Bowling MD;  Location: [facility] OR     Interstim Device Placement  04/14/2014    Dr. Bowling - [facility]     LAMINECTOMY  LUMBAR ONE LEVEL  1997          LAPAROSCOPIC CHOLECYSTECTOMY  04/2008          LAPAROSCOPIC TUBAL LIGATION      No Comments Provided       Social History   I have reviewed this patient's social history and updated it with pertinent information if needed.  Social History     Tobacco Use     Smoking status: Never Smoker     Smokeless tobacco: Never Used   Substance Use Topics     Alcohol use: No     Alcohol/week: 0.0 standard drinks     Drug use: No     Comment: Drug use: No       Family History   I have reviewed this patient's family history and updated it with pertinent information if needed.  Family History   Problem Relation Age of Onset     Other - See Comments Father         MI     Cancer Mother         Cancer,Some type of cancer, aneurysm     Diabetes Paternal Grandmother         Diabetes,Type II     Cancer Sister         Cancer,unknown type     Diabetes Sister         Diabetes,Type II     Diabetes Sister         Diabetes,Type II     Diabetes Sister         Diabetes,Type II     Other - See Comments Daughter         depression     Other - See Comments Daughter         ovarian cancer and depression     Heart Disease Brother         Heart Disease,CAD, MI       Prior to Admission Medications   Prior to Admission Medications   Prescriptions Last Dose Informant Patient Reported? Taking?   Calcium Carb-Cholecalciferol (CALCIUM PLUS D3 ABSORBABLE) 600-2500 MG-UNIT CAPS 2/22/2021 at AM Self Yes Yes   Sig: Take 1 capsule by mouth daily   aspirin (ASA) 81 MG tablet 2/22/2021 at AM Self No Yes   Sig: Take 1 tablet (81 mg) by mouth daily   blood glucose monitoring (TRICIA CONTOUR) test strip Unknown at N/A Self No No   Sig: TEST 3 TIMES A DAY   furosemide (LASIX) 20 MG tablet 2/22/2021 at AM Self No Yes   Sig: Take 1 tablet (20 mg) by mouth daily   glucose (BD GLUCOSE) 4 g chewable tablet More than a month at Unknown time Self Yes No   Sig: Take 1 tablet by mouth every hour as needed for low blood sugar   insulin aspart  (NOVOLOG FLEXPEN) 100 UNIT/ML pen 2/22/2021 at HAS BEEN USING 9-12 UNITS TID Self No Yes   Sig: Inject 7 - 16 units before each meal based on sliding scale.  Max daily dose 56 units.   insulin glargine (BASAGLAR KWIKPEN) 100 UNIT/ML pen 2/22/2021 at HAS BEEN USING 20 UNITS DAILY FOR THE PAST SEVERAL DAYS Self No Yes   Sig: Inject 42 Units Subcutaneous At Bedtime Adjusts according to chemstrip   insulin pen needle (31G X 8 MM) 31G X 8 MM miscellaneous Unknown at N/A Self No No   Sig: Use 4 pen needles daily or as directed.   isosorbide mononitrate (IMDUR) 60 MG 24 hr tablet 2/22/2021 at AM Self No Yes   Sig: Take 2 tablets (120 mg) by mouth daily   lisinopril (ZESTRIL) 2.5 MG tablet 2/22/2021 at PM Self Yes Yes   Sig: Take 2.5 mg by mouth every evening   metoprolol succinate ER (TOPROL-XL) 25 MG 24 hr tablet 2/22/2021 at PM Self Yes Yes   Sig: Take 25 mg by mouth every evening   nitroGLYcerin (NITROSTAT) 0.4 MG sublingual tablet More than a month at NO RECENT USE Self Yes No   Sig: Place 0.4 mg under the tongue every 5 minutes as needed for chest pain   order for DME Unknown at N/A Self No No   Sig: Equipment being ordered: Diabetic shoes, pre-ulcer callous formation   polyethylene glycol (MIRALAX) 17 GM/Dose powder Past Week at A FEW DAYS AGO Self No Yes   Sig: Take 17 g by mouth daily   rosuvastatin (CRESTOR) 40 MG tablet 2/22/2021 at SUPPERTIME Self No Yes   Sig: Take 40 mg by mouth daily   zolpidem (AMBIEN) 10 MG tablet Past Month at Unknown time Self No Yes   Sig: Take 1 tablet (10 mg) by mouth nightly as needed for sleep      Facility-Administered Medications: None     Allergies   No Known Allergies    Physical Exam   Vital Signs: Temp: 97.4  F (36.3  C) Temp src: Tympanic BP: (!) 142/71 Pulse: 52   Resp: 16 SpO2: 100 % O2 Device: None (Room air)    Weight: 0 lbs 0 oz    General Appearance: Appropriate woman in no obvious distress  Eyes: Pupils equal, reactive, EOM intact  HEENT: Mouth nose and throat  unremarkable  Respiratory: Lungs are clear  Cardiovascular: Regular rate and rhythm, no murmur heard  GI: Soft, tender across the upper abdomen without guarding or rebound, no masses  Lymph/Hematologic: Cervical and supraclavicular nodes are negative  Genitourinary: Not examined  Skin: No obvious lesions or rashes  Musculoskeletal: Moving arms and legs without difficulty  Neurologic: No focal deficits, cranial nerves normal  Psychiatric: Mood and affect normal    Data   Data reviewed today: I reviewed all medications, new labs and imaging results over the last 24 hours. I personally reviewed no images or EKG's today.    Results for orders placed or performed during the hospital encounter of 02/23/21 (from the past 24 hour(s))   Asymptomatic SARS-CoV-2 COVID-19 Virus (Coronavirus) by PCR    Specimen: Nasopharyngeal   Result Value Ref Range    SARS-CoV-2 Virus Specimen Source Nasopharyngeal     SARS-CoV-2 PCR Result POSITIVE (AA)     SARS-CoV-2 PCR Comment       Testing was performed using the Xpert Xpress SARS-CoV-2 Assay on the Cepheid Gene-Xpert   Instrument Systems. Additional information about this Emergency Use Authorization (EUA)   assay can be found via the Lab Guide.

## 2021-02-23 NOTE — ASSESSMENT & PLAN NOTE
History of drug-eluting stents, completely occluded RCA being managed medically  Asymptomatic at this time  Continue treatments using Imdur, Zestril, metoprolol and is on Crestor for lipid control

## 2021-02-23 NOTE — ASSESSMENT & PLAN NOTE
Present for the past 2 weeks associated with the abdominal pain  As above, nausea medication ordered, IV fluids

## 2021-02-24 ENCOUNTER — TRANSFERRED RECORDS (OUTPATIENT)
Dept: HEALTH INFORMATION MANAGEMENT | Facility: OTHER | Age: 71
End: 2021-02-24

## 2021-02-24 ENCOUNTER — APPOINTMENT (OUTPATIENT)
Dept: CARDIOLOGY | Facility: OTHER | Age: 71
DRG: 308 | End: 2021-02-24
Attending: FAMILY MEDICINE
Payer: MEDICARE

## 2021-02-24 ENCOUNTER — APPOINTMENT (OUTPATIENT)
Dept: GENERAL RADIOLOGY | Facility: OTHER | Age: 71
DRG: 308 | End: 2021-02-24
Attending: FAMILY MEDICINE
Payer: MEDICARE

## 2021-02-24 VITALS
HEART RATE: 44 BPM | DIASTOLIC BLOOD PRESSURE: 69 MMHG | BODY MASS INDEX: 27.29 KG/M2 | SYSTOLIC BLOOD PRESSURE: 129 MMHG | TEMPERATURE: 97.6 F | OXYGEN SATURATION: 100 % | RESPIRATION RATE: 16 BRPM | WEIGHT: 164 LBS

## 2021-02-24 PROBLEM — R00.1 BRADYCARDIA: Status: ACTIVE | Noted: 2021-02-24

## 2021-02-24 LAB
ABO + RH BLD: NORMAL
ABO + RH BLD: NORMAL
ALBUMIN SERPL-MCNC: 3 G/DL (ref 3.5–5.7)
ALP SERPL-CCNC: 46 U/L (ref 34–104)
ALT SERPL W P-5'-P-CCNC: 44 U/L (ref 7–52)
ANION GAP SERPL CALCULATED.3IONS-SCNC: 5 MMOL/L (ref 3–14)
ANION GAP SERPL CALCULATED.3IONS-SCNC: 7 MMOL/L (ref 3–14)
APTT PPP: 26 SEC (ref 22–37)
AST SERPL W P-5'-P-CCNC: 54 U/L (ref 13–39)
BASOPHILS # BLD AUTO: 0 10E9/L (ref 0–0.2)
BASOPHILS NFR BLD AUTO: 0.3 %
BILIRUB SERPL-MCNC: 1.4 MG/DL (ref 0.3–1)
BUN SERPL-MCNC: 15 MG/DL (ref 7–25)
BUN SERPL-MCNC: 17 MG/DL (ref 7–25)
CALCIUM SERPL-MCNC: 8.2 MG/DL (ref 8.6–10.3)
CALCIUM SERPL-MCNC: 8.5 MG/DL (ref 8.6–10.3)
CHLORIDE SERPL-SCNC: 108 MMOL/L (ref 98–107)
CHLORIDE SERPL-SCNC: 109 MMOL/L (ref 98–107)
CO2 SERPL-SCNC: 28 MMOL/L (ref 21–31)
CO2 SERPL-SCNC: 29 MMOL/L (ref 21–31)
CREAT SERPL-MCNC: 1.98 MG/DL (ref 0.6–1.2)
CREAT SERPL-MCNC: 2.02 MG/DL (ref 0.6–1.2)
CRP SERPL-MCNC: <1 MG/L
D DIMER PPP FEU-MCNC: 0.3 UG/ML FEU (ref 0–0.5)
DIFFERENTIAL METHOD BLD: ABNORMAL
EOSINOPHIL # BLD AUTO: 0.1 10E9/L (ref 0–0.7)
EOSINOPHIL NFR BLD AUTO: 2.6 %
ERYTHROCYTE [DISTWIDTH] IN BLOOD BY AUTOMATED COUNT: 13.9 % (ref 10–15)
ERYTHROCYTE [DISTWIDTH] IN BLOOD BY AUTOMATED COUNT: 14.1 % (ref 10–15)
FIBRINOGEN PPP-MCNC: 386 MG/DL (ref 200–420)
GFR SERPL CREATININE-BSD FRML MDRD: 24 ML/MIN/{1.73_M2}
GFR SERPL CREATININE-BSD FRML MDRD: 25 ML/MIN/{1.73_M2}
GLUCOSE SERPL-MCNC: 121 MG/DL (ref 70–105)
GLUCOSE SERPL-MCNC: 136 MG/DL (ref 70–105)
HCT VFR BLD AUTO: 31.9 % (ref 35–47)
HCT VFR BLD AUTO: 34.3 % (ref 35–47)
HGB BLD-MCNC: 10.3 G/DL (ref 11.7–15.7)
HGB BLD-MCNC: 11.2 G/DL (ref 11.7–15.7)
IMM GRANULOCYTES # BLD: 0 10E9/L (ref 0–0.4)
IMM GRANULOCYTES NFR BLD: 0.6 %
INR PPP: 1.05 (ref 0.86–1.14)
INTERPRETATION ECG - MUSE: NORMAL
INTERPRETATION ECG - MUSE: NORMAL
LDH SERPL L TO P-CCNC: 222 U/L (ref 140–271)
LYMPHOCYTES # BLD AUTO: 1.3 10E9/L (ref 0.8–5.3)
LYMPHOCYTES NFR BLD AUTO: 35.8 %
MCH RBC QN AUTO: 29 PG (ref 26.5–33)
MCH RBC QN AUTO: 29.1 PG (ref 26.5–33)
MCHC RBC AUTO-ENTMCNC: 32.3 G/DL (ref 31.5–36.5)
MCHC RBC AUTO-ENTMCNC: 32.7 G/DL (ref 31.5–36.5)
MCV RBC AUTO: 89 FL (ref 78–100)
MCV RBC AUTO: 90 FL (ref 78–100)
MONOCYTES # BLD AUTO: 0.3 10E9/L (ref 0–1.3)
MONOCYTES NFR BLD AUTO: 8.6 %
NEUTROPHILS # BLD AUTO: 1.8 10E9/L (ref 1.6–8.3)
NEUTROPHILS NFR BLD AUTO: 52.1 %
PLATELET # BLD AUTO: 90 10E9/L (ref 150–450)
PLATELET # BLD AUTO: 96 10E9/L (ref 150–450)
POTASSIUM SERPL-SCNC: 2.8 MMOL/L (ref 3.5–5.1)
POTASSIUM SERPL-SCNC: 3.3 MMOL/L (ref 3.5–5.1)
PROT SERPL-MCNC: 5.3 G/DL (ref 6.4–8.9)
RBC # BLD AUTO: 3.54 10E12/L (ref 3.8–5.2)
RBC # BLD AUTO: 3.86 10E12/L (ref 3.8–5.2)
SODIUM SERPL-SCNC: 143 MMOL/L (ref 134–144)
SODIUM SERPL-SCNC: 143 MMOL/L (ref 134–144)
SPECIMEN EXP DATE BLD: NORMAL
TROPONIN I SERPL-MCNC: 44.3 PG/ML
TROPONIN I SERPL-MCNC: 60 PG/ML
WBC # BLD AUTO: 3.1 10E9/L (ref 4–11)
WBC # BLD AUTO: 3.5 10E9/L (ref 4–11)

## 2021-02-24 PROCEDURE — 36415 COLL VENOUS BLD VENIPUNCTURE: CPT | Performed by: FAMILY MEDICINE

## 2021-02-24 PROCEDURE — 93005 ELECTROCARDIOGRAM TRACING: CPT

## 2021-02-24 PROCEDURE — 86140 C-REACTIVE PROTEIN: CPT | Performed by: FAMILY MEDICINE

## 2021-02-24 PROCEDURE — 85384 FIBRINOGEN ACTIVITY: CPT | Performed by: FAMILY MEDICINE

## 2021-02-24 PROCEDURE — 250N000011 HC RX IP 250 OP 636: Performed by: FAMILY MEDICINE

## 2021-02-24 PROCEDURE — 84484 ASSAY OF TROPONIN QUANT: CPT | Performed by: FAMILY MEDICINE

## 2021-02-24 PROCEDURE — 120N000001 HC R&B MED SURG/OB

## 2021-02-24 PROCEDURE — 80053 COMPREHEN METABOLIC PANEL: CPT | Performed by: FAMILY MEDICINE

## 2021-02-24 PROCEDURE — 85379 FIBRIN DEGRADATION QUANT: CPT | Performed by: FAMILY MEDICINE

## 2021-02-24 PROCEDURE — 999N000157 HC STATISTIC RCP TIME EA 10 MIN

## 2021-02-24 PROCEDURE — 85730 THROMBOPLASTIN TIME PARTIAL: CPT | Performed by: FAMILY MEDICINE

## 2021-02-24 PROCEDURE — 86900 BLOOD TYPING SEROLOGIC ABO: CPT | Performed by: FAMILY MEDICINE

## 2021-02-24 PROCEDURE — 93306 TTE W/DOPPLER COMPLETE: CPT

## 2021-02-24 PROCEDURE — 74018 RADEX ABDOMEN 1 VIEW: CPT

## 2021-02-24 PROCEDURE — G0378 HOSPITAL OBSERVATION PER HR: HCPCS

## 2021-02-24 PROCEDURE — 85610 PROTHROMBIN TIME: CPT | Performed by: FAMILY MEDICINE

## 2021-02-24 PROCEDURE — 80048 BASIC METABOLIC PNL TOTAL CA: CPT | Performed by: FAMILY MEDICINE

## 2021-02-24 PROCEDURE — 83615 LACTATE (LD) (LDH) ENZYME: CPT | Performed by: FAMILY MEDICINE

## 2021-02-24 PROCEDURE — 93010 ELECTROCARDIOGRAM REPORT: CPT | Performed by: INTERNAL MEDICINE

## 2021-02-24 PROCEDURE — 36416 COLLJ CAPILLARY BLOOD SPEC: CPT | Performed by: FAMILY MEDICINE

## 2021-02-24 PROCEDURE — 85027 COMPLETE CBC AUTOMATED: CPT | Performed by: FAMILY MEDICINE

## 2021-02-24 PROCEDURE — 250N000013 HC RX MED GY IP 250 OP 250 PS 637: Mod: GY | Performed by: FAMILY MEDICINE

## 2021-02-24 PROCEDURE — 93306 TTE W/DOPPLER COMPLETE: CPT | Mod: 26 | Performed by: INTERNAL MEDICINE

## 2021-02-24 PROCEDURE — 84484 ASSAY OF TROPONIN QUANT: CPT | Mod: 91 | Performed by: FAMILY MEDICINE

## 2021-02-24 PROCEDURE — 99239 HOSP IP/OBS DSCHRG MGMT >30: CPT | Performed by: FAMILY MEDICINE

## 2021-02-24 PROCEDURE — 86901 BLOOD TYPING SEROLOGIC RH(D): CPT | Performed by: FAMILY MEDICINE

## 2021-02-24 PROCEDURE — 85025 COMPLETE CBC W/AUTO DIFF WBC: CPT | Performed by: FAMILY MEDICINE

## 2021-02-24 RX ORDER — POTASSIUM CHLORIDE 1500 MG/1
40 TABLET, EXTENDED RELEASE ORAL ONCE
Status: DISCONTINUED | OUTPATIENT
Start: 2021-02-24 | End: 2021-02-24 | Stop reason: DRUGHIGH

## 2021-02-24 RX ORDER — POTASSIUM CHLORIDE 1500 MG/1
20 TABLET, EXTENDED RELEASE ORAL ONCE
Status: DISCONTINUED | OUTPATIENT
Start: 2021-02-24 | End: 2021-02-24 | Stop reason: CLARIF

## 2021-02-24 RX ORDER — POTASSIUM CHLORIDE 1500 MG/1
40 TABLET, EXTENDED RELEASE ORAL ONCE
Status: COMPLETED | OUTPATIENT
Start: 2021-02-24 | End: 2021-02-24

## 2021-02-24 RX ORDER — POTASSIUM CHLORIDE 7.45 MG/ML
10 INJECTION INTRAVENOUS
Status: DISPENSED | OUTPATIENT
Start: 2021-02-24 | End: 2021-02-24

## 2021-02-24 RX ORDER — MAGNESIUM CARB/ALUMINUM HYDROX 105-160MG
296 TABLET,CHEWABLE ORAL ONCE
Status: COMPLETED | OUTPATIENT
Start: 2021-02-24 | End: 2021-02-24

## 2021-02-24 RX ORDER — POTASSIUM CHLORIDE 7.45 MG/ML
10 INJECTION INTRAVENOUS
Status: DISCONTINUED | OUTPATIENT
Start: 2021-02-24 | End: 2021-02-24 | Stop reason: CLARIF

## 2021-02-24 RX ORDER — SODIUM CHLORIDE AND POTASSIUM CHLORIDE 150; 900 MG/100ML; MG/100ML
INJECTION, SOLUTION INTRAVENOUS CONTINUOUS
Status: DISCONTINUED | OUTPATIENT
Start: 2021-02-24 | End: 2021-02-24 | Stop reason: HOSPADM

## 2021-02-24 RX ADMIN — POTASSIUM CHLORIDE 10 MEQ: 7.46 INJECTION, SOLUTION INTRAVENOUS at 01:19

## 2021-02-24 RX ADMIN — ONDANSETRON 4 MG: 2 INJECTION INTRAMUSCULAR; INTRAVENOUS at 13:10

## 2021-02-24 RX ADMIN — POTASSIUM CHLORIDE 10 MEQ: 7.46 INJECTION, SOLUTION INTRAVENOUS at 05:05

## 2021-02-24 RX ADMIN — POTASSIUM CHLORIDE AND SODIUM CHLORIDE: 900; 150 INJECTION, SOLUTION INTRAVENOUS at 13:10

## 2021-02-24 RX ADMIN — DIATRIZOATE MEGLUMINE AND DIATRIZOATE SODIUM 120 ML: 660; 100 SOLUTION ORAL; RECTAL at 08:00

## 2021-02-24 RX ADMIN — POTASSIUM CHLORIDE 40 MEQ: 1500 TABLET, EXTENDED RELEASE ORAL at 13:10

## 2021-02-24 RX ADMIN — MAGNESIUM CITRATE 296 ML: 1.75 LIQUID ORAL at 09:25

## 2021-02-24 RX ADMIN — HYDROMORPHONE HYDROCHLORIDE 0.3 MG: 1 INJECTION, SOLUTION INTRAMUSCULAR; INTRAVENOUS; SUBCUTANEOUS at 01:26

## 2021-02-24 ASSESSMENT — ACTIVITIES OF DAILY LIVING (ADL): ADLS_ACUITY_SCORE: 13

## 2021-02-24 NOTE — PROGRESS NOTES
[facility] TRANSPORT NOTE  Data:   Reason for Transport:  Bradycardia, episode of unresponsiveness and asystole, PRASHANT Sierra was transported to [place] via cart at 1707.  Patient was accompanied by [facility]. Equipment used for transport: Cardiac monitor  and IV pump. Family was aware of reason for transport: yes    Action:  Report: given to [place]    Response:  Patient's condition when transferred off unit was stable.    Cornelio Denise RN

## 2021-02-24 NOTE — DISCHARGE SUMMARY
[facility]    Discharge Summary  Hospitalist    Date of Admission:  2/23/2021  Date of Discharge:  2/24/2021  Discharging Provider: Ngoc Marin  Date of Service (when I saw the patient): 02/24/21    Discharge Diagnoses   Active Problems:    CKD (chronic kidney disease) stage 4, GFR 15-29 ml/min (H) (1/17/2018)    Hyperlipidemia (1/17/2018)    Essential hypertension (1/17/2018)    Type 2 diabetes mellitus with hyperglycemia, with long-term current use of insulin (H) (2/13/2017)    History of coronary artery bypass graft x 2 (12/30/2019)    Hypokalemia (7/20/2020), POA    Non-intractable vomiting with nausea (2/23/2021), POA    Abdominal pain, epigastric (2/23/2021), POA    COVID-19 virus detected (2/23/2021)    Bradycardia (2/24/2021), POA      History of Present Illness   Bailey Sierra is an 70 year old female who presented with  Nausea, vomiting, bradycardia and COVID.     Hospital Course   Bailey Sierra was admitted on 2/23/2021.  The following problems were addressed during her hospitalization:    Abdominal pain, nausea and vomiting.  Patient is status post a laparoscopic hiatal hernia repair at [facility] on July 22, 2020.  Has not had any problems since that time.  However, developed nausea and vomiting about 2 weeks ago.  Of significance she is also not had a bowel movement in about 2 weeks.  Incidentally, she was noted be Covid positive on admission.  Unclear whether this is contributory or not.  CT scan of the abdomen was done and did not show any obstruction.  However, patient was noted to be bradycardic in the 40s and had bradycardia down to the 30s associated with an episode of nausea and unresponsiveness and asystole versus a long sinus pause on telemetry. She was given antiemetics and pain control as needed. Did not have any chest pain.  Prior history of Pavon's esophagus.  I do not see a PPI on her medication list at this time.    Bradycardia, hx of CAD.  she has several risk  factors including a history of coronary artery disease status post she CABG x2.  Other risk factors including high cholesterol, hypertension and diabetes.  Blood sugars were well controlled here.  Given the bradycardia and nausea we cannot exclude underlying cardiac arrhythmia as cause of symptoms.  Echocardiogram was done and did not show any significant valvular abnormalities.  Ejection fraction was normal.  EKG showed sinus bradycardia without any new acute significant changes.  Troponin was initially 60 then 44.  She is transferred to higher level of care for further work-up and evaluation.  She had been taking metoprolol XR 25 mg every evening.  Had not had a dose in >36 hours at time of transfer.    Covid positive.  Unclear if this is contributing to her symptoms or not.  Echocardiogram did not show any specific acute findings.    CKD, near her baseline renal function. Given gentle IV hydration.     Pancytopenia. New finding.   Platelets 116 on admission, 96 at time of transfer.  Previously 154 on December 17, 2020.  Hemoglobin 11.2 at time of transfer.  Was 12.6 on admission.  Has been ranging 12-13 over the last several years.  White blood cell low at 3.5.  Previously had normal white blood cell count.  This could be related to her new Covid diagnosis.  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 Jeremy Swenson of [place] has graciously accepted patient in transfer.  Appreciate care.    Significant Results and Procedures   Sinus bradycardia with significant pause of >30sec vs asystole associated with unresponsiveness    Pending Results   These results will be followed up by accepting hospital   Unresulted Labs Ordered in the Past 30 Days of this Admission     No orders found for last 31 day(s).          Code Status   Full Code       Primary Care Physician   Sam Leal    Physical Exam   Temp: 98.1  F (36.7  C) Temp src: Tympanic BP: 133/66 Pulse: (!) 45   Resp: 16  SpO2: 99 % O2 Device: None (Room air)    Vitals:    02/24/21 0509   Weight: 74.4 kg (164 lb)     Vital Signs with Ranges  Temp:  [97.4  F (36.3  C)-99.4  F (37.4  C)] 98.1  F (36.7  C)  Pulse:  [37-74] 45  Resp:  [16-18] 16  BP: (104-149)/(51-77) 133/66  SpO2:  [97 %-100 %] 99 %  No intake/output data recorded.    Constitutional: Awake and alert.  No acute distress.  Respiratory:     Clear to auscultation bilaterally.  No wheezing, rhonchi, rales.  Cardiovascular: Bradycardic.  Soft systolic ejection murmur  GI: Abdomen is soft, nondistended.  There is tenderness to palpation in the left and upper and left lower quadrant.  There is a midline well-healed scar.  No obvious hernias.  Skin/Integumen: Warm, dry, intact.  No concerning rashes or petechiae.    Discharge Disposition   Transferred to [place]  Condition at discharge: Stable    Consultations This Hospital Stay   None    Time Spent on this Encounter   I, Ngoc Marin DO, personally saw the patient today and spent greater than 30 minutes discharging this patient.    Discharge Orders   No discharge procedures on file.  Discharge Medications   Current Discharge Medication List      CONTINUE these medications which have NOT CHANGED    Details   aspirin (ASA) 81 MG tablet Take 1 tablet (81 mg) by mouth daily  Qty: 90 tablet, Refills: 3    Associated Diagnoses: Type 2 diabetes mellitus without complication, with long-term current use of insulin (H)      Calcium Carb-Cholecalciferol (CALCIUM PLUS D3 ABSORBABLE) 600-2500 MG-UNIT CAPS Take 1 capsule by mouth daily      furosemide (LASIX) 20 MG tablet Take 1 tablet (20 mg) by mouth daily  Qty: 90 tablet, Refills: 3    Associated Diagnoses: Atherosclerotic heart disease of native coronary artery with other forms of angina pectoris (H)      insulin aspart (NOVOLOG FLEXPEN) 100 UNIT/ML pen Inject 7 - 16 units before each meal based on sliding scale.  Max daily dose 56 units.  Qty: 60 mL, Refills: 11    Associated  Diagnoses: Type 2 diabetes mellitus with complication, with long-term current use of insulin (H)      insulin glargine (BASAGLAR KWIKPEN) 100 UNIT/ML pen Inject 42 Units Subcutaneous At Bedtime Adjusts according to chemstrip  Qty: 60 mL, Refills: 4    Comments: If Basaglar is not covered by insurance, may substitute Lantus at same dose and frequency.    Associated Diagnoses: Type 2 diabetes mellitus without complication, with long-term current use of insulin (H)      isosorbide mononitrate (IMDUR) 60 MG 24 hr tablet Take 2 tablets (120 mg) by mouth daily  Qty: 180 tablet, Refills: 3    Associated Diagnoses: Essential hypertension      lisinopril (ZESTRIL) 2.5 MG tablet Take 2.5 mg by mouth every evening      metoprolol succinate ER (TOPROL-XL) 25 MG 24 hr tablet Take 25 mg by mouth every evening      polyethylene glycol (MIRALAX) 17 GM/Dose powder Take 17 g by mouth daily  Qty: 510 g, Refills: 1    Associated Diagnoses: Abdominal pain, generalized      rosuvastatin (CRESTOR) 40 MG tablet Take 40 mg by mouth daily  Qty: 90 tablet, Refills: 1    Associated Diagnoses: Type 2 diabetes mellitus with complication, with long-term current use of insulin (H)      zolpidem (AMBIEN) 10 MG tablet Take 1 tablet (10 mg) by mouth nightly as needed for sleep  Qty: 30 tablet, Refills: 5    Associated Diagnoses: Primary insomnia      blood glucose monitoring (Frontback CONTOUR) test strip TEST 3 TIMES A DAY  Qty: 300 each, Refills: 3    Associated Diagnoses: Type 2 diabetes mellitus without complication, with long-term current use of insulin (H)      glucose (BD GLUCOSE) 4 g chewable tablet Take 1 tablet by mouth every hour as needed for low blood sugar      insulin pen needle (31G X 8 MM) 31G X 8 MM miscellaneous Use 4 pen needles daily or as directed.  Qty: 400 each, Refills: 3    Associated Diagnoses: Type 2 diabetes mellitus without complication, with long-term current use of insulin (H)      nitroGLYcerin (NITROSTAT) 0.4 MG  "sublingual tablet Place 0.4 mg under the tongue every 5 minutes as needed for chest pain      order for DME Equipment being ordered: Diabetic shoes, pre-ulcer callous formation  Qty: 2 each, Refills: 3    Associated Diagnoses: Type 2 diabetes mellitus with complication, with long-term current use of insulin (H)           Allergies   No Known Allergies  Data   Most Recent 3 CBC's:  Recent Labs   Lab Test 02/24/21  1029 02/24/21  0545 02/23/21  1419   WBC 3.5* 3.1* 4.4   HGB 11.2* 10.3* 12.6   MCV 89 90 88   PLT 96* 90* 116*      Most Recent 3 BMP's:  Recent Labs   Lab Test 02/24/21  1029 02/24/21  0545 02/23/21  1920 02/23/21  1419    143  --  144   POTASSIUM 2.8* 3.3* 2.9* 2.7*   CHLORIDE 108* 109*  --  103   CO2 28 29  --  30   BUN 15 17  --  19   CR 1.98* 2.02*  --  2.10*   ANIONGAP 7 5  --  11   ELICIA 8.5* 8.2*  --  10.0   * 121*  --  189*     Most Recent 2 LFT's:  Recent Labs   Lab Test 02/24/21  1029 02/23/21  1419   AST 54* 51*   ALT 44 50   ALKPHOS 46 52   BILITOTAL 1.4* 1.4*     Most Recent INR's and Anticoagulation Dosing History:  Anticoagulation Dose History     Recent Dosing and Labs Latest Ref Rng & Units 7/18/2019 2/24/2021    INR 0.86 - 1.14 0.95 1.05        Most Recent 3 Troponin's:  Recent Labs   Lab Test 07/18/19 2007 07/18/19  1810 07/15/19  1156   TROPI <0.030 <0.030 <0.030     Most Recent Cholesterol Panel:  Recent Labs   Lab Test 04/20/20  1024   CHOL 118   LDL 58   HDL 41   TRIG 96     Most Recent 6 Bacteria Isolates From Any Culture (See EPIC Reports for Culture Details):  Recent Labs   Lab Test 07/18/19  2107   CULT >100,000 colonies/mL  mixed urogenital aisha       Most Recent TSH, T4 and A1c Labs:  Recent Labs   Lab Test 12/17/20  1042   A1C 7.0*     Results for orders placed or performed during the hospital encounter of 02/23/21   CT Abdomen pelvis w/o contrast    Addendum: 2/24/2021    In the body, a word is absent. \"There is NO hydronephrosis.\"    NELLY PERKINS MD      " Narrative    PROCEDURE:  CT ABDOMEN PELVIS W/O CONTRAST    HISTORY:  Bowel obstruction suspected; Abdominal pain, acute,  nonlocalized    TECHNIQUE:  Helical CT of the abdomen and pelvis was performed without  intravenous contrast.    COMPARISON:  6/12/2020    FINDINGS:      Evaluation of the solid organs is somewhat limited due to the lack of  intravenous contrast.    Limited views through the lung bases demonstrate trace tree-in-bud  opacity in the left lower lobe and lingula.    There is a residual or recurrent mild to moderate hiatal hernia. There  is retained or refluxed contrast in the thoracic esophagus. Oral  contrast passes through a normal caliber stomach into normal caliber  small bowel. There is prominent stool in the colon and rectum.    Small nonobstructive renal calculi versus vascular calcifications are  seen. There is hydronephrosis. There is fatty atrophy of the pancreas.  No adrenal mass is identified. The spleen is within normal limits in  size. The gallbladder is surgically absent.    No free fluid, free air or adenopathy is present.  No suspicious  osseous lesions are identified.      Impression    IMPRESSION:      Recurrent or residual hiatal hernia with extensive retained or  refluxed contrast in the thoracic esophagus. Normal caliber stomach  and small bowel. No small bowel obstruction. Prominent stool can be  seen in constipation.    NELLY PERKINS MD   XR Abdomen 1 View    Narrative    PROCEDURE:  XR ABDOMEN 1 VW    HISTORY:  possible obstruction, hx of hiatal hernia repair.    TECHNIQUE:  2 supine radiographs of the abdomen.    COMPARISON:  CT 2/23/2021    FINDINGS:     The lung bases are clear. No subdiaphragmatic air is seen.    No dilated loops of small bowel or air-fluid levels are seen. Oral  contrast has reached the colon from the CT scan at 8:00 PM the prior  evening. Prominent stool persists.      Impression    IMPRESSION:    No obstruction or free air.    NELLY PERKINS  MD   Echocardiogram Complete    Narrative    [id number]  [id number]  [id number]  [id number]^MARCO ANTONIO^GRICEL^M        [facility]  [address] [place] [address]     Name: CARLOS SCRUGGS  MRN: [id number]  : 1950  Study Date: 2021 12:38 PM  Age: 70 yrs  Gender: Female  Patient Location: [facility]  Reason For Study: Abn EKG  Ordering Physician: GRICEL BERNARD  Performed By: Krystle Welsh RDCS, RVT     BSA: 1.8 m2  Height: 65 in  Weight: 164 lb  HR: 42  BP: 133/66 mmHg  _____________________________________________________________________________  __        Procedure  Complete Portable Echo Adult. Echocardiogram with two-dimensional, color and  spectral Doppler performed.  _____________________________________________________________________________  __        Interpretation Summary  Left ventricular function, chamber size, wall motion, and wall thickness are  normal.The EF is 60-65%.  Right ventricular function, chamber size, wall motion, and thickness are  normal.  Mild aortic stenosis and insufficiency.  Previous study not available for comparison.  _____________________________________________________________________________  __        Left Ventricle  Left ventricular function, chamber size, wall motion, and wall thickness are  normal.The EF is 60-65%. Left ventricular diastolic function is indeterminate.     Right Ventricle  Right ventricular function, chamber size, wall motion, and thickness are  normal.     Atria  Both atria appear normal. The atrial septum is intact as assessed by color  Doppler .     Mitral Valve  The mitral valve is normal. Trace mitral insufficiency is present.     Aortic Valve  The aortic valve is tricuspid. Mild aortic insufficiency is present. Mild  aortic stenosis is present. The peak aortic velocity is 2.5 m/sec. The mean  gradient across the aortic valve is13 mmHg.        Tricuspid Valve  The tricuspid valve is normal. Trace tricuspid insufficiency is  present. The  right ventricular systolic pressure is approximated at 29.7 mmHg plus the  right atrial pressure. The peak velocity of the tricuspid regurgitant jet is  not obtainable.     Pulmonic Valve  The pulmonic valve is normal. Trace pulmonic insufficiency is present.     Vessels  The aorta root is normal. The thoracic aorta is normal. The pulmonary artery  cannot be assessed. The inferior vena cava was normal in size with preserved  respiratory variability.     Pericardium  No pericardial effusion is present.     Compared to Previous Study  Previous study not available for comparison.     _____________________________________________________________________________  __  MMode/2D Measurements & Calculations  IVSd: 0.87 cm  LVIDd: 4.2 cm  LVIDs: 2.7 cm  LVPWd: 1.1 cm  FS: 35.6 %     LV mass(C)d: 131.1 grams  LV mass(C)dI: 72.1 grams/m2  Ao root diam: 2.9 cm  asc Aorta Diam: 3.7 cm  LVOT diam: 1.9 cm  LVOT area: 2.8 cm2  LA Volume (BP): 48.9 ml  LA Volume Index (BP): 26.9 ml/m2  RWT: 0.52        Doppler Measurements & Calculations  MV E max austen: 132.0 cm/sec  MV A max austen: 87.3 cm/sec  MV E/A: 1.5     MV dec slope: 507.0 cm/sec2  MV dec time: 0.26 sec  Ao V2 max: 250.0 cm/sec  Ao max P.0 mmHg  Ao V2 mean: 171.0 cm/sec  Ao mean P.0 mmHg  Ao V2 VTI: 63.0 cm  JOSIE(I,D): 2.0 cm2  JOSIE(V,D): 1.9 cm2  AI P1/2t: 678.5 msec  LV V1 max PG: 10.9 mmHg  LV V1 max: 165.0 cm/sec  LV V1 VTI: 44.0 cm  SV(LVOT): 124.8 ml  SI(LVOT): 68.6 ml/m2  TR max austen: 272.0 cm/sec  TR max P.7 mmHg  AV Austen Ratio (DI): 0.66  JOSIE Index (cm2/m2): 1.1  E/E' av.5  Lateral E/e': 15.2  Medial E/e': 19.9              _____________________________________________________________________________  __        Report approved by: Edilia Cody 2021 02:42 PM

## 2021-02-24 NOTE — PROGRESS NOTES
Discussed with radiology with recommendation that with her CT imaging last evening with contrast, there is no evidence of obstruction.  They do not think there would be a benefit with proceeding with a Gastrografin study.  We will cancel this and will order mag citrate and treat for constipation which appears to be underlying cause.Electronically signed by SANDRA Schilling on February 24, 2021

## 2021-02-24 NOTE — PROGRESS NOTES
SAFETY CHECKLIST  ID Bands and Risk clasps correct and in place (DNR, Fall risk, Allergy, Latex, Limb):  Yes  All Lines Reconciled and labeled correctly: Yes  Whiteboard updated:Yes  Environmental interventions (bed/chair alarm on, call light, side rails, restraints, sitter....): Yes  Verify Tele #: [id number]

## 2021-02-24 NOTE — ASSESSMENT & PLAN NOTE
Potassium low at 2.7, likely due to poor oral intake  Potassium replacement, follow  Placed on telemetry overnight

## 2021-02-24 NOTE — PROGRESS NOTES
[facility]  Hospitalist Progress Note    Assessment & Plan   Bailey Sierra is a 70 year old female who was admitted on 2/23/2021 for abdominal pain, nausea and vomiting.  Has had bradycardia heart rate in the high 30s rate.  Earlier today had an episode of unresponsiveness associated with asystole on telemetry.  Resolved after a few seconds.  Patient denies ever having this in the past.  She does have a history of coronary artery disease, diabetes, high cholesterol and high blood pressure.  She has had several abdominal surgeries.  CT did not show any obstruction.  However there is documentation of a possible hydronephrosis which I think would be incorrect.  I will verify with radiology.  Incidentally was found to be Covid positive on admission which I think is noncontributory at this time.    Active Problems:    Non-intractable vomiting with nausea  Bradycardia with pause/asystole    Hx History of coronary artery bypass graft x 2. ? If n/v or bradycardia is a symptom of CAD or other acute coronary problem.   -EKG, echo, trop stat continue telemetry  -Depending on findings may need transfer to higher level of care.  -Also has significant hypokalemia with difficulty replacing due to acute nausea and vomiting.  -Replace potassium, will add IV fluids  -was on metoprolol prior to admission. This is currently on hold.       CKD (chronic kidney disease) stage 4, GFR 15-29 ml/min (H), chronic.  Does not have significant acute changes with her baseline being around 2-2.1.  Was given IV fluids overnight.    Hyperlipidemia      Essential hypertension, stable. BP normal range.   -holding metprolol and imdur.       Type 2 diabetes mellitus with hyperglycemia, with long-term current use of insulin (H). Blood sugars stable.  -diet as tolerated  -POCT glc and SSI      Hypokalemia,   -replace.   -add K to fluids.       Abdominal pain, epigastric-has large hiatal hernia noted on CT. Had recent hiatal hernia  surgery in July.   -will discuss with surgery.       COVID-19 virus detected, incidental. Labs reassuring.  Unclear what I think very unlikely to be related to her acute symptoms.    DVT Prophylaxis: Low Risk/Ambulatory with no VTE prophylaxis indicated  Code Status: Full Code     Ngoc Marin    Interval History   Patient had an episode of nausea associated with severe bradycardia down to what was noted as asystole or could be correlated with a significant pause earlier today and an episode of being unresponsive.  After few seconds this resolved.  She does not recall the episode.  She has not had anything like this in the past.  She does have a cardiac history of coronary artery disease with stents.  Her symptoms with that event were left-sided tingling.  She has not had a bowel movement in about 2 weeks.  No fevers.  Complains of left-sided pain.  I have reviewed her CT.  Stat EKG echo and ultrasound are also ordered.    -Data reviewed today: I reviewed all new labs and imaging results over the last 24 hours. I personally reviewed the EKG tracing showing sinus bradycardia, flattened T waves.    Physical Exam   Temp: 98.1  F (36.7  C) Temp src: Tympanic BP: 133/66 Pulse: (!) 45   Resp: 16 SpO2: 99 % O2 Device: None (Room air)    Vitals:    02/24/21 0509   Weight: 74.4 kg (164 lb)     Vital Signs with Ranges  Temp:  [97.4  F (36.3  C)-99.4  F (37.4  C)] 98.1  F (36.7  C)  Pulse:  [37-74] 45  Resp:  [16-20] 16  BP: (100-149)/(51-77) 133/66  SpO2:  [97 %-100 %] 99 %  No intake/output data recorded.    Constitutional: Awake and alert.  No acute distress.  Respiratory: Clear to auscultation bilaterally.  No wheezing, rhonchi, rales.  Cardiovascular: Bradycardic.  Soft systolic ejection murmur  GI: Abdomen is soft, nondistended.  There is tenderness to palpation in the left and upper and left lower quadrant.  There is a midline well-healed scar.  No obvious hernias.  Skin/Integumen: Warm, dry, intact.  No concerning  rashes or petechiae.  Other:      Medications     - MEDICATION INSTRUCTIONS -       sodium chloride 100 mL/hr at 02/23/21 1920       insulin aspart  1-7 Units Subcutaneous TID AC     insulin aspart  1-5 Units Subcutaneous At Bedtime     insulin glargine  25 Units Subcutaneous At Bedtime     isosorbide mononitrate  120 mg Oral Daily     lisinopril  2.5 mg Oral QPM     potassium chloride  40 mEq Oral Once     sodium chloride (PF)  3 mL Intracatheter Q8H       Data   Recent Labs   Lab 02/24/21  1029 02/24/21  0545 02/23/21 1920 02/23/21  1419   WBC 3.5* 3.1*  --  4.4   HGB 11.2* 10.3*  --  12.6   MCV 89 90  --  88   PLT 96* 90*  --  116*   INR 1.05  --   --   --     143  --  144   POTASSIUM 2.8* 3.3* 2.9* 2.7*   CHLORIDE 108* 109*  --  103   CO2 28 29  --  30   BUN 15 17  --  19   CR 1.98* 2.02*  --  2.10*   ANIONGAP 7 5  --  11   ELICIA 8.5* 8.2*  --  10.0   * 121*  --  189*   ALBUMIN 3.0*  --   --  3.7   PROTTOTAL 5.3*  --   --  5.9*   BILITOTAL 1.4*  --   --  1.4*   ALKPHOS 46  --   --  52   ALT 44  --   --  50   AST 54*  --   --  51*       Recent Results (from the past 24 hour(s))   CT Abdomen pelvis w/o contrast    Narrative    PROCEDURE:  CT ABDOMEN PELVIS W/O CONTRAST    HISTORY:  Bowel obstruction suspected; Abdominal pain, acute,  nonlocalized    TECHNIQUE:  Helical CT of the abdomen and pelvis was performed without  intravenous contrast.    COMPARISON:  6/12/2020    FINDINGS:      Evaluation of the solid organs is somewhat limited due to the lack of  intravenous contrast.    Limited views through the lung bases demonstrate trace tree-in-bud  opacity in the left lower lobe and lingula.    There is a residual or recurrent mild to moderate hiatal hernia. There  is retained or refluxed contrast in the thoracic esophagus. Oral  contrast passes through a normal caliber stomach into normal caliber  small bowel. There is prominent stool in the colon and rectum.    Small nonobstructive renal calculi versus  vascular calcifications are  seen. There is hydronephrosis. There is fatty atrophy of the pancreas.  No adrenal mass is identified. The spleen is within normal limits in  size. The gallbladder is surgically absent.    No free fluid, free air or adenopathy is present.  No suspicious  osseous lesions are identified.      Impression    IMPRESSION:      Recurrent or residual hiatal hernia with extensive retained or  refluxed contrast in the thoracic esophagus. Normal caliber stomach  and small bowel. No small bowel obstruction. Prominent stool can be  seen in constipation.    NELLY PERKINS MD   XR Abdomen 1 View    Narrative    PROCEDURE:  XR ABDOMEN 1 VW    HISTORY:  possible obstruction, hx of hiatal hernia repair.    TECHNIQUE:  2 supine radiographs of the abdomen.    COMPARISON:  CT 2/23/2021    FINDINGS:     The lung bases are clear. No subdiaphragmatic air is seen.    No dilated loops of small bowel or air-fluid levels are seen. Oral  contrast has reached the colon from the CT scan at 8:00 PM the prior  evening. Prominent stool persists.      Impression    IMPRESSION:    No obstruction or free air.    NELLY PERKINS MD

## 2021-02-24 NOTE — PLAN OF CARE
/62 (BP Location: Left arm)   Pulse (!) 39   Temp 98.6  F (37  C) (Tympanic)   Resp 16   SpO2 99%     HR 34-40.  EKG shows sinus bradycardia.  Pt is asymptomatic.  MD notified, no new orders at this time.  Will continue to monitor.  Jocelyn De Luna RN.............................2/24/2021 1:59 AM

## 2021-02-24 NOTE — UTILIZATION REVIEW
Admission Status; Secondary Review Determination    Under the authority of the Utilization Management Committee, the utilization review process indicated a secondary review on the above patient. The review outcome is based on review of the medical records, discussions with staff, and applying clinical experience noted on the date of the review.    (x) Inpatient Status Appropriate - This patient's medical care is consistent with medical management for inpatient care and reasonable inpatient medical practice.    RATIONALE FOR DETERMINATION: 70-year-old female with history of constipation, hiatal hernia surgery July 2020 now presents with several weeks of significant intermittent nausea and vomiting and worsening constipation with marked hypokalemia with ongoing nausea vomiting hospital day 2 with an episode associated with significant severe bradycardia/pauses and unresponsiveness.  Patient will require minimum 2 nights in the hospital with active management appropriate for inpatient care.    At the time of admission with the information available to the attending physician more than 2 nights Hospital complex care was anticipated, based on patient risk of adverse outcome if treated as outpatient and complex care required. Inpatient admission is appropriate based on the Medicare guidelines.    This document was produced using voice recognition software    The information on this document is developed by the utilization review team in order for the business office to ensure compliance. This only denotes the appropriateness of proper admission status and does not reflect the quality of care rendered.    The definitions of Inpatient Status and Observation Status used in making the determination above are those provided in the CMS Coverage Manual, Chapter 1 and Chapter 6, section 70.4.    Sincerely,    Fito Vaughan MD  Utilization Review  Physician Advisor  [facility].

## 2021-02-24 NOTE — PROGRESS NOTES
Patient is alert and orientated. Complaints of LLQ and LUQ discomfort, declines medication, soft but tender. Hypoactive bowel sounds, passing gas, no BM. Heart is bradycardic and murmur detected. Complaints of n/v. One episode of emesis that accompanied brief period of unresponsiveness and asystole per tele. Lungs clear but dim in bases. SBA d/t occasional dizziness. Trace edema to hands/wrist. 2+ edema to BLE, weak pedal pulses. Will continue to monitor.    /66 (BP Location: Right arm)   Pulse (!) 45   Temp 98.1  F (36.7  C) (Tympanic)   Resp 16   Wt 74.4 kg (164 lb)   SpO2 99%   BMI 27.29 kg/m

## 2021-02-24 NOTE — PROGRESS NOTES
Patient rhythm SR with [place] PVCs, rate and rhythm back to SB 37-40's. Strip printed and sent for review.

## 2021-02-24 NOTE — PROGRESS NOTES
Notified MD Schilling of patient's HR consistently in mid to high 30s. Patient is asymptomatic. EKG to be considered. Patient remains on tele.

## 2021-02-24 NOTE — PROGRESS NOTES
Spoke with radiologist concerning this case, radiologist wants pt to try and drink oral contrast since GFR is below contrast protocols. Discussed with RN, who agreed to adm the oral contrast to the patient and will pass onto the next shift that she will be scanned approx 1930, info also passed onto next shift imaging tech. marv

## 2021-02-24 NOTE — PROGRESS NOTES
Received call from Олег, patient was in asystole per tele. Arrived to room and patient appeared pale and was currently vomiting. Per CNA, patient was brieffy unresponsive for 30-60 seconds. Denies chest pain. Heart rate increased to 90s for couple minutes per tele tech. PVCs also noted. Heart rate was 45 and BP was 133/66 on my arrival. Blood sugar of 140. Notified MD Marin. EKG and Echo ordered. Will continue to monitor.     /66 (BP Location: Right arm)   Pulse (!) 45   Temp 98.8  F (37.1  C) (Tympanic)   Resp 16   Wt 74.4 kg (164 lb)   SpO2 99%   BMI 27.29 kg/m

## 2021-02-24 NOTE — PLAN OF CARE
/62 (BP Location: Left arm)   Pulse (!) 39   Temp 98.6  F (37  C) (Tympanic)   Resp 16   SpO2 99%     VS.  HR bradycardic. MD notified.  Pt asymptomatic.  Pt complains of 10/10 pain, prn medications administered.  Pt does not call for pain medications when needed.  Hands, legs and feet edema 2+.  Jocelyn De Luna RN.............................2/24/2021 4:15 AM

## 2021-02-25 ENCOUNTER — TRANSFERRED RECORDS (OUTPATIENT)
Dept: HEALTH INFORMATION MANAGEMENT | Facility: OTHER | Age: 71
End: 2021-02-25

## 2021-02-26 LAB — EJECTION FRACTION: NORMAL %

## 2021-03-08 ENCOUNTER — HOSPITAL ENCOUNTER (OUTPATIENT)
Dept: NUCLEAR MEDICINE | Facility: OTHER | Age: 71
End: 2021-03-08
Attending: FAMILY MEDICINE
Payer: MEDICARE

## 2021-03-08 DIAGNOSIS — R11.0 NAUSEA: ICD-10-CM

## 2021-03-08 DIAGNOSIS — Z87.19 H/O HIATAL HERNIA: ICD-10-CM

## 2021-03-08 DIAGNOSIS — E11.9 TYPE 2 DIABETES MELLITUS WITHOUT COMPLICATION, WITH LONG-TERM CURRENT USE OF INSULIN (H): ICD-10-CM

## 2021-03-08 DIAGNOSIS — Z79.4 TYPE 2 DIABETES MELLITUS WITHOUT COMPLICATION, WITH LONG-TERM CURRENT USE OF INSULIN (H): ICD-10-CM

## 2021-03-08 DIAGNOSIS — K21.9 GASTROESOPHAGEAL REFLUX DISEASE, UNSPECIFIED WHETHER ESOPHAGITIS PRESENT: ICD-10-CM

## 2021-03-08 DIAGNOSIS — R68.81 EARLY SATIETY: ICD-10-CM

## 2021-03-08 PROCEDURE — 78264 GASTRIC EMPTYING IMG STUDY: CPT

## 2021-03-08 PROCEDURE — A9541 TC99M SULFUR COLLOID: HCPCS | Performed by: FAMILY MEDICINE

## 2021-03-08 PROCEDURE — 343N000001 HC RX 343: Performed by: FAMILY MEDICINE

## 2021-03-08 RX ADMIN — Medication 1.03 MILLICURIE: at 10:30

## 2021-03-09 ENCOUNTER — OFFICE VISIT (OUTPATIENT)
Dept: FAMILY MEDICINE | Facility: OTHER | Age: 71
End: 2021-03-09
Attending: FAMILY MEDICINE
Payer: COMMERCIAL

## 2021-03-09 VITALS
TEMPERATURE: 97.3 F | HEART RATE: 68 BPM | RESPIRATION RATE: 18 BRPM | OXYGEN SATURATION: 99 % | BODY MASS INDEX: 28.79 KG/M2 | WEIGHT: 172.8 LBS | HEIGHT: 65 IN | SYSTOLIC BLOOD PRESSURE: 126 MMHG | DIASTOLIC BLOOD PRESSURE: 62 MMHG

## 2021-03-09 DIAGNOSIS — Z79.4 TYPE 2 DIABETES MELLITUS WITH COMPLICATION, WITH LONG-TERM CURRENT USE OF INSULIN (H): ICD-10-CM

## 2021-03-09 DIAGNOSIS — E11.9 TYPE 2 DIABETES MELLITUS WITHOUT COMPLICATION, WITH LONG-TERM CURRENT USE OF INSULIN (H): ICD-10-CM

## 2021-03-09 DIAGNOSIS — E11.8 TYPE 2 DIABETES MELLITUS WITH COMPLICATION, WITH LONG-TERM CURRENT USE OF INSULIN (H): ICD-10-CM

## 2021-03-09 DIAGNOSIS — Z95.0 S/P CARDIAC PACEMAKER PROCEDURE: Primary | ICD-10-CM

## 2021-03-09 DIAGNOSIS — Z79.4 TYPE 2 DIABETES MELLITUS WITHOUT COMPLICATION, WITH LONG-TERM CURRENT USE OF INSULIN (H): ICD-10-CM

## 2021-03-09 DIAGNOSIS — K44.9 HIATAL HERNIA: ICD-10-CM

## 2021-03-09 DIAGNOSIS — U07.1 COVID-19 VIRUS DETECTED: ICD-10-CM

## 2021-03-09 PROBLEM — R10.9 ABDOMINAL PAIN: Status: RESOLVED | Noted: 2021-02-23 | Resolved: 2021-03-09

## 2021-03-09 PROBLEM — E87.6 HYPOKALEMIA: Status: RESOLVED | Noted: 2020-07-20 | Resolved: 2021-03-09

## 2021-03-09 PROBLEM — R11.2 NON-INTRACTABLE VOMITING WITH NAUSEA: Status: RESOLVED | Noted: 2021-02-23 | Resolved: 2021-03-09

## 2021-03-09 PROBLEM — E66.01 MORBID OBESITY (H): Status: RESOLVED | Noted: 2018-09-28 | Resolved: 2021-03-09

## 2021-03-09 PROBLEM — R10.13 ABDOMINAL PAIN, EPIGASTRIC: Status: RESOLVED | Noted: 2021-02-23 | Resolved: 2021-03-09

## 2021-03-09 PROCEDURE — G0463 HOSPITAL OUTPT CLINIC VISIT: HCPCS

## 2021-03-09 PROCEDURE — 99214 OFFICE O/P EST MOD 30 MIN: CPT | Performed by: FAMILY MEDICINE

## 2021-03-09 RX ORDER — METOPROLOL SUCCINATE 25 MG/1
12.5 TABLET, EXTENDED RELEASE ORAL EVERY EVENING
Qty: 45 TABLET | Refills: 3 | Status: SHIPPED | OUTPATIENT
Start: 2021-03-09 | End: 2022-02-02

## 2021-03-09 RX ORDER — INSULIN GLARGINE 100 [IU]/ML
10-15 INJECTION, SOLUTION SUBCUTANEOUS AT BEDTIME
Qty: 60 ML | Refills: 4 | COMMUNITY
Start: 2021-03-09 | End: 2021-07-27

## 2021-03-09 RX ORDER — ROSUVASTATIN CALCIUM 40 MG/1
TABLET, COATED ORAL
Qty: 90 TABLET | Refills: 1 | Status: SHIPPED | OUTPATIENT
Start: 2021-03-09 | End: 2021-07-07

## 2021-03-09 ASSESSMENT — ANXIETY QUESTIONNAIRES
3. WORRYING TOO MUCH ABOUT DIFFERENT THINGS: NOT AT ALL
2. NOT BEING ABLE TO STOP OR CONTROL WORRYING: NOT AT ALL
5. BEING SO RESTLESS THAT IT IS HARD TO SIT STILL: NOT AT ALL
IF YOU CHECKED OFF ANY PROBLEMS ON THIS QUESTIONNAIRE, HOW DIFFICULT HAVE THESE PROBLEMS MADE IT FOR YOU TO DO YOUR WORK, TAKE CARE OF THINGS AT HOME, OR GET ALONG WITH OTHER PEOPLE: NOT DIFFICULT AT ALL
7. FEELING AFRAID AS IF SOMETHING AWFUL MIGHT HAPPEN: NOT AT ALL
GAD7 TOTAL SCORE: 0
1. FEELING NERVOUS, ANXIOUS, OR ON EDGE: NOT AT ALL
6. BECOMING EASILY ANNOYED OR IRRITABLE: NOT AT ALL

## 2021-03-09 ASSESSMENT — PATIENT HEALTH QUESTIONNAIRE - PHQ9
5. POOR APPETITE OR OVEREATING: NOT AT ALL
SUM OF ALL RESPONSES TO PHQ QUESTIONS 1-9: 2

## 2021-03-09 ASSESSMENT — PAIN SCALES - GENERAL: PAINLEVEL: NO PAIN (0)

## 2021-03-09 ASSESSMENT — MIFFLIN-ST. JEOR: SCORE: 1299.7

## 2021-03-09 NOTE — NURSING NOTE
Patient presents today for hospital follow up.    Previous A1C is at goal of <8  Lab Results   Component Value Date    A1C 7.0 12/17/2020    A1C Canceled, Test credited 11/03/2020    A1C 7.1 07/08/2020    A1C 6.8 04/20/2020    A1C 6.9 10/02/2019     Urine microalbumin:creatine:    Foot exam 05/21/20  Eye exam 12/04/20    Tobacco User no  Patient is on a daily aspirin  Patient is on a Statin.  Blood pressure today of:     BP Readings from Last 1 Encounters:   02/24/21 129/69      is at the goal of <139/89 for diabetics.    Noemy Holt LPN on 3/9/2021 at 1:04 PM        Medication Reconciliation Complete    Noemy Holt LPN  3/9/2021 1:04 PM

## 2021-03-09 NOTE — PROGRESS NOTES
"SUBJECTIVE:  Bailey Sierra is a 70 year old female here for hospital follow-up.  She was recently hospitalized on February 23 for worsening abdominal pain.  During that hospitalization she had symptomatic bradycardia and sinus pauses seen on telemetry.  She was ultimately transferred to [facility] in [place].    Upon transfer she remained bradycardic in the 40s, normotensive.  She had a permanent pacemaker placed.  Her insulin regimen was decreased due to n.p.o. status, decreased appetite due to abdominal pain.  She also interestingly tested positive for Covid upon admission, she never had any symptoms.    She had a gastric emptying study performed earlier yesterday, results are not available yet.    Since she has been home her blood sugars have been anywhere from 70s to the mid 100s.  She has not been using glargine 10 to 15 units at night and generally 7 units of NovoLog with meals.  This is a significant decrease from prehospitalization.    In regards to her pacemaker she reports that her skin seems to be healing well as it is getting itchy.  She has had no chest pain or palpitations.    Her abdominal pain has improved significantly since her hospitalization.    Allergies:  No Known Allergies    ROS:    As above otherwise ROS is unremarkable.    OBJECTIVE:  /62   Pulse 68   Temp 97.3  F (36.3  C)   Resp 18   Ht 1.651 m (5' 5\")   Wt 78.4 kg (172 lb 12.8 oz)   SpO2 99%   BMI 28.76 kg/m      EXAM:  General Appearance: Pleasant, alert, appropriate appearance for age. No acute distress  Head: Normal. Normocephalic, atraumatic.  Lungs: Normal chest wall and respirations. Clear to auscultation, no wheezes or crackles.  Cardiovascular: Regular rate and rhythm. S1, S2, no murmurs.  Skin: no concerning or new rashes.  Pacemaker incision site is healing well.  Neurologic Exam: CN 2-12 grossly intact.  Normal gait.  Symmetric DTRs, No focal motor or sensory deficits. No tremor.  Psychiatric Exam: Alert and " oriented, appropriate affect.    ASSESSEMENT AND PLAN:    1. S/P cardiac pacemaker procedure    2. Type 2 diabetes mellitus with complication, with long-term current use of insulin (H)    3. Type 2 diabetes mellitus without complication, with long-term current use of insulin (H)    4. COVID-19 virus detected    5. Hiatal hernia      For her pacemaker she will follow-up with cardiology as scheduled later this week.    Reviewed Covid results which consistently were positive upon admission.  She does not recall ever having any symptoms.  She certainly out of her infectious.  Nonetheless.    She will continue with insulin as prescribed and call me if her blood sugar start elevating as they were previously.    New prescription for metoprolol 12.5 mg daily were sent to her pharmacy.    Will await gastric emptying study results from radiology.  Recommend continued bowel regimen including fiber, water.    Sukumar Leal MD    This document was prepared using voice generated software.  While every attempt was made for accuracy, grammatical errors may exist.

## 2021-03-10 ENCOUNTER — TELEPHONE (OUTPATIENT)
Dept: FAMILY MEDICINE | Facility: OTHER | Age: 71
End: 2021-03-10

## 2021-03-10 DIAGNOSIS — R11.0 NAUSEA: ICD-10-CM

## 2021-03-10 DIAGNOSIS — K21.9 GASTROESOPHAGEAL REFLUX DISEASE, UNSPECIFIED WHETHER ESOPHAGITIS PRESENT: ICD-10-CM

## 2021-03-10 DIAGNOSIS — K31.84 GASTROPARESIS: Primary | ICD-10-CM

## 2021-03-10 DIAGNOSIS — Z79.4 TYPE 2 DIABETES MELLITUS WITHOUT COMPLICATION, WITH LONG-TERM CURRENT USE OF INSULIN (H): ICD-10-CM

## 2021-03-10 DIAGNOSIS — Z87.19 H/O HIATAL HERNIA: ICD-10-CM

## 2021-03-10 DIAGNOSIS — R68.81 EARLY SATIETY: Primary | ICD-10-CM

## 2021-03-10 DIAGNOSIS — E11.9 TYPE 2 DIABETES MELLITUS WITHOUT COMPLICATION, WITH LONG-TERM CURRENT USE OF INSULIN (H): ICD-10-CM

## 2021-03-10 ASSESSMENT — ANXIETY QUESTIONNAIRES: GAD7 TOTAL SCORE: 0

## 2021-03-10 NOTE — TELEPHONE ENCOUNTER
Results of gastric emptying study ere reviewed with radiology.  These show significant slowing of gastric emptying then what would be expected.  I would recommend follow up with GI to discuss possible treatment options or dietary recommendations.  If she is comfortable with that I can place the referral to [facility].

## 2021-03-10 NOTE — TELEPHONE ENCOUNTER
Patient was notified of results and is fine with GI referral.    Noemy Holt LPN on 3/10/2021 at 10:45 AM

## 2021-03-24 ENCOUNTER — TRANSFERRED RECORDS (OUTPATIENT)
Dept: HEALTH INFORMATION MANAGEMENT | Facility: OTHER | Age: 71
End: 2021-03-24

## 2021-03-31 ENCOUNTER — TELEPHONE (OUTPATIENT)
Dept: FAMILY MEDICINE | Facility: OTHER | Age: 71
End: 2021-03-31

## 2021-03-31 DIAGNOSIS — N25.81 HYPERPARATHYROIDISM DUE TO RENAL INSUFFICIENCY (H): Primary | ICD-10-CM

## 2021-03-31 DIAGNOSIS — E11.65 TYPE 2 DIABETES MELLITUS WITH HYPERGLYCEMIA, WITH LONG-TERM CURRENT USE OF INSULIN (H): ICD-10-CM

## 2021-03-31 DIAGNOSIS — Z79.4 TYPE 2 DIABETES MELLITUS WITH HYPERGLYCEMIA, WITH LONG-TERM CURRENT USE OF INSULIN (H): ICD-10-CM

## 2021-03-31 DIAGNOSIS — E78.5 HYPERLIPIDEMIA, UNSPECIFIED HYPERLIPIDEMIA TYPE: ICD-10-CM

## 2021-03-31 NOTE — TELEPHONE ENCOUNTER
DWS-patient has appointment on 06.08.21 and wants to have her lab work before appointment she is looking for orders       Please call and advise  Thank You

## 2021-04-01 ENCOUNTER — HOSPITAL ENCOUNTER (OUTPATIENT)
Dept: MAMMOGRAPHY | Facility: OTHER | Age: 71
Discharge: HOME | End: 2021-04-01
Attending: FAMILY MEDICINE | Admitting: FAMILY MEDICINE
Payer: MEDICARE

## 2021-04-01 DIAGNOSIS — Z12.31 VISIT FOR SCREENING MAMMOGRAM: ICD-10-CM

## 2021-04-01 PROCEDURE — 77063 BREAST TOMOSYNTHESIS BI: CPT

## 2021-04-07 DIAGNOSIS — I10 ESSENTIAL HYPERTENSION: Primary | ICD-10-CM

## 2021-04-07 RX ORDER — LISINOPRIL 2.5 MG/1
TABLET ORAL
Qty: 90 TABLET | Refills: 0 | Status: SHIPPED | OUTPATIENT
Start: 2021-04-07 | End: 2021-06-08

## 2021-04-07 NOTE — TELEPHONE ENCOUNTER
[facility] sent Rx request for the following:      Requested Prescriptions   Pending Prescriptions Disp Refills   lisinopril (ZESTRIL) 2.5 MG tablet [Pharmacy Med Name: LISINOPRIL 2.5MG TABLETS] 90 tablet     Sig: TAKE 1 TABLET(2.5 MG) BY MOUTH DAILY   Last Office Visit:              3/9/21  Future Office visit:             Next 5 appointments (look out 90 days)    Apr 09, 2021 11:30 AM  Nurse Only with TOBI JORDAN  [facility] [address]  [place] [address]  [phone]   Jun 08, 2021 10:00 AM  SHORT with Sam Leal  [facility] ([facility] ) [address]  [place] [address]  [phone]      Routing refill request to provider for review/approval because:   ACE Inhibitors (Including Combos) Protocol Failed - 4/7/2021 10:28 AM       Failed - Normal serum creatinine on file in past 12 months       Failed - Normal serum potassium on file in past 12 months   Historically reported    In clinical absence of patient's primary, Sam Leal, patient is requesting that this message be sent to the covering provider for consideration please.  He is scheduled to return Friday 4/9.    Unable to complete prescription refill per RN Medication Refill Policy. Pretty Rodríguez RN .............. 4/7/2021  10:31 AM

## 2021-04-09 ENCOUNTER — ALLIED HEALTH/NURSE VISIT (OUTPATIENT)
Dept: FAMILY MEDICINE | Facility: OTHER | Age: 71
End: 2021-04-09
Attending: FAMILY MEDICINE
Payer: MEDICARE

## 2021-04-09 DIAGNOSIS — Z20.822 COVID-19 RULED OUT: Primary | ICD-10-CM

## 2021-04-09 LAB
SARS-COV-2 RNA RESP QL NAA+PROBE: NORMAL
SPECIMEN SOURCE: NORMAL

## 2021-04-09 PROCEDURE — U0003 INFECTIOUS AGENT DETECTION BY NUCLEIC ACID (DNA OR RNA); SEVERE ACUTE RESPIRATORY SYNDROME CORONAVIRUS 2 (SARS-COV-2) (CORONAVIRUS DISEASE [COVID-19]), AMPLIFIED PROBE TECHNIQUE, MAKING USE OF HIGH THROUGHPUT TECHNOLOGIES AS DESCRIBED BY CMS-2020-01-R: HCPCS | Mod: ZL | Performed by: FAMILY MEDICINE

## 2021-04-09 PROCEDURE — C9803 HOPD COVID-19 SPEC COLLECT: HCPCS

## 2021-04-09 PROCEDURE — U0005 INFEC AGEN DETEC AMPLI PROBE: HCPCS | Mod: ZL | Performed by: FAMILY MEDICINE

## 2021-04-09 NOTE — PROCEDURES
Patient swabbed for COVID-19 testing.  Milvia Frankel LPN on 4/9/2021 at 11:31 AM    Surgery on 4-13-21 at [facility].

## 2021-04-10 LAB
LABORATORY COMMENT REPORT: NORMAL
SARS-COV-2 RNA RESP QL NAA+PROBE: NEGATIVE
SPECIMEN SOURCE: NORMAL

## 2021-04-13 ENCOUNTER — TRANSFERRED RECORDS (OUTPATIENT)
Dept: HEALTH INFORMATION MANAGEMENT | Facility: OTHER | Age: 71
End: 2021-04-13

## 2021-04-27 ENCOUNTER — TRANSFERRED RECORDS (OUTPATIENT)
Dept: HEALTH INFORMATION MANAGEMENT | Facility: OTHER | Age: 71
End: 2021-04-27

## 2021-06-02 DIAGNOSIS — E11.9 TYPE 2 DIABETES MELLITUS WITHOUT COMPLICATION, WITH LONG-TERM CURRENT USE OF INSULIN (H): ICD-10-CM

## 2021-06-02 DIAGNOSIS — Z79.4 TYPE 2 DIABETES MELLITUS WITHOUT COMPLICATION, WITH LONG-TERM CURRENT USE OF INSULIN (H): ICD-10-CM

## 2021-06-02 RX ORDER — PEN NEEDLE, DIABETIC 31 GX5/16"
NEEDLE, DISPOSABLE MISCELLANEOUS
Qty: 400 EACH | Refills: 3 | Status: SHIPPED | OUTPATIENT
Start: 2021-06-02 | End: 2022-06-15

## 2021-06-02 NOTE — TELEPHONE ENCOUNTER
"[facility] sent Rx request for the following:   insulin pen needle (B-D U/F) 31G X 8 MM miscellaneous  Sig: USE 4 PEN NEEDLES DAILY    Last Prescription Date:   05/21/2020  Last Fill Qty/Refills:         400 each, R-3    Last Office Visit:              03/09/2021 ([facility])   [facility]   Diabetic Supplies Protocol Passed - 6/2/2021  1:24 PM        Passed - Medication is active on med list        Passed - Patient is 18 years of age or older        Passed - Recent (6 mo) or future (30 days) visit within the authorizing provider's specialty     Patient had office visit in the last 6 months or has a visit in the next 30 days with authorizing provider.  See \"Patient Info\" tab in inbasket, or \"Choose Columns\" in Meds & Orders section of the refill encounter.             Prescription approved per [facility] Refill Protocol.  Carolyn Gregg RN ....................  6/2/2021   3:31 PM        "

## 2021-06-03 ENCOUNTER — APPOINTMENT (OUTPATIENT)
Dept: GENERAL RADIOLOGY | Facility: OTHER | Age: 71
End: 2021-06-03
Attending: FAMILY MEDICINE
Payer: MEDICARE

## 2021-06-03 ENCOUNTER — HOSPITAL ENCOUNTER (EMERGENCY)
Facility: OTHER | Age: 71
Discharge: HOME | End: 2021-06-03
Attending: FAMILY MEDICINE | Admitting: FAMILY MEDICINE
Payer: MEDICARE

## 2021-06-03 ENCOUNTER — APPOINTMENT (OUTPATIENT)
Dept: CT IMAGING | Facility: OTHER | Age: 71
End: 2021-06-03
Attending: FAMILY MEDICINE
Payer: MEDICARE

## 2021-06-03 VITALS
RESPIRATION RATE: 8 BRPM | SYSTOLIC BLOOD PRESSURE: 90 MMHG | BODY MASS INDEX: 27.46 KG/M2 | OXYGEN SATURATION: 100 % | WEIGHT: 165 LBS | DIASTOLIC BLOOD PRESSURE: 68 MMHG | TEMPERATURE: 96 F | HEART RATE: 60 BPM

## 2021-06-03 DIAGNOSIS — M54.2 NECK PAIN: ICD-10-CM

## 2021-06-03 DIAGNOSIS — M79.622 PAIN OF LEFT UPPER ARM: ICD-10-CM

## 2021-06-03 LAB
ANION GAP SERPL CALCULATED.3IONS-SCNC: 10 MMOL/L (ref 3–14)
BASOPHILS # BLD AUTO: 0 10E9/L (ref 0–0.2)
BASOPHILS NFR BLD AUTO: 0.4 %
BUN SERPL-MCNC: 25 MG/DL (ref 7–25)
CALCIUM SERPL-MCNC: 10 MG/DL (ref 8.6–10.3)
CHLORIDE SERPL-SCNC: 107 MMOL/L (ref 98–107)
CO2 SERPL-SCNC: 23 MMOL/L (ref 21–31)
CREAT SERPL-MCNC: 2.24 MG/DL (ref 0.6–1.2)
CRP SERPL-MCNC: <1 MG/L
DIFFERENTIAL METHOD BLD: ABNORMAL
EOSINOPHIL # BLD AUTO: 0.2 10E9/L (ref 0–0.7)
EOSINOPHIL NFR BLD AUTO: 2.8 %
ERYTHROCYTE [DISTWIDTH] IN BLOOD BY AUTOMATED COUNT: 13.9 % (ref 10–15)
GFR SERPL CREATININE-BSD FRML MDRD: 21 ML/MIN/{1.73_M2}
GLUCOSE SERPL-MCNC: 154 MG/DL (ref 70–105)
HCT VFR BLD AUTO: 40 % (ref 35–47)
HGB BLD-MCNC: 13.4 G/DL (ref 11.7–15.7)
IMM GRANULOCYTES # BLD: 0 10E9/L (ref 0–0.4)
IMM GRANULOCYTES NFR BLD: 0.3 %
LYMPHOCYTES # BLD AUTO: 1.6 10E9/L (ref 0.8–5.3)
LYMPHOCYTES NFR BLD AUTO: 23.3 %
MCH RBC QN AUTO: 29.5 PG (ref 26.5–33)
MCHC RBC AUTO-ENTMCNC: 33.5 G/DL (ref 31.5–36.5)
MCV RBC AUTO: 88 FL (ref 78–100)
MONOCYTES # BLD AUTO: 0.7 10E9/L (ref 0–1.3)
MONOCYTES NFR BLD AUTO: 10.5 %
NEUTROPHILS # BLD AUTO: 4.3 10E9/L (ref 1.6–8.3)
NEUTROPHILS NFR BLD AUTO: 62.7 %
PLATELET # BLD AUTO: 132 10E9/L (ref 150–450)
POTASSIUM SERPL-SCNC: 3 MMOL/L (ref 3.5–5.1)
RBC # BLD AUTO: 4.55 10E12/L (ref 3.8–5.2)
SODIUM SERPL-SCNC: 140 MMOL/L (ref 134–144)
TROPONIN I SERPL-MCNC: 23.3 PG/ML
WBC # BLD AUTO: 6.9 10E9/L (ref 4–11)

## 2021-06-03 PROCEDURE — 85025 COMPLETE CBC W/AUTO DIFF WBC: CPT | Performed by: FAMILY MEDICINE

## 2021-06-03 PROCEDURE — 250N000011 HC RX IP 250 OP 636: Performed by: FAMILY MEDICINE

## 2021-06-03 PROCEDURE — 99283 EMERGENCY DEPT VISIT LOW MDM: CPT | Performed by: FAMILY MEDICINE

## 2021-06-03 PROCEDURE — 86140 C-REACTIVE PROTEIN: CPT | Performed by: FAMILY MEDICINE

## 2021-06-03 PROCEDURE — 36415 COLL VENOUS BLD VENIPUNCTURE: CPT | Performed by: FAMILY MEDICINE

## 2021-06-03 PROCEDURE — 99285 EMERGENCY DEPT VISIT HI MDM: CPT | Mod: 25 | Performed by: FAMILY MEDICINE

## 2021-06-03 PROCEDURE — 73030 X-RAY EXAM OF SHOULDER: CPT | Mod: LT

## 2021-06-03 PROCEDURE — 72125 CT NECK SPINE W/O DYE: CPT

## 2021-06-03 PROCEDURE — 80048 BASIC METABOLIC PNL TOTAL CA: CPT | Performed by: FAMILY MEDICINE

## 2021-06-03 PROCEDURE — 250N000013 HC RX MED GY IP 250 OP 250 PS 637: Mod: GY | Performed by: FAMILY MEDICINE

## 2021-06-03 PROCEDURE — 93005 ELECTROCARDIOGRAM TRACING: CPT | Performed by: FAMILY MEDICINE

## 2021-06-03 PROCEDURE — 93010 ELECTROCARDIOGRAM REPORT: CPT | Performed by: INTERNAL MEDICINE

## 2021-06-03 PROCEDURE — 84484 ASSAY OF TROPONIN QUANT: CPT | Performed by: FAMILY MEDICINE

## 2021-06-03 PROCEDURE — 96375 TX/PRO/DX INJ NEW DRUG ADDON: CPT | Performed by: FAMILY MEDICINE

## 2021-06-03 PROCEDURE — 96376 TX/PRO/DX INJ SAME DRUG ADON: CPT | Performed by: FAMILY MEDICINE

## 2021-06-03 PROCEDURE — 96374 THER/PROPH/DIAG INJ IV PUSH: CPT | Performed by: FAMILY MEDICINE

## 2021-06-03 RX ORDER — HYDROMORPHONE HYDROCHLORIDE 1 MG/ML
0.5 INJECTION, SOLUTION INTRAMUSCULAR; INTRAVENOUS; SUBCUTANEOUS ONCE
Status: COMPLETED | OUTPATIENT
Start: 2021-06-03 | End: 2021-06-03

## 2021-06-03 RX ORDER — POTASSIUM CHLORIDE 20MEQ/15ML
40 LIQUID (ML) ORAL ONCE
Status: COMPLETED | OUTPATIENT
Start: 2021-06-03 | End: 2021-06-03

## 2021-06-03 RX ORDER — ONDANSETRON 2 MG/ML
4 INJECTION INTRAMUSCULAR; INTRAVENOUS ONCE
Status: COMPLETED | OUTPATIENT
Start: 2021-06-03 | End: 2021-06-03

## 2021-06-03 RX ORDER — METHYLPREDNISOLONE SODIUM SUCCINATE 125 MG/2ML
125 INJECTION, POWDER, LYOPHILIZED, FOR SOLUTION INTRAMUSCULAR; INTRAVENOUS ONCE
Status: COMPLETED | OUTPATIENT
Start: 2021-06-03 | End: 2021-06-03

## 2021-06-03 RX ORDER — HYDROCODONE BITARTRATE AND ACETAMINOPHEN 5; 325 MG/1; MG/1
1 TABLET ORAL EVERY 6 HOURS PRN
Qty: 18 TABLET | Refills: 0 | Status: SHIPPED | OUTPATIENT
Start: 2021-06-03 | End: 2021-06-08

## 2021-06-03 RX ORDER — KETOROLAC TROMETHAMINE 15 MG/ML
15 INJECTION, SOLUTION INTRAMUSCULAR; INTRAVENOUS ONCE
Status: COMPLETED | OUTPATIENT
Start: 2021-06-03 | End: 2021-06-03

## 2021-06-03 RX ADMIN — HYDROMORPHONE HYDROCHLORIDE 0.5 MG: 1 INJECTION, SOLUTION INTRAMUSCULAR; INTRAVENOUS; SUBCUTANEOUS at 17:30

## 2021-06-03 RX ADMIN — ONDANSETRON 4 MG: 2 INJECTION INTRAMUSCULAR; INTRAVENOUS at 17:30

## 2021-06-03 RX ADMIN — POTASSIUM CHLORIDE 40 MEQ: 20 SOLUTION ORAL at 18:21

## 2021-06-03 RX ADMIN — KETOROLAC TROMETHAMINE 15 MG: 15 INJECTION, SOLUTION INTRAMUSCULAR; INTRAVENOUS at 17:08

## 2021-06-03 RX ADMIN — HYDROMORPHONE HYDROCHLORIDE 0.5 MG: 1 INJECTION, SOLUTION INTRAMUSCULAR; INTRAVENOUS; SUBCUTANEOUS at 18:21

## 2021-06-03 RX ADMIN — METHYLPREDNISOLONE SODIUM SUCCINATE 125 MG: 125 INJECTION, POWDER, FOR SOLUTION INTRAMUSCULAR; INTRAVENOUS at 18:21

## 2021-06-03 ASSESSMENT — ENCOUNTER SYMPTOMS
CHILLS: 0
ACTIVITY CHANGE: 1
NECK PAIN: 1
FEVER: 0
BACK PAIN: 0
ABDOMINAL PAIN: 1
COUGH: 1
SHORTNESS OF BREATH: 1

## 2021-06-03 NOTE — ED PROVIDER NOTES
History     Chief Complaint   Patient presents with     Arm Pain     Shoulder Pain     HPI  Bailey Sierra is a 71 year old female with history of pacemaker, CABG x 2,HTN, hyperlipidemia, Type II DM with CKD type 3 who presents to the ER with Left shoulder, arm and neck pain for about 3 weeks.  Patient states that she is trying to put ice on it heat on it and it has not helped.  She  states that Tylenol and ibuprofen are worthless.  She lays on her left shoulder because it feels better.  She did go to see the chiropractor 3 times and states that that has not helped at all.  When I asked her why she came in today she stated that she can stand it anymore.  She also talked to the pacemaker nurse who told her she should come in because it was on her left side.    Allergies:  No Known Allergies    Problem List:    Patient Active Problem List    Diagnosis Date Noted     S/P cardiac pacemaker procedure 03/09/2021     Priority: Medium     Bradycardia 02/24/2021     Priority: Medium     COVID-19 virus detected 02/23/2021     Priority: Medium     Proteinuria, unspecified type 07/20/2020     Priority: Medium     Pavon's esophagus without dysplasia 07/08/2020     Priority: Medium     History of coronary artery bypass graft x 2 12/30/2019     Priority: Medium     Stenosis of carotid artery, unspecified laterality 12/30/2019     Priority: Medium     Chronic total occlusion of coronary artery (RCA) 12/30/2019     Priority: Medium     Atherosclerotic heart disease of native coronary artery with other forms of angina pectoris (H) 09/28/2018     Priority: Medium     CKD (chronic kidney disease) stage 4, GFR 15-29 ml/min (H) 01/17/2018     Priority: Medium     Nonrheumatic aortic valve stenosis 01/17/2018     Priority: Medium     Hyperlipidemia 01/17/2018     Priority: Medium     Essential hypertension 01/17/2018     Priority: Medium     AC (acromioclavicular) joint arthritis 03/10/2017     Priority: Medium     Impingement  syndrome of right shoulder 03/10/2017     Priority: Medium     Right rotator cuff tendinitis 03/10/2017     Priority: Medium     Type 2 diabetes mellitus with hyperglycemia, with long-term current use of insulin (H) 02/13/2017     Priority: Medium     Light chain disease, 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  Per Dr. Ortiz, if hypoalbuminemia not improving by Nov-Dec 2016, needs referral to Hematology.        Vitamin D deficiency 10/23/2016     Priority: Medium     Hyperparathyroidism due to renal insufficiency (H) 03/28/2016     Priority: Medium     Coronary atherosclerosis 09/03/2013     Priority: Medium     Overview:   2 vessel bypass, August 2013       Hiatal hernia 07/26/2013     Priority: Medium     GERD (gastroesophageal reflux disease) 01/08/2013     Priority: Medium     Urge incontinence 10/25/2012     Priority: Medium     Insomnia 01/25/2012     Priority: Medium        Past Medical History:    Past Medical History:   Diagnosis Date     Atherosclerotic heart disease of native coronary artery without angina pectoris      Cardiac murmur      Chronic kidney disease, stage III (moderate)      Controlled type 2 diabetes mellitus with stage 3 chronic kidney disease, with long-term current use of insulin (H) 7/26/2013     Edema      Essential (primary) hypertension      Gastro-esophageal reflux disease without esophagitis      Hyperlipidemia      Impingement syndrome of right shoulder 03/10/2017     Insomnia 01/25/2012     Iron deficiency anemia      Neuromuscular dysfunction of bladder      Osteoarthritis      Other shoulder lesions, right shoulder 03/10/2017     Other shoulder lesions, unspecified shoulder      Other specified postprocedural states 04/19/2017     Personal history of other medical treatment (CODE)      Plantar fascial fibromatosis      Presence of aortocoronary bypass graft 07/2013     Primary osteoarthritis of shoulder 03/10/2017      Type 2 diabetes mellitus without complications (H)      Urgency of urination 03/08/2011     Uterovaginal prolapse        Past Surgical History:    Past Surgical History:   Procedure Laterality Date     ARTHROSCOPY SHOULDER Right 1997          ARTHROSCOPY SHOULDER Left 2012          ARTHROSCOPY SHOULDER RT/LT Right [id number]          Cardiac Bypass  07/2013          COLONOSCOPY  11/24/2009 11/24/2009     CYSTOSCOPY  03/15/2001     DENTAL SURGERY      Dental extractions     ENDOSCOPIC RETROGRADE CHOLANGIOPANCREATOGRAPHY  05/2008    Bile leak with ERCP and stenting and drainage of bile collection through abdominal wall.     ESOPHAGOSCOPY, GASTROSCOPY, DUODENOSCOPY (EGD), COMBINED N/A 6/23/2020    Pavon's follow up 5 years, 6/23/2025     HYSTERECTOMY TOTAL ABDOMINAL, BILATERAL SALPINGO-OOPHORECTOMY, COMBINED  04/02/2001    Vag vault suspension with Cortex mesh     IMPLANT STIMULATOR AND LEADS SACRAL NERVE (STAGE ONE AND TWO) N/A 5/7/2019    Procedure: Interstim Implant Revision, GENERATOR AND TYING LEAD EXCHANGE;  Surgeon: Honorio Bowling MD;  Location: [facility] OR     Interstim Device Placement  04/14/2014    Dr. Bowling - [facility]     LAMINECTOMY LUMBAR ONE LEVEL  1997          LAPAROSCOPIC CHOLECYSTECTOMY  04/2008          LAPAROSCOPIC TUBAL LIGATION      No Comments Provided       Family History:    Family History   Problem Relation Age of Onset     Other - See Comments Father         MI     Cancer Mother         Cancer,Some type of cancer, aneurysm     Diabetes Paternal Grandmother         Diabetes,Type II     Cancer Sister         Cancer,unknown type     Diabetes Sister         Diabetes,Type II     Diabetes Sister         Diabetes,Type II     Diabetes Sister         Diabetes,Type II     Other - See Comments Daughter         depression     Other - See Comments Daughter         ovarian cancer and depression     Heart Disease Brother         Heart Disease,CAD, MI       Social History:  Marital Status:   [5]  Social  History     Tobacco Use     Smoking status: Never Smoker     Smokeless tobacco: Never Used   Substance Use Topics     Alcohol use: No     Alcohol/week: 0.0 standard drinks     Drug use: No     Comment: Drug use: No        Medications:    HYDROcodone-acetaminophen (NORCO) 5-325 MG tablet  aspirin (ASA) 81 MG tablet  blood glucose monitoring (TRICIA CONTOUR) test strip  Calcium Carb-Cholecalciferol (CALCIUM PLUS D3 ABSORBABLE) 600-2500 MG-UNIT CAPS  glucose (BD GLUCOSE) 4 g chewable tablet  insulin aspart (NOVOLOG FLEXPEN) 100 UNIT/ML pen  insulin glargine (BASAGLAR KWIKPEN) 100 UNIT/ML pen  insulin pen needle (B-D U/F) 31G X 8 MM miscellaneous  isosorbide mononitrate (IMDUR) 60 MG 24 hr tablet  lisinopril (ZESTRIL) 2.5 MG tablet  metoprolol succinate ER (TOPROL-XL) 25 MG 24 hr tablet  nitroGLYcerin (NITROSTAT) 0.4 MG sublingual tablet  order for DME  rosuvastatin (CRESTOR) 40 MG tablet  zolpidem (AMBIEN) 10 MG tablet      Review of Systems   Constitutional: Positive for activity change. Negative for chills and fever.   Respiratory: Positive for cough and shortness of breath.    Cardiovascular: Negative for chest pain.   Gastrointestinal: Positive for abdominal pain.   Musculoskeletal: Positive for neck pain. Negative for back pain.        Left shoulder pain.  Left arm pain.       Physical Exam   BP: 118/54  Pulse: 79  Temp: 96  F (35.6  C)  Resp: 20  Weight: 74.8 kg (165 lb)  SpO2: 98 %      Physical Exam  Vitals signs and nursing note reviewed.   Constitutional:       Appearance: Normal appearance.      Comments: Appears uncomfortable and slightly tearful.   HENT:      Head: Normocephalic and atraumatic.   Neck:      Musculoskeletal: Normal range of motion and neck supple. No neck rigidity or muscular tenderness.   Cardiovascular:      Rate and Rhythm: Normal rate and regular rhythm.      Heart sounds: Normal heart sounds.   Pulmonary:      Effort: No respiratory distress.      Breath sounds: Normal breath sounds. No  wheezing or rales.   Musculoskeletal: Normal range of motion.         General: No swelling.      Comments: I was able to passively move her shoulder.  She had some point tenderness posteriorly and about 4 cm down on the humerus.  Rotation of the arm in flexion extension however did not hurt.   Skin:     General: Skin is warm and dry.      Capillary Refill: Capillary refill takes less than 2 seconds.   Neurological:      General: No focal deficit present.      Mental Status: She is alert and oriented to person, place, and time.       ED Course   Patient seen and examined. Xray ordered. Toradol ordered. Labs ordered.   Labs are unremarkable. Patient had initial improvement with dilaudid, but pain is now coming back. Will scan C- spine and give another dose of Dilaudid. I dose solumedrol for possible radiculopathy.     Patient does have some moderate evidence of C6-C7 on the left with definite impingement on the right.  Discussed with patient that this possibly could be secondary to a new rotator cuff tear and she will need to have that worked up as an outpatient.  We will give her pain meds and advised that she follow-up with her regular provider.  Patient has an appointment with her provider next Tuesday.  She is comfortable with discharge at this time.  She was actually fairly comfortable in terms of her pain as well at time of discharge.         EKG Interpretation:      Interpreted by Siri Saleem MD  Time reviewed: 16:46  Symptoms at time of EKG: left shoulder pain, neck pain and arm pain   Rhythm: atrial paced rhythm  Rate: Normal  Axis: Normal  Ectopy: premature atrial contraction  Conduction: 1st degree AV block   ST Segments/ T Waves: Non-specific ST-T wave changes  Q Waves: III and aVf  Comparison to prior: Unchanged    Clinical Impression: non-specific EKG       Results for orders placed or performed during the hospital encounter of 06/03/21 (from the past 24 hour(s))   CBC with platelets differential    Result Value Ref Range    WBC 6.9 4.0 - 11.0 10e9/L    RBC Count 4.55 3.8 - 5.2 10e12/L    Hemoglobin 13.4 11.7 - 15.7 g/dL    Hematocrit 40.0 35.0 - 47.0 %    MCV 88 78 - 100 fl    MCH 29.5 26.5 - 33.0 pg    MCHC 33.5 31.5 - 36.5 g/dL    RDW 13.9 10.0 - 15.0 %    Platelet Count 132 (L) 150 - 450 10e9/L    Diff Method Automated Method     % Neutrophils 62.7 %    % Lymphocytes 23.3 %    % Monocytes 10.5 %    % Eosinophils 2.8 %    % Basophils 0.4 %    % Immature Granulocytes 0.3 %    Absolute Neutrophil 4.3 1.6 - 8.3 10e9/L    Absolute Lymphocytes 1.6 0.8 - 5.3 10e9/L    Absolute Monocytes 0.7 0.0 - 1.3 10e9/L    Absolute Eosinophils 0.2 0.0 - 0.7 10e9/L    Absolute Basophils 0.0 0.0 - 0.2 10e9/L    Abs Immature Granulocytes 0.0 0 - 0.4 10e9/L   CRP inflammation   Result Value Ref Range    CRP Inflammation <1.0 <10.0 mg/L   Troponin [facility]   Result Value Ref Range    Troponin 23.3 <34.0 pg/mL   Basic metabolic panel   Result Value Ref Range    Sodium 140 134 - 144 mmol/L    Potassium 3.0 (L) 3.5 - 5.1 mmol/L    Chloride 107 98 - 107 mmol/L    Carbon Dioxide 23 21 - 31 mmol/L    Anion Gap 10 3 - 14 mmol/L    Glucose 154 (H) 70 - 105 mg/dL    Urea Nitrogen 25 7 - 25 mg/dL    Creatinine 2.24 (H) 0.60 - 1.20 mg/dL    GFR Estimate 21 (L) >60 mL/min/[1.73_m2]    GFR Estimate If Black 26 (L) >60 mL/min/[1.73_m2]    Calcium 10.0 8.6 - 10.3 mg/dL   XR Shoulder Left 2 Views    Narrative    Exam: XR SHOULDER 2 VIEW LEFT     History:Female, age 71 years, increased pain left shoulder    Comparison:  None    Technique: Two views are submitted    Findings: Bones are normally mineralized. No evidence of acute or  subacute fracture.  No evidence of dislocation.  Postoperative changes  of the left shoulder. Mild glenohumeral joint degenerative change           Impression    Impression:  No evidence of acute or subacute bony abnormality.     Mild glenohumeral joint degenerative change.    Transvenous pacer. If concern for recurrent  rotator cuff tear exists,  CT arthrogram may be helpful in characterizing the bony and soft  tissue structures.    ROMÁN CROWDER MD   CT Cervical Spine w/o Contrast    Narrative    CT CERVICAL SPINE W/O CONTRAST, 6/3/2021 6:20 PM    History: Female, age 71 years; Neck pain, chronic, no prior imaging;  Cervical radiculopathy, no red flags    Comparison: None.    TECHNIQUE: CT was performed of the cervical spine. Sagittal, coronal,  and axial reconstructions were reviewed.    FINDINGS: The  cervical spine demonstrates mild generalized  straightening. Degenerative changes are seen throughout with fusion of  the left C2-3 and C3-4 facet joints. Large right-sided uncinate spurs  severely narrow the right neural foramen at C5-6 and C6-7. The left  neural foramen at C6-7 is moderately narrowed. No acute fracture, no  acute subluxation. Soft tissues are unremarkable.      Impression    IMPRESSION:   No evidence of acute or subacute bony abnormality.     Moderate multilevel degenerative change. Uncinate spurring narrows the  right neural foramen at both the C5-6 and C6-7 levels with possible  right C6 and C7 impingement. Correlate for right C6 versus C7  radiculopathy.    ROMÁN CROWDER MD       Medications   ketorolac (TORADOL) injection 15 mg (15 mg Intravenous Given 6/3/21 1708)   HYDROmorphone (PF) (DILAUDID) injection 0.5 mg (0.5 mg Intravenous Given 6/3/21 1730)   ondansetron (ZOFRAN) injection 4 mg (4 mg Intravenous Given 6/3/21 1730)   potassium chloride (KAYCIEL) solution 40 mEq (40 mEq Oral Given 6/3/21 1821)   methylPREDNISolone sodium succinate (solu-MEDROL) injection 125 mg (125 mg Intravenous Given 6/3/21 1821)   HYDROmorphone (PF) (DILAUDID) injection 0.5 mg (0.5 mg Intravenous Given 6/3/21 1821)       Assessments & Plan (with Medical Decision Making)     I have reviewed the nursing notes.    I have reviewed the findings, diagnosis, plan and need for follow up with the patient.    New Prescriptions     HYDROCODONE-ACETAMINOPHEN (NORCO) 5-325 MG TABLET    Take 1 tablet by mouth every 6 hours as needed for pain       Final diagnoses:   Neck pain   Pain of left upper arm       6/3/2021   [facility] MD  06/03/21 [id number]

## 2021-06-03 NOTE — ED TRIAGE NOTES
ED Nursing Triage Note (General)   ________________________________    Bailey MEMO Sierra is a 71 year old Female that presents to triage via private car with c/o left shoulder and left arm pain, as well as left side of neck. Patient has had this pain for 3 weeks. Denies chest pain. Denies SOB. Has a pacemaker. Did not take anything for pain today.   /54   Pulse 79   Temp 96  F (35.6  C) (Tympanic)   Resp 20   Wt 74.8 kg (165 lb)   SpO2 98%   BMI 27.46 kg/m  t  Patient appears alert , in mild distress., and cooperative and calm behavior.  GCS Total = 15  Airway: intact  Breathing noted as Normal.  Circulation Normal  Skin normal  Action taken:  Triage to critical care immediately      PRE HOSPITAL PRIOR LIVING SITUATION Alone

## 2021-06-04 DIAGNOSIS — E78.5 HYPERLIPIDEMIA, UNSPECIFIED HYPERLIPIDEMIA TYPE: ICD-10-CM

## 2021-06-04 DIAGNOSIS — N25.81 HYPERPARATHYROIDISM DUE TO RENAL INSUFFICIENCY (H): ICD-10-CM

## 2021-06-04 DIAGNOSIS — Z79.4 TYPE 2 DIABETES MELLITUS WITH HYPERGLYCEMIA, WITH LONG-TERM CURRENT USE OF INSULIN (H): ICD-10-CM

## 2021-06-04 DIAGNOSIS — E11.65 TYPE 2 DIABETES MELLITUS WITH HYPERGLYCEMIA, WITH LONG-TERM CURRENT USE OF INSULIN (H): ICD-10-CM

## 2021-06-04 LAB
ALBUMIN SERPL-MCNC: 3.6 G/DL (ref 3.5–5.7)
ALP SERPL-CCNC: 59 U/L (ref 34–104)
ALT SERPL W P-5'-P-CCNC: 151 U/L (ref 7–52)
ANION GAP SERPL CALCULATED.3IONS-SCNC: 11 MMOL/L (ref 3–14)
AST SERPL W P-5'-P-CCNC: 173 U/L (ref 13–39)
BILIRUB SERPL-MCNC: 0.7 MG/DL (ref 0.3–1)
BUN SERPL-MCNC: 41 MG/DL (ref 7–25)
CALCIUM SERPL-MCNC: 10.1 MG/DL (ref 8.6–10.3)
CHLORIDE SERPL-SCNC: 105 MMOL/L (ref 98–107)
CHOLEST SERPL-MCNC: 132 MG/DL
CO2 SERPL-SCNC: 21 MMOL/L (ref 21–31)
CREAT SERPL-MCNC: 3.52 MG/DL (ref 0.6–1.2)
DEPRECATED CALCIDIOL+CALCIFEROL SERPL-MC: 37.9 NG/ML
GFR SERPL CREATININE-BSD FRML MDRD: 13 ML/MIN/{1.73_M2}
GLUCOSE SERPL-MCNC: 292 MG/DL (ref 70–105)
HBA1C MFR BLD: 6.7 % (ref 4–6)
HDLC SERPL-MCNC: 44 MG/DL (ref 23–92)
INTERPRETATION ECG - MUSE: NORMAL
LDLC SERPL CALC-MCNC: 71 MG/DL
NONHDLC SERPL-MCNC: 88 MG/DL
POTASSIUM SERPL-SCNC: 4 MMOL/L (ref 3.5–5.1)
PROT SERPL-MCNC: 6.5 G/DL (ref 6.4–8.9)
PTH-INTACT SERPL-MCNC: 65 PG/ML (ref 12–88)
SODIUM SERPL-SCNC: 137 MMOL/L (ref 134–144)
TRIGL SERPL-MCNC: 83 MG/DL

## 2021-06-04 PROCEDURE — 80061 LIPID PANEL: CPT | Mod: ZL | Performed by: FAMILY MEDICINE

## 2021-06-04 PROCEDURE — 83036 HEMOGLOBIN GLYCOSYLATED A1C: CPT | Mod: ZL | Performed by: FAMILY MEDICINE

## 2021-06-04 PROCEDURE — 82306 VITAMIN D 25 HYDROXY: CPT | Mod: ZL | Performed by: FAMILY MEDICINE

## 2021-06-04 PROCEDURE — 83970 ASSAY OF PARATHORMONE: CPT | Mod: ZL | Performed by: FAMILY MEDICINE

## 2021-06-04 PROCEDURE — 80053 COMPREHEN METABOLIC PANEL: CPT | Mod: ZL | Performed by: FAMILY MEDICINE

## 2021-06-04 PROCEDURE — 36415 COLL VENOUS BLD VENIPUNCTURE: CPT | Mod: ZL | Performed by: FAMILY MEDICINE

## 2021-06-08 ENCOUNTER — OFFICE VISIT (OUTPATIENT)
Dept: FAMILY MEDICINE | Facility: OTHER | Age: 71
End: 2021-06-08
Attending: FAMILY MEDICINE
Payer: COMMERCIAL

## 2021-06-08 VITALS
SYSTOLIC BLOOD PRESSURE: 92 MMHG | WEIGHT: 162.4 LBS | BODY MASS INDEX: 27.06 KG/M2 | RESPIRATION RATE: 20 BRPM | DIASTOLIC BLOOD PRESSURE: 48 MMHG | OXYGEN SATURATION: 99 % | HEART RATE: 88 BPM | TEMPERATURE: 98.4 F | HEIGHT: 65 IN

## 2021-06-08 DIAGNOSIS — E11.9 TYPE 2 DIABETES MELLITUS WITHOUT COMPLICATION, WITH LONG-TERM CURRENT USE OF INSULIN (H): ICD-10-CM

## 2021-06-08 DIAGNOSIS — Z79.4 TYPE 2 DIABETES MELLITUS WITHOUT COMPLICATION, WITH LONG-TERM CURRENT USE OF INSULIN (H): ICD-10-CM

## 2021-06-08 DIAGNOSIS — N18.4 CKD (CHRONIC KIDNEY DISEASE) STAGE 4, GFR 15-29 ML/MIN (H): ICD-10-CM

## 2021-06-08 DIAGNOSIS — F51.01 PRIMARY INSOMNIA: ICD-10-CM

## 2021-06-08 DIAGNOSIS — I10 ESSENTIAL HYPERTENSION: ICD-10-CM

## 2021-06-08 DIAGNOSIS — M25.512 ACUTE PAIN OF LEFT SHOULDER: Primary | ICD-10-CM

## 2021-06-08 PROCEDURE — G0463 HOSPITAL OUTPT CLINIC VISIT: HCPCS

## 2021-06-08 PROCEDURE — 99214 OFFICE O/P EST MOD 30 MIN: CPT | Performed by: FAMILY MEDICINE

## 2021-06-08 RX ORDER — LISINOPRIL 2.5 MG/1
2.5 TABLET ORAL DAILY
Qty: 90 TABLET | Refills: 3 | Status: SHIPPED | OUTPATIENT
Start: 2021-06-08 | End: 2021-07-27

## 2021-06-08 RX ORDER — ZOLPIDEM TARTRATE 10 MG/1
10 TABLET ORAL
Qty: 30 TABLET | Refills: 5 | Status: SHIPPED | OUTPATIENT
Start: 2021-06-08 | End: 2022-07-15

## 2021-06-08 RX ORDER — TRAMADOL HYDROCHLORIDE 50 MG/1
50 TABLET ORAL EVERY 6 HOURS PRN
Qty: 10 TABLET | Refills: 0 | Status: SHIPPED | OUTPATIENT
Start: 2021-06-08 | End: 2021-06-11

## 2021-06-08 ASSESSMENT — PAIN SCALES - GENERAL: PAINLEVEL: WORST PAIN (10)

## 2021-06-08 ASSESSMENT — MIFFLIN-ST. JEOR: SCORE: 1247.52

## 2021-06-08 NOTE — NURSING NOTE
Patient presents today for ED follow up. Patient complains she is still having terrible neck and shoulder pain.    Lab Results   Component Value Date    A1C 6.7 06/04/2021    A1C 7.0 12/17/2020    A1C Canceled, Test credited 11/03/2020    ALBUMIN 3.6 06/04/2021     Previous A1C is at goal of <8  Date of last Foot exam due  Eye exam Hard copy has been scanned into the chart.  Patient is not a Tobacco User  Patient is on a daily aspirin  Patient is on a Statin.  Blood pressure today of:92/48     BP Readings from Last 1 Encounters:   06/03/21 90/68      is at the goal of <139/89 for diabetics.    Noemy Holt LPN on 6/8/2021 at 10:11 AM        Medication Reconciliation Complete    Noemy Holt LPN  6/8/2021 10:10 AM

## 2021-06-10 ENCOUNTER — HOSPITAL ENCOUNTER (EMERGENCY)
Facility: OTHER | Age: 71
Discharge: ANOTHER HOSPITAL | End: 2021-06-10
Attending: PHYSICIAN ASSISTANT | Admitting: PHYSICIAN ASSISTANT
Payer: MEDICARE

## 2021-06-10 ENCOUNTER — TRANSFERRED RECORDS (OUTPATIENT)
Dept: HEALTH INFORMATION MANAGEMENT | Facility: OTHER | Age: 71
End: 2021-06-10

## 2021-06-10 ENCOUNTER — APPOINTMENT (OUTPATIENT)
Dept: GENERAL RADIOLOGY | Facility: OTHER | Age: 71
End: 2021-06-10
Attending: PHYSICIAN ASSISTANT
Payer: MEDICARE

## 2021-06-10 ENCOUNTER — APPOINTMENT (OUTPATIENT)
Dept: CT IMAGING | Facility: OTHER | Age: 71
End: 2021-06-10
Attending: PHYSICIAN ASSISTANT
Payer: MEDICARE

## 2021-06-10 ENCOUNTER — APPOINTMENT (OUTPATIENT)
Dept: ULTRASOUND IMAGING | Facility: OTHER | Age: 71
End: 2021-06-10
Attending: PHYSICIAN ASSISTANT
Payer: MEDICARE

## 2021-06-10 VITALS
BODY MASS INDEX: 27.66 KG/M2 | RESPIRATION RATE: 17 BRPM | WEIGHT: 166 LBS | HEIGHT: 65 IN | SYSTOLIC BLOOD PRESSURE: 137 MMHG | OXYGEN SATURATION: 100 % | DIASTOLIC BLOOD PRESSURE: 66 MMHG | TEMPERATURE: 96.6 F | HEART RATE: 60 BPM

## 2021-06-10 DIAGNOSIS — N17.9 ACUTE RENAL FAILURE, UNSPECIFIED ACUTE RENAL FAILURE TYPE (H): ICD-10-CM

## 2021-06-10 DIAGNOSIS — R74.01 TRANSAMINITIS: ICD-10-CM

## 2021-06-10 DIAGNOSIS — U07.1 2019 NOVEL CORONAVIRUS DISEASE (COVID-19): ICD-10-CM

## 2021-06-10 LAB
ALBUMIN SERPL-MCNC: 3.2 G/DL (ref 3.5–5.7)
ALBUMIN UR-MCNC: 100 MG/DL
ALP SERPL-CCNC: 72 U/L (ref 34–104)
ALT SERPL W P-5'-P-CCNC: 278 U/L (ref 7–52)
ANION GAP SERPL CALCULATED.3IONS-SCNC: 19 MMOL/L (ref 3–14)
APAP SERPL-MCNC: <0.2 UG/ML (ref 0–30)
APPEARANCE UR: ABNORMAL
AST SERPL W P-5'-P-CCNC: 454 U/L (ref 13–39)
BACTERIA #/AREA URNS HPF: ABNORMAL /HPF
BASOPHILS # BLD AUTO: 0 10E9/L (ref 0–0.2)
BASOPHILS NFR BLD AUTO: 0.3 %
BILIRUB SERPL-MCNC: 0.7 MG/DL (ref 0.3–1)
BILIRUB UR QL STRIP: NEGATIVE
BUN SERPL-MCNC: 120 MG/DL (ref 7–25)
CALCIUM SERPL-MCNC: 9.4 MG/DL (ref 8.6–10.3)
CHLORIDE SERPL-SCNC: 99 MMOL/L (ref 98–107)
CO2 SERPL-SCNC: 18 MMOL/L (ref 21–31)
COLOR UR AUTO: YELLOW
CREAT SERPL-MCNC: 13.78 MG/DL (ref 0.6–1.2)
DIFFERENTIAL METHOD BLD: ABNORMAL
EOSINOPHIL # BLD AUTO: 0.1 10E9/L (ref 0–0.7)
EOSINOPHIL NFR BLD AUTO: 1.3 %
ERYTHROCYTE [DISTWIDTH] IN BLOOD BY AUTOMATED COUNT: 14.5 % (ref 10–15)
GFR SERPL CREATININE-BSD FRML MDRD: 3 ML/MIN/{1.73_M2}
GLUCOSE SERPL-MCNC: 241 MG/DL (ref 70–105)
GLUCOSE UR STRIP-MCNC: 30 MG/DL
HCT VFR BLD AUTO: 38.5 % (ref 35–47)
HGB BLD-MCNC: 12.5 G/DL (ref 11.7–15.7)
HGB UR QL STRIP: ABNORMAL
IMM GRANULOCYTES # BLD: 0.1 10E9/L (ref 0–0.4)
IMM GRANULOCYTES NFR BLD: 0.6 %
INR PPP: 1.07 (ref 0.86–1.14)
KETONES UR STRIP-MCNC: NEGATIVE MG/DL
LABORATORY COMMENT REPORT: ABNORMAL
LEUKOCYTE ESTERASE UR QL STRIP: ABNORMAL
LIPASE SERPL-CCNC: 46 U/L (ref 11–82)
LYMPHOCYTES # BLD AUTO: 0.9 10E9/L (ref 0.8–5.3)
LYMPHOCYTES NFR BLD AUTO: 8.9 %
MCH RBC QN AUTO: 29.2 PG (ref 26.5–33)
MCHC RBC AUTO-ENTMCNC: 32.5 G/DL (ref 31.5–36.5)
MCV RBC AUTO: 90 FL (ref 78–100)
MONOCYTES # BLD AUTO: 1.2 10E9/L (ref 0–1.3)
MONOCYTES NFR BLD AUTO: 11.9 %
MUCOUS THREADS #/AREA URNS LPF: PRESENT /LPF
NEUTROPHILS # BLD AUTO: 8 10E9/L (ref 1.6–8.3)
NEUTROPHILS NFR BLD AUTO: 77 %
NITRATE UR QL: NEGATIVE
PH UR STRIP: 6 PH (ref 5–7)
PLATELET # BLD AUTO: 125 10E9/L (ref 150–450)
POTASSIUM SERPL-SCNC: 4.9 MMOL/L (ref 3.5–5.1)
PROT SERPL-MCNC: 6 G/DL (ref 6.4–8.9)
RBC # BLD AUTO: 4.28 10E12/L (ref 3.8–5.2)
RBC #/AREA URNS AUTO: 2 /HPF (ref 0–2)
SARS-COV-2 RNA RESP QL NAA+PROBE: POSITIVE
SODIUM SERPL-SCNC: 136 MMOL/L (ref 134–144)
SOURCE: ABNORMAL
SP GR UR STRIP: 1.02 (ref 1–1.03)
SPECIMEN SOURCE: ABNORMAL
UROBILINOGEN UR STRIP-MCNC: NORMAL MG/DL (ref 0–2)
WBC # BLD AUTO: 10.4 10E9/L (ref 4–11)
WBC #/AREA URNS AUTO: 10 /HPF (ref 0–5)

## 2021-06-10 PROCEDURE — U0003 INFECTIOUS AGENT DETECTION BY NUCLEIC ACID (DNA OR RNA); SEVERE ACUTE RESPIRATORY SYNDROME CORONAVIRUS 2 (SARS-COV-2) (CORONAVIRUS DISEASE [COVID-19]), AMPLIFIED PROBE TECHNIQUE, MAKING USE OF HIGH THROUGHPUT TECHNOLOGIES AS DESCRIBED BY CMS-2020-01-R: HCPCS | Performed by: PHYSICIAN ASSISTANT

## 2021-06-10 PROCEDURE — C9803 HOPD COVID-19 SPEC COLLECT: HCPCS | Performed by: PHYSICIAN ASSISTANT

## 2021-06-10 PROCEDURE — 250N000009 HC RX 250: Performed by: PHYSICIAN ASSISTANT

## 2021-06-10 PROCEDURE — 250N000013 HC RX MED GY IP 250 OP 250 PS 637: Performed by: PHYSICIAN ASSISTANT

## 2021-06-10 PROCEDURE — 85025 COMPLETE CBC W/AUTO DIFF WBC: CPT | Performed by: PHYSICIAN ASSISTANT

## 2021-06-10 PROCEDURE — 80053 COMPREHEN METABOLIC PANEL: CPT | Performed by: PHYSICIAN ASSISTANT

## 2021-06-10 PROCEDURE — 76705 ECHO EXAM OF ABDOMEN: CPT

## 2021-06-10 PROCEDURE — 74018 RADEX ABDOMEN 1 VIEW: CPT

## 2021-06-10 PROCEDURE — 83690 ASSAY OF LIPASE: CPT | Performed by: PHYSICIAN ASSISTANT

## 2021-06-10 PROCEDURE — U0005 INFEC AGEN DETEC AMPLI PROBE: HCPCS | Performed by: PHYSICIAN ASSISTANT

## 2021-06-10 PROCEDURE — 81001 URINALYSIS AUTO W/SCOPE: CPT | Performed by: PHYSICIAN ASSISTANT

## 2021-06-10 PROCEDURE — 250N000011 HC RX IP 250 OP 636: Performed by: PHYSICIAN ASSISTANT

## 2021-06-10 PROCEDURE — 87086 URINE CULTURE/COLONY COUNT: CPT | Performed by: PHYSICIAN ASSISTANT

## 2021-06-10 PROCEDURE — 85610 PROTHROMBIN TIME: CPT | Performed by: PHYSICIAN ASSISTANT

## 2021-06-10 PROCEDURE — 80143 DRUG ASSAY ACETAMINOPHEN: CPT | Performed by: PHYSICIAN ASSISTANT

## 2021-06-10 PROCEDURE — 96372 THER/PROPH/DIAG INJ SC/IM: CPT | Performed by: PHYSICIAN ASSISTANT

## 2021-06-10 PROCEDURE — 99284 EMERGENCY DEPT VISIT MOD MDM: CPT | Performed by: PHYSICIAN ASSISTANT

## 2021-06-10 PROCEDURE — 36415 COLL VENOUS BLD VENIPUNCTURE: CPT | Performed by: PHYSICIAN ASSISTANT

## 2021-06-10 PROCEDURE — 74176 CT ABD & PELVIS W/O CONTRAST: CPT

## 2021-06-10 PROCEDURE — 99285 EMERGENCY DEPT VISIT HI MDM: CPT | Mod: 25 | Performed by: PHYSICIAN ASSISTANT

## 2021-06-10 RX ORDER — OLANZAPINE 10 MG/2ML
2.5 INJECTION, POWDER, FOR SOLUTION INTRAMUSCULAR ONCE
Status: COMPLETED | OUTPATIENT
Start: 2021-06-10 | End: 2021-06-10

## 2021-06-10 RX ORDER — SUCRALFATE ORAL 1 G/10ML
1 SUSPENSION ORAL ONCE
Status: COMPLETED | OUTPATIENT
Start: 2021-06-10 | End: 2021-06-10

## 2021-06-10 RX ORDER — LIDOCAINE HYDROCHLORIDE 20 MG/ML
15 SOLUTION OROPHARYNGEAL ONCE
Status: COMPLETED | OUTPATIENT
Start: 2021-06-10 | End: 2021-06-10

## 2021-06-10 RX ORDER — MAGNESIUM HYDROXIDE/ALUMINUM HYDROXICE/SIMETHICONE 120; 1200; 1200 MG/30ML; MG/30ML; MG/30ML
15 SUSPENSION ORAL ONCE
Status: COMPLETED | OUTPATIENT
Start: 2021-06-10 | End: 2021-06-10

## 2021-06-10 RX ADMIN — OLANZAPINE 2.5 MG: 10 INJECTION, POWDER, LYOPHILIZED, FOR SOLUTION INTRAMUSCULAR at 12:21

## 2021-06-10 RX ADMIN — MAGNESIUM HYDROXIDE/ALUMINUM HYDROXICE/SIMETHICONE 15 ML: 120; 1200; 1200 SUSPENSION ORAL at 12:18

## 2021-06-10 RX ADMIN — SUCRALFATE 1 G: 1 SUSPENSION ORAL at 12:18

## 2021-06-10 RX ADMIN — LIDOCAINE HYDROCHLORIDE 15 ML: 20 SOLUTION ORAL; TOPICAL at 12:18

## 2021-06-10 ASSESSMENT — MIFFLIN-ST. JEOR: SCORE: 1263.85

## 2021-06-10 NOTE — ED PROVIDER NOTES
History     Chief Complaint   Patient presents with     Vomiting     Generalized Weakness     HPI  Bailey Sierra is a 71 year old female who presents to the emergency department for evaluation just hasn't been feeling well for about 3 weeks last week she started to vomit and was [facility] for pacemaker evaluation. She has not had any chest pain or shortness of breath. She is afebrile no headaches dizziness or visual disturbances no cough no diarrhea constipation does complain of left shoulder discomfort for quite some time and neck pain. She was recently seen in the clinic also does have a history of diabetes with stage IV kidney disease with some gastroparesis. But she is also noted some mild abdominal discomfort for her to characterize where that discomfort is but on examination it appears that it midepigastric and along the sides.     Allergies:  No Known Allergies    Problem List:    Patient Active Problem List    Diagnosis Date Noted     S/P cardiac pacemaker procedure 03/09/2021     Priority: Medium     Bradycardia 02/24/2021     Priority: Medium     COVID-19 virus detected 02/23/2021     Priority: Medium     Proteinuria, unspecified type 07/20/2020     Priority: Medium     Pavon's esophagus without dysplasia 07/08/2020     Priority: Medium     History of coronary artery bypass graft x 2 12/30/2019     Priority: Medium     Stenosis of carotid artery, unspecified laterality 12/30/2019     Priority: Medium     Chronic total occlusion of coronary artery (RCA) 12/30/2019     Priority: Medium     Atherosclerotic heart disease of native coronary artery with other forms of angina pectoris (H) 09/28/2018     Priority: Medium     CKD (chronic kidney disease) stage 4, GFR 15-29 ml/min (H) 01/17/2018     Priority: Medium     Nonrheumatic aortic valve stenosis 01/17/2018     Priority: Medium     Hyperlipidemia 01/17/2018     Priority: Medium     Essential hypertension 01/17/2018     Priority: Medium      AC (acromioclavicular) joint arthritis 03/10/2017     Priority: Medium     Impingement syndrome of right shoulder 03/10/2017     Priority: Medium     Right rotator cuff tendinitis 03/10/2017     Priority: Medium     Type 2 diabetes mellitus with hyperglycemia, with long-term current use of insulin (H) 02/13/2017     Priority: Medium     Light chain disease, 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  Per Dr. Ortiz, if hypoalbuminemia not improving by Nov-Dec 2016, needs referral to Hematology.        Vitamin D deficiency 10/23/2016     Priority: Medium     Hyperparathyroidism due to renal insufficiency (H) 03/28/2016     Priority: Medium     Coronary atherosclerosis 09/03/2013     Priority: Medium     Overview:   2 vessel bypass, August 2013       Hiatal hernia 07/26/2013     Priority: Medium     GERD (gastroesophageal reflux disease) 01/08/2013     Priority: Medium     Urge incontinence 10/25/2012     Priority: Medium     Insomnia 01/25/2012     Priority: Medium        Past Medical History:    Past Medical History:   Diagnosis Date     Atherosclerotic heart disease of native coronary artery without angina pectoris      Cardiac murmur      Chronic kidney disease, stage III (moderate)      Controlled type 2 diabetes mellitus with stage 3 chronic kidney disease, with long-term current use of insulin (H) 7/26/2013     Edema      Essential (primary) hypertension      Gastro-esophageal reflux disease without esophagitis      Hyperlipidemia      Impingement syndrome of right shoulder 03/10/2017     Insomnia 01/25/2012     Iron deficiency anemia      Neuromuscular dysfunction of bladder      Osteoarthritis      Other shoulder lesions, right shoulder 03/10/2017     Other shoulder lesions, unspecified shoulder      Other specified postprocedural states 04/19/2017     Personal history of other medical treatment (CODE)      Plantar fascial fibromatosis      Presence  of aortocoronary bypass graft 07/2013     Primary osteoarthritis of shoulder 03/10/2017     Type 2 diabetes mellitus without complications (H)      Urgency of urination 03/08/2011     Uterovaginal prolapse        Past Surgical History:    Past Surgical History:   Procedure Laterality Date     ARTHROSCOPY SHOULDER Right 1997          ARTHROSCOPY SHOULDER Left 2012          ARTHROSCOPY SHOULDER RT/LT Right [id number]          Cardiac Bypass  07/2013          COLONOSCOPY  11/24/2009 11/24/2009     CYSTOSCOPY  03/15/2001     DENTAL SURGERY      Dental extractions     ENDOSCOPIC RETROGRADE CHOLANGIOPANCREATOGRAPHY  05/2008    Bile leak with ERCP and stenting and drainage of bile collection through abdominal wall.     ESOPHAGOSCOPY, GASTROSCOPY, DUODENOSCOPY (EGD), COMBINED N/A 6/23/2020    Pavon's follow up 5 years, 6/23/2025     HYSTERECTOMY TOTAL ABDOMINAL, BILATERAL SALPINGO-OOPHORECTOMY, COMBINED  04/02/2001    Vag vault suspension with Cortex mesh     IMPLANT STIMULATOR AND LEADS SACRAL NERVE (STAGE ONE AND TWO) N/A 5/7/2019    Procedure: Interstim Implant Revision, GENERATOR AND TYING LEAD EXCHANGE;  Surgeon: Honorio Bowling MD;  Location: [facility] OR     Interstim Device Placement  04/14/2014    Dr. Bowling - [facility]     LAMINECTOMY LUMBAR ONE LEVEL  1997          LAPAROSCOPIC CHOLECYSTECTOMY  04/2008          LAPAROSCOPIC TUBAL LIGATION      No Comments Provided       Family History:    Family History   Problem Relation Age of Onset     Other - See Comments Father         MI     Cancer Mother         Cancer,Some type of cancer, aneurysm     Diabetes Paternal Grandmother         Diabetes,Type II     Cancer Sister         Cancer,unknown type     Diabetes Sister         Diabetes,Type II     Diabetes Sister         Diabetes,Type II     Diabetes Sister         Diabetes,Type II     Other - See Comments Daughter         depression     Other - See Comments Daughter         ovarian cancer and depression     Heart Disease Brother      "    Heart Disease,CAD, MI       Social History:  Marital Status:   [5]  Social History     Tobacco Use     Smoking status: Never Smoker     Smokeless tobacco: Never Used   Substance Use Topics     Alcohol use: No     Alcohol/week: 0.0 standard drinks     Drug use: No     Comment: Drug use: No        Medications:    aspirin (ASA) 81 MG tablet  insulin aspart (NOVOLOG FLEXPEN) 100 UNIT/ML pen  isosorbide mononitrate (IMDUR) 60 MG 24 hr tablet  lisinopril (ZESTRIL) 2.5 MG tablet  metoprolol succinate ER (TOPROL-XL) 25 MG 24 hr tablet  blood glucose monitoring ([employer] CONTOUR) test strip  Calcium Carb-Cholecalciferol (CALCIUM PLUS D3 ABSORBABLE) 600-2500 MG-UNIT CAPS  glucose (BD GLUCOSE) 4 g chewable tablet  insulin glargine (BASAGLAR KWIKPEN) 100 UNIT/ML pen  insulin pen needle (B-D U/F) 31G X 8 MM miscellaneous  nitroGLYcerin (NITROSTAT) 0.4 MG sublingual tablet  order for DME  rosuvastatin (CRESTOR) 40 MG tablet  traMADol (ULTRAM) 50 MG tablet  zolpidem (AMBIEN) 10 MG tablet          Review of Systems   Please see HPI for pertinent positives and negatives.  All other systems reviewed and found to be negative.      Physical Exam   BP: 115/51  Pulse: 63  Temp: 96.6  F (35.9  C)  Resp: 20  Height: 165.1 cm (5' 5\")  Weight: 75.3 kg (166 lb)  SpO2: 100 %      Physical Exam   Exam:  Constitutional: healthy, alert and no distress  Head: Normocephalic. No masses, lesions, tenderness or abnormalities  Neck: Neck supple. No adenopathy. Thyroid symmetric, normal size,, Carotids without bruits.  ENT: ENT exam normal, no neck nodes or sinus tenderness  Cardiovascular: negative, PMI normal. No lifts, heaves, or thrills. RRR. No murmurs, clicks gallops or rub  Respiratory: negative, Percussion normal. Good diaphragmatic excursion. Lungs clear  Gastrointestinal: Abdomen soft, non-tender. BS normal. No masses, organomegaly  : Deferred  Musculoskeletal: extremities normal- no gross deformities noted, gait normal and normal " muscle tone  Skin: no suspicious lesions or rashes  Neurologic: Gait normal. Reflexes normal and symmetric. Sensation grossly WNL.  Psychiatric: mentation appears normal and affect normal/bright  Hematologic/Lymphatic/Immunologic: Normal cervical lymph nodes      ED Course                  Results for orders placed or performed during the hospital encounter of 06/10/21 (from the past 24 hour(s))   CBC with platelets differential   Result Value Ref Range    WBC 10.4 4.0 - 11.0 10e9/L    RBC Count 4.28 3.8 - 5.2 10e12/L    Hemoglobin 12.5 11.7 - 15.7 g/dL    Hematocrit 38.5 35.0 - 47.0 %    MCV 90 78 - 100 fl    MCH 29.2 26.5 - 33.0 pg    MCHC 32.5 31.5 - 36.5 g/dL    RDW 14.5 10.0 - 15.0 %    Platelet Count 125 (L) 150 - 450 10e9/L    Diff Method Automated Method     % Neutrophils 77.0 %    % Lymphocytes 8.9 %    % Monocytes 11.9 %    % Eosinophils 1.3 %    % Basophils 0.3 %    % Immature Granulocytes 0.6 %    Absolute Neutrophil 8.0 1.6 - 8.3 10e9/L    Absolute Lymphocytes 0.9 0.8 - 5.3 10e9/L    Absolute Monocytes 1.2 0.0 - 1.3 10e9/L    Absolute Eosinophils 0.1 0.0 - 0.7 10e9/L    Absolute Basophils 0.0 0.0 - 0.2 10e9/L    Abs Immature Granulocytes 0.1 0 - 0.4 10e9/L   Comprehensive metabolic panel   Result Value Ref Range    Sodium 136 134 - 144 mmol/L    Potassium 4.9 3.5 - 5.1 mmol/L    Chloride 99 98 - 107 mmol/L    Carbon Dioxide 18 (L) 21 - 31 mmol/L    Anion Gap 19 (H) 3 - 14 mmol/L    Glucose 241 (H) 70 - 105 mg/dL    Urea Nitrogen 120 (H) 7 - 25 mg/dL    Creatinine 13.78 (HH) 0.60 - 1.20 mg/dL    GFR Estimate 3 (L) >60 mL/min/[1.73_m2]    GFR Estimate If Black 3 (L) >60 mL/min/[1.73_m2]    Calcium 9.4 8.6 - 10.3 mg/dL    Bilirubin Total 0.7 0.3 - 1.0 mg/dL    Albumin 3.2 (L) 3.5 - 5.7 g/dL    Protein Total 6.0 (L) 6.4 - 8.9 g/dL    Alkaline Phosphatase 72 34 - 104 U/L     (H) 7 - 52 U/L     (H) 13 - 39 U/L   Lipase   Result Value Ref Range    Lipase 46 11 - 82 U/L   Acetaminophen [facility]   Result  Value Ref Range    Acetaminophen <0.2 0.0 - 30.0 ug/mL   XR Abdomen 1 View    Narrative    PROCEDURE: XR ABDOMEN 1 VIEW 6/10/2021 12:06 PM    HISTORY: pain    COMPARISONS: None.    TECHNIQUE: 1 view    FINDINGS: There is a stimulating electrode in the pelvis. The  intestinal gas pattern is normal. There is no extraluminal gas or  pathologic intra-abdominal calcifications. Surgical clips are seen in  the right upper quadrant.         Impression    IMPRESSION: Normal abdominal gas pattern    KRUNAL COMBS MD   CT Abdomen Pelvis w/o Contrast    Narrative    CT ABDOMEN PELVIS W/O CONTRAST    CLINICAL HISTORY: Female, age 71 years,  3 week history of weakness in  one week history of emesis;    Comparison:  CT scan abdomen and pelvis 2/23/2021    TECHNIQUE:  CT was performed of the abdomen and pelvis without   contrast. Sagittal, coronal, axial and MIP reconstructions were  reviewed.     FINDINGS:  The lung bases are clear. Visualized portions of heart demonstrate no  acute abnormality.    Moderate size hiatal hernia is similar in appearance without acute  abnormality. Stomach and duodenum are normal.    Liver: Normal.    Gallbladder: Surgically absent. The spleen: Normal.    Pancreas: No acute abnormality. Mild atrophy.    Adrenal glands: Normal.    Kidneys: Radiodense calculi again seen in each kidney. There is no  evidence of ureteral or bladder calculus.    Moderate to large volume of stool is seen within the proximal colon.  No evidence of acute inflammatory process. Moderate to large volume of  stool also seen in the distal colon and rectum.    There is no evidence of pathologic lymph node enlargement. Dense  calcifications are seen throughout the abdominal aorta that are  similar in appearance compared to the earlier study.    Bony structures demonstrate no acute abnormality. Mild/moderate  degenerative changes are again seen within the lumbar spine.    Inguinal lymph nodes are normal.      Impression     IMPRESSION:   No acute abnormality.    Chronic changes including bilateral renal calculi are similar in  appearance as described above.    ROMÁN CROWDER MD   INR   Result Value Ref Range    INR 1.07 0.86 - 1.14   Asymptomatic SARS-CoV-2 COVID-19 Virus (Coronavirus) by PCR    Specimen: Nasopharyngeal   Result Value Ref Range    SARS-CoV-2 Virus Specimen Source Nasopharyngeal     SARS-CoV-2 PCR Result POSITIVE (AA)     SARS-CoV-2 PCR Comment       Testing was performed using the Xpert Xpress SARS-CoV-2 Assay on the Cepheid Gene-Xpert   Instrument Systems. Additional information about this Emergency Use Authorization (EUA)   assay can be found via the Lab Guide.     US Abdomen Limited    Narrative    PROCEDURES: US ABDOMEN LIMITED    HISTORY: Transaminitis    TECHNIQUE: Grayscale ultrasound of the upper abdomen was performed.    COMPARISON: none     FINDINGS:    MEASUREMENTS:   Liver length:  15 cm.   Common duct diameter:  1 cm.    LIVER: The liver was incompletely visualized in the left lobe. No  liver masses or biliary ductal enlargement is seen    GALLBLADDER: The gallbladder has been removed    ABDOMINAL AORTA AND IVC: The abdominal aorta and inferior vena cava  were not visualized due to intestinal gas    PANCREAS: The pancreas was obscured by intestinal gas    Right KIDNEY: The right kidney is normal in size and is free of masses  or hydronephrosis        Impression    IMPRESSION:     No liver masses or biliary ductal enlargement is seen.    KRUNAL COMBS MD       Medications   OLANZapine (zyPREXA) injection 2.5 mg (2.5 mg Intramuscular Given 6/10/21 1221)   alum & mag hydroxide-simethicone (MAALOX) suspension 15 mL (15 mLs Oral Given 6/10/21 1218)     And   lidocaine (XYLOCAINE) 2 % solution 15 mL (15 mLs Mouth/Throat Given 6/10/21 1218)   sucralfate (CARAFATE) suspension 1 g (1 g Oral Given 6/10/21 1218)       Assessments & Plan (with Medical Decision Making)     I have reviewed the nursing notes.    I  have reviewed the findings, diagnosis, plan and need for follow up with the patient.  Differential diagnosis at this point includes: appendicitis, aortic aneurysm, mesenteric ischemia, bowel perforation, bowel obstruction, inflammatory bowel diseases, cholecystitis, pancreatitis, hepatitis, gastritis, GERD, diverticulitis, PUD, pyelonephritis/UTI, renal colic/stone, GC/chlamydia, PID, cervicitis, endometritis, IUP, ectopic pregnancy, dysfunctional uterine bleeding, ovarian cyst/torsion, spontaneous , AAA, as well as other etiologies.  Pleasant female who presents for evaluation with increasing weakness no significant confusion.  She notes that her symptoms of nausea and vomiting weakness coming off the last 3 weeks she was seen in the clinic recently had a creatinine 3.52 and a BUN of 41.  Today it has increased significantly.  She is also is found to have a transaminitis.  She did have CT imaging of her abdomen pelvis to rule out an obstructive uropathy causing the renal failure and she had ultrasonography of the right upper quadrant due to the transaminitis and has no evidence of any acute pathology at this time.  Tylenol studies were sent and the unremarkable also.  She received the above medications which seem to help with her mild symptoms.  Given the fact that she does have renal failure she will be transferred to [facility] I did speak with Dr. Luna.  She will be transferred via [facility] ambulance.  The patient does have COVID-19 she is COVID-19 positive.  The patient is not found to be hypoxic there is no respiratory issues at this time.  Given her renal failure we will use caution with fluid hydration.  She does make urine but has not been able to provide us a urinalysis at this time.  Vital signs are stable and at this time there appears to be no evidence of any sepsis.  I explained my diagnostic considerations and recommendations and the patient voiced an understanding and was in agreement  with the treatment plan. All questions were answered. We discussed potential side effects of any prescribed or recommended therapies, as well as expectations for response to treatments.      New Prescriptions    No medications on file       Final diagnoses:   2019 novel coronavirus disease (COVID-19)   Acute renal failure, unspecified acute renal failure type (H)   Transaminitis       6/10/2021   [facility] Center[facility] PA-C  06/10/21 [id number]

## 2021-06-10 NOTE — ED TRIAGE NOTES
ED Nursing Triage Note (General)   ________________________________    Bailey Sierra is a 71 year old Female that presents to triage private car  With history of, Pt says she can't walk, left shoulder/arm pain, feeling weak, and vomiting. Pt reports feeling week for about 3 weeks and vomiting for 1 week.  Pt was seen at the [facility] for her pacemaker and was sent over here.   There were no vitals taken for this visit.t  Patient appears alert  and oriented, in mild distress., and cooperative and pleasant behavior.  GCS Total = 15  Airway: intact  Breathing noted as Normal  Circulation Normal  Skin:  Normal        PRE HOSPITAL PRIOR LIVING SITUATION Alone

## 2021-06-14 LAB
BACTERIA SPEC CULT: NORMAL
SPECIMEN SOURCE: NORMAL

## 2021-06-14 NOTE — RESULT ENCOUNTER NOTE
Final urine culture report is negative.  Adult Negative Urine culture parameters per protocol: Any # Urogenital single or mixed organism, <10,000 col/ml single organism (cath specimen), and <50,000 col/ml single organism (midstream or cath specimen).  [facility] discharge antibiotic prescribed (If applicable): None  Treatment recommendations per [facility] ED Lab Result Urine Culture protocol.

## 2021-06-17 ENCOUNTER — TRANSFERRED RECORDS (OUTPATIENT)
Dept: HEALTH INFORMATION MANAGEMENT | Facility: OTHER | Age: 71
End: 2021-06-17

## 2021-06-25 ENCOUNTER — TELEPHONE (OUTPATIENT)
Dept: FAMILY MEDICINE | Facility: OTHER | Age: 71
End: 2021-06-25

## 2021-06-25 NOTE — TELEPHONE ENCOUNTER
They have questions about what labs need to be drawn for creatine /electrolyte levels.  Please call on Monday if [facility] sends over orders they can do the labs there.  Please call    Vinod Hess on 6/25/2021 at 3:06 PM

## 2021-06-28 ENCOUNTER — TELEPHONE (OUTPATIENT)
Dept: FAMILY MEDICINE | Facility: OTHER | Age: 71
End: 2021-06-28

## 2021-06-28 NOTE — TELEPHONE ENCOUNTER
Spoke with Bobbi and gave her the information from Dr Leal.  Norma J. Gosselin, LPN .......  6/28/2021  10:32 AM

## 2021-06-28 NOTE — TELEPHONE ENCOUNTER
Spoke with Bobbi and gave her the information below.  Norma J. Gosselin, LPN .......  6/28/2021  10:31 AM

## 2021-06-28 NOTE — TELEPHONE ENCOUNTER
Patient currently has a Dexcom glucose monitor and is requesting verbal orders so she can manage it independently at [facility].    Noemy Holt LPN on 6/28/2021 at 9:54 AM

## 2021-06-28 NOTE — TELEPHONE ENCOUNTER
MARYJANE Leal-spoke with Lisa @ . Requesting orders for Dexacom Glucose monitor so pt can manage independently. Thank you.   Rochelle Pelletier

## 2021-06-28 NOTE — TELEPHONE ENCOUNTER
She was hospitalized in [place], any posthospitalization lab orders should have come directly from them as we have not seen her since she has left the hospital.

## 2021-06-30 ENCOUNTER — RESULTS ONLY (OUTPATIENT)
Dept: LAB | Age: 71
End: 2021-06-30

## 2021-06-30 ENCOUNTER — TELEPHONE (OUTPATIENT)
Dept: FAMILY MEDICINE | Facility: OTHER | Age: 71
End: 2021-06-30

## 2021-06-30 ENCOUNTER — MEDICAL CORRESPONDENCE (OUTPATIENT)
Dept: HEALTH INFORMATION MANAGEMENT | Facility: OTHER | Age: 71
End: 2021-06-30

## 2021-06-30 DIAGNOSIS — R11.0 NAUSEA: Primary | ICD-10-CM

## 2021-06-30 LAB
ANION GAP SERPL CALCULATED.3IONS-SCNC: 14 MMOL/L (ref 3–14)
BUN SERPL-MCNC: 40 MG/DL (ref 7–25)
CALCIUM SERPL-MCNC: 9.2 MG/DL (ref 8.6–10.3)
CHLORIDE SERPL-SCNC: 103 MMOL/L (ref 98–107)
CO2 SERPL-SCNC: 23 MMOL/L (ref 21–31)
CREAT SERPL-MCNC: 5.19 MG/DL (ref 0.6–1.2)
GFR SERPL CREATININE-BSD FRML MDRD: 8 ML/MIN/{1.73_M2}
GLUCOSE SERPL-MCNC: 364 MG/DL (ref 70–105)
POTASSIUM SERPL-SCNC: 3.4 MMOL/L (ref 3.5–5.1)
SODIUM SERPL-SCNC: 140 MMOL/L (ref 134–144)

## 2021-06-30 NOTE — TELEPHONE ENCOUNTER
Left message to call back tomorrow with more information as her PCP is out of the office.  Otherwise, she could try to go through the provider that covers their facility.  Angelina Rowland ([facility])................ 6/30/2021 4:19 PM

## 2021-07-01 NOTE — PROGRESS NOTES
Assessment & Plan     1. Nausea  Overall doing well, gradually improving. Continues to have nausea daily. ED abdominal imagines unremarkable other than for hiatal hernia. Patient would like to continue with conservative management for now and if minimal improvement over the next few weeks would like follow up with general surgery for additional evaluation. Discontinue Reglan and start Zofran as outlined below for nausea relief.   - ondansetron (ZOFRAN) 4 MG tablet; Take 1 tablet (4 mg) by mouth every 6 hours as needed for nausea  Dispense: 120 tablet; Refill: 0  - Basic Metabolic Panel; Future    2. Non-traumatic rhabdomyolysis  Will recheck BMP to continue monitoring labs. Labs were checked on 6/30 and were showing good improvement. Still elevated creatinine, decreased GFR. Patient asked to complete labs early this week as she is short on time at today's visit.     3. CKD (chronic kidney disease) stage 4, GFR 15-29 ml/min (H)  See #2.   - Basic Metabolic Panel; Future    4. Type 2 diabetes mellitus with hyperglycemia, with long-term current use of insulin (H)  Continue with medications as previously prescribed. Educated on healthy diet choices. Follow up as needed.   - Basic Metabolic Panel; Future      Return in about 1 week (around 7/9/2021).    Mahogany Guerrero PA-C  [facility] is [facility] 71 year [facility] who presents for the following health issues     HPI   Here for hospital follow up. Hospitalized from 6/10-6/25/2021 for nausea, vomiting, anorexia, anuria, fall. Creatinne was 13.78 on admission with CPK of 30,644. Treated with D5 and bicarb drip. Dialysis was required for two of the days of her stay as well. Patient and labs slowly improved throughout her stay. COVID positive as well which was thought to be viral shedding from previous positive in 2/21. She was discharged to SNF.    Patient is presenting today feeling somewhat improved. Has been improving more each day.  She is still struggling with nausea daily. Had been getting Reglan at [facility] but was doing well with Zofran prior to hospital discharge and would like to switch back to this. Diabetes has been well controlled with Basaglar 25 U daily and Novolog sliding scale TID. Senna dose increased for chronic constipation which has provided good relief.     Had labs completed at [facility] on 6/30/2021 and showed creatinine of 5.19, potassium 3.4, GFR 8, and glucose 364.         PAST MEDICAL HISTORY:   Past Medical History:   Diagnosis Date     Atherosclerotic heart disease of native coronary artery without angina pectoris     No Comments Provided     Cardiac murmur     No Comments Provided     Chronic kidney disease, stage III (moderate)     No Comments Provided     Controlled type 2 diabetes mellitus with stage 3 chronic kidney disease, with long-term current use of insulin (H) 7/26/2013     Edema     No Comments Provided     Essential (primary) hypertension     No Comments Provided     Gastro-esophageal reflux disease without esophagitis     No Comments Provided     Hyperlipidemia     No Comments Provided     Impingement syndrome of right shoulder 03/10/2017          Insomnia 01/25/2012          Iron deficiency anemia     No Comments Provided     Neuromuscular dysfunction of bladder     No Comments Provided     Osteoarthritis     No Comments Provided     Other shoulder lesions, right shoulder 03/10/2017          Other shoulder lesions, unspecified shoulder     No Comments Provided     Other specified postprocedural states 04/19/2017          Personal history of other medical treatment (CODE)     Three childbirths     Plantar fascial fibromatosis     No Comments Provided     Presence of aortocoronary bypass graft 07/2013    [facility]     Primary osteoarthritis of shoulder 03/10/2017          Type 2 diabetes mellitus without complications (H)     No Comments Provided     Urgency of urination 03/08/2011          Uterovaginal  prolapse     No Comments Provided       PAST SURGICAL HISTORY:   Past Surgical History:   Procedure Laterality Date     ARTHROSCOPY SHOULDER Right 1997          ARTHROSCOPY SHOULDER Left 2012          ARTHROSCOPY SHOULDER RT/LT Right [id number]          Cardiac Bypass  07/2013          COLONOSCOPY  11/24/2009 11/24/2009     CYSTOSCOPY  03/15/2001     DENTAL SURGERY      Dental extractions     ENDOSCOPIC RETROGRADE CHOLANGIOPANCREATOGRAPHY  05/2008    Bile leak with ERCP and stenting and drainage of bile collection through abdominal wall.     ESOPHAGOSCOPY, GASTROSCOPY, DUODENOSCOPY (EGD), COMBINED N/A 6/23/2020    Pavon's follow up 5 years, 6/23/2025     HYSTERECTOMY TOTAL ABDOMINAL, BILATERAL SALPINGO-OOPHORECTOMY, COMBINED  04/02/2001    Vag vault suspension with Cortex mesh     IMPLANT STIMULATOR AND LEADS SACRAL NERVE (STAGE ONE AND TWO) N/A 5/7/2019    Procedure: Interstim Implant Revision, GENERATOR AND TYING LEAD EXCHANGE;  Surgeon: Honorio Bowling MD;  Location: [facility] OR     Interstim Device Placement  04/14/2014    Dr. Bowling - [facility]     LAMINECTOMY LUMBAR ONE LEVEL  1997          LAPAROSCOPIC CHOLECYSTECTOMY  04/2008          LAPAROSCOPIC TUBAL LIGATION      No Comments Provided       FAMILY HISTORY:   Family History   Problem Relation Age of Onset     Other - See Comments Father         MI     Cancer Mother         Cancer,Some type of cancer, aneurysm     Diabetes Paternal Grandmother         Diabetes,Type II     Cancer Sister         Cancer,unknown type     Diabetes Sister         Diabetes,Type II     Diabetes Sister         Diabetes,Type II     Diabetes Sister         Diabetes,Type II     Other - See Comments Daughter         depression     Other - See Comments Daughter         ovarian cancer and depression     Heart Disease Brother         Heart Disease,CAD, MI       SOCIAL HISTORY:   Social History     Tobacco Use     Smoking status: Never Smoker     Smokeless tobacco: Never Used   Substance Use Topics      "Alcohol use: No     Alcohol/week: 0.0 standard drinks      No Known Allergies;ler  Current Outpatient Medications   Medication     ondansetron (ZOFRAN) 4 MG tablet     aspirin (ASA) 81 MG tablet     blood glucose monitoring (TRICIA CONTOUR) test strip     Calcium Carb-Cholecalciferol (CALCIUM PLUS D3 ABSORBABLE) 600-2500 MG-UNIT CAPS     glucose (BD GLUCOSE) 4 g chewable tablet     insulin aspart (NOVOLOG FLEXPEN) 100 UNIT/ML pen     insulin glargine (BASAGLAR KWIKPEN) 100 UNIT/ML pen     insulin pen needle (B-D U/F) 31G X 8 MM miscellaneous     isosorbide mononitrate (IMDUR) 60 MG 24 hr tablet     lisinopril (ZESTRIL) 2.5 MG tablet     metoprolol succinate ER (TOPROL-XL) 25 MG 24 hr tablet     nitroGLYcerin (NITROSTAT) 0.4 MG sublingual tablet     ondansetron (ZOFRAN) 4 MG tablet     order for DME     rosuvastatin (CRESTOR) 40 MG tablet     zolpidem (AMBIEN) 10 MG tablet     No current facility-administered medications for this visit.          Review of Systems   Per HPI        Objective    /74   Pulse 62   Temp 97.5  F (36.4  C)   Resp 14   Ht 1.651 m (5' 5\")   Wt 75.3 kg (166 lb)   SpO2 99%   Breastfeeding No   BMI 27.62 kg/m    Body mass index is 27.62 kg/m .  Physical Exam   General: Pleasant, in no apparent distress.  Cardiovascular: Regular rate and rhythm with S1 equal to S2. No murmurs, friction rubs, or gallops.   Respiratory: Lungs are resonant and clear to auscultation bilaterally. No wheezes, crackles, or rhonchi.  Psych: Appropriate mood and affect.            "

## 2021-07-02 ENCOUNTER — OFFICE VISIT (OUTPATIENT)
Dept: FAMILY MEDICINE | Facility: OTHER | Age: 71
End: 2021-07-02
Attending: PHYSICIAN ASSISTANT
Payer: COMMERCIAL

## 2021-07-02 ENCOUNTER — TELEPHONE (OUTPATIENT)
Dept: FAMILY MEDICINE | Facility: OTHER | Age: 71
End: 2021-07-02

## 2021-07-02 VITALS
HEART RATE: 62 BPM | DIASTOLIC BLOOD PRESSURE: 74 MMHG | SYSTOLIC BLOOD PRESSURE: 132 MMHG | OXYGEN SATURATION: 99 % | TEMPERATURE: 97.5 F | BODY MASS INDEX: 27.66 KG/M2 | RESPIRATION RATE: 14 BRPM | WEIGHT: 166 LBS | HEIGHT: 65 IN

## 2021-07-02 DIAGNOSIS — N18.4 CKD (CHRONIC KIDNEY DISEASE) STAGE 4, GFR 15-29 ML/MIN (H): ICD-10-CM

## 2021-07-02 DIAGNOSIS — R11.0 NAUSEA: Primary | ICD-10-CM

## 2021-07-02 DIAGNOSIS — M62.82 NON-TRAUMATIC RHABDOMYOLYSIS: ICD-10-CM

## 2021-07-02 DIAGNOSIS — E11.65 TYPE 2 DIABETES MELLITUS WITH HYPERGLYCEMIA, WITH LONG-TERM CURRENT USE OF INSULIN (H): ICD-10-CM

## 2021-07-02 DIAGNOSIS — Z79.4 TYPE 2 DIABETES MELLITUS WITH HYPERGLYCEMIA, WITH LONG-TERM CURRENT USE OF INSULIN (H): ICD-10-CM

## 2021-07-02 PROCEDURE — 99214 OFFICE O/P EST MOD 30 MIN: CPT | Performed by: PHYSICIAN ASSISTANT

## 2021-07-02 PROCEDURE — G0463 HOSPITAL OUTPT CLINIC VISIT: HCPCS

## 2021-07-02 RX ORDER — ONDANSETRON 4 MG/1
4 TABLET, FILM COATED ORAL EVERY 6 HOURS PRN
Qty: 120 TABLET | Refills: 0 | Status: SHIPPED | OUTPATIENT
Start: 2021-07-02 | End: 2021-08-03

## 2021-07-02 RX ORDER — ONDANSETRON 4 MG/1
4 TABLET, FILM COATED ORAL EVERY 8 HOURS PRN
Qty: 30 TABLET | Refills: 3 | Status: SHIPPED | OUTPATIENT
Start: 2021-07-02 | End: 2021-07-02

## 2021-07-02 ASSESSMENT — PAIN SCALES - GENERAL: PAINLEVEL: WORST PAIN (10)

## 2021-07-02 ASSESSMENT — ANXIETY QUESTIONNAIRES
5. BEING SO RESTLESS THAT IT IS HARD TO SIT STILL: NOT AT ALL
GAD7 TOTAL SCORE: 0
1. FEELING NERVOUS, ANXIOUS, OR ON EDGE: NOT AT ALL
IF YOU CHECKED OFF ANY PROBLEMS ON THIS QUESTIONNAIRE, HOW DIFFICULT HAVE THESE PROBLEMS MADE IT FOR YOU TO DO YOUR WORK, TAKE CARE OF THINGS AT HOME, OR GET ALONG WITH OTHER PEOPLE: NOT DIFFICULT AT ALL
3. WORRYING TOO MUCH ABOUT DIFFERENT THINGS: NOT AT ALL
2. NOT BEING ABLE TO STOP OR CONTROL WORRYING: NOT AT ALL
7. FEELING AFRAID AS IF SOMETHING AWFUL MIGHT HAPPEN: NOT AT ALL
6. BECOMING EASILY ANNOYED OR IRRITABLE: NOT AT ALL

## 2021-07-02 ASSESSMENT — PATIENT HEALTH QUESTIONNAIRE - PHQ9: 5. POOR APPETITE OR OVEREATING: NOT AT ALL

## 2021-07-02 ASSESSMENT — MIFFLIN-ST. JEOR: SCORE: 1263.85

## 2021-07-02 NOTE — TELEPHONE ENCOUNTER
Patient was seen today and a prescription was sent in. Sam was notified.    Noemy Holt LPN on 7/2/2021 at 3:41 PM

## 2021-07-02 NOTE — NURSING NOTE
Patient presents to clinic for hospital follow up.  Debra Pierson LPN ....................  7/2/2021   11:08 AM

## 2021-07-02 NOTE — TELEPHONE ENCOUNTER
States pt has been having problems with nausea. She said the usual medication isn't working and would like to know if there is something else she could take

## 2021-07-03 ASSESSMENT — ANXIETY QUESTIONNAIRES: GAD7 TOTAL SCORE: 0

## 2021-07-05 ENCOUNTER — TELEPHONE (OUTPATIENT)
Dept: FAMILY MEDICINE | Facility: OTHER | Age: 71
End: 2021-07-05

## 2021-07-05 NOTE — TELEPHONE ENCOUNTER
States pt is at [facility] and they are only giving her 2 nausea pills. She said that at pt's last appt, 7/2, they were told that pt could have up to 4 pills. Wanting to discuss, please call

## 2021-07-05 NOTE — TELEPHONE ENCOUNTER
Patient had appointment with Mahogany Guerrero on 07/02/21 . See notes. For zofran 4 mg every 6 hours. Family called and stated the nursing home will only give this twice a day. Please advise. Cehlle Summers LPN ....................7/5/2021  3:30 PM

## 2021-07-06 DIAGNOSIS — E11.8 TYPE 2 DIABETES MELLITUS WITH COMPLICATION, WITH LONG-TERM CURRENT USE OF INSULIN (H): ICD-10-CM

## 2021-07-06 DIAGNOSIS — Z79.4 TYPE 2 DIABETES MELLITUS WITH COMPLICATION, WITH LONG-TERM CURRENT USE OF INSULIN (H): ICD-10-CM

## 2021-07-06 NOTE — TELEPHONE ENCOUNTER
Call the nursing home, give a verbal order to change zofran to 4 milligram every 6 hours AS NEEDED nausea.  Joshua Hoyos MD on 7/6/2021 at 6:34 AM

## 2021-07-07 ENCOUNTER — MEDICAL CORRESPONDENCE (OUTPATIENT)
Dept: HEALTH INFORMATION MANAGEMENT | Facility: OTHER | Age: 71
End: 2021-07-07

## 2021-07-07 ENCOUNTER — RESULTS ONLY (OUTPATIENT)
Dept: LAB | Age: 71
End: 2021-07-07

## 2021-07-07 LAB
ANION GAP SERPL CALCULATED.3IONS-SCNC: 8 MMOL/L (ref 3–14)
BUN SERPL-MCNC: 25 MG/DL (ref 7–25)
CALCIUM SERPL-MCNC: 8.2 MG/DL (ref 8.6–10.3)
CHLORIDE SERPL-SCNC: 108 MMOL/L (ref 98–107)
CO2 SERPL-SCNC: 26 MMOL/L (ref 21–31)
CREAT SERPL-MCNC: 3.66 MG/DL (ref 0.6–1.2)
GFR SERPL CREATININE-BSD FRML MDRD: 12 ML/MIN/{1.73_M2}
GLUCOSE SERPL-MCNC: 200 MG/DL (ref 70–105)
POTASSIUM SERPL-SCNC: 3.7 MMOL/L (ref 3.5–5.1)
SODIUM SERPL-SCNC: 142 MMOL/L (ref 134–144)

## 2021-07-07 RX ORDER — ROSUVASTATIN CALCIUM 40 MG/1
TABLET, COATED ORAL
Qty: 90 TABLET | Refills: 1 | Status: SHIPPED | OUTPATIENT
Start: 2021-07-07 | End: 2022-02-02

## 2021-07-07 NOTE — TELEPHONE ENCOUNTER
[facility] the following:   rosuvastatin (CRESTOR) 40 MG tablet  Sig: TAKE 1 TABLET BY MOUTH DAILY    Last Prescription Date:   03/09/2021  Last Fill Qty/Refills:         90, R-1    Last Office Visit:              07/02/2021 ([place])   Future Office visit:           07/27/2021 ([place])   [facility] Protocol Passed - 7/6/2021  5:20 AM        Passed - LDL on file in past 12 months     Recent Labs   Lab Test 06/04/21  0906   LDL 71             Passed - No abnormal creatine kinase in past 12 months     No lab results found.             Passed - Recent (12 mo) or future (30 days) visit within the authorizing provider's specialty     Patient has had an office visit with the authorizing provider or a provider within the authorizing providers department within the previous 12 mos or has a future within next 30 days. See \"Patient Info\" tab in inbasket, or \"Choose Columns\" in Meds & Orders section of the refill encounter.              Passed - Medication is active on med list        Passed - Patient is age 18 or older        Passed - No active pregnancy on record        Passed - No positive pregnancy test in past 12 months         Prescription approved per [facility] Refill Protocol.  Carolyn Gregg RN ....................  7/7/2021   9:29 AM        "

## 2021-07-13 ENCOUNTER — NURSING HOME VISIT (OUTPATIENT)
Dept: GERIATRICS | Facility: OTHER | Age: 71
End: 2021-07-13
Attending: NURSE PRACTITIONER
Payer: COMMERCIAL

## 2021-07-13 ENCOUNTER — TELEPHONE (OUTPATIENT)
Dept: FAMILY MEDICINE | Facility: OTHER | Age: 71
End: 2021-07-13

## 2021-07-13 DIAGNOSIS — K22.70 BARRETT'S ESOPHAGUS WITHOUT DYSPLASIA: ICD-10-CM

## 2021-07-13 DIAGNOSIS — R11.0 NAUSEA: ICD-10-CM

## 2021-07-13 DIAGNOSIS — Z79.4 TYPE 2 DIABETES MELLITUS WITH HYPERGLYCEMIA, WITH LONG-TERM CURRENT USE OF INSULIN (H): Primary | ICD-10-CM

## 2021-07-13 DIAGNOSIS — E11.65 TYPE 2 DIABETES MELLITUS WITH HYPERGLYCEMIA, WITH LONG-TERM CURRENT USE OF INSULIN (H): Primary | ICD-10-CM

## 2021-07-13 DIAGNOSIS — K59.00 CONSTIPATION, UNSPECIFIED CONSTIPATION TYPE: ICD-10-CM

## 2021-07-13 DIAGNOSIS — K21.9 GASTROESOPHAGEAL REFLUX DISEASE WITHOUT ESOPHAGITIS: ICD-10-CM

## 2021-07-13 DIAGNOSIS — K31.84 GASTROPARESIS: ICD-10-CM

## 2021-07-13 PROCEDURE — 99310 SBSQ NF CARE HIGH MDM 45: CPT | Performed by: NURSE PRACTITIONER

## 2021-07-13 RX ORDER — SACCHAROMYCES BOULARDII 250 MG
250 CAPSULE ORAL 2 TIMES DAILY
COMMUNITY
Start: 2021-07-13 | End: 2021-12-13

## 2021-07-13 RX ORDER — POLYETHYLENE GLYCOL 3350 17 G/17G
1 POWDER, FOR SOLUTION ORAL DAILY
COMMUNITY
Start: 2021-07-13 | End: 2022-06-14

## 2021-07-13 NOTE — TELEPHONE ENCOUNTER
MARYJANE Santiago is currently at the [facility]. She is being given 25 units insulin but thinks that is too much. Please call to advise. Thank you.  Rochelle Pelletier

## 2021-07-14 NOTE — PROGRESS NOTES
"[facility]  Acute Care Visit    Patient Name: Bailey Sierra   : 1950  MRN: [id number]    Place of Service: [facility]  DOS: 2021    CC: nausea, lower abd discomfort, early morning lower blood sugars    HPI:  Bailey Sierra is a 71 year old female with PMH of multiple chronic medical concerns, who is seen today regarding pt states she would like her basaglar decreased because she is having lower blood sugars early morning. She states nursing staff are having her drink a glass of orange juice and she doesn't want to do that in the middle of the night. She has a Dexcom which alarms when she is heading high or low blood sugars. Currently taking basaglar 25 units at bedtime.   Pt states she has been feeling nauseous off and on. Ever since she started taking \"the pink little pill\" which is zofran it takes away her nausea but she is needing to take it usually twice a day after breakfast and after supper.   Pt has diabetes type 2 which she has had for many years. While hospitalized end of  for a fall with resultant rhabo and SARA needing dialysis (cr up to 13), she had also been having nausea. Pt had been started on reglan 5 mg in AM. This was stopped recently at hospital follow up and zofran started which she says helps beautifully.    Pt reports lower abdominal cramping. She reports having had a hiatal hernia repaired and thought a lot of her nausea symptoms started after that. However she reports having a work up done with scans. Pt did have gastric emptying test which showed some slowed gastric emptying.   Pt states today she has not been having regular bowel movements and has been quite constipated. She had been started on senna 2 tabs BID which has helped but still constipated.   Pt currently taking famotidine. Denies heartburn.      Multidisciplinary notes, laboratory values, medications, vital signs, weight and orders arereviewed from nursing home records. I have reviewed the " patient s medical history and updated the computerized patient record.     PMH:  Past Medical History:   Diagnosis Date     Atherosclerotic heart disease of native coronary artery without angina pectoris     No Comments Provided     Cardiac murmur     No Comments Provided     Chronic kidney disease, stage III (moderate)     No Comments Provided     Controlled type 2 diabetes mellitus with stage 3 chronic kidney disease, with long-term current use of insulin (H) 7/26/2013     Edema     No Comments Provided     Essential (primary) hypertension     No Comments Provided     Gastro-esophageal reflux disease without esophagitis     No Comments Provided     Hyperlipidemia     No Comments Provided     Impingement syndrome of right shoulder 03/10/2017          Insomnia 01/25/2012          Iron deficiency anemia     No Comments Provided     Neuromuscular dysfunction of bladder     No Comments Provided     Osteoarthritis     No Comments Provided     Other shoulder lesions, right shoulder 03/10/2017          Other shoulder lesions, unspecified shoulder     No Comments Provided     Other specified postprocedural states 04/19/2017          Personal history of other medical treatment (CODE)     Three childbirths     Plantar fascial fibromatosis     No Comments Provided     Presence of aortocoronary bypass graft 07/2013    [facility]     Primary osteoarthritis of shoulder 03/10/2017          Type 2 diabetes mellitus without complications (H)     No Comments Provided     Urgency of urination 03/08/2011          Uterovaginal prolapse     No Comments Provided       Medications:  Current Outpatient Medications   Medication Sig Dispense Refill     aspirin (ASA) 81 MG tablet Take 1 tablet (81 mg) by mouth daily 90 tablet 3     blood glucose monitoring (TRICIA CONTOUR) test strip TEST 3 TIMES A  each 3     Calcium Carb-Cholecalciferol (CALCIUM PLUS D3 ABSORBABLE) 600-2500 MG-UNIT CAPS Take 1 capsule by mouth daily       glucose (BD  GLUCOSE) 4 g chewable tablet Take 1 tablet by mouth every hour as needed for low blood sugar       insulin aspart (NOVOLOG FLEXPEN) 100 UNIT/ML pen Inject 7 - 16 units before each meal based on sliding scale.  Max daily dose 56 units. 60 mL 11     insulin glargine (BASAGLAR KWIKPEN) 100 UNIT/ML pen Inject 10-15 Units Subcutaneous At Bedtime Adjusts according to chemstrip 60 mL 4     insulin pen needle (B-D U/F) 31G X 8 MM miscellaneous USE 4 PEN NEEDLES DAILY 400 each 3     isosorbide mononitrate (IMDUR) 60 MG 24 hr tablet Take 2 tablets (120 mg) by mouth daily 180 tablet 3     lisinopril (ZESTRIL) 2.5 MG tablet Take 1 tablet (2.5 mg) by mouth daily 90 tablet 3     metoprolol succinate ER (TOPROL-XL) 25 MG 24 hr tablet Take 0.5 tablets (12.5 mg) by mouth every evening 45 tablet 3     nitroGLYcerin (NITROSTAT) 0.4 MG sublingual tablet Place 0.4 mg under the tongue every 5 minutes as needed for chest pain       ondansetron (ZOFRAN) 4 MG tablet Take 1 tablet (4 mg) by mouth every 6 hours as needed for nausea 120 tablet 0     order for DME Equipment being ordered: Diabetic shoes, pre-ulcer callous formation 2 each 3     rosuvastatin (CRESTOR) 40 MG tablet TAKE 1 TABLET BY MOUTH DAILY 90 tablet 1     zolpidem (AMBIEN) 10 MG tablet Take 1 tablet (10 mg) by mouth nightly as needed for sleep 30 tablet 5     Medication reconciliation complete between [facility] record and [facility] MAR.     Allergies:  No Known Allergies    Review of Systems:  +intermittent nausea  +constipation  Denies vomiting  +lower abdominal discomfort/cramping  +lower blood sugars in early morning hours  Denies having low blood sugar symptoms but knows she is going low because her dexcom alarms her  Denies cough, shortness of breath, chest pain, heartburn  Denies trouble urinating      Vital Signs:  Temp 97.1   Pulse 641   Respirations 18   /55   Oxygen Sats 97%   Weight 160.6#    Physical Exam:     Pleasant and alert without distress.    Sclera nonicteric,  conjunctiva non-inflamed.  Skin color pink. Mucous membranes moist.   Lungs clear to auscultation throughout. No wheezes or rales noted.   Cardiovascular regular, S1, S2 auscultated. No murmur noted.  Abdomen soft and with mild tenderness lower abdomen  Extremities with 1+ edema.      Able to move upper and lower extremities       New Labs/Diagnostics:  Last Comprehensive Metabolic Panel:  Sodium   Date Value Ref Range Status   07/07/2021 142 134 - 144 mmol/L Final     Potassium   Date Value Ref Range Status   07/07/2021 3.7 3.5 - 5.1 mmol/L Final     Chloride   Date Value Ref Range Status   07/07/2021 108 (H) 98 - 107 mmol/L Final     Carbon Dioxide   Date Value Ref Range Status   07/07/2021 26 21 - 31 mmol/L Final     Anion Gap   Date Value Ref Range Status   07/07/2021 8 3 - 14 mmol/L Final     Glucose   Date Value Ref Range Status   07/07/2021 200 (H) 70 - 105 mg/dL Final     Urea Nitrogen   Date Value Ref Range Status   07/07/2021 25 7 - 25 mg/dL Final     Creatinine   Date Value Ref Range Status   07/07/2021 3.66 (H) 0.60 - 1.20 mg/dL Final     GFR Estimate   Date Value Ref Range Status   07/07/2021 12 (L) >60 mL/min/[1.73_m2] Final     Calcium   Date Value Ref Range Status   07/07/2021 8.2 (L) 8.6 - 10.3 mg/dL Final     Cr continues to improve.     Assessment/Plan:  (E11.65,  Z79.4) Type 2 diabetes mellitus with hyperglycemia, with long-term current use of insulin (H)  (primary encounter diagnosis)  Comment: lower blood sugars early morning mostly in 70s  Plan: decrease basaglar to 21 units (from 25 units).     (K21.9) Gastroesophageal reflux disease without esophagitis  (K22.70) Pavon's esophagus without dysplasia  Comment: controlled  Plan: cont famotidine    (R11.0) Nausea  (K31.84) Gastroparesis  Comment: unsure of source but likely due to gastroparesis---differentials could be constipation, gerd, gastritis  Plan: cont famotidine, start florastor 250 mg BID, zofran PRN.    Pt is recommended to follow up  with gastroenterology Georgina Ayala in Oct 2021 (last visit 4/2021).      (K59.00) Constipation, unspecified constipation type  Comment: may be cause of lower abd discomfort  Plan: cont senna 2 tabs BID, ADD miralax--HOLD for loose stools      A total of 40 minutes was spent with this patient, of which more than 50% was spent in counseling and/or coordination of care.     BRINTEY Mata, CNP ....................  7/13/2021   8:27 PM

## 2021-07-14 NOTE — ASSESSMENT & PLAN NOTE
Recommended to continue famotidine 40 mg daily from gastroenterology. Follow up endoscopy in  April 2024.

## 2021-07-16 NOTE — TELEPHONE ENCOUNTER
Crystal Wang was at [facility] and did adjust patient's insulin.  She will call with any other concerns.    Pretty Aguilar LPN............7/16/2021 2:03 PM

## 2021-07-27 ENCOUNTER — TELEPHONE (OUTPATIENT)
Dept: FAMILY MEDICINE | Facility: OTHER | Age: 71
End: 2021-07-27

## 2021-07-27 ENCOUNTER — OFFICE VISIT (OUTPATIENT)
Dept: FAMILY MEDICINE | Facility: OTHER | Age: 71
End: 2021-07-27
Attending: FAMILY MEDICINE
Payer: COMMERCIAL

## 2021-07-27 VITALS
DIASTOLIC BLOOD PRESSURE: 66 MMHG | HEART RATE: 69 BPM | RESPIRATION RATE: 18 BRPM | WEIGHT: 163 LBS | OXYGEN SATURATION: 99 % | HEIGHT: 65 IN | TEMPERATURE: 98.1 F | SYSTOLIC BLOOD PRESSURE: 112 MMHG | BODY MASS INDEX: 27.16 KG/M2

## 2021-07-27 DIAGNOSIS — E11.9 TYPE 2 DIABETES MELLITUS WITHOUT COMPLICATION, WITH LONG-TERM CURRENT USE OF INSULIN (H): ICD-10-CM

## 2021-07-27 DIAGNOSIS — H91.92 CHANGE IN HEARING, LEFT: ICD-10-CM

## 2021-07-27 DIAGNOSIS — R29.898 LEFT LEG WEAKNESS: Primary | ICD-10-CM

## 2021-07-27 DIAGNOSIS — M62.82 NON-TRAUMATIC RHABDOMYOLYSIS: ICD-10-CM

## 2021-07-27 DIAGNOSIS — Z79.4 TYPE 2 DIABETES MELLITUS WITHOUT COMPLICATION, WITH LONG-TERM CURRENT USE OF INSULIN (H): ICD-10-CM

## 2021-07-27 DIAGNOSIS — Z79.4 TYPE 2 DIABETES MELLITUS WITH HYPERGLYCEMIA, WITH LONG-TERM CURRENT USE OF INSULIN (H): ICD-10-CM

## 2021-07-27 DIAGNOSIS — E11.65 TYPE 2 DIABETES MELLITUS WITH HYPERGLYCEMIA, WITH LONG-TERM CURRENT USE OF INSULIN (H): ICD-10-CM

## 2021-07-27 DIAGNOSIS — N18.4 CKD (CHRONIC KIDNEY DISEASE) STAGE 4, GFR 15-29 ML/MIN (H): ICD-10-CM

## 2021-07-27 PROCEDURE — 99214 OFFICE O/P EST MOD 30 MIN: CPT | Performed by: FAMILY MEDICINE

## 2021-07-27 PROCEDURE — G0463 HOSPITAL OUTPT CLINIC VISIT: HCPCS

## 2021-07-27 PROCEDURE — G0180 MD CERTIFICATION HHA PATIENT: HCPCS | Performed by: FAMILY MEDICINE

## 2021-07-27 RX ORDER — INSULIN GLARGINE 100 [IU]/ML
19 INJECTION, SOLUTION SUBCUTANEOUS AT BEDTIME
Qty: 60 ML | Refills: 4 | COMMUNITY
Start: 2021-07-27 | End: 2021-08-03

## 2021-07-27 ASSESSMENT — MIFFLIN-ST. JEOR: SCORE: 1250.24

## 2021-07-27 ASSESSMENT — PAIN SCALES - GENERAL: PAINLEVEL: NO PAIN (0)

## 2021-07-27 NOTE — NURSING NOTE
Patient is needing visit today for face to face for home PT and OT.    Lab Results   Component Value Date    A1C 6.7 06/04/2021    A1C 7.0 12/17/2020    A1C Canceled, Test credited 11/03/2020    ALBUMIN 3.2 06/10/2021     Previous A1C is at goal of <8  Date of last Foot exam due  Eye exam Yes- Date of last eye exam: 12/04/20,  Location: [place]  Patient is not a Tobacco User  Patient is on a daily aspirin  Patient is on a Statin.  Blood pressure today of:     BP Readings from Last 1 Encounters:   07/02/21 132/74      is at the goal of <139/89 for diabetics.    Noemy Holt LPN on 7/27/2021 at 8:19 AM      Medication Reconciliation Complete    Noemy Holt LPN  7/27/2021 8:18 AM

## 2021-07-27 NOTE — TELEPHONE ENCOUNTER
"URGENT: per CMS best practice, response is requested within 24 hours.    Home Care regulation mandates that you are notified about drug discrepancies, interactions & contraindications. Response within a 24 hour timeframe is established by CMS as \"best practice\" for the delivery of home health care. Home Care is required to report if the 24 hour timeframe was met. The home health clinician will contact you again if this timeframe is not met or if the response does not address all concerns.       Situation:        The patient was admitted to [facility] on 7/27/21. Upon admission, a med reconciliation was completed to identify any drug discrepancies, interactions or contraindications. Home Care's drug regime review has revealed significant medication issues.     You are being contacted for clarifying orders related to the medication issues.     Background:    DM    Assessment:     Medication Discrepancies found   Aspirin & Nitorglycerin    FYI: Pt reported she takes Basaglar Kwikpen 19 Units at bedtime. Recent med list says 21 Units. Miralax reported to take this 17gram as needed, Senna 8.6 mg reported to take this medication twice daily as needed. Med list from [facility] does not have Lisinopril listed please update.     Thanks for your time Jessenia Armas RN     Recommendations:    Please evaluate this information and indicate below whether or not changes are required. A copy of the patient's drug interaction/contraindications report is available upon request.       CLINIC NURSE - If changes are made, please update the Epic medication profile.     Please sign this encounter with your electronic signature.       "

## 2021-07-27 NOTE — PROGRESS NOTES
"SUBJECTIVE:  Bailey Sierra is a 71 year old female here for follow-up.  She has been at [facility] recovering from a hospitalization in June.  During her hospitalization she was found to have rhabdomyolysis likely from being on the floor for extended period time due to left leg weakness, nausea, vomiting.    She has been working with rehab and has had some improvement in her left leg weakness however she continues to use a wheeled walker.  She is now back at home and she is planning on working with home care to continue with her therapy.    She has no plan to follow-up with nephrology.    She describes a pounding in her left ear that has been consistent.  She is unsure if this corresponds to her heartbeat.  She is had no tinnitus or hearing loss.    Allergies:  No Known Allergies    ROS:    As above otherwise ROS is unremarkable.    OBJECTIVE:  /66   Pulse 69   Temp 98.1  F (36.7  C)   Resp 18   Ht 1.651 m (5' 5\")   Wt 73.9 kg (163 lb)   SpO2 99%   BMI 27.12 kg/m      EXAM:  General Appearance: Pleasant, alert, appropriate appearance for age. No acute distress  Head: Normal. Normocephalic, atraumatic.  Eyes: PERRL, EOMI  Ears: Normal TM's bilaterally. Normal auditory canals and external ears.   Lungs: Normal chest wall and respirations. Clear to auscultation, no wheezes or crackles.  Cardiovascular: Regular rate and rhythm. S1, S2, no murmurs.  Musculoskeletal: No edema.  Skin: no concerning or new rashes.  Neurologic Exam: CN 2-12 grossly intact.  Uses a wheeled walker for ambulation.  Grossly weak with her left lower extremity.  Psychiatric Exam: Alert and oriented, appropriate affect.    ASSESSEMENT AND PLAN:    1. Left leg weakness    2. Type 2 diabetes mellitus without complication, with long-term current use of insulin (H)    3. Non-traumatic rhabdomyolysis    4. CKD (chronic kidney disease) stage 4, GFR 15-29 ml/min (H)    5. Type 2 diabetes mellitus with hyperglycemia, with long-term current " use of insulin (H)    6. Change in hearing, left      Will refer to home care for continued PT and OT for left leg weakness.    Repeat hemoglobin A1c in December 2021.    Recommend that she follow-up with nephrology as she has a history of stage IV chronic kidney disease and required dialysis in June for 3 rounds.  We will make that follow-up on her own.    Patient currently uses a Dexcom G6 CGM for continuous monitoring of glucose.   Patient injects or boluses insulin 3 or more times daily before breakfast, before lunch, before dinner, and bedtime.   Patient requires frequent adjustments to their insulin treatment regimen on the basis of therapeutic CGM testing results.       We will refer her to audiology for formal hearing evaluation.    Sukumar Leal MD  Family Medicine                Documentation of a Face-to-Face Physician Encounter July 27, 2021    Bailey Sierra  1950  [id number]    I certify that this patient is under my care and that I, or a nurse practitioner or physician's assistant working with me, had a face-to-face encounter that meets the physician face-to-face encounter requirements with this patient on: 7/27/2021.    The encounter with the patient was in whole, or in part, for the following medical condition, which is the primary reason for home health care:  Encounter Diagnoses   Name Primary?     Type 2 diabetes mellitus without complication, with long-term current use of insulin (H)      Left leg weakness Yes     Non-traumatic rhabdomyolysis      CKD (chronic kidney disease) stage 4, GFR 15-29 ml/min (H)      Type 2 diabetes mellitus with hyperglycemia, with long-term current use of insulin (H)      Change in hearing, left        I certify that, based on my findings, the following services are medically necessary home health services: Occupational Therapy and Physical Therapy    My clinical findings support the need for the above services because: Left leg weakness that resulted in  rhabdomyolysis and hospitalization.  Has made some improvement at skilled nursing but not back to her baseline yet.    Further, I certify that my clinical findings support that this patient is homebound (i.e. absences from home require considerable and taxing effort and are for medical reasons or Spiritism services or infrequently or of short duration when for other reasons) because: She is unable to drive, requires her daughter's assistance.      Physician signature ___________________________________   July 27, 2021  Physician name: Sam Leal    Fax ([phone]) or scan/email ([email or web address]) this completed document to [facility] within 24 hours of the referral date.  Questions: [phone].

## 2021-07-30 ENCOUNTER — TELEPHONE (OUTPATIENT)
Dept: FAMILY MEDICINE | Facility: OTHER | Age: 71
End: 2021-07-30

## 2021-07-30 NOTE — TELEPHONE ENCOUNTER
This is all fine and safe, please update our med list to reflect this.  Joshua Hoyos MD on 7/30/2021 at 3:41 PM

## 2021-07-30 NOTE — TELEPHONE ENCOUNTER
"Note     URGENT: per CMS best practice, response is requested within 24 hours.     Home Care regulation mandates that you are notified about drug discrepancies, interactions & contraindications. Response within a 24 hour timeframe is established by CMS as \"best practice\" for the delivery of home health care. Home Care is required to report if the 24 hour timeframe was met. The home health clinician will contact you again if this timeframe is not met or if the response does not address all concerns.         Situation:         The patient was admitted to [facility] on 7/27/21. Upon admission, a med reconciliation was completed to identify any drug discrepancies, interactions or contraindications. Home Care's drug regime review has revealed significant medication issues.      You are being contacted for clarifying orders related to the medication issues.      Background:    DM     Assessment:     Medication Discrepancies found   Aspirin & Nitorglycerin     FYI: Pt reported she takes Basaglar Kwikpen 19 Units at bedtime. Recent med list says 21 Units. Miralax reported to take this 17gram as needed, Senna 8.6 mg reported to take this medication twice daily as needed. Med list from [facility] does not have Lisinopril listed please update.      Thanks for your time Jessenia Armas, DANICA &     Adelia Hu RN on 7/30/2021 at 12:43 PM       Recommendations:    Please evaluate this information and indicate below whether or not changes are required. A copy of the patient's drug interaction/contraindications report is available upon request.         CLINIC NURSE - If changes are made, please update the Epic medication profile.      Please sign this encounter with your electronic signature.              "

## 2021-08-02 DIAGNOSIS — E11.9 TYPE 2 DIABETES MELLITUS WITHOUT COMPLICATION, WITH LONG-TERM CURRENT USE OF INSULIN (H): ICD-10-CM

## 2021-08-02 DIAGNOSIS — I25.118 ATHEROSCLEROTIC HEART DISEASE OF NATIVE CORONARY ARTERY WITH OTHER FORMS OF ANGINA PECTORIS (H): Primary | ICD-10-CM

## 2021-08-02 DIAGNOSIS — Z01.818 PREOP GENERAL PHYSICAL EXAM: ICD-10-CM

## 2021-08-02 DIAGNOSIS — R11.0 NAUSEA: ICD-10-CM

## 2021-08-02 DIAGNOSIS — Z79.4 TYPE 2 DIABETES MELLITUS WITHOUT COMPLICATION, WITH LONG-TERM CURRENT USE OF INSULIN (H): ICD-10-CM

## 2021-08-03 RX ORDER — INSULIN GLARGINE 100 [IU]/ML
INJECTION, SOLUTION SUBCUTANEOUS
Qty: 15 ML | Refills: 11 | Status: SHIPPED | OUTPATIENT
Start: 2021-08-03 | End: 2022-06-14

## 2021-08-03 RX ORDER — ASPIRIN 81 MG/1
TABLET ORAL
Qty: 30 TABLET | Refills: 4 | Status: SHIPPED | OUTPATIENT
Start: 2021-08-03 | End: 2022-07-15

## 2021-08-03 RX ORDER — CHOLECALCIFEROL (VITAMIN D3) 50 MCG
TABLET ORAL
Qty: 90 TABLET | Refills: 3 | Status: SHIPPED | OUTPATIENT
Start: 2021-08-03 | End: 2022-07-15

## 2021-08-03 RX ORDER — FUROSEMIDE 20 MG
TABLET ORAL
Qty: 90 TABLET | Refills: 3 | Status: SHIPPED | OUTPATIENT
Start: 2021-08-03 | End: 2021-08-16

## 2021-08-03 RX ORDER — ONDANSETRON 4 MG/1
TABLET, FILM COATED ORAL
Qty: 120 TABLET | Refills: 0 | Status: SHIPPED | OUTPATIENT
Start: 2021-08-03 | End: 2022-02-09

## 2021-08-03 RX ORDER — LISINOPRIL 20 MG/1
TABLET ORAL
Qty: 90 TABLET | Refills: 3 | Status: SHIPPED | OUTPATIENT
Start: 2021-08-03 | End: 2021-08-16

## 2021-08-03 RX ORDER — FAMOTIDINE 40 MG/1
TABLET, FILM COATED ORAL
Qty: 90 TABLET | Refills: 3 | Status: SHIPPED | OUTPATIENT
Start: 2021-08-03 | End: 2022-07-15

## 2021-08-03 NOTE — TELEPHONE ENCOUNTER
[facility] #[id number] sent Rx request for the following:      ASPIRIN 81MG EC TABLET  Sig: TAKE 1 TABLET BY MOUTH DAILY      Last Prescription Date:   2020  Last Fill Qty/Refills:         90, R-3    Last Office Visit:              2021   Future Office visit:           None    ONDANSETRON 4MG TABLET  Sig: TAKE 1 TABLET BY MOUTH EVERY 6 HOURS AS NEEDED      Last Prescription Date:   2021  Last Fill Qty/Refills:         120, R-0      Prescription refilled per RN Medication Refill Policy.................... Michael Stern RN ....................  8/3/2021   3:31 PM           BASAGLAR [id number]  Si UNITS NIGHTLY      Last Prescription Date:   historical  Last Fill Qty/Refills:         n/a, R-n/a         VITAMIN D3 2000IU TABLET  Sig: TAKE 1 TABLET BY MOUTH DAILY      Last Prescription Date:   historical  Last Fill Qty/Refills:         n/a, R-n/a         ONDANSETRON 4MG TABLET  Sig: TAKE 1 TABLET BY MOUTH EVERY 6 HOURS AS NEEDED      Last Prescription Date:   2021  Last Fill Qty/Refills:         120, R-0         LISINOPRIL 20MG TABLET  Sig: TAKE 1 TABLET DAILY **IS SHE STILL TAKING?      Last Prescription Date:   historical  Last Fill Qty/Refills:         n/a, R-n/a         FUROSEMIDE 20MG TABLET  Sig: TAKE 1 TABLET DAILY      Last Prescription Date:   10/14/2020  Last Fill Qty/Refills:         90, R-3         FAMOTIDINE 40MG TABLET  Sig: TAKE 1 TABLET BY MOUTH DAILY      Last Prescription Date:   historical  Last Fill Qty/Refills:         n/a, R-n/a        Routing refill request to provider for review/approval because:  Drug not active on patient's medication list  Medication is reported/historical    Unable to complete prescription refill per RN Medication Refill Policy.................... Michael Stern RN ....................  8/3/2021   3:31 PM

## 2021-08-04 ENCOUNTER — ALLIED HEALTH/NURSE VISIT (OUTPATIENT)
Dept: EDUCATION SERVICES | Facility: OTHER | Age: 71
End: 2021-08-04
Attending: FAMILY MEDICINE
Payer: MEDICARE

## 2021-08-04 DIAGNOSIS — Z79.4 TYPE 2 DIABETES MELLITUS WITHOUT COMPLICATION, WITH LONG-TERM CURRENT USE OF INSULIN (H): Primary | ICD-10-CM

## 2021-08-04 DIAGNOSIS — E11.9 TYPE 2 DIABETES MELLITUS WITHOUT COMPLICATION, WITH LONG-TERM CURRENT USE OF INSULIN (H): Primary | ICD-10-CM

## 2021-08-04 PROCEDURE — G0108 DIAB MANAGE TRN  PER INDIV: HCPCS

## 2021-08-04 NOTE — PROGRESS NOTES
"Diabetes Self-Management Education & Support    Presents for:      SUBJECTIVE/OBJECTIVE:  Diabetes education in the past 24mo: No  Diabetes type: Type 2  Disease course: Stable  Diabetes management related comments/concerns: No  Cultural Influences/Ethnic Background:  American  Patient visits with her daughter, Ami, but is unsure why she is visiting [facility] today, she shares her recent health history and lows during her stay at the [facility].  She notes her Lantus was decreased, \"but I took it down even more after I got home from 21 to 15 units and that took care of those lows.\"      Diabetes Symptoms & Complications:  Fatigue: No  Neuropathy: No  Polydipsia: No  Polyphagia: No  Polyuria: Sometimes  Visual change: No  Slow healing wounds: No  Autonomic neuropathy: No  CVA: No  Heart disease: Yes  Nephropathy: Yes  Peripheral neuropathy: Other  Peripheral Vascular Disease: No  Retinopathy: No  Sexual dysfunction: No    Patient Problem List and Family Medical History reviewed for relevant medical history, current medical status, and diabetes risk factors.    Vitals:  There were no vitals taken for this visit.  Estimated body mass index is 27.12 kg/m  as calculated from the following:    Height as of 7/27/21: 1.651 m (5' 5\").    Weight as of 7/27/21: 73.9 kg (163 lb).   Last 3 BP:   BP Readings from Last 3 Encounters:   07/27/21 112/66   07/02/21 132/74   06/10/21 137/66       History   Smoking Status     Never Smoker   Smokeless Tobacco     Never Used       Labs:  Lab Results   Component Value Date    A1C 6.7 06/04/2021     Lab Results   Component Value Date     07/07/2021     Lab Results   Component Value Date    LDL 71 06/04/2021     HDL Cholesterol   Date Value Ref Range Status   06/04/2021 44 23 - 92 mg/dL Final   ]  GFR Estimate   Date Value Ref Range Status   07/07/2021 12 (L) >60 mL/min/[1.73_m2] Final     GFR Estimate If Black   Date Value Ref Range Status   07/07/2021 15 (L) >60 " mL/min/[1.73_m2] Final     Lab Results   Component Value Date    CR 3.66 07/07/2021     No results found for: MICROALBUMIN    Healthy Eating:  Cultural/Alevism diet restrictions?: No  Meal planning/habits: Other  How many times a week on average do you eat food made away from home (restaurant/take-out)?: 0    Being Active:  Barrier to exercise: Physical limitation    Monitoring:  Blood Glucose Meter: Other  DEXCOM G6 CGM  Times checking blood sugar at home (number): 4+  Times checking blood sugar at home (per): Day  Blood glucose trend: No change      Taking Medications:  Diabetes Medication(s)     Diabetic Other       glucose (BD GLUCOSE) 4 g chewable tablet    Take 1 tablet by mouth every hour as needed for low blood sugar    Insulin       insulin aspart (NOVOLOG FLEXPEN) 100 UNIT/ML pen    Inject 7 - 16 units before each meal based on sliding scale.  Max daily dose 56 units.     insulin glargine (BASAGLAR KWIKPEN) 100 UNIT/ML pen    21 UNITS NIGHTLY          Current Treatments: Insulin Injections  Dose schedule: Pre-breakfast, Pre-lunch, Pre-dinner, At bedtime  Given by: Patient  Injection/Infusion sites: Abdomen    Reducing Risks:  CAD Risks: Dyslipidemia, Hypertension  Has dilated eye exam at least once a year?: Yes  Sees dentist every 6 months?: No  Feet checked by healthcare provider in the last year?: Yes    Healthy Coping:  Informal Support system:: Family  Patient Activation Measure Survey Score:  No flowsheet data found.    Diabetes knowledge and skills assessment:   Patient is knowledgeable in diabetes management concepts related to: Monitoring, Taking Medication and Problem Solving    Patient needs further education on the following diabetes management concepts: Healthy Eating, Being Active, Problem Solving, Reducing Risks and Healthy Coping    Based on learning assessment above, most appropriate setting for further diabetes education would be: Group class or Individual  "setting.      REPORTS:    Dexcom G6 CGM download glucose results:    Report shows 158 mg/dl average sensor glucose over the past two weeks.    Standard deviation (SD) is +- 50 mg/dl. Goal for SD is +-50 mg/dl or lower.      GMI (Glucose Management Indicator or HbA1c ) 7.1%.    Glucose 64% in target (70 - 180), 29% above target (181 - 199), 6% high (200 - 400), <1% below target (56 - 69) and 0% hypo (39 - 55).      Most significant patterns of highs found between 12:25 am and 3:00 am.    No significant patterns of lows found.      Discussed reasons BG increases after 9:00 pm and does not down come down until early morning.  Patient states she consumes watermelon or strawberries in the evening, but does not cover her snack with Novolog.  Recommend patient change her bed snack to vegetables, but patient wants to keep her fruit as the bed snack.    Discussed adding small amount of Novolog with fruit snack to help decrease hyperglycemia pattern overnight.    After food recall, patient using ICR 1:3 grams.  Patient taking 9 units for every 25 - 30 grams.  Recommend Novolog 3 units for her 15 gram CHO bed snack.    Discussed importance of consistent CHO intake with each meal if she takes her Novolog 9 units.  Patient notes sometimes has a salad and takes 9 units, \"but then I drop, so I eat candy to bring the sugars back up.\"  Food list given with CHO grams and serving sizes listed.  Her daughter shares she helps with meal planning and will watch the CHO content with meals.        Education provided today on:  AADE Self-Care Behaviors:  Diabetes Pathophysiology  Healthy Eating: consistency in amount, composition, and timing of food intake  Being Active: relationship to blood glucose  Taking Medication: action of prescribed medication  Problem Solving: high blood glucose - causes, signs/symptoms, treatment and prevention, low blood glucose - causes, signs/symptoms, treatment and prevention and carrying a " carbohydrate source at all times  Reducing Risks: major complications of diabetes, prevention, early diagnostic measures and treatment of complications, A1C - goals, relating to blood glucose levels, how often to check, lipids levels and goals and blood pressure and goals  Healthy Coping: benefits of making appropriate lifestyle changes    Pt verbalized understanding of concepts discussed and recommendations provided today.       Education Materials Provided:  My Food Plan        PLAN  See Patient Instructions for co-developed, patient-stated behavior change goals.  AVS printed and provided to patient today. See Follow-Up section for recommended follow-up.    Hazel Del Rio RN, BSN, [facility]  8/4/2021 4:20 PM   Time Spent: 60 minutes  Encounter Type: Individual    Any diabetes medication dose changes were made via the CDE Protocol and Collaborative Practice Agreement with the patient's primary care provider. A copy of this encounter was shared with the provider.

## 2021-08-10 DIAGNOSIS — K31.84 GASTROPARESIS: Primary | ICD-10-CM

## 2021-08-11 RX ORDER — SENNOSIDES A AND B 8.6 MG/1
TABLET, FILM COATED ORAL
Qty: 90 TABLET | Refills: 3 | Status: SHIPPED | OUTPATIENT
Start: 2021-08-11 | End: 2022-07-15

## 2021-08-11 NOTE — TELEPHONE ENCOUNTER
[id number] sent Rx request for the following:   senna (SENNA-TABS) 8.6 MG tablet  SigTAKE 1 TABLET BY MOUTH EVERY EVENING    Last Prescription Date:   Not on medication list  Last Fill Qty/Refills:         0, R-0    Last Office Visit:              07/27/2021 ([place])   Future Office visit:           None noted    Routing refill request to provider for review/approval because:  Drug not on the [facility], [facility] or [facility] refill protocol or controlled substance    Not on active Rx list or historical. Routing new request for PCP review and consideration. Unable to complete prescription refill per RN Medication Refill Policy.................... Carolyn Gregg RN ....................  8/11/2021   10:45 AM

## 2021-08-17 LAB
CREATININE (EXTERNAL): 2.77 MG/DL (ref 0.4–1)
GFR ESTIMATED (EXTERNAL): 17 ML/MIN/1.73
GLUCOSE (EXTERNAL): 204 MG/DL (ref 70–99)
POTASSIUM (EXTERNAL): 3.6 MEQ/L (ref 3.4–5.1)

## 2021-08-19 ENCOUNTER — TRANSFERRED RECORDS (OUTPATIENT)
Dept: HEALTH INFORMATION MANAGEMENT | Facility: OTHER | Age: 71
End: 2021-08-19

## 2021-08-20 ENCOUNTER — TELEPHONE (OUTPATIENT)
Dept: FAMILY MEDICINE | Facility: OTHER | Age: 71
End: 2021-08-20

## 2021-08-20 NOTE — TELEPHONE ENCOUNTER
Request for Home Care Physical Therapy orders as follows:    PT eval and treat date:      Continuation frequency =  2 x week x __2__ weeks                Effective date = 8/23/21    Interventions include:    Therapeutic Exercise  Therapeutic Activity  Gait Training  Transfer Training  Gait/Balance/Dysfunction  Home Exercise Program  Home Safety Assessment        Therapist:Zana Dave PT  Please respond with .HOMECAREAGREE, if you are in agreement. Please sign with your comments and signature, if you are not in agreement.

## 2021-08-20 NOTE — TELEPHONE ENCOUNTER
Request for Home Care Occupational Therapy orders as follows:    Continuation frequency =  2 x week x 3weeks                 Effective date = 8/23/21    Interventions include:    Therapeutic Exercise  ADL training  Adaptive equipment  Home safety assessment  Home exercise program                                     For therapist: Padma Anguiano OTR/L  Please respond with .HOMECAREAGREE, if you are in agreement. Please sign with your comments and signature, if you are not in agreement.

## 2021-08-20 NOTE — TELEPHONE ENCOUNTER
Sending to team member as pcp is out of office.  Debra Pierson LPN ....................  8/20/2021   2:53 PM

## 2021-08-22 ENCOUNTER — HOSPITAL ENCOUNTER (EMERGENCY)
Facility: OTHER | Age: 71
Discharge: HOME | End: 2021-08-22
Attending: PHYSICIAN ASSISTANT | Admitting: PHYSICIAN ASSISTANT
Payer: MEDICARE

## 2021-08-22 VITALS
DIASTOLIC BLOOD PRESSURE: 67 MMHG | SYSTOLIC BLOOD PRESSURE: 129 MMHG | HEIGHT: 65 IN | BODY MASS INDEX: 27.16 KG/M2 | TEMPERATURE: 97.5 F | OXYGEN SATURATION: 98 % | RESPIRATION RATE: 20 BRPM | HEART RATE: 61 BPM | WEIGHT: 163 LBS

## 2021-08-22 DIAGNOSIS — K92.2 GASTROINTESTINAL HEMORRHAGE, UNSPECIFIED GASTROINTESTINAL HEMORRHAGE TYPE: ICD-10-CM

## 2021-08-22 DIAGNOSIS — D62 ANEMIA DUE TO BLOOD LOSS, ACUTE: ICD-10-CM

## 2021-08-22 DIAGNOSIS — R55 NEAR SYNCOPE: ICD-10-CM

## 2021-08-22 DIAGNOSIS — E86.0 DEHYDRATION: ICD-10-CM

## 2021-08-22 LAB
ALBUMIN SERPL-MCNC: 3.6 G/DL (ref 3.5–5.7)
ALP SERPL-CCNC: 44 U/L (ref 34–104)
ALT SERPL W P-5'-P-CCNC: 35 U/L (ref 7–52)
ANION GAP SERPL CALCULATED.3IONS-SCNC: 9 MMOL/L (ref 3–14)
AST SERPL W P-5'-P-CCNC: 26 U/L (ref 13–39)
BASOPHILS # BLD AUTO: 0 10E3/UL (ref 0–0.2)
BASOPHILS NFR BLD AUTO: 0 %
BILIRUB SERPL-MCNC: 0.6 MG/DL (ref 0.3–1)
BUN SERPL-MCNC: 27 MG/DL (ref 7–25)
CALCIUM SERPL-MCNC: 9.6 MG/DL (ref 8.6–10.3)
CHLORIDE BLD-SCNC: 105 MMOL/L (ref 98–107)
CO2 SERPL-SCNC: 25 MMOL/L (ref 21–31)
CREAT SERPL-MCNC: 2.88 MG/DL (ref 0.6–1.2)
EOSINOPHIL # BLD AUTO: 0.1 10E3/UL (ref 0–0.7)
EOSINOPHIL NFR BLD AUTO: 2 %
ERYTHROCYTE [DISTWIDTH] IN BLOOD BY AUTOMATED COUNT: 13.8 % (ref 10–15)
GFR SERPL CREATININE-BSD FRML MDRD: 16 ML/MIN/1.73M2
GLUCOSE BLD-MCNC: 193 MG/DL (ref 70–105)
HCT VFR BLD AUTO: 30.7 % (ref 35–47)
HGB BLD-MCNC: 9.8 G/DL (ref 11.7–15.7)
IMM GRANULOCYTES # BLD: 0 10E3/UL
IMM GRANULOCYTES NFR BLD: 0 %
LYMPHOCYTES # BLD AUTO: 1.2 10E3/UL (ref 0.8–5.3)
LYMPHOCYTES NFR BLD AUTO: 24 %
MCH RBC QN AUTO: 29.4 PG (ref 26.5–33)
MCHC RBC AUTO-ENTMCNC: 31.9 G/DL (ref 31.5–36.5)
MCV RBC AUTO: 92 FL (ref 78–100)
MONOCYTES # BLD AUTO: 0.5 10E3/UL (ref 0–1.3)
MONOCYTES NFR BLD AUTO: 10 %
NEUTROPHILS # BLD AUTO: 3.1 10E3/UL (ref 1.6–8.3)
NEUTROPHILS NFR BLD AUTO: 64 %
NRBC # BLD AUTO: 0 10E3/UL
NRBC BLD AUTO-RTO: 0 /100
PLATELET # BLD AUTO: 140 10E3/UL (ref 150–450)
POTASSIUM BLD-SCNC: 3.6 MMOL/L (ref 3.5–5.1)
PROT SERPL-MCNC: 6.1 G/DL (ref 6.4–8.9)
RBC # BLD AUTO: 3.33 10E6/UL (ref 3.8–5.2)
SODIUM SERPL-SCNC: 139 MMOL/L (ref 134–144)
WBC # BLD AUTO: 4.9 10E3/UL (ref 4–11)

## 2021-08-22 PROCEDURE — 85025 COMPLETE CBC W/AUTO DIFF WBC: CPT | Performed by: PHYSICIAN ASSISTANT

## 2021-08-22 PROCEDURE — 250N000011 HC RX IP 250 OP 636: Performed by: PHYSICIAN ASSISTANT

## 2021-08-22 PROCEDURE — 82040 ASSAY OF SERUM ALBUMIN: CPT | Performed by: PHYSICIAN ASSISTANT

## 2021-08-22 PROCEDURE — 36415 COLL VENOUS BLD VENIPUNCTURE: CPT | Performed by: PHYSICIAN ASSISTANT

## 2021-08-22 PROCEDURE — C9113 INJ PANTOPRAZOLE SODIUM, VIA: HCPCS | Performed by: PHYSICIAN ASSISTANT

## 2021-08-22 PROCEDURE — 99283 EMERGENCY DEPT VISIT LOW MDM: CPT | Performed by: PHYSICIAN ASSISTANT

## 2021-08-22 PROCEDURE — 96374 THER/PROPH/DIAG INJ IV PUSH: CPT | Performed by: PHYSICIAN ASSISTANT

## 2021-08-22 PROCEDURE — 99284 EMERGENCY DEPT VISIT MOD MDM: CPT | Mod: 25 | Performed by: PHYSICIAN ASSISTANT

## 2021-08-22 PROCEDURE — 258N000003 HC RX IP 258 OP 636: Performed by: PHYSICIAN ASSISTANT

## 2021-08-22 PROCEDURE — 96361 HYDRATE IV INFUSION ADD-ON: CPT | Performed by: PHYSICIAN ASSISTANT

## 2021-08-22 RX ORDER — PANTOPRAZOLE SODIUM 40 MG/1
40 TABLET, DELAYED RELEASE ORAL DAILY
Qty: 10 TABLET | Refills: 0 | Status: SHIPPED | OUTPATIENT
Start: 2021-08-22 | End: 2021-09-01

## 2021-08-22 RX ORDER — SODIUM CHLORIDE 9 MG/ML
INJECTION, SOLUTION INTRAVENOUS CONTINUOUS
Status: DISCONTINUED | OUTPATIENT
Start: 2021-08-22 | End: 2021-08-22 | Stop reason: HOSPADM

## 2021-08-22 RX ADMIN — PANTOPRAZOLE SODIUM 80 MG: 40 INJECTION, POWDER, FOR SOLUTION INTRAVENOUS at 18:14

## 2021-08-22 RX ADMIN — SODIUM CHLORIDE 1000 ML: 9 INJECTION, SOLUTION INTRAVENOUS at 16:54

## 2021-08-22 ASSESSMENT — ENCOUNTER SYMPTOMS
DIZZINESS: 1
ABDOMINAL PAIN: 0
HEMATURIA: 0
FEVER: 0
VOMITING: 0
WOUND: 0
BRUISES/BLEEDS EASILY: 0
CONFUSION: 0
ADENOPATHY: 0
BACK PAIN: 0
CHILLS: 0
LIGHT-HEADEDNESS: 1
CHEST TIGHTNESS: 0
SHORTNESS OF BREATH: 0
NAUSEA: 1

## 2021-08-22 ASSESSMENT — MIFFLIN-ST. JEOR: SCORE: 1250.24

## 2021-08-22 NOTE — ED TRIAGE NOTES
"Pt here by meds 1 into hallway bed, [place] and [place], pt reports that she sat down and knew that her BG was elevated so she took 10 units of novolog, pt is currently feeling better, VSS, denies pain  ED Nursing Triage Note (General)   ________________________________    Baileyinga Sierra is a 71 year old Female that presents to triage private car  With history of  dizziness reported by patient   Significant symptoms had onset 30 minute(s) ago.  /67   Pulse 61   Temp 97.5  F (36.4  C) (Tympanic)   Resp 20   Ht 1.651 m (5' 5\")   Wt 73.9 kg (163 lb)   SpO2 98%   BMI 27.12 kg/m  t  Patient appears alert  and oriented, in no acute distress., and cooperative and pleasant behavior.    GCS Total = 15  Airway: intact  Breathing noted as Normal  Circulation Normal  Skin:  Normal  Action taken:  Triage to critical care immediately      PRE HOSPITAL PRIOR LIVING SITUATION Alone    "

## 2021-08-23 ENCOUNTER — TELEPHONE (OUTPATIENT)
Dept: FAMILY MEDICINE | Facility: OTHER | Age: 71
End: 2021-08-23

## 2021-08-23 NOTE — TELEPHONE ENCOUNTER
Dr. Leal,    Client was seen in ED on 8/22 for increased weakness and nausea, dehydration. She was released home on Protonix 40 mg daily for 10 days. She is currently on Pepcid 40 mg at bedtime. Should she hold Pepcid while taking Protonix therapy or can she continue taking this with Protonix. Please advise.     Thanks,    Odilia Miller LPN

## 2021-08-23 NOTE — ED PROVIDER NOTES
History     Chief Complaint   Patient presents with     Dizziness     HPI  Bailey Sierra is a 71 year old female who was at the [facility] when she suddenly became very lightheaded and felt weak.  She felt dizzy and nauseated and she sat down.  She thought she was going to pass out so she sat down.  She thought that her blood glucose must be elevated so she took 10 units of NovoLog.  The ambulance was called and IV established.  Her blood glucose in the ED is 208.  She reports she is feeling much better at this time.    Allergies:  No Known Allergies    Problem List:    Patient Active Problem List    Diagnosis Date Noted     Gastroparesis 07/13/2021     Priority: Medium     4/2021       S/P cardiac pacemaker procedure 03/09/2021     Priority: Medium     Bradycardia 02/24/2021     Priority: Medium     COVID-19 virus detected 02/23/2021     Priority: Medium     Proteinuria, unspecified type 07/20/2020     Priority: Medium     Pavon's esophagus without dysplasia 07/08/2020     Priority: Medium     History of coronary artery bypass graft x 2 12/30/2019     Priority: Medium     Stenosis of carotid artery, unspecified laterality 12/30/2019     Priority: Medium     Chronic total occlusion of coronary artery (RCA) 12/30/2019     Priority: Medium     Atherosclerotic heart disease of native coronary artery with other forms of angina pectoris (H) 09/28/2018     Priority: Medium     CKD (chronic kidney disease) stage 4, GFR 15-29 ml/min (H) 01/17/2018     Priority: Medium     Nonrheumatic aortic valve stenosis 01/17/2018     Priority: Medium     Hyperlipidemia 01/17/2018     Priority: Medium     Essential hypertension 01/17/2018     Priority: Medium     AC (acromioclavicular) joint arthritis 03/10/2017     Priority: Medium     Impingement syndrome of right shoulder 03/10/2017     Priority: Medium     Right rotator cuff tendinitis 03/10/2017     Priority: Medium     Type 2 diabetes mellitus with hyperglycemia, with  long-term current use of insulin (H) 02/13/2017     Priority: Medium     Light chain disease, 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  Per Dr. Ortiz, if hypoalbuminemia not improving by Nov-Dec 2016, needs referral to Hematology.        Vitamin D deficiency 10/23/2016     Priority: Medium     Hyperparathyroidism due to renal insufficiency (H) 03/28/2016     Priority: Medium     Coronary atherosclerosis 09/03/2013     Priority: Medium     Overview:   2 vessel bypass, August 2013       Hiatal hernia 07/26/2013     Priority: Medium     GERD (gastroesophageal reflux disease) 01/08/2013     Priority: Medium     Urge incontinence 10/25/2012     Priority: Medium     Insomnia 01/25/2012     Priority: Medium        Past Medical History:    Past Medical History:   Diagnosis Date     Atherosclerotic heart disease of native coronary artery without angina pectoris      Cardiac murmur      Chronic kidney disease, stage III (moderate)      Controlled type 2 diabetes mellitus with stage 3 chronic kidney disease, with long-term current use of insulin (H) 7/26/2013     Edema      Essential (primary) hypertension      Gastro-esophageal reflux disease without esophagitis      Hyperlipidemia      Impingement syndrome of right shoulder 03/10/2017     Insomnia 01/25/2012     Iron deficiency anemia      Neuromuscular dysfunction of bladder      Osteoarthritis      Other shoulder lesions, right shoulder 03/10/2017     Other shoulder lesions, unspecified shoulder      Other specified postprocedural states 04/19/2017     Personal history of other medical treatment (CODE)      Plantar fascial fibromatosis      Presence of aortocoronary bypass graft 07/2013     Primary osteoarthritis of shoulder 03/10/2017     Type 2 diabetes mellitus without complications (H)      Urgency of urination 03/08/2011     Uterovaginal prolapse        Past Surgical History:    Past Surgical History:    Procedure Laterality Date     ARTHROSCOPY SHOULDER Right 1997          ARTHROSCOPY SHOULDER Left 2012          ARTHROSCOPY SHOULDER RT/LT Right [id number]          Cardiac Bypass  07/2013          COLONOSCOPY  11/24/2009 11/24/2009     CYSTOSCOPY  03/15/2001     DENTAL SURGERY      Dental extractions     ENDOSCOPIC RETROGRADE CHOLANGIOPANCREATOGRAPHY  05/2008    Bile leak with ERCP and stenting and drainage of bile collection through abdominal wall.     ESOPHAGOSCOPY, GASTROSCOPY, DUODENOSCOPY (EGD), COMBINED N/A 6/23/2020    Pavon's follow up 5 years, 6/23/2025     HYSTERECTOMY TOTAL ABDOMINAL, BILATERAL SALPINGO-OOPHORECTOMY, COMBINED  04/02/2001    Vag vault suspension with Cortex mesh     IMPLANT STIMULATOR AND LEADS SACRAL NERVE (STAGE ONE AND TWO) N/A 5/7/2019    Procedure: Interstim Implant Revision, GENERATOR AND TYING LEAD EXCHANGE;  Surgeon: Honorio Bowling MD;  Location: [facility] OR     Interstim Device Placement  04/14/2014    Dr. Bowling - [facility]     LAMINECTOMY LUMBAR ONE LEVEL  1997          LAPAROSCOPIC CHOLECYSTECTOMY  04/2008          LAPAROSCOPIC TUBAL LIGATION      No Comments Provided       Family History:    Family History   Problem Relation Age of Onset     Other - See Comments Father         MI     Cancer Mother         Cancer,Some type of cancer, aneurysm     Diabetes Paternal Grandmother         Diabetes,Type II     Cancer Sister         Cancer,unknown type     Diabetes Sister         Diabetes,Type II     Diabetes Sister         Diabetes,Type II     Diabetes Sister         Diabetes,Type II     Other - See Comments Daughter         depression     Other - See Comments Daughter         ovarian cancer and depression     Heart Disease Brother         Heart Disease,CAD, MI       Social History:  Marital Status:   [5]  Social History     Tobacco Use     Smoking status: Never Smoker     Smokeless tobacco: Never Used   Vaping Use     Vaping Use: Never used   Substance Use Topics     Alcohol use: No  "    Alcohol/week: 0.0 standard drinks     Drug use: No     Comment: Drug use: No        Medications:    pantoprazole (PROTONIX) 40 MG EC tablet  aspirin (ASA) 81 MG tablet  ASPIRIN ADULT LOW STRENGTH 81 MG EC tablet  blood glucose monitoring (TRICIA CONTOUR) test strip  Calcium Carb-Cholecalciferol (CALCIUM PLUS D3 ABSORBABLE) 600-2500 MG-UNIT CAPS  famotidine (PEPCID) 40 MG tablet  glucose (BD GLUCOSE) 4 g chewable tablet  insulin aspart (NOVOLOG FLEXPEN) 100 UNIT/ML pen  insulin glargine (BASAGLAR KWIKPEN) 100 UNIT/ML pen  insulin pen needle (B-D U/F) 31G X 8 MM miscellaneous  isosorbide mononitrate (IMDUR) 60 MG 24 hr tablet  metoprolol succinate ER (TOPROL-XL) 25 MG 24 hr tablet  nitroGLYcerin (NITROSTAT) 0.4 MG sublingual tablet  ondansetron (ZOFRAN) 4 MG tablet  order for DME  polyethylene glycol (MIRALAX) 17 g packet  rosuvastatin (CRESTOR) 40 MG tablet  saccharomyces boulardii (FLORASTOR) 250 MG capsule  senna (SENNA-TABS) 8.6 MG tablet  VITAMIN D3 50 MCG (2000 UT) tablet  zolpidem (AMBIEN) 10 MG tablet          Review of Systems   Constitutional: Negative for chills and fever.   HENT: Negative for congestion.    Eyes: Negative for visual disturbance.   Respiratory: Negative for chest tightness and shortness of breath.    Cardiovascular: Negative for chest pain.   Gastrointestinal: Positive for nausea. Negative for abdominal pain and vomiting.   Genitourinary: Negative for hematuria.   Musculoskeletal: Negative for back pain.   Skin: Negative for rash and wound.   Neurological: Positive for dizziness and light-headedness. Negative for syncope.   Hematological: Negative for adenopathy. Does not bruise/bleed easily.   Psychiatric/Behavioral: Negative for confusion.   All other systems reviewed and are negative.      Physical Exam   BP: 129/67  Pulse: 61  Temp: 97.5  F (36.4  C)  Resp: 20  Height: 165.1 cm (5' 5\")  Weight: 73.9 kg (163 lb)  SpO2: 98 %  Lying Orthostatic BP: 124/69  Lying Orthostatic Pulse: 60 " bpm  Sitting Orthostatic BP: 124/64  Sitting Orthostatic Pulse: 67 bpm  Standing Orthostatic BP: 114/87  Standing Orthostatic Pulse: 62 bpm      Physical Exam  Vitals and nursing note reviewed.   Constitutional:       General: She is not in acute distress.     Appearance: Normal appearance. She is not ill-appearing or toxic-appearing.   HENT:      Head: Normocephalic. No raccoon eyes, right periorbital erythema or left periorbital erythema.      Right Ear: No drainage or tenderness.      Left Ear: No drainage or tenderness.      Nose: Nose normal.   Eyes:      General: Lids are normal. Gaze aligned appropriately. No scleral icterus.     Extraocular Movements: Extraocular movements intact.   Neck:      Trachea: No tracheal deviation.   Cardiovascular:      Rate and Rhythm: Normal rate.   Pulmonary:      Effort: Pulmonary effort is normal. No respiratory distress.      Breath sounds: No stridor. No wheezing.      Comments: Lung sounds are clear.  Abdominal:      General: Bowel sounds are normal.      Palpations: Abdomen is soft.      Tenderness: There is no abdominal tenderness.      Comments: Abdomen soft nontender no rebound or masses bowel sounds are normal.   Musculoskeletal:         General: No deformity or signs of injury. Normal range of motion.      Cervical back: Normal range of motion. No signs of trauma.   Skin:     General: Skin is warm and dry.      Coloration: Skin is not jaundiced or pale.   Neurological:      General: No focal deficit present.      Mental Status: She is alert and oriented to person, place, and time.      GCS: GCS eye subscore is 4. GCS verbal subscore is 5. GCS motor subscore is 6.      Motor: No tremor or seizure activity.   Psychiatric:         Attention and Perception: Attention normal.         Mood and Affect: Mood normal.         ED Course          Results for orders placed or performed during the hospital encounter of 08/22/21 (from the past 24 hour(s))   CBC with platelets  differential    Narrative    The following orders were created for panel order CBC with platelets differential.  Procedure                               Abnormality         Status                     ---------                               -----------         ------                     CBC with platelets and d...[[id number]]  Abnormal            Final result                 Please view results for these tests on the individual orders.   Comprehensive metabolic panel   Result Value Ref Range    Sodium 139 134 - 144 mmol/L    Potassium 3.6 3.5 - 5.1 mmol/L    Chloride 105 98 - 107 mmol/L    Carbon Dioxide (CO2) 25 21 - 31 mmol/L    Anion Gap 9 3 - 14 mmol/L    Urea Nitrogen 27 (H) 7 - 25 mg/dL    Creatinine 2.88 (H) 0.60 - 1.20 mg/dL    Calcium 9.6 8.6 - 10.3 mg/dL    Glucose 193 (H) 70 - 105 mg/dL    Alkaline Phosphatase 44 34 - 104 U/L    AST 26 13 - 39 U/L    ALT 35 7 - 52 U/L    Protein Total 6.1 (L) 6.4 - 8.9 g/dL    Albumin 3.6 3.5 - 5.7 g/dL    Bilirubin Total 0.6 0.3 - 1.0 mg/dL    GFR Estimate 16 (L) >60 mL/min/1.73m2   CBC with platelets and differential   Result Value Ref Range    WBC Count 4.9 4.0 - 11.0 10e3/uL    RBC Count 3.33 (L) 3.80 - 5.20 10e6/uL    Hemoglobin 9.8 (L) 11.7 - 15.7 g/dL    Hematocrit 30.7 (L) 35.0 - 47.0 %    MCV 92 78 - 100 fL    MCH 29.4 26.5 - 33.0 pg    MCHC 31.9 31.5 - 36.5 g/dL    RDW 13.8 10.0 - 15.0 %    Platelet Count 140 (L) 150 - 450 10e3/uL    % Neutrophils 64 %    % Lymphocytes 24 %    % Monocytes 10 %    % Eosinophils 2 %    % Basophils 0 %    % Immature Granulocytes 0 %    NRBCs per 100 WBC 0 <1 /100    Absolute Neutrophils 3.1 1.6 - 8.3 10e3/uL    Absolute Lymphocytes 1.2 0.8 - 5.3 10e3/uL    Absolute Monocytes 0.5 0.0 - 1.3 10e3/uL    Absolute Eosinophils 0.1 0.0 - 0.7 10e3/uL    Absolute Basophils 0.0 0.0 - 0.2 10e3/uL    Absolute Immature Granulocytes 0.0 <=0.0 10e3/uL    Absolute NRBCs 0.0 10e3/uL       Medications   0.9% sodium chloride BOLUS (0 mLs Intravenous  Stopped 8/22/21 1753)   pantoprazole (PROTONIX) IV push injection 80 mg (80 mg Intravenous Given 8/22/21 1814)       Assessments & Plan (with Medical Decision Making)     I have reviewed the nursing notes.    I have reviewed the findings, diagnosis, plan and need for follow up with the patient.      Discharge Medication List as of 8/22/2021  6:25 PM      START taking these medications    Details   pantoprazole (PROTONIX) 40 MG EC tablet Take 1 tablet (40 mg) by mouth daily for 10 days, Disp-10 tablet, R-0, Local Print             Final diagnoses:   Anemia due to blood loss, acute   Gastrointestinal hemorrhage, unspecified gastrointestinal hemorrhage type   Dehydration   Near syncope     Afebrile.  Vital signs stable.  Patient with a near syncopal episode while at the [facility].  She felt her blood glucose was elevated she gave itself 10 units of insulin.  IV established via EMS.  She was given fluids.  Orthostatics are negative.  Blood glucose in the ER is 193.  Her CMP shows a creatinine of 2.88 with a GFR of 16 and a BUN of 27.  This is actually improved from her previous values.  Her CBC shows normal white blood cells and no left shift.  Her hemoglobin is 9.8 this is decreased from 2 months ago which was 12.5 and even more so from 4 months ago when it was 13.5.  She reports she is scheduled for an upper GI endoscopy in the future.  Upon asking her she reports some change in stool colors as well.  Acute blood loss anemia.  GI blood loss.  I discussed the need for close follow-up with her primary care provider for further evaluation and a follow-up was arranged with Arina Luu on 8/26/2021 at 09:00am.  She was given Protonix IV in the ER.  Rx for short course of Protonix as well.  A UA was ordered but she did not produce a urinary sample.  She is feeling much better with the above treatment and does not see the need for this.  She will be discharged home to follow-up if there is any concerns for further evaluation as  needed.  8/22/2021   [facility]     Fredis Montes PA-C  08/22/21 [id number]

## 2021-08-24 NOTE — TELEPHONE ENCOUNTER
Since this is only for 10 days, there is no concerns with her taking both. BRITNEY Lake CNP on 8/24/2021 at 3:03 PM

## 2021-08-24 NOTE — TELEPHONE ENCOUNTER
This message has been routed multiple times, but no order or notes have been placed.  Left message for Home care to review this message and document if they got the orders they need or if they still need them.    Angelina Delgado CMA ([facility])

## 2021-08-26 ENCOUNTER — OFFICE VISIT (OUTPATIENT)
Dept: FAMILY MEDICINE | Facility: OTHER | Age: 71
End: 2021-08-26
Attending: NURSE PRACTITIONER
Payer: COMMERCIAL

## 2021-08-26 ENCOUNTER — TELEPHONE (OUTPATIENT)
Dept: FAMILY MEDICINE | Facility: OTHER | Age: 71
End: 2021-08-26

## 2021-08-26 VITALS
DIASTOLIC BLOOD PRESSURE: 62 MMHG | WEIGHT: 159.6 LBS | HEART RATE: 58 BPM | TEMPERATURE: 98 F | OXYGEN SATURATION: 99 % | SYSTOLIC BLOOD PRESSURE: 110 MMHG | RESPIRATION RATE: 14 BRPM | BODY MASS INDEX: 26.56 KG/M2

## 2021-08-26 DIAGNOSIS — M62.82 NON-TRAUMATIC RHABDOMYOLYSIS: Primary | ICD-10-CM

## 2021-08-26 DIAGNOSIS — K21.9 GASTROESOPHAGEAL REFLUX DISEASE WITHOUT ESOPHAGITIS: ICD-10-CM

## 2021-08-26 DIAGNOSIS — K22.70 BARRETT'S ESOPHAGUS WITHOUT DYSPLASIA: Primary | ICD-10-CM

## 2021-08-26 LAB — HGB BLD-MCNC: 11.3 G/DL (ref 11.7–15.7)

## 2021-08-26 PROCEDURE — 36415 COLL VENOUS BLD VENIPUNCTURE: CPT | Mod: ZL | Performed by: NURSE PRACTITIONER

## 2021-08-26 PROCEDURE — 99214 OFFICE O/P EST MOD 30 MIN: CPT | Performed by: NURSE PRACTITIONER

## 2021-08-26 PROCEDURE — G0463 HOSPITAL OUTPT CLINIC VISIT: HCPCS

## 2021-08-26 PROCEDURE — 85018 HEMOGLOBIN: CPT | Mod: ZL | Performed by: NURSE PRACTITIONER

## 2021-08-26 ASSESSMENT — ANXIETY QUESTIONNAIRES
2. NOT BEING ABLE TO STOP OR CONTROL WORRYING: NOT AT ALL
6. BECOMING EASILY ANNOYED OR IRRITABLE: NOT AT ALL
7. FEELING AFRAID AS IF SOMETHING AWFUL MIGHT HAPPEN: NOT AT ALL
1. FEELING NERVOUS, ANXIOUS, OR ON EDGE: NOT AT ALL
GAD7 TOTAL SCORE: 0
5. BEING SO RESTLESS THAT IT IS HARD TO SIT STILL: NOT AT ALL
3. WORRYING TOO MUCH ABOUT DIFFERENT THINGS: NOT AT ALL
IF YOU CHECKED OFF ANY PROBLEMS ON THIS QUESTIONNAIRE, HOW DIFFICULT HAVE THESE PROBLEMS MADE IT FOR YOU TO DO YOUR WORK, TAKE CARE OF THINGS AT HOME, OR GET ALONG WITH OTHER PEOPLE: NOT DIFFICULT AT ALL

## 2021-08-26 ASSESSMENT — PATIENT HEALTH QUESTIONNAIRE - PHQ9: 5. POOR APPETITE OR OVEREATING: NOT AT ALL

## 2021-08-26 ASSESSMENT — PAIN SCALES - GENERAL: PAINLEVEL: NO PAIN (0)

## 2021-08-26 NOTE — TELEPHONE ENCOUNTER
Request for Home Care Physical Therapy orders as follows:    PT eval and treat date:      Continuation frequency =  2 x week x __1__ weeks   Then 1 x 2 weeks              Effective date = 9/6/21    Interventions include:    Therapeutic Exercise  Therapeutic Activity  Gait Training  Transfer Training  Gait/Balance/Dysfunction  Home Exercise Program  Home Safety Assessment         Therapist: Rochelle Dave PT  Please respond with .HOMECAREAGREE, if you are in agreement. Please sign with your comments and signature, if you are not in agreement.

## 2021-08-26 NOTE — NURSING NOTE
"Coming in for a f/u from ER     GI BLEED    Chief Complaint   Patient presents with     Hospital F/U     ER GI bleed       Initial /62   Pulse 58   Temp 98  F (36.7  C)   Resp 14   Wt 72.4 kg (159 lb 9.6 oz)   SpO2 99%   BMI 26.56 kg/m   Estimated body mass index is 26.56 kg/m  as calculated from the following:    Height as of 8/22/21: 1.651 m (5' 5\").    Weight as of this encounter: 72.4 kg (159 lb 9.6 oz).  Medication Reconciliation: complete.  FOOD SECURITY SCREENING QUESTIONS  Hunger Vital Signs:  Within the past 12 months we worried whether our food would run out before we got money to buy more. Never  Within the past 12 months the food we bought just didn't last and we didn't have money to get more. Never  Salud Nagy LPN 8/26/2021 9:23 AM      Salud Nagy LPN  "

## 2021-08-26 NOTE — PROGRESS NOTES
HPI:    Bailey Sierra is a 71 year old female who presents to clinic today for ER follow-up.  She was in the emergency room on 08/22/2021 for anemia due to blood loss, GI bleed, dehydration.  [place] and felt weak, she felt dizzy and nauseated.  Hemoglobin was low at 9.8.  She has had iron saturation and ferritin levels checked 08/17/2021 which were within normal limits.  She was started on Protonix 40 mg daily for 10 days.  She had also been given IV fluids.  She does have a diagnosis of Pavon's esophagitis determined by biopsy without dysplasia.  She follows with gastroenterology.  She has an appointment gastroenterology on 11/01/2021.    She states [place] a lot became overheated.  She also reports her blood sugars were high.  She sat down and had some mild nausea but then started to feel better after she took her insulin.  She did not want to go the emergency room but her family insisted.  She states since she was in the emergency room she is feeling well.  She has not had any further episodes.  Denies any dark or bloody stools, no vomiting.  Denies any abdominal pain.  She states she has an EGD scheduled for October 2021.    Past Medical History:   Diagnosis Date     Atherosclerotic heart disease of native coronary artery without angina pectoris     No Comments Provided     Cardiac murmur     No Comments Provided     Chronic kidney disease, stage III (moderate)     No Comments Provided     Controlled type 2 diabetes mellitus with stage 3 chronic kidney disease, with long-term current use of insulin (H) 7/26/2013     Edema     No Comments Provided     Essential (primary) hypertension     No Comments Provided     Gastro-esophageal reflux disease without esophagitis     No Comments Provided     Hyperlipidemia     No Comments Provided     Impingement syndrome of right shoulder 03/10/2017          Insomnia 01/25/2012          Iron  deficiency anemia     No Comments Provided     Neuromuscular dysfunction of bladder     No Comments Provided     Osteoarthritis     No Comments Provided     Other shoulder lesions, right shoulder 03/10/2017          Other shoulder lesions, unspecified shoulder     No Comments Provided     Other specified postprocedural states 04/19/2017          Personal history of other medical treatment (CODE)     Three childbirths     Plantar fascial fibromatosis     No Comments Provided     [facility]     Primary osteoarthritis of shoulder 03/10/2017          Type 2 diabetes mellitus without complications (H)     No Comments Provided     Urgency of urination 03/08/2011          Uterovaginal prolapse     No Comments Provided         Current Outpatient Medications   Medication Sig Dispense Refill     aspirin (ASA) 81 MG tablet Take 1 tablet (81 mg) by mouth daily 90 tablet 3     ASPIRIN ADULT LOW STRENGTH 81 MG EC tablet TAKE 1 TABLET BY MOUTH DAILY 30 tablet 4     blood glucose monitoring (AutoUncle CONTOUR) test strip TEST 3 TIMES A DAY (Patient taking differently: TEST 4 TIMES A DAY) 300 each 3     Calcium Carb-Cholecalciferol (CALCIUM PLUS D3 ABSORBABLE) 600-2500 MG-UNIT CAPS Take 1 capsule by mouth daily       famotidine (PEPCID) 40 MG tablet TAKE 1 TABLET BY MOUTH DAILY 90 tablet 3     glucose (BD GLUCOSE) 4 g chewable tablet Take 1 tablet by mouth every hour as needed for low blood sugar       insulin aspart (NOVOLOG FLEXPEN) 100 UNIT/ML pen Inject 7 - 16 units before each meal based on sliding scale.  Max daily dose 56 units. 60 mL 11     insulin glargine (BASAGLAR KWIKPEN) 100 UNIT/ML pen 21 UNITS NIGHTLY 15 mL 11     insulin pen needle (B-D U/F) 31G X 8 MM miscellaneous USE 4 PEN NEEDLES DAILY 400 each 3     isosorbide mononitrate (IMDUR) 60 MG 24 hr tablet Take 2 tablets (120 mg) by mouth daily 180 tablet 3     metoprolol succinate ER (TOPROL-XL) 25 MG 24 hr tablet Take 0.5  tablets (12.5 mg) by mouth every evening 45 tablet 3     nitroGLYcerin (NITROSTAT) 0.4 MG sublingual tablet Place 0.4 mg under the tongue every 5 minutes as needed for chest pain       ondansetron (ZOFRAN) 4 MG tablet TAKE 1 TABLET BY MOUTH EVERY 6 HOURS AS NEEDED 120 tablet 0     order for DME Equipment being ordered: Diabetic shoes, pre-ulcer callous formation 2 each 3     pantoprazole (PROTONIX) 40 MG EC tablet Take 1 tablet (40 mg) by mouth daily for 10 days 10 tablet 0     polyethylene glycol (MIRALAX) 17 g packet Take 17 g by mouth daily       rosuvastatin (CRESTOR) 40 MG tablet TAKE 1 TABLET BY MOUTH DAILY 90 tablet 1     saccharomyces boulardii (FLORASTOR) 250 MG capsule Take 1 capsule (250 mg) by mouth 2 times daily       senna (SENNA-TABS) 8.6 MG tablet TAKE 1 TABLET BY MOUTH EVERY EVENING 90 tablet 3     VITAMIN D3 50 MCG (2000 UT) tablet TAKE 1 TABLET BY MOUTH DAILY 90 tablet 3     zolpidem (AMBIEN) 10 MG tablet Take 1 tablet (10 mg) by mouth nightly as needed for sleep 30 tablet 5       No Known Allergies    ROS:  Pertinent positives and negatives are noted in HPI.    EXAM:  /62   Pulse 58   Temp 98  F (36.7  C)   Resp 14   Wt 72.4 kg (159 lb 9.6 oz)   SpO2 99%   BMI 26.56 kg/m    General appearance: well appearing female, in no acute distress  Respiratory: clear to auscultation bilaterally  Cardiac: RRR with no murmurs  Abdomen: soft, nontender, no masses or organomegaly  Psychological: normal affect, alert and pleasant  Results for orders placed or performed in visit on 08/26/21   Hemoglobin     Status: Abnormal   Result Value Ref Range    Hemoglobin 11.3 (L) 11.7 - 15.7 g/dL       ASSESSMENT AND PLAN:    1. Pavon's esophagus without dysplasia    2. Gastroesophageal reflux disease without esophagitis        Hemoglobin was rechecked due to this being low previously.  It does appear that she has had fluctuating hemoglobins over the past year.  Today hemoglobin is up to 11.3 which is  reassuring.  She will continue for her 10 days of Protonix and then stop this while continuing with her famotidine.  She will follow-up with specialist including GI as scheduled.  Follow-up in clinic or ER as needed.  No other concerns at this time.  BRITNEY Lake CNP..................8/26/2021 8:45 AM

## 2021-08-26 NOTE — TELEPHONE ENCOUNTER
Dr Leal reviewed and completed the following home care or hospice form(s) for patient on 8/30/21   This covers the certification period effective 7/27/21 to 9/24/21.  Salud Nagy LPN on 8/26/2021 at 4:43 PM

## 2021-08-26 NOTE — TELEPHONE ENCOUNTER
The patient is currently receiving Home Care therapy. It is recommended that the patient continue therapy as an outpatient. In preparation for the patient's discharge from Home Care, we would like to schedule the therapy appointments now in order to prevent a lapse in service.     If agreeable, the outpatient therapy order is attached and ready for your signature.       Thank you  Rochelle Dave PT

## 2021-08-27 ASSESSMENT — ANXIETY QUESTIONNAIRES: GAD7 TOTAL SCORE: 0

## 2021-08-31 ENCOUNTER — ALLIED HEALTH/NURSE VISIT (OUTPATIENT)
Dept: UROLOGY | Facility: OTHER | Age: 71
End: 2021-08-31
Attending: UROLOGY
Payer: MEDICARE

## 2021-08-31 DIAGNOSIS — N32.81 OVERACTIVE BLADDER: Primary | ICD-10-CM

## 2021-08-31 PROCEDURE — 99207 PR NO CHARGE NURSE ONLY: CPT

## 2021-08-31 NOTE — NURSING NOTE
Pt is here today for a one year follow up.  Program changed from  Program 3, 2.2 amp to Program 5, 1.7   Follow up in one year

## 2021-09-08 DIAGNOSIS — E11.8 TYPE 2 DIABETES MELLITUS WITH COMPLICATION, WITH LONG-TERM CURRENT USE OF INSULIN (H): ICD-10-CM

## 2021-09-08 DIAGNOSIS — Z95.0 S/P CARDIAC PACEMAKER PROCEDURE: ICD-10-CM

## 2021-09-08 DIAGNOSIS — Z79.4 TYPE 2 DIABETES MELLITUS WITH COMPLICATION, WITH LONG-TERM CURRENT USE OF INSULIN (H): ICD-10-CM

## 2021-09-10 ENCOUNTER — TELEPHONE (OUTPATIENT)
Dept: FAMILY MEDICINE | Facility: OTHER | Age: 71
End: 2021-09-10

## 2021-09-10 NOTE — TELEPHONE ENCOUNTER
Patient called stating [facility] was sending over an order for [facility] to send them a new order for her Dexcom. Patient would like an update on where we are at with this.     Please contact patient today. She was informed [facility] was not in office.     Thank you,  Kesha Casiano on 9/10/2021 at 12:44 PM

## 2021-09-13 RX ORDER — ATORVASTATIN CALCIUM 40 MG/1
40 TABLET, FILM COATED ORAL DAILY
Qty: 90 TABLET | Refills: 3 | Status: SHIPPED | OUTPATIENT
Start: 2021-09-13 | End: 2021-12-13

## 2021-09-13 RX ORDER — METOPROLOL SUCCINATE 25 MG/1
12.5 TABLET, EXTENDED RELEASE ORAL EVERY EVENING
Qty: 45 TABLET | Refills: 3 | OUTPATIENT
Start: 2021-09-13

## 2021-09-13 RX ORDER — ROSUVASTATIN CALCIUM 40 MG/1
TABLET, COATED ORAL
Qty: 90 TABLET | Refills: 1 | OUTPATIENT
Start: 2021-09-13

## 2021-09-13 NOTE — TELEPHONE ENCOUNTER
"[id number] sent Rx request for the following:   rosuvastatin (CRESTOR) 40 MG tablet  Sig TAKE 1 TABLET BY MOUTH DAILY    Last Prescription Date:   07/07/2021  Last Fill Qty/Refills:         90, R-1    Statins Protocol Passed - 9/9/2021 12:38 PM       Passed - LDL on file in past 12 months    Recent Labs   Lab Test 06/04/21  0906   LDL 71            Passed - No abnormal creatine kinase in past 12 months    No lab results found.            Passed - Recent (12 mo) or future (30 days) visit within the authorizing provider's specialty    Patient has had an office visit with the authorizing provider or a provider within the authorizing providers department within the previous 12 mos or has a future within next 30 days. See \"Patient Info\" tab in inbasket, or \"Choose Columns\" in Meds & Orders section of the refill encounter.             Passed - Medication is active on med list       Passed - Patient is age 18 or older       Passed - No active pregnancy on record       Passed - No positive pregnancy test in past 12 months     metoprolol succinate ER (TOPROL-XL) 25 MG 24 hr tablet  SigTake 0.5 tablets (12.5 mg) by mouth every evening    Last Prescription Date:   03/09/2021  Last Fill Qty/Refills:         45, R-3    Beta-Blockers Protocol Passed - 9/9/2021 12:38 PM   Redundant refill request refused: Too soon:     Last Office Visit:              08/26/2021 (J[facility])   Future Office visit:           None noted    Note from pharmacy: Pharmacy requesting 90 day supply. Also would like to switch to Atrovastatin instead. If yes send new RX.  Routing for consideration of PCP. Unable to complete prescription refill per RN Medication Refill Policy.................... Carolyn Gregg RN ....................  9/13/2021   8:30 AM        "

## 2021-09-15 ENCOUNTER — TRANSFERRED RECORDS (OUTPATIENT)
Dept: HEALTH INFORMATION MANAGEMENT | Facility: OTHER | Age: 71
End: 2021-09-15

## 2021-09-17 ENCOUNTER — TELEPHONE (OUTPATIENT)
Dept: FAMILY MEDICINE | Facility: OTHER | Age: 71
End: 2021-09-17

## 2021-09-17 NOTE — TELEPHONE ENCOUNTER
Haven't recieved fax. I attempted call to [facility] and was on hold for 15 minutes.    Noemy Holt LPN on 9/17/2021 at 3:28 PM

## 2021-09-17 NOTE — TELEPHONE ENCOUNTER
Please fax back the completed paperwork that was faxed over a few days ago.  FX# [phone]  Its regarding the diabetic supply order.      Liz Hu on 9/17/2021 at 1:18 PM

## 2021-09-20 NOTE — TELEPHONE ENCOUNTER
I reached out to patient. She talked to [facility] today and they were going to refax her paperwork here.    Noemy Holt LPN on 9/20/2021 at 4:39 PM

## 2021-09-21 NOTE — TELEPHONE ENCOUNTER
Fax was received from [facility]. Paperwork was completed and faxed.    Noemy Holt LPN on 9/21/2021 at 11:46 AM

## 2021-09-23 ENCOUNTER — NURSE TRIAGE (OUTPATIENT)
Dept: NURSING | Facility: CLINIC | Age: 71
End: 2021-09-23

## 2021-09-23 NOTE — TELEPHONE ENCOUNTER
"TELEPHONE CALL     Reason for call Pt needs a Rx for her \"Depends\" size L to be sent to her pharmacy:     [facility]   [address]   [facility] [id number]    Her PCP is Sam Ramsay at [facility]     Note sent to Her PCP is Sam Ramsay     No symptoms reported, no reason to TRIAGE patient for this nurse.  Patient s questions answered  Lizette Fisher RN Nurse Triage Advisor 6:19 PM 9/23/2021      "

## 2021-09-24 ENCOUNTER — TELEPHONE (OUTPATIENT)
Dept: FAMILY MEDICINE | Facility: OTHER | Age: 71
End: 2021-09-24

## 2021-09-24 DIAGNOSIS — N39.41 URGE INCONTINENCE: Primary | ICD-10-CM

## 2021-09-24 NOTE — TELEPHONE ENCOUNTER
Patient had called after hours triage RN for request of New Rx of depends size Large sent to [facility]. No historical Rx, none on current med list, no recent pharmacy requests. Rx julia'd up for review. PCP currently out of office and patient desires to be addressed prior to weekend. Carolyn Gregg RN ....................  9/24/2021   8:35 AM

## 2021-09-24 NOTE — TELEPHONE ENCOUNTER
Left message notifying patient orders have been sent to [facility].    Noemy Holt LPN on 9/24/2021 at 11:36 AM

## 2021-09-24 NOTE — TELEPHONE ENCOUNTER
Left message for patient notifying her orders were sent to [facility].    Noemy Holt LPN on 9/24/2021 at 11:37 AM

## 2021-09-24 NOTE — TELEPHONE ENCOUNTER
Patient has ins back and would like her script for depends sent to [facility]-realizes you are gone until Mon.

## 2021-09-29 ENCOUNTER — HOSPITAL ENCOUNTER (OUTPATIENT)
Dept: PHYSICAL THERAPY | Facility: OTHER | Age: 71
Setting detail: THERAPIES SERIES
End: 2021-09-29
Attending: FAMILY MEDICINE
Payer: MEDICARE

## 2021-09-29 DIAGNOSIS — M62.82 NON-TRAUMATIC RHABDOMYOLYSIS: ICD-10-CM

## 2021-09-29 PROCEDURE — 97161 PT EVAL LOW COMPLEX 20 MIN: CPT | Mod: GP

## 2021-09-29 PROCEDURE — 97110 THERAPEUTIC EXERCISES: CPT | Mod: GP

## 2021-09-29 NOTE — PROGRESS NOTES
[facility]        OUTPATIENT PHYSICAL THERAPY FUNCTIONAL EVALUATION  PLAN OF TREATMENT FOR OUTPATIENT REHABILITATION  (COMPLETE FOR INITIAL CLAIMS ONLY)  Patient's Last Name, First Name, M.I.  YOB: 1950  Bailey Sierra     Provider's Name   [facility]   Medical Record No.  [id number]     Start of Care Date:  09/29/21   Onset Date:  08/23/21   Type:     _X__PT   ____OT  ____SLP Medical Diagnosis:  M62.82     PT Diagnosis:  decreased functional independence Visits from SOC:  1                              __________________________________________________________________________________  Plan of Treatment/Functional Goals:  balance training, gait training, joint mobilization, motor coordination training, neuromuscular re-education, ROM, strengthening, stretching     Ultrasound, Cryotherapy, Thermotherapy: Hydrocollator Packs, Microcurrent Electrical Stimulation     GOALS  gait  pt will ambulate with SEC for 200 feet with step though pattern to progress towards prior function.   11/10/21    strength   Pt will complete 4 sit<>Stands from average height chair without UE assist in order to improve functional strength for transfer and gait safety.   11/24/21    balance  Pt will demonstrate the ability to complete a L SLS for 5 seconds to improve stability for walking.   11/10/21    homemaking  Pt will demonstrate ability to simulate sweeping a floor with wooden dowel without use of an AD for 30 seconds to progress towards maintaining her own home.   12/08/21       Therapy Frequency:  2 times/Week   Predicted Duration of Therapy Intervention:  10 weeks    Mora De Oliveira, PT                                    I CERTIFY THE NEED FOR THESE SERVICES FURNISHED UNDER        THIS PLAN OF TREATMENT AND WHILE UNDER MY CARE     (Physician co-signature of this document indicates  review and certification of the therapy plan).                Certification Date From:  09/29/21   Certification Date To:  12/08/21    Referring Provider:  Dr. Leal    Initial Assessment  See Epic Evaluation- Start of Care Date: 09/29/21

## 2021-09-29 NOTE — PROGRESS NOTES
09/29/21 1400   Quick Adds   Quick Adds Certification   Type of Visit Initial OP PT Evaluation   General Information   Start of Care Date 09/29/21   Referring Physician Dr. Leal   Orders Evaluate and Treat as Indicated   Additional Orders pt has homecare intitially.    Order Date 08/26/21   Medical Diagnosis M62.82 (non-traumatic Rhabdomyolysis)    Onset of illness/injury or Date of Surgery 08/23/21   Precautions/Limitations fall precautions   Surgical/Medical history reviewed Yes   Pertinent history of current problem (include personal factors and/or comorbidities that impact the POC) PMH: diabetes, fall in june, heart problems, HTN, bladder control, unexplained weight loss.    Current Community Support Emergency call system;Housekeeping service   Patient role/Employment history Retired   Living environment House/[facility]   Home/Community Accessibility Comments basement stairs 13, 2 steps with railings.    Current Assistive Devices Four Wheeled Walker;Front Wheeled Walker   ADL Devices Shower/Tub Chair   Patient/Family Goals Statement increase strength, complete ADL's, get rid of 4WW.    General Information Comments Pt is a 71 year old female referred to skilled PT services following a discharge from homecare services after meeting goals and being able to progress to OP as pt was leaving her home. pt started homecare in late june/early July 2021 after a fall in her home. pt was not using a walker before the fall. pt then had homecare and during homecare went to [facility] where she felt ill in the heat and had to go the ER. pt was then referred to OP after that as she was leaving her home and still was weaker than normal.    Fall Risk Screen   Fall screen completed by PT   Have you fallen 2 or more times in the past year? No   Have you fallen and had an injury in the past year? No   Is patient a fall risk? Yes;Department fall risk interventions implemented   Fall screen comments pt has had a fall and  requires use of 4WW.    Abuse Screen (yes response referral indicated)   Feels Unsafe at Home or Work/School no   Feels Threatened by Someone no   Does Anyone Try to Keep You From Having Contact with Others or Doing Things Outside Your Home? no   Physical Signs of Abuse Present no   Strength   Strength Comments L hip flexion 2+/5, R hip flexion 3/5, B hip ABD/ADD 3/5, B knee extension 4-/5, B knee flexion 4/5, B ankle dorsiflexion 4/5.    Transfer Skills   Transfer Comments requires use of UE's.    Gait   Gait Comments uses 4WW, decreased clearance of L foot but does not drag on ground.    Balance Special Tests Hopkins Balance   Score out of 56 35   Comments increased falls risk   Balance Special Tests Sit to Stand Reps in 30 Seconds   Reps in 30 seconds 10   Height 16 inches   Comments with UE assist    Modality Interventions   Planned Modality Interventions Ultrasound;Cryotherapy;Thermotherapy: Hydrocollator Packs;Microcurrent Electrical Stimulation   Planned Modality Interventions Comments vasopneumatic compression.    Planned Therapy Interventions   Planned Therapy Interventions balance training;gait training;joint mobilization;motor coordination training;neuromuscular re-education;ROM;strengthening;stretching   Clinical Impression   Criteria for Skilled Therapeutic Interventions Met yes, treatment indicated   PT Diagnosis decreased functional independence   Influenced by the following impairments weakness, fatigue, decreased motor control, high falls risk.    Functional limitations due to impairments gait instability, increased assist for transfers, difficulty with ADL's.    Clinical Presentation Stable/Uncomplicated   Clinical Presentation Rationale symptoms consistent with diagnosis.    Clinical Decision Making (Complexity) Low complexity   Therapy Frequency 2 times/Week   Predicted Duration of Therapy Intervention (days/wks) 10 weeks   Risk & Benefits of therapy have been explained Yes   Patient, Family & other  staff in agreement with plan of care Yes   Clinical Impression Comments Pt is a 71 year old female referred to skilled PT services following a decline in function starting in June, pt was able to improve strength to allow her to leave her home but continues to require increased assist from AD and from family. pt presents with an increased falls risk, LE weakness, and reliance on 4WW for safe ambulation. Pt can benefit from PT services to improve complaince with HEP and reduce use of 4WW.    Education Assessment   Preferred Learning Style Listening;Reading   Barriers to Learning No barriers   GOALS   PT Eval Goals 1;2;3;4   Goal 1   Goal Identifier gait   Goal Description pt will ambulate with SEC for 200 feet with step though pattern to progress towards prior function.    Target Date 11/10/21   Goal 2   Goal Identifier strength    Goal Description Pt will complete 4 sit<>Stands from average height chair without UE assist in order to improve functional strength for transfer and gait safety.    Target Date 11/24/21   Goal 3   Goal Identifier balance   Goal Description Pt will demonstrate the ability to complete a L SLS for 5 seconds to improve stability for walking.    Target Date 11/10/21   Goal 4   Goal Identifier transfers   Goal Description homemaking   Goal Progress Pt will demonstrate ability to simulate sweeping a floor with wooden dowel without use of an AD for 30 seconds to progress towards maintaining her own home.    Date Met 12/08/21   Total Evaluation Time   PT Eval, Low Complexity Minutes (40413) 30   Therapy Certification   Certification date from 09/29/21   Certification date to 12/08/21   Medical Diagnosis M62.82   Certification I certify the need for these services furnished under this plan of treatment and while under my care.  (Physician co-signature of this document indicates review and certification of the therapy plan).

## 2021-10-01 ENCOUNTER — HOSPITAL ENCOUNTER (OUTPATIENT)
Dept: PHYSICAL THERAPY | Facility: OTHER | Age: 71
Setting detail: THERAPIES SERIES
End: 2021-10-01
Attending: FAMILY MEDICINE
Payer: MEDICARE

## 2021-10-01 PROCEDURE — 97112 NEUROMUSCULAR REEDUCATION: CPT | Mod: GP

## 2021-10-01 PROCEDURE — 97110 THERAPEUTIC EXERCISES: CPT | Mod: GP

## 2021-10-05 ENCOUNTER — HOSPITAL ENCOUNTER (OUTPATIENT)
Dept: PHYSICAL THERAPY | Facility: OTHER | Age: 71
Setting detail: THERAPIES SERIES
End: 2021-10-05
Attending: FAMILY MEDICINE
Payer: MEDICARE

## 2021-10-05 PROCEDURE — 97110 THERAPEUTIC EXERCISES: CPT | Mod: GP

## 2021-10-07 ENCOUNTER — HOSPITAL ENCOUNTER (OUTPATIENT)
Dept: PHYSICAL THERAPY | Facility: OTHER | Age: 71
Setting detail: THERAPIES SERIES
End: 2021-10-07
Attending: FAMILY MEDICINE
Payer: MEDICARE

## 2021-10-07 PROCEDURE — 97110 THERAPEUTIC EXERCISES: CPT | Mod: GP

## 2021-10-08 ENCOUNTER — ALLIED HEALTH/NURSE VISIT (OUTPATIENT)
Dept: FAMILY MEDICINE | Facility: OTHER | Age: 71
End: 2021-10-08
Attending: FAMILY MEDICINE
Payer: MEDICARE

## 2021-10-08 DIAGNOSIS — Z20.822 COVID-19 RULED OUT: Primary | ICD-10-CM

## 2021-10-08 PROCEDURE — C9803 HOPD COVID-19 SPEC COLLECT: HCPCS

## 2021-10-08 PROCEDURE — U0003 INFECTIOUS AGENT DETECTION BY NUCLEIC ACID (DNA OR RNA); SEVERE ACUTE RESPIRATORY SYNDROME CORONAVIRUS 2 (SARS-COV-2) (CORONAVIRUS DISEASE [COVID-19]), AMPLIFIED PROBE TECHNIQUE, MAKING USE OF HIGH THROUGHPUT TECHNOLOGIES AS DESCRIBED BY CMS-2020-01-R: HCPCS | Mod: ZL

## 2021-10-10 LAB — SARS-COV-2 RNA RESP QL NAA+PROBE: NEGATIVE

## 2021-10-12 ENCOUNTER — TRANSFERRED RECORDS (OUTPATIENT)
Dept: HEALTH INFORMATION MANAGEMENT | Facility: OTHER | Age: 71
End: 2021-10-12

## 2021-10-14 ENCOUNTER — HOSPITAL ENCOUNTER (OUTPATIENT)
Dept: PHYSICAL THERAPY | Facility: OTHER | Age: 71
Setting detail: THERAPIES SERIES
End: 2021-10-14
Attending: FAMILY MEDICINE
Payer: MEDICARE

## 2021-10-14 PROCEDURE — 97112 NEUROMUSCULAR REEDUCATION: CPT | Mod: GP

## 2021-10-14 PROCEDURE — 97110 THERAPEUTIC EXERCISES: CPT | Mod: GP

## 2021-10-26 ENCOUNTER — HOSPITAL ENCOUNTER (OUTPATIENT)
Dept: PHYSICAL THERAPY | Facility: OTHER | Age: 71
Setting detail: THERAPIES SERIES
End: 2021-10-26
Attending: FAMILY MEDICINE
Payer: MEDICARE

## 2021-10-26 PROCEDURE — 97110 THERAPEUTIC EXERCISES: CPT | Mod: GP

## 2021-11-01 ENCOUNTER — TRANSFERRED RECORDS (OUTPATIENT)
Dept: HEALTH INFORMATION MANAGEMENT | Facility: OTHER | Age: 71
End: 2021-11-01

## 2021-11-09 ENCOUNTER — TELEPHONE (OUTPATIENT)
Dept: EDUCATION SERVICES | Facility: OTHER | Age: 71
End: 2021-11-09
Payer: COMMERCIAL

## 2021-11-09 DIAGNOSIS — Z79.4 TYPE 2 DIABETES MELLITUS WITHOUT COMPLICATION, WITH LONG-TERM CURRENT USE OF INSULIN (H): Primary | ICD-10-CM

## 2021-11-09 DIAGNOSIS — E11.9 TYPE 2 DIABETES MELLITUS WITHOUT COMPLICATION, WITH LONG-TERM CURRENT USE OF INSULIN (H): Primary | ICD-10-CM

## 2021-11-09 NOTE — TELEPHONE ENCOUNTER
"Patient shares she has new insurance, [employer].  She states \"[facility] will no longer send me Dexcom supplies.  Patient shares, \"[facility] needs my Dexcom prescriptions and they will fill it.\"    Contacted [facility] to confirm.  Pharmacist states, \"Due to Medicare primary coverage, they will not be able to fill it, but they will send it out to another pharmacy for fill.\"    Patient notified.  Recommend sending Dexcom supply prescriptions to [facility] as they work with both Medicare and [employer] insurance.    If accepted as written, please sign pending orders.    Thank you,    Hazel Del Rio RN, BSN, [facility]  11/9/2021 4:38 PM         "

## 2021-11-11 NOTE — TELEPHONE ENCOUNTER
Patient calls and requests we do NOT send to [facility], she would like scripts sent to [facility] [place].  Patient states she understands that [facility] will not fill rx due to Medicare, but will send to a different pharmacy to fill.    Pharmacy updated, per patient request.    Hazel Del Rio RN, BSN, [facility]  11/11/2021 4:24 PM

## 2021-11-14 RX ORDER — PROCHLORPERAZINE 25 MG/1
SUPPOSITORY RECTAL
Qty: 3 EACH | Refills: 11 | Status: SHIPPED | OUTPATIENT
Start: 2021-11-14 | End: 2022-11-21

## 2021-11-14 RX ORDER — PROCHLORPERAZINE 25 MG/1
SUPPOSITORY RECTAL
Qty: 1 EACH | Refills: 3 | Status: SHIPPED | OUTPATIENT
Start: 2021-11-14 | End: 2022-12-15

## 2021-11-24 NOTE — PROGRESS NOTES
[facility]    Outpatient Physical Therapy Discharge Note  Patient: Bailey Sierra  : 1950    Beginning/End Dates of Reporting Period:  21 to 10/26/21    Referring Provider: Dr. Leal    Therapy Diagnosis: decreased functional independence.      Client Self Report: pt reports she is feeling stronger and doing more. feels overall more steady and ready to discharge from therapy. pt has met all goals at this time and is ready to discharge with HEP.     Goals:  Goal Identifier gait   Goal Description pt will ambulate with SEC for 200 feet with step though pattern to progress towards prior function.    Target Date 11/10/21   Date Met  10/26/21   Progress (detail required for progress note): no LOB     Goal Identifier strength    Goal Description Pt will complete 4 sit<>Stands from average height chair without UE assist in order to improve functional strength for transfer and gait safety.    Target Date 21   Date Met  10/26/21   Progress (detail required for progress note): able to complete 5      Goal Identifier balance   Goal Description Pt will demonstrate the ability to complete a L SLS for 5 seconds to improve stability for walking.    Target Date 11/10/21   Date Met  10/26/21   Progress (detail required for progress note): able to complete B, R more stable then L.      Goal Identifier homemaking   Goal Description Pt will demonstrate ability to simulate sweeping a floor with wooden dowel without use of an AD for 30 seconds to progress towards maintaining her own home.    Target Date 21   Date Met  10/26/21   Progress (detail required for progress note): no LOB, pt reports task is easy       Plan:  Discharge from therapy.    Discharge:    Reason for Discharge: Patient has met all goals.  No further expectation of progress.    Equipment Issued: none    Discharge Plan: Patient to continue  home program.

## 2021-11-26 ENCOUNTER — TELEPHONE (OUTPATIENT)
Dept: LAB | Facility: OTHER | Age: 71
End: 2021-11-26
Payer: COMMERCIAL

## 2021-11-26 DIAGNOSIS — Z79.4 TYPE 2 DIABETES MELLITUS WITH HYPERGLYCEMIA, WITH LONG-TERM CURRENT USE OF INSULIN (H): Primary | ICD-10-CM

## 2021-11-26 DIAGNOSIS — E11.65 TYPE 2 DIABETES MELLITUS WITH HYPERGLYCEMIA, WITH LONG-TERM CURRENT USE OF INSULIN (H): Primary | ICD-10-CM

## 2021-11-26 NOTE — TELEPHONE ENCOUNTER
Patient has diabetic check up scheduled for next week. She would like to get her lab done before her appointment.    Noemy Holt LPN on 11/26/2021 at 1:25 PM

## 2021-11-29 ENCOUNTER — TELEPHONE (OUTPATIENT)
Dept: FAMILY MEDICINE | Facility: OTHER | Age: 71
End: 2021-11-29
Payer: COMMERCIAL

## 2021-11-29 NOTE — TELEPHONE ENCOUNTER
DWS- Patient states she is returning a call to david she is not aware why she was called. Ok to leave detailed message [phone].          Justina Mcintosh on 11/29/2021 at 12:37 PM

## 2021-11-29 NOTE — TELEPHONE ENCOUNTER
Closing encounter. Please see open telephone encounter.    Noemy Holt LPN on 11/29/2021 at 1:12 PM

## 2021-12-03 ENCOUNTER — LAB (OUTPATIENT)
Dept: LAB | Facility: OTHER | Age: 71
End: 2021-12-03
Attending: FAMILY MEDICINE
Payer: COMMERCIAL

## 2021-12-03 DIAGNOSIS — E11.65 TYPE 2 DIABETES MELLITUS WITH HYPERGLYCEMIA, WITH LONG-TERM CURRENT USE OF INSULIN (H): ICD-10-CM

## 2021-12-03 DIAGNOSIS — Z79.4 TYPE 2 DIABETES MELLITUS WITH HYPERGLYCEMIA, WITH LONG-TERM CURRENT USE OF INSULIN (H): ICD-10-CM

## 2021-12-03 LAB — HBA1C MFR BLD: 6.8 % (ref 4–6.2)

## 2021-12-03 PROCEDURE — 83036 HEMOGLOBIN GLYCOSYLATED A1C: CPT | Mod: ZL

## 2021-12-03 PROCEDURE — 36415 COLL VENOUS BLD VENIPUNCTURE: CPT | Mod: ZL

## 2021-12-13 ENCOUNTER — OFFICE VISIT (OUTPATIENT)
Dept: FAMILY MEDICINE | Facility: OTHER | Age: 71
End: 2021-12-13
Attending: FAMILY MEDICINE
Payer: COMMERCIAL

## 2021-12-13 ENCOUNTER — TELEPHONE (OUTPATIENT)
Dept: FAMILY MEDICINE | Facility: OTHER | Age: 71
End: 2021-12-13

## 2021-12-13 VITALS
HEIGHT: 65 IN | HEART RATE: 68 BPM | BODY MASS INDEX: 27.32 KG/M2 | TEMPERATURE: 97.7 F | OXYGEN SATURATION: 99 % | WEIGHT: 164 LBS | DIASTOLIC BLOOD PRESSURE: 58 MMHG | RESPIRATION RATE: 20 BRPM | SYSTOLIC BLOOD PRESSURE: 104 MMHG

## 2021-12-13 DIAGNOSIS — Z79.4 TYPE 2 DIABETES MELLITUS WITH COMPLICATION, WITH LONG-TERM CURRENT USE OF INSULIN (H): Primary | ICD-10-CM

## 2021-12-13 DIAGNOSIS — N18.4 CKD (CHRONIC KIDNEY DISEASE) STAGE 4, GFR 15-29 ML/MIN (H): ICD-10-CM

## 2021-12-13 DIAGNOSIS — E11.8 TYPE 2 DIABETES MELLITUS WITH COMPLICATION, WITH LONG-TERM CURRENT USE OF INSULIN (H): Primary | ICD-10-CM

## 2021-12-13 DIAGNOSIS — I10 ESSENTIAL HYPERTENSION: ICD-10-CM

## 2021-12-13 PROBLEM — U07.1 COVID-19 VIRUS DETECTED: Status: RESOLVED | Noted: 2021-02-23 | Resolved: 2021-12-13

## 2021-12-13 PROCEDURE — G0463 HOSPITAL OUTPT CLINIC VISIT: HCPCS

## 2021-12-13 PROCEDURE — 99214 OFFICE O/P EST MOD 30 MIN: CPT | Performed by: FAMILY MEDICINE

## 2021-12-13 RX ORDER — LISINOPRIL 2.5 MG/1
2.5 TABLET ORAL DAILY
Qty: 90 TABLET | Refills: 3 | Status: SHIPPED | OUTPATIENT
Start: 2021-12-13 | End: 2022-11-02

## 2021-12-13 ASSESSMENT — MIFFLIN-ST. JEOR: SCORE: 1254.78

## 2021-12-13 ASSESSMENT — PAIN SCALES - GENERAL: PAINLEVEL: NO PAIN (0)

## 2021-12-13 NOTE — NURSING NOTE
Patient presents today for diabetic check up.    Lab Results   Component Value Date    A1C 6.8 12/03/2021    A1C 6.7 06/04/2021    A1C 7.0 12/17/2020    A1C Canceled, Test credited 11/03/2020    ALBUMIN 3.6 08/22/2021    ALBUMIN 3.2 06/10/2021     Previous A1C is at goal of <8  Date of last Foot exam due  Eye exam No [facility]  Patient is not a Tobacco User  Patient is on a daily aspirin  Patient is on a Statin.  Blood pressure today of:     BP Readings from Last 1 Encounters:   08/26/21 110/62      is at the goal of <139/89 for diabetics.    Noemy Holt LPN on 12/13/2021 at 10:54 AM    ]  Medication Reconciliation Complete    Noemy Holt LPN  12/13/2021 10:54 AM

## 2021-12-13 NOTE — TELEPHONE ENCOUNTER
Patient is calling to see if she can climb stairs.  She forgot to ask at her appmt today.  Please call      Latoya Flores on 12/13/2021 at 12:07 PM

## 2021-12-13 NOTE — PROGRESS NOTES
"SUBJECTIVE:  Bailey Sierra is a 71 year old female here for diabetic follow-up.  She continues checking blood sugars 4 times daily and her sugars generally run  in the morning and mid to upper 100s later in the afternoon.  She had hemoglobin A1c drawn last week and comes in today for follow-up.  She otherwise has no new concerns today.    Allergies:  No Known Allergies    ROS:    As above otherwise ROS is unremarkable.    OBJECTIVE:  /58   Pulse 68   Temp 97.7  F (36.5  C)   Resp 20   Ht 1.651 m (5' 5\")   Wt 74.4 kg (164 lb)   SpO2 99%   BMI 27.29 kg/m      EXAM:  General Appearance: Pleasant, alert, appropriate appearance for age. No acute distress  Head: Normal. Normocephalic, atraumatic.  Lungs: Normal chest wall and respirations. Clear to auscultation, no wheezes or crackles.  Cardiovascular: Regular rate and rhythm. S1, S2, no murmurs.  Musculoskeletal: 1+ pedal edema bilaterally.  She has bunion especially on her right foot.  Skin: no concerning or new rashes.  Callus seen on both of her feet especially her great toe.  Neurologic Exam: CN 2-12 grossly intact.  Normal gait.  Symmetric DTRs, No focal motor or sensory deficits. No tremor.  Normal monofilament testing.  Psychiatric Exam: Alert and oriented, appropriate affect.    ASSESSEMENT AND PLAN:    1. Type 2 diabetes mellitus with complication, with long-term current use of insulin (H)    2. Essential hypertension    3. CKD (chronic kidney disease) stage 4, GFR 15-29 ml/min (H)      Reviewed most recent hemoglobin A1c which was 6.8%.  Her weight is stable.  She will continue her current regimen.  We will follow up again in June for repeat A1c.  Recommend referral for diabetic shoes and diabetic inserts.    Discussed Covid vaccinations which she declines.    Blood pressure is well controlled.    For some reason it appears that lisinopril was discontinued when she left the nursing home so we restarted 2.5 mg daily.    Sukumar Leal MD  Family " Medicine

## 2022-01-03 DIAGNOSIS — I10 ESSENTIAL HYPERTENSION: ICD-10-CM

## 2022-01-04 RX ORDER — ISOSORBIDE MONONITRATE 60 MG/1
TABLET, EXTENDED RELEASE ORAL
Qty: 180 TABLET | Refills: 3 | Status: SHIPPED | OUTPATIENT
Start: 2022-01-04 | End: 2022-12-29

## 2022-01-04 NOTE — TELEPHONE ENCOUNTER
"[id number] sent Rx request for the following:   isosorbide mononitrate (IMDUR) 60 MG 24 hr tablet  SigTAKE 2 TABLETS BY MOUTH EVERY DAY    Last Prescription Date:   12/21/2020  Last Fill Qty/Refills:         180, R-3    Last Office Visit:              12/13/2021 ([facility])   Future Office visit:           None noted   Nitrates Passed - 1/3/2022  1:05 AM        Passed - Blood pressure under 140/90 in past 12 months     BP Readings from Last 3 Encounters:   12/13/21 104/58   08/26/21 110/62   08/22/21 129/67                 Passed - Pt is not on erectile dysfunction medications        Passed - Recent (12 mo) or future (30 days) visit within the authorizing provider's specialty     Patient has had an office visit with the authorizing provider or a provider within the authorizing providers department within the previous 12 mos or has a future within next 30 days. See \"Patient Info\" tab in inbasket, or \"Choose Columns\" in Meds & Orders section of the refill encounter.              Passed - Medication is active on med list        Passed - Patient is age 18 or older         Prescription approved per [facility] Refill Protocol.  Carolyn Gregg RN ....................  1/4/2022   12:39 PM        "

## 2022-01-11 ENCOUNTER — TRANSFERRED RECORDS (OUTPATIENT)
Dept: HEALTH INFORMATION MANAGEMENT | Facility: OTHER | Age: 72
End: 2022-01-11
Payer: COMMERCIAL

## 2022-01-11 LAB — RETINOPATHY: NEGATIVE

## 2022-01-15 DIAGNOSIS — Z79.4 TYPE 2 DIABETES MELLITUS WITH COMPLICATION, WITH LONG-TERM CURRENT USE OF INSULIN (H): ICD-10-CM

## 2022-01-15 DIAGNOSIS — E11.8 TYPE 2 DIABETES MELLITUS WITH COMPLICATION, WITH LONG-TERM CURRENT USE OF INSULIN (H): ICD-10-CM

## 2022-01-17 RX ORDER — INSULIN ASPART 100 [IU]/ML
INJECTION, SOLUTION INTRAVENOUS; SUBCUTANEOUS
Qty: 15 ML | Refills: 0 | Status: SHIPPED | OUTPATIENT
Start: 2022-01-17 | End: 2022-01-21

## 2022-01-17 NOTE — TELEPHONE ENCOUNTER
"[id number] sent Rx request for the following:   Insulin Aspart FlexPen 100 UNIT/ML SOPN   SigINJECT 7-16 UNITS BEFORE EACH MEAL BASED ON SLIDING SCALE. MAX DAILY DOSE 56 UNITS    Last Prescription Date:   12/21/2020  Last Fill Qty/Refills:         60 mL, R-11    Last Office Visit:              12/13/2021 ([place])   Future Office visit:           None noted   Short Acting Insulin Protocol Passed - 1/15/2022  1:02 AM        Passed - Serum creatinine on file in past 12 months     Recent Labs   Lab Test 08/22/21  1634   CR 2.88*       Ok to refill medication if creatinine is low          Passed - HgbA1C in past 3 or 6 months     If HgbA1C is 8 or greater, it needs to be on file within the past 3 months.  If less than 8, must be on file within the past 6 months.     Recent Labs   Lab Test 12/03/21  0836 06/20/18  0845 12/11/17  0923   A1C 6.8*   < >  --    [id number]  --   --  6.4*    < > = values in this interval not displayed.             Passed - Medication is active on med list        Passed - Patient is age 18 or older        Passed - Recent (6 mo) or future (30 days) visit within the authorizing provider's specialty     Patient had office visit in the last 6 months or has a visit in the next 30 days with authorizing provider or within the authorizing provider's specialty.  See \"Patient Info\" tab in inbasket, or \"Choose Columns\" in Meds & Orders section of the refill encounter.             Unable to complete prescription refill per RN Medication Refill Policy.................... Carolyn Gregg RN ....................  1/17/2022   11:12 AM          "

## 2022-01-21 DIAGNOSIS — E11.8 TYPE 2 DIABETES MELLITUS WITH COMPLICATION, WITH LONG-TERM CURRENT USE OF INSULIN (H): ICD-10-CM

## 2022-01-21 DIAGNOSIS — Z79.4 TYPE 2 DIABETES MELLITUS WITH COMPLICATION, WITH LONG-TERM CURRENT USE OF INSULIN (H): ICD-10-CM

## 2022-01-21 RX ORDER — INSULIN ASPART 100 [IU]/ML
INJECTION, SOLUTION INTRAVENOUS; SUBCUTANEOUS
Qty: 60 ML | Refills: 3 | Status: SHIPPED | OUTPATIENT
Start: 2022-01-21 | End: 2022-07-15

## 2022-01-21 NOTE — TELEPHONE ENCOUNTER
"[id number] sent Rx request for the following:   Insulin Aspart FlexPen 100 UNIT/ML SOPN  Sig INJECT 7-16 UNITS BEFORE EACH MEAL BASED ON SLIDING SCALE. MAX DAILY DOSE 56 UNITS    Last Prescription Date:   01/17/2022  Last Fill Qty/Refills:         15 mL, R-0    Last Office Visit:              12/13/2021 ([place])   Future Office visit:           None noted   Short Acting Insulin Protocol Passed - 1/21/2022  1:20 AM        Passed - Serum creatinine on file in past 12 months     Recent Labs   Lab Test 08/22/21  1634   CR 2.88*       Ok to refill medication if creatinine is low          Passed - HgbA1C in past 3 or 6 months     If HgbA1C is 8 or greater, it needs to be on file within the past 3 months.  If less than 8, must be on file within the past 6 months.     Recent Labs   Lab Test 12/03/21  0836 06/20/18  0845 12/11/17  0923   A1C 6.8*   < >  --    [id number]  --   --  6.4*    < > = values in this interval not displayed.             Passed - Medication is active on med list        Passed - Patient is age 18 or older        Passed - Recent (6 mo) or future (30 days) visit within the authorizing provider's specialty     Patient had office visit in the last 6 months or has a visit in the next 30 days with authorizing provider or within the authorizing provider's specialty.  See \"Patient Info\" tab in inbasket, or \"Choose Columns\" in Meds & Orders section of the refill encounter.             Pharmacy requests greater supply. Unable to complete prescription refill per RN Medication Refill Policy.................... Carolyn Gregg RN ....................  1/21/2022   8:13 AM          "

## 2022-02-02 DIAGNOSIS — E11.8 TYPE 2 DIABETES MELLITUS WITH COMPLICATION, WITH LONG-TERM CURRENT USE OF INSULIN (H): ICD-10-CM

## 2022-02-02 DIAGNOSIS — Z95.0 S/P CARDIAC PACEMAKER PROCEDURE: ICD-10-CM

## 2022-02-02 DIAGNOSIS — Z79.4 TYPE 2 DIABETES MELLITUS WITH COMPLICATION, WITH LONG-TERM CURRENT USE OF INSULIN (H): ICD-10-CM

## 2022-02-02 RX ORDER — ROSUVASTATIN CALCIUM 40 MG/1
TABLET, COATED ORAL
Qty: 90 TABLET | Refills: 3 | Status: SHIPPED | OUTPATIENT
Start: 2022-02-02 | End: 2023-01-30

## 2022-02-02 RX ORDER — METOPROLOL SUCCINATE 25 MG/1
TABLET, EXTENDED RELEASE ORAL
Qty: 45 TABLET | Refills: 3 | Status: SHIPPED | OUTPATIENT
Start: 2022-02-02 | End: 2023-01-30

## 2022-02-02 NOTE — TELEPHONE ENCOUNTER
"[id number] sent Rx request for the following:   rosuvastatin (CRESTOR) 40 MG tablet   SigTAKE 1 TABLET BY MOUTH EVERY DAY    Last Prescription Date:   07/07/2021  Last Fill Qty/Refills:         90, R-1    Statins Protocol Passed - 2/2/2022  1:10 AM       Passed - LDL on file in past 12 months    Recent Labs   Lab Test 06/04/21  0906   LDL 71            Passed - No abnormal creatine kinase in past 12 months    No lab results found.            Passed - Recent (12 mo) or future (30 days) visit within the authorizing provider's specialty    Patient has had an office visit with the authorizing provider or a provider within the authorizing providers department within the previous 12 mos or has a future within next 30 days. See \"Patient Info\" tab in inbasket, or \"Choose Columns\" in Meds & Orders section of the refill encounter.             Passed - Medication is active on med list       Passed - Patient is age 18 or older       Passed - No active pregnancy on record       Passed - No positive pregnancy test in past 12 months     metoprolol succinate ER (TOPROL-XL) 25 MG 24 hr tablet  SigTAKE 1/2 TABLET BY MOUTH EVERY EVENING    Last Prescription Date:   03/09/2021  Last Fill Qty/Refills:         45, R-3    Beta-Blockers Protocol Passed - 2/2/2022  1:10 AM       Passed - Blood pressure under 140/90 in past 12 months    BP Readings from Last 3 Encounters:   12/13/21 104/58   08/26/21 110/62   08/22/21 129/67                Passed - Patient is age 6 or older       Passed - Recent (12 mo) or future (30 days) visit within the authorizing provider's specialty    Patient has had an office visit with the authorizing provider or a provider within the authorizing providers department within the previous 12 mos or has a future within next 30 days. See \"Patient Info\" tab in inbasket, or \"Choose Columns\" in Meds & Orders section of the refill encounter.             Passed - Medication is active on med list "       Last Office Visit:              12/13/2021 ([place])   Future Office visit:           None noted    Unable to complete prescription refill per RN Medication Refill Policy.................... Carolyn Gregg RN ....................  2/2/2022   1:10 PM

## 2022-03-21 ENCOUNTER — TELEPHONE (OUTPATIENT)
Dept: FAMILY MEDICINE | Facility: OTHER | Age: 72
End: 2022-03-21
Payer: COMMERCIAL

## 2022-03-21 DIAGNOSIS — Z79.4 TYPE 2 DIABETES MELLITUS WITH HYPERGLYCEMIA, WITH LONG-TERM CURRENT USE OF INSULIN (H): Primary | ICD-10-CM

## 2022-03-21 DIAGNOSIS — E11.65 TYPE 2 DIABETES MELLITUS WITH HYPERGLYCEMIA, WITH LONG-TERM CURRENT USE OF INSULIN (H): Primary | ICD-10-CM

## 2022-03-21 NOTE — TELEPHONE ENCOUNTER
Pt is having A1C checked in June, please put in an order, so we can schedule, thank you, call once entered into the [facility] as well, thanks!    Mildred Diaz on 3/21/2022 at 12:23 PM

## 2022-04-09 ENCOUNTER — ALLIED HEALTH/NURSE VISIT (OUTPATIENT)
Dept: FAMILY MEDICINE | Facility: OTHER | Age: 72
End: 2022-04-09
Attending: OPHTHALMOLOGY
Payer: COMMERCIAL

## 2022-04-09 DIAGNOSIS — Z20.822 COVID-19 RULED OUT: Primary | ICD-10-CM

## 2022-04-09 PROCEDURE — U0005 INFEC AGEN DETEC AMPLI PROBE: HCPCS | Mod: ZL

## 2022-04-09 PROCEDURE — C9803 HOPD COVID-19 SPEC COLLECT: HCPCS

## 2022-04-09 NOTE — PROGRESS NOTES
Patient here today for Covid test. Having surgery on 4/12/22 at [facility] with Elijah.  Fax to [phone]     Liz Gastelum CNA .............................on 4/9/2022 at 11:02 AM

## 2022-04-10 LAB — SARS-COV-2 RNA RESP QL NAA+PROBE: NEGATIVE

## 2022-06-14 ENCOUNTER — OFFICE VISIT (OUTPATIENT)
Dept: FAMILY MEDICINE | Facility: OTHER | Age: 72
End: 2022-06-14
Attending: FAMILY MEDICINE
Payer: COMMERCIAL

## 2022-06-14 VITALS
DIASTOLIC BLOOD PRESSURE: 62 MMHG | TEMPERATURE: 97.8 F | OXYGEN SATURATION: 97 % | SYSTOLIC BLOOD PRESSURE: 110 MMHG | WEIGHT: 160.4 LBS | RESPIRATION RATE: 16 BRPM | BODY MASS INDEX: 26.69 KG/M2 | HEART RATE: 60 BPM

## 2022-06-14 DIAGNOSIS — E78.5 HYPERLIPIDEMIA, UNSPECIFIED HYPERLIPIDEMIA TYPE: ICD-10-CM

## 2022-06-14 DIAGNOSIS — Z79.4 TYPE 2 DIABETES MELLITUS WITHOUT COMPLICATION, WITH LONG-TERM CURRENT USE OF INSULIN (H): ICD-10-CM

## 2022-06-14 DIAGNOSIS — N18.4 CKD (CHRONIC KIDNEY DISEASE) STAGE 4, GFR 15-29 ML/MIN (H): ICD-10-CM

## 2022-06-14 DIAGNOSIS — E11.9 TYPE 2 DIABETES MELLITUS WITHOUT COMPLICATION, WITH LONG-TERM CURRENT USE OF INSULIN (H): ICD-10-CM

## 2022-06-14 DIAGNOSIS — E11.65 TYPE 2 DIABETES MELLITUS WITH HYPERGLYCEMIA, WITH LONG-TERM CURRENT USE OF INSULIN (H): Primary | ICD-10-CM

## 2022-06-14 DIAGNOSIS — I10 ESSENTIAL HYPERTENSION: ICD-10-CM

## 2022-06-14 DIAGNOSIS — Z79.4 TYPE 2 DIABETES MELLITUS WITH HYPERGLYCEMIA, WITH LONG-TERM CURRENT USE OF INSULIN (H): Primary | ICD-10-CM

## 2022-06-14 PROBLEM — I25.118 ATHEROSCLEROTIC HEART DISEASE OF NATIVE CORONARY ARTERY WITH OTHER FORMS OF ANGINA PECTORIS (H): Status: RESOLVED | Noted: 2018-09-28 | Resolved: 2022-06-14

## 2022-06-14 PROCEDURE — 99214 OFFICE O/P EST MOD 30 MIN: CPT | Performed by: FAMILY MEDICINE

## 2022-06-14 PROCEDURE — G0463 HOSPITAL OUTPT CLINIC VISIT: HCPCS

## 2022-06-14 RX ORDER — POLYETHYLENE GLYCOL 3350 17 G/17G
POWDER, FOR SOLUTION ORAL DAILY PRN
COMMUNITY
Start: 2022-03-25 | End: 2024-04-22

## 2022-06-14 RX ORDER — POLYETHYLENE GLYCOL 3350, SODIUM CHLORIDE, SODIUM BICARBONATE, POTASSIUM CHLORIDE 420; 11.2; 5.72; 1.48 G/4L; G/4L; G/4L; G/4L
POWDER, FOR SOLUTION ORAL
COMMUNITY
Start: 2022-03-25 | End: 2022-06-14

## 2022-06-14 RX ORDER — INSULIN GLARGINE 100 [IU]/ML
25 INJECTION, SOLUTION SUBCUTANEOUS AT BEDTIME
Qty: 15 ML | Refills: 11 | Status: SHIPPED | OUTPATIENT
Start: 2022-06-14 | End: 2022-10-10

## 2022-06-14 RX ORDER — FAMOTIDINE 40 MG/1
1 TABLET, FILM COATED ORAL AT BEDTIME
COMMUNITY
Start: 2021-04-27 | End: 2022-10-10

## 2022-06-14 ASSESSMENT — PAIN SCALES - GENERAL: PAINLEVEL: NO PAIN (0)

## 2022-06-14 NOTE — NURSING NOTE
"Chief Complaint   Patient presents with     RECHECK     A1C       Initial /62 (BP Location: Right arm, Patient Position: Sitting, Cuff Size: Adult Regular)   Pulse 60   Temp 97.8  F (36.6  C) (Tympanic)   Resp 16   Wt 72.8 kg (160 lb 6.4 oz)   LMP  (LMP Unknown)   SpO2 97%   Breastfeeding No   BMI 26.69 kg/m   Estimated body mass index is 26.69 kg/m  as calculated from the following:    Height as of 12/13/21: 1.651 m (5' 5\").    Weight as of this encounter: 72.8 kg (160 lb 6.4 oz).     Medication Reconciliation: complete    Previous A1C is at goal of <8  Lab Results   Component Value Date    A1C 6.8 12/03/2021    A1C 6.7 06/04/2021    A1C 7.0 12/17/2020    A1C Canceled, Test credited 11/03/2020    A1C 7.1 07/08/2020    A1C 6.8 04/20/2020     Urine microalbumin:creatine: 7/20/20  Foot exam : 5/21/2020  Eye exam :  1/2021  Tobacco User : no  Patient is on a daily aspirin  Patient is on a Statin.  Blood pressure today of:     BP Readings from Last 1 Encounters:   06/14/22 110/62      is at the goal of <139/89 for diabetics.      FOOD SECURITY SCREENING QUESTIONS:    The next two questions are to help us understand your food security.  If you are feeling you need any assistance in this area, we have resources available to support you today.    Hunger Vital Signs:  Within the past 12 months we worried whether our food would run out before we got money to buy more. Never  Within the past 12 months the food we bought just didn't last and we didn't have money to get more. Never    Advance care plan reviewed      Sylvia Dorman LPN on 6/14/2022 at 12:53 PM      "

## 2022-06-15 DIAGNOSIS — Z79.4 TYPE 2 DIABETES MELLITUS WITHOUT COMPLICATION, WITH LONG-TERM CURRENT USE OF INSULIN (H): ICD-10-CM

## 2022-06-15 DIAGNOSIS — E11.9 TYPE 2 DIABETES MELLITUS WITHOUT COMPLICATION, WITH LONG-TERM CURRENT USE OF INSULIN (H): ICD-10-CM

## 2022-06-15 RX ORDER — PEN NEEDLE, DIABETIC 31 GX5/16"
NEEDLE, DISPOSABLE MISCELLANEOUS
Qty: 400 EACH | Refills: 3 | Status: SHIPPED | OUTPATIENT
Start: 2022-06-15 | End: 2023-04-17

## 2022-06-15 NOTE — TELEPHONE ENCOUNTER
"[id number] sent Rx request for the following:   B-D U/F 31G X 8 MM insulin pen needle  SigUSE 4 PEN NEEDLES DAILY    Last Prescription Date:   06/02/2021  Last Fill Qty/Refills:         400, R-3    Last Office Visit:              06/14/2021 ([place])   [facility]   Diabetic Supplies Protocol Passed - 6/15/2022  9:41 AM        Passed - Medication is active on med list        Passed - Patient is 18 years of age or older        Passed - Recent (6 mo) or future (30 days) visit within the authorizing provider's specialty     Patient had office visit in the last 6 months or has a visit in the next 30 days with authorizing provider.  See \"Patient Info\" tab in inbasket, or \"Choose Columns\" in Meds & Orders section of the refill encounter.             Unable to complete prescription refill per RN Medication Refill Policy.................... Carolyn Gregg RN ....................  6/15/2022   3:01 PM        "

## 2022-06-15 NOTE — TELEPHONE ENCOUNTER
Routing to covering provider as PCP is out of the office.     Katie Sofia RN .............. 6/15/2022  3:58 PM

## 2022-07-15 ENCOUNTER — OFFICE VISIT (OUTPATIENT)
Dept: FAMILY MEDICINE | Facility: OTHER | Age: 72
End: 2022-07-15
Attending: FAMILY MEDICINE
Payer: COMMERCIAL

## 2022-07-15 VITALS
WEIGHT: 163.2 LBS | TEMPERATURE: 98.6 F | RESPIRATION RATE: 20 BRPM | OXYGEN SATURATION: 97 % | SYSTOLIC BLOOD PRESSURE: 112 MMHG | BODY MASS INDEX: 27.16 KG/M2 | HEART RATE: 68 BPM | DIASTOLIC BLOOD PRESSURE: 54 MMHG

## 2022-07-15 DIAGNOSIS — R11.0 NAUSEA: ICD-10-CM

## 2022-07-15 DIAGNOSIS — F51.01 PRIMARY INSOMNIA: ICD-10-CM

## 2022-07-15 DIAGNOSIS — Z79.4 TYPE 2 DIABETES MELLITUS WITH COMPLICATION, WITH LONG-TERM CURRENT USE OF INSULIN (H): ICD-10-CM

## 2022-07-15 DIAGNOSIS — E11.8 TYPE 2 DIABETES MELLITUS WITH COMPLICATION, WITH LONG-TERM CURRENT USE OF INSULIN (H): ICD-10-CM

## 2022-07-15 DIAGNOSIS — I25.118 ATHEROSCLEROTIC HEART DISEASE OF NATIVE CORONARY ARTERY WITH OTHER FORMS OF ANGINA PECTORIS (H): ICD-10-CM

## 2022-07-15 DIAGNOSIS — Z01.818 PREOP GENERAL PHYSICAL EXAM: ICD-10-CM

## 2022-07-15 DIAGNOSIS — K31.84 GASTROPARESIS: ICD-10-CM

## 2022-07-15 PROCEDURE — G0463 HOSPITAL OUTPT CLINIC VISIT: HCPCS

## 2022-07-15 PROCEDURE — 99214 OFFICE O/P EST MOD 30 MIN: CPT | Performed by: FAMILY MEDICINE

## 2022-07-15 RX ORDER — SENNOSIDES A AND B 8.6 MG/1
1 TABLET, FILM COATED ORAL DAILY
Qty: 90 TABLET | Refills: 3 | Status: SHIPPED | OUTPATIENT
Start: 2022-07-15 | End: 2022-07-15

## 2022-07-15 RX ORDER — ZOLPIDEM TARTRATE 10 MG/1
10 TABLET ORAL
Qty: 30 TABLET | Refills: 5 | Status: SHIPPED | OUTPATIENT
Start: 2022-07-15 | End: 2023-04-17

## 2022-07-15 RX ORDER — FAMOTIDINE 40 MG/1
40 TABLET, FILM COATED ORAL DAILY
Qty: 90 TABLET | Refills: 3 | Status: SHIPPED | OUTPATIENT
Start: 2022-07-15 | End: 2022-07-15

## 2022-07-15 RX ORDER — CHOLECALCIFEROL (VITAMIN D3) 50 MCG
1 TABLET ORAL DAILY
Qty: 90 TABLET | Refills: 3 | Status: SHIPPED | OUTPATIENT
Start: 2022-07-15 | End: 2023-04-17

## 2022-07-15 RX ORDER — INSULIN ASPART 100 [IU]/ML
5 INJECTION, SOLUTION INTRAVENOUS; SUBCUTANEOUS
Qty: 60 ML | Refills: 3 | COMMUNITY
Start: 2022-07-15 | End: 2023-01-30

## 2022-07-15 RX ORDER — FAMOTIDINE 40 MG/1
40 TABLET, FILM COATED ORAL 2 TIMES DAILY
Qty: 180 TABLET | Refills: 3 | Status: SHIPPED | OUTPATIENT
Start: 2022-07-15 | End: 2023-04-17

## 2022-07-15 ASSESSMENT — PAIN SCALES - GENERAL: PAINLEVEL: NO PAIN (0)

## 2022-07-15 NOTE — PROGRESS NOTES
Assessment & Plan     Nausea  She is been getting relief from Pepcid once to twice daily.  This will be continued.  - famotidine (PEPCID) 40 MG tablet; Take 1 tablet (40 mg) by mouth 2 times daily    Gastroparesis  See above    Primary insomnia  This was refilled, she does not use this nightly but it is effective when she does use it.  - zolpidem (AMBIEN) 10 MG tablet; Take 1 tablet (10 mg) by mouth nightly as needed for sleep    Atherosclerotic heart disease of native coronary artery with other forms of angina pectoris (H)  Continue daily aspirin.  - aspirin (ASPIRIN ADULT LOW STRENGTH) 81 MG EC tablet; Take 1 tablet (81 mg) by mouth daily    Type 2 diabetes mellitus with complication, with long-term current use of insulin (H)  I am worried that she continues to have frequent hypoglycemic episodes after breakfast.  I also suspect that a sliding scale regimen is becoming too complicated for her.  We will simplify her regimen by continuing Basaglar at 20 units which she is currently using, not 25 as was directed.  We will also change aspart to 5 units 3 times daily with meals.  She will follow-up in 3 months for reassessment.  - Insulin Aspart FlexPen 100 UNIT/ML SOPN; Inject 5 Units Subcutaneous 3 times daily (with meals)                 No follow-ups on file.    Sam Leal  [facility]   Bailey is a 72 year old, presenting for the following health issues:  Diabetes    She has a history of type 2 diabetes.  She has been having hypoglycemic episodes where her last visit we decreased her Basaglar to 25 units at night.  She reports that her a.m. sugars have now improved to be between 80 and 120.  However she continues to have some hypoglycemia especially after breakfast with blood sugars in the 50s or 60s.  She does not these after lunch or dinner.  She is currently using sliding scale regimen.  Her most recent hemoglobin A1c last month was 6.1%.    History of Present Illness        Reason for visit:  Follow up    She eats 2-3 servings of fruits and vegetables daily.She consumes 0 sweetened beverage(s) daily.She exercises with enough effort to increase her heart rate 9 or less minutes per day.  She exercises with enough effort to increase her heart rate 3 or less days per week.   She is taking medications regularly.             Review of Systems         Objective    /54   Pulse 68   Temp 98.6  F (37  C) (Tympanic)   Resp 20   Wt 74 kg (163 lb 3.2 oz)   LMP  (LMP Unknown)   SpO2 97%   Breastfeeding No   BMI 27.16 kg/m    Body mass index is 27.16 kg/m .  Physical Exam   GENERAL: healthy, alert and no distress                    .  ..

## 2022-07-15 NOTE — NURSING NOTE
Patient is here for follow up of diabetes.     Previous A1C is at goal of <8  Lab Results   Component Value Date    A1C 6.8 12/03/2021    A1C 6.7 06/04/2021    A1C 7.0 12/17/2020    A1C Canceled, Test credited 11/03/2020    A1C 7.1 07/08/2020    A1C 6.8 04/20/2020     Urine Microalbumin/Creat:    Albumin Urine mg/L   Date Value Ref Range Status   04/20/2020 273 mg/L Final     Albumin Urine mg/g Cr   Date Value Ref Range Status   04/20/2020 176.13 (H) 0 - 25 mg/g Cr Final     Creatinine Urine   Date Value Ref Range Status   04/20/2020 155 mg/dL Final     Foot exam due 5/21/20  Eye exam 1/11/22    Tobacco User no  Patient is on a daily aspirin  Patient is on a Statin.  Blood pressure today of:     BP Readings from Last 1 Encounters:   07/15/22 112/54      is at the goal of <139/89 for diabetics.    Ariella Rivera LPN on 7/15/2022 at 12:58 PM      No LMP recorded (lmp unknown). Patient has had a hysterectomy.  Medication Reconciliation: complete    FOOD SECURITY SCREENING QUESTIONS  Hunger Vital Signs:  Within the past 12 months we worried whether our food would run out before we got money to buy more. Never  Within the past 12 months the food we bought just didn't last and we didn't have money to get more. Never  Ariella Rivera LPN 7/15/2022 12:58 PM       Advance care directive on file? yes  Advance care directive provided to patient? na       Ariella Rivera LPN

## 2022-07-19 ENCOUNTER — ALLIED HEALTH/NURSE VISIT (OUTPATIENT)
Dept: UROLOGY | Facility: OTHER | Age: 72
End: 2022-07-19
Attending: UROLOGY
Payer: COMMERCIAL

## 2022-07-19 DIAGNOSIS — N32.81 OVERACTIVE BLADDER: Primary | ICD-10-CM

## 2022-07-19 PROCEDURE — 99207 PR NO CHARGE NURSE ONLY: CPT

## 2022-07-19 NOTE — NURSING NOTE
Pt presents to clinic for an Interstim check.  After impeadence check, lead 3 has been flagged to have a possible issue.  Program has been changed to Program 3, 2.5amp.  Pt is to follow up 8/16/22

## 2022-08-01 ENCOUNTER — TRANSFERRED RECORDS (OUTPATIENT)
Dept: HEALTH INFORMATION MANAGEMENT | Facility: OTHER | Age: 72
End: 2022-08-01

## 2022-09-13 ENCOUNTER — ALLIED HEALTH/NURSE VISIT (OUTPATIENT)
Dept: UROLOGY | Facility: OTHER | Age: 72
End: 2022-09-13
Attending: UROLOGY
Payer: COMMERCIAL

## 2022-09-13 DIAGNOSIS — N32.81 OVERACTIVE BLADDER: Primary | ICD-10-CM

## 2022-09-13 PROCEDURE — 99207 PR NO CHARGE LOS: CPT

## 2022-09-13 NOTE — NURSING NOTE
Chief Complaint   Patient presents with     Follow Up     Interstim check        Patient is on program 3 2.5 amp will continue to stay on program. Patient was instructed to schedule with Dr. Bowling for the battery of interstim.  Medication Reconciliation: completed   Kellee Alfaro LPN  9/13/2022 10:35 AM

## 2022-10-10 ENCOUNTER — OFFICE VISIT (OUTPATIENT)
Dept: UROLOGY | Facility: OTHER | Age: 72
End: 2022-10-10
Attending: UROLOGY
Payer: COMMERCIAL

## 2022-10-10 ENCOUNTER — OFFICE VISIT (OUTPATIENT)
Dept: FAMILY MEDICINE | Facility: OTHER | Age: 72
End: 2022-10-10
Attending: FAMILY MEDICINE
Payer: COMMERCIAL

## 2022-10-10 VITALS
OXYGEN SATURATION: 99 % | HEART RATE: 59 BPM | RESPIRATION RATE: 16 BRPM | TEMPERATURE: 98 F | DIASTOLIC BLOOD PRESSURE: 60 MMHG | WEIGHT: 159.3 LBS | BODY MASS INDEX: 26.51 KG/M2 | SYSTOLIC BLOOD PRESSURE: 120 MMHG

## 2022-10-10 VITALS
TEMPERATURE: 98 F | DIASTOLIC BLOOD PRESSURE: 68 MMHG | SYSTOLIC BLOOD PRESSURE: 132 MMHG | HEART RATE: 70 BPM | WEIGHT: 160 LBS | HEIGHT: 64 IN | RESPIRATION RATE: 16 BRPM | BODY MASS INDEX: 27.31 KG/M2 | OXYGEN SATURATION: 97 %

## 2022-10-10 DIAGNOSIS — I49.5 SINUS NODE DYSFUNCTION (H): ICD-10-CM

## 2022-10-10 DIAGNOSIS — N39.41 URGE INCONTINENCE: Primary | ICD-10-CM

## 2022-10-10 DIAGNOSIS — I10 ESSENTIAL HYPERTENSION: ICD-10-CM

## 2022-10-10 DIAGNOSIS — E11.9 TYPE 2 DIABETES MELLITUS WITHOUT COMPLICATION, WITH LONG-TERM CURRENT USE OF INSULIN (H): ICD-10-CM

## 2022-10-10 DIAGNOSIS — D89.89 LIGHT CHAIN DISEASE, KAPPA TYPE (H): ICD-10-CM

## 2022-10-10 DIAGNOSIS — Z79.4 TYPE 2 DIABETES MELLITUS WITHOUT COMPLICATION, WITH LONG-TERM CURRENT USE OF INSULIN (H): ICD-10-CM

## 2022-10-10 DIAGNOSIS — N25.81 HYPERPARATHYROIDISM DUE TO RENAL INSUFFICIENCY (H): ICD-10-CM

## 2022-10-10 DIAGNOSIS — D69.6 THROMBOCYTOPENIA (H): ICD-10-CM

## 2022-10-10 DIAGNOSIS — I50.32 CHRONIC DIASTOLIC CONGESTIVE HEART FAILURE (H): ICD-10-CM

## 2022-10-10 DIAGNOSIS — Z79.4 TYPE 2 DIABETES MELLITUS WITH HYPERGLYCEMIA, WITH LONG-TERM CURRENT USE OF INSULIN (H): Primary | ICD-10-CM

## 2022-10-10 DIAGNOSIS — E11.65 TYPE 2 DIABETES MELLITUS WITH HYPERGLYCEMIA, WITH LONG-TERM CURRENT USE OF INSULIN (H): Primary | ICD-10-CM

## 2022-10-10 PROBLEM — N13.30 HYDRONEPHROSIS: Status: ACTIVE | Noted: 2021-02-24

## 2022-10-10 PROBLEM — I35.1 MILD AORTIC INSUFFICIENCY: Status: ACTIVE | Noted: 2021-02-24

## 2022-10-10 PROBLEM — N18.9 ACUTE KIDNEY INJURY SUPERIMPOSED ON CKD (H): Status: ACTIVE | Noted: 2021-06-11

## 2022-10-10 PROBLEM — K59.00 CONSTIPATION, UNSPECIFIED: Status: ACTIVE | Noted: 2021-02-24

## 2022-10-10 PROBLEM — N17.9 ACUTE KIDNEY INJURY SUPERIMPOSED ON CKD (H): Status: ACTIVE | Noted: 2021-06-11

## 2022-10-10 PROBLEM — M62.82 NON-TRAUMATIC RHABDOMYOLYSIS: Status: ACTIVE | Noted: 2021-06-11

## 2022-10-10 PROBLEM — I95.9 HYPOTENSION, UNSPECIFIED HYPOTENSION TYPE: Status: ACTIVE | Noted: 2022-06-21

## 2022-10-10 PROBLEM — Z95.0 PACEMAKER: Status: ACTIVE | Noted: 2021-03-03

## 2022-10-10 PROBLEM — U07.1 COVID-19 VIRUS INFECTION: Status: ACTIVE | Noted: 2021-02-24

## 2022-10-10 PROBLEM — N18.31 STAGE 3A CHRONIC KIDNEY DISEASE (H): Status: ACTIVE | Noted: 2021-06-11

## 2022-10-10 LAB
HBA1C MFR BLD: 6.9 % (ref 4–6.2)
HOLD SPECIMEN: NORMAL

## 2022-10-10 PROCEDURE — G0463 HOSPITAL OUTPT CLINIC VISIT: HCPCS

## 2022-10-10 PROCEDURE — 99204 OFFICE O/P NEW MOD 45 MIN: CPT | Mod: 25 | Performed by: UROLOGY

## 2022-10-10 PROCEDURE — 36415 COLL VENOUS BLD VENIPUNCTURE: CPT | Mod: ZL | Performed by: FAMILY MEDICINE

## 2022-10-10 PROCEDURE — 83036 HEMOGLOBIN GLYCOSYLATED A1C: CPT | Mod: ZL | Performed by: FAMILY MEDICINE

## 2022-10-10 PROCEDURE — G0463 HOSPITAL OUTPT CLINIC VISIT: HCPCS | Mod: 27

## 2022-10-10 PROCEDURE — 99214 OFFICE O/P EST MOD 30 MIN: CPT | Performed by: FAMILY MEDICINE

## 2022-10-10 RX ORDER — AMPICILLIN 2 G/1
2 INJECTION, POWDER, FOR SOLUTION INTRAVENOUS
Status: CANCELLED | OUTPATIENT
Start: 2022-10-11

## 2022-10-10 RX ORDER — INSULIN GLARGINE 100 [IU]/ML
20 INJECTION, SOLUTION SUBCUTANEOUS AT BEDTIME
COMMUNITY
Start: 2022-10-10 | End: 2023-06-15

## 2022-10-10 ASSESSMENT — PATIENT HEALTH QUESTIONNAIRE - PHQ9
SUM OF ALL RESPONSES TO PHQ QUESTIONS 1-9: 3
SUM OF ALL RESPONSES TO PHQ QUESTIONS 1-9: 3
10. IF YOU CHECKED OFF ANY PROBLEMS, HOW DIFFICULT HAVE THESE PROBLEMS MADE IT FOR YOU TO DO YOUR WORK, TAKE CARE OF THINGS AT HOME, OR GET ALONG WITH OTHER PEOPLE: NOT DIFFICULT AT ALL

## 2022-10-10 ASSESSMENT — PAIN SCALES - GENERAL
PAINLEVEL: NO PAIN (0)
PAINLEVEL: NO PAIN (0)

## 2022-10-10 NOTE — PROGRESS NOTES
Assessment & Plan     Type 2 diabetes mellitus with hyperglycemia, with long-term current use of insulin (H)  We will repeat hemoglobin A1c given recent change in insulin regimen.  If this remains in the the recommended range we will follow-up in 6 months.  - Hemoglobin A1c; Future  - Albumin Random Urine Quantitative with Creat Ratio; Future    Essential hypertension  Controlled, continue current regimen.    Chronic diastolic congestive heart failure (H)  Stable, continue current regimen.    Sinus node dysfunction (H)  Currently asymptomatic    Light chain disease, kappa type (H)  Chronic    Hyperparathyroidism due to renal insufficiency (H)  Stable, continue nephrology follow-up    Sam Leal  [facility]   Bailey is a 72 year old accompanied by her staff, presenting for the following health issues:  Diabetes (A1C check)    She has a history of type 2 diabetes.  Her last visit we continued Basaglar at 20 units and decreased her mealtime insulin to 5 units with meals due to hypoglycemia.    She was also recently seen by nephrology with no significant changes noted.  Her kidney function has improved significantly as she has been try to increase her fluid intake.    History of Present Illness       Diabetes:   She presents for follow up of diabetes.  She is checking home blood glucose four or more times daily. She checks blood glucose before meals and at bedtime.  Blood glucose is never over 200 and never under 70. She is aware of hypoglycemia symptoms including other. She has no concerns regarding her diabetes at this time.  She is not experiencing numbness or burning in feet, excessive thirst, blurry vision, weight changes or redness, sores or blisters on feet.         She eats 2-3 servings of fruits and vegetables daily.She consumes 0 sweetened beverage(s) daily.She exercises with enough effort to increase her heart rate 10 to 19 minutes per day.  She exercises with enough  effort to increase her heart rate 4 days per week.   She is taking medications regularly.    Today's PHQ-9         PHQ-9 Total Score: 3    PHQ-9 Q9 Thoughts of better off dead/self-harm past 2 weeks :   Not at all    How difficult have these problems made it for you to do your work, take care of things at home, or get along with other people: Not difficult at all             Review of Systems         Objective    /60 (BP Location: Right arm, Patient Position: Sitting, Cuff Size: Adult Regular)   Pulse 59   Temp 98  F (36.7  C) (Tympanic)   Resp 16   Wt 72.3 kg (159 lb 4.8 oz)   LMP  (LMP Unknown)   SpO2 99%   Breastfeeding No   BMI 26.51 kg/m    Body mass index is 26.51 kg/m .  Physical Exam   GENERAL: healthy, alert and no distress  RESP: lungs clear to auscultation - no rales, rhonchi or wheezes  CV: regular rate and rhythm, normal S1 S2, no S3 or S4, no murmur, click or rub, no peripheral edema and peripheral pulses strong  MS: Chronic pedal edema bilaterally up to her mid shins which is at her baseline.  SKIN: no suspicious lesions or rashes

## 2022-10-10 NOTE — PROGRESS NOTES
Type of Visit  NPV    Chief Complaint  Urge urinary incontinence    HPI  Ms. Sierra is a 72 y.o. female with history of urge urinary incontinence s/p Interstim implant 8 year(s) ago.  She underwent generator replacement 3 years ago.  She did quite well after the last intervention.  She experienced 2 years of significantly improved urinary symptoms.  Over the past year she has redeveloped significant incontinence and lack of sensation.  She has undergone numerous device inquiries revealing that the third lead is not responding or working appropriately.  The patient previously reported over 90% improvement after the InterStim was placed.  She would like to get back to that same level of management.  She presents to discuss InterStim revision to replace the lead that is not functional.  She denies dysuria, fevers and gross hematuria.      Past Medical History  She  has a past medical history of Atherosclerotic heart disease of native coronary artery without angina pectoris, Cardiac murmur, Chronic kidney disease, stage III (moderate) (H), Controlled type 2 diabetes mellitus with stage 3 chronic kidney disease, with long-term current use of insulin (H) (7/26/2013), COVID-19 virus detected (2/23/2021), Edema, Essential (primary) hypertension, Gastro-esophageal reflux disease without esophagitis, Hyperlipidemia, Impingement syndrome of right shoulder (03/10/2017), Insomnia (01/25/2012), Iron deficiency anemia, Neuromuscular dysfunction of bladder, Osteoarthritis, Other shoulder lesions, right shoulder (03/10/2017), Other shoulder lesions, unspecified shoulder, Other specified postprocedural states (04/19/2017), Personal history of other medical treatment (CODE), Plantar fascial fibromatosis, Presence of aortocoronary bypass graft (07/2013), Primary osteoarthritis of shoulder (03/10/2017), Type 2 diabetes mellitus without complications (H), Urgency of urination (03/08/2011), and Uterovaginal prolapse.  Patient Active  Problem List   Diagnosis     AC (acromioclavicular) joint arthritis     Coronary atherosclerosis     CKD (chronic kidney disease) stage 4, GFR 15-29 ml/min (H)     GERD (gastroesophageal reflux disease)     Nonrheumatic aortic valve stenosis     Hyperlipidemia     Essential hypertension     Impingement syndrome of right shoulder     Insomnia     Type 2 diabetes mellitus with hyperglycemia, with long-term current use of insulin (H)     Urge incontinence     Right rotator cuff tendinitis     Vitamin D deficiency     Light chain disease, kappa type (H)     Hyperparathyroidism due to renal insufficiency (H)     Hiatal hernia     History of coronary artery bypass graft x 2     Stenosis of carotid artery, unspecified laterality     Chronic total occlusion of coronary artery (RCA)     Pavon's esophagus without dysplasia     Proteinuria, unspecified type     Bradycardia     S/P cardiac pacemaker procedure     Gastroparesis     Chronic diastolic congestive heart failure (H)     Sinus node dysfunction (H)     Thrombocytopenia (H)       Past Surgical History  She  has a past surgical history that includes Laminectomy lumbar one level (1997); Laparoscopic tubal ligation; Dental surgery; Arthroscopy shoulder (Right, 1997); Arthroscopy shoulder (Left, 2012); Hysterectomy total abdominal, bilateral salpingo-oophorectomy, combined (04/02/2001); Cystoscopy (03/15/2001); Laparoscopic cholecystectomy (04/2008); ercp (05/2008); Cardiac Bypass (07/2013); Interstim Device Placement (04/14/2014); arthroscopy shoulder rt/lt (Right, [id number]); colonoscopy (11/24/2009); Implant stimulator and leads sacral nerve (stage one and two) (N/A, 5/7/2019); and Esophagoscopy, gastroscopy, duodenoscopy (EGD), combined (N/A, 6/23/2020).    Medications  She has a current medication list which includes the following prescription(s): aspirin, b-d u/f, blood glucose, dexcom g6 sensor, dexcom g6 transmitter, famotidine, gavilax, glucose, incontinence supply  disposable, insulin aspart flexpen, insulin glargine, isosorbide mononitrate, lisinopril, metoprolol succinate er, nitroglycerin, ondansetron, order for dme, rosuvastatin, vitamin d3, and zolpidem.    Allergies  No Known Allergies    Social History  She  reports that she has never smoked. She has never used smokeless tobacco. She reports that she does not drink alcohol and does not use drugs.  No drug abuse.    Family History  Family History   Problem Relation Age of Onset     Other - See Comments Father         MI     Cancer Mother         Cancer,Some type of cancer, aneurysm     Diabetes Paternal Grandmother         Diabetes,Type II     Cancer Sister         Cancer,unknown type     Diabetes Sister         Diabetes,Type II     Diabetes Sister         Diabetes,Type II     Diabetes Sister         Diabetes,Type II     Other - See Comments Daughter         depression     Other - See Comments Daughter         ovarian cancer and depression     Heart Disease Brother         Heart Disease,CAD, MI       Review of Systems  I personally reviewed the ROS with the patient.    Nursing Notes:   Di Marquez LPN  10/10/2022 12:04 PM  Signed  Chief Complaint   Patient presents with     Follow Up     Discuss interstim  replacement       Medication reconciliation completed.    Review of Systems:    Weight loss:    yes     Recent fever/chills:  No   Night sweats:   No  Current skin rash:  No   Recent hair loss:  No  Heat intolerance:  No   Cold intolerance:  No  Chest pain:   No   Palpitations:   No  Shortness of breath:  No   Wheezing:   No  Constipation:    No   Diarrhea:   No   Nausea:   No   Vomiting:   No   Kidney/side pain:  No   Back pain:   No  Frequent headaches:  No   Dizziness:     No  Leg swelling:   No   Calf pain:    No        Initial /68 (BP Location: Left arm, Patient Position: Sitting, Cuff Size: Adult Regular)   Pulse 70   Temp 98  F (36.7  C) (Temporal)   Resp 16   LMP  (LMP Unknown)   SpO2 97%    "Breastfeeding No  Estimated body mass index is 26.51 kg/m  as calculated from the following:    Height as of 12/13/21: 1.651 m (5' 5\").    Weight as of an earlier encounter on 10/10/22: 72.3 kg (159 lb 4.8 oz).       Di Marquez LPN .......  10/10/2022  11:55 AM      Physical Exam  Vitals:    10/10/22 1152   BP: 132/68   BP Location: Left arm   Patient Position: Sitting   Cuff Size: Adult Regular   Pulse: 70   Resp: 16   Temp: 98  F (36.7  C)   TempSrc: Temporal   SpO2: 97%   Weight: 72.6 kg (160 lb)   Height: 1.626 m (5' 4\")     Constitutional: NAD, WDWN.   Head: NCAT  Eyes: Conjunctivae normal  Pulmonary/Chest: Respirations are even and non-labored bilaterally.  Abdominal: Soft. No distension. No CVA tenderness.  Extremities: BETTE x 4, Warm. No clubbing.  No cyanosis.    Skin: Pink, warm and dry.  No rashes noted.  Genitourinary:  Nonpalpable bladder    Labs  Results for orders placed or performed in visit on 10/10/22   Hemoglobin A1c     Status: Abnormal   Result Value Ref Range    Hemoglobin A1C 6.9 (H) 4.0 - 6.2 %   Extra Tube     Status: None    Narrative    The following orders were created for panel order Extra Tube.  Procedure                               Abnormality         Status                     ---------                               -----------         ------                     Extra Serum Separator Tu...[[id number]]                      Final result                 Please view results for these tests on the individual orders.   Extra Serum Separator Tube (SST)     Status: None   Result Value Ref Range    Hold Specimen [facility]      Imaging  CT Stone  6/10/2021  IMPRESSION:   No acute abnormality.  Chronic changes including bilateral renal calculi are similar in  appearance as described above.    Assessment  Ms. Sierra is a 72 y.o. female with urge urinary incontinence s/p Interstim implant 8 year(s) ago who requires lead revision for ongoing benefit.  Reviewed the past 8 years of notes.  Her InterStim " was initially placed in 2014 following successful Botox.  Unfortunately the Botox was only effective for about 3 months.  Because of this we transition to InterStim.  Given her history and response I am in agreement with InterStim revision.  Nonrechargeable generator.  Her primary medical condition is insulin-dependent diabetes.  Her most recent hemoglobin A1c was at or close to her goal    Plan  InterStim nonrechargeable generator and lead revision under MAC sedation

## 2022-10-10 NOTE — NURSING NOTE
"Chief Complaint   Patient presents with     Diabetes     A1C check         Initial /60 (BP Location: Right arm, Patient Position: Sitting, Cuff Size: Adult Regular)   Pulse 59   Temp 98  F (36.7  C) (Tympanic)   Resp 16   Wt 72.3 kg (159 lb 4.8 oz)   LMP  (LMP Unknown)   SpO2 99%   Breastfeeding No   BMI 26.51 kg/m   Estimated body mass index is 26.51 kg/m  as calculated from the following:    Height as of 12/13/21: 1.651 m (5' 5\").    Weight as of this encounter: 72.3 kg (159 lb 4.8 oz).     Advance Care Directive on file? yes  Advance Care Directive provided to patient? On file     FOOD SECURITY SCREENING QUESTIONS:    The next two questions are to help us understand your food security.  If you are feeling you need any assistance in this area, we have resources available to support you today.    Hunger Vital Signs:  Within the past 12 months we worried whether our food would run out before we got money to buy more. Never  Within the past 12 months the food we bought just didn't last and we didn't have money to get more. Never        Ivonne Pierson LPN   "

## 2022-10-11 ENCOUNTER — LAB (OUTPATIENT)
Dept: LAB | Facility: OTHER | Age: 72
End: 2022-10-11
Payer: COMMERCIAL

## 2022-10-11 DIAGNOSIS — E11.65 TYPE 2 DIABETES MELLITUS WITH HYPERGLYCEMIA, WITH LONG-TERM CURRENT USE OF INSULIN (H): ICD-10-CM

## 2022-10-11 DIAGNOSIS — Z79.4 TYPE 2 DIABETES MELLITUS WITH HYPERGLYCEMIA, WITH LONG-TERM CURRENT USE OF INSULIN (H): ICD-10-CM

## 2022-10-11 LAB
CREAT UR-MCNC: 226 MG/DL
MICROALBUMIN UR-MCNC: 193 MG/L
MICROALBUMIN/CREAT UR: 85.4 MG/G CR (ref 0–25)

## 2022-10-11 PROCEDURE — 82043 UR ALBUMIN QUANTITATIVE: CPT | Mod: ZL

## 2022-10-14 ENCOUNTER — OFFICE VISIT (OUTPATIENT)
Dept: FAMILY MEDICINE | Facility: OTHER | Age: 72
End: 2022-10-14
Attending: UROLOGY
Payer: COMMERCIAL

## 2022-10-14 VITALS
TEMPERATURE: 97.1 F | OXYGEN SATURATION: 99 % | HEART RATE: 59 BPM | DIASTOLIC BLOOD PRESSURE: 66 MMHG | RESPIRATION RATE: 15 BRPM | SYSTOLIC BLOOD PRESSURE: 130 MMHG | WEIGHT: 160.2 LBS | HEIGHT: 65 IN | BODY MASS INDEX: 26.69 KG/M2

## 2022-10-14 DIAGNOSIS — N39.41 URGE INCONTINENCE: ICD-10-CM

## 2022-10-14 DIAGNOSIS — Z79.4 TYPE 2 DIABETES MELLITUS WITH HYPERGLYCEMIA, WITH LONG-TERM CURRENT USE OF INSULIN (H): ICD-10-CM

## 2022-10-14 DIAGNOSIS — Z01.818 PREOPERATIVE EXAMINATION: Primary | ICD-10-CM

## 2022-10-14 DIAGNOSIS — I10 ESSENTIAL HYPERTENSION: ICD-10-CM

## 2022-10-14 DIAGNOSIS — E11.65 TYPE 2 DIABETES MELLITUS WITH HYPERGLYCEMIA, WITH LONG-TERM CURRENT USE OF INSULIN (H): ICD-10-CM

## 2022-10-14 DIAGNOSIS — I50.32 CHRONIC DIASTOLIC CONGESTIVE HEART FAILURE (H): ICD-10-CM

## 2022-10-14 PROCEDURE — 93005 ELECTROCARDIOGRAM TRACING: CPT | Performed by: FAMILY MEDICINE

## 2022-10-14 PROCEDURE — 99214 OFFICE O/P EST MOD 30 MIN: CPT | Performed by: FAMILY MEDICINE

## 2022-10-14 PROCEDURE — G0463 HOSPITAL OUTPT CLINIC VISIT: HCPCS

## 2022-10-14 PROCEDURE — 93010 ELECTROCARDIOGRAM REPORT: CPT | Performed by: INTERNAL MEDICINE

## 2022-10-14 PROCEDURE — C9803 HOPD COVID-19 SPEC COLLECT: HCPCS | Performed by: FAMILY MEDICINE

## 2022-10-14 PROCEDURE — U0005 INFEC AGEN DETEC AMPLI PROBE: HCPCS | Mod: ZL | Performed by: FAMILY MEDICINE

## 2022-10-14 ASSESSMENT — PAIN SCALES - GENERAL: PAINLEVEL: NO PAIN (0)

## 2022-10-14 NOTE — H&P (VIEW-ONLY)
[facility] AND [facility]  [address]  [place] [address]  Phone: [phone]  Fax: [phone]  Primary Provider: Celsa Buck  Pre-op Performing Provider: CELSA BUCK      PREOPERATIVE EVALUATION:  Today's date: 10/14/2022    Bailey Sierra is a 72 year old female who presents for a preoperative evaluation.    Surgical Information:  Surgery/Procedure: Interstim replacement  Surgery Location: [facility]   Surgeon: Dr. Bowling  Surgery Date: 10/18/22  Time of Surgery: TBD  Where patient plans to recover: At home with family  Fax number for surgical facility: Note does not need to be faxed, will be available electronically in Epic.    Type of Anesthesia Anticipated: to be determined    Assessment & Plan     The proposed surgical procedure is considered LOW risk.    Preoperative examination  She is currently optimized for upcoming procedure.  She will take half of her normal nighttime insulin dose the night before her procedure.  She will be n.p.o. after midnight and skip her morning insulin dose the morning of her procedure.  She can resume her insulin regimen postoperatively.  She is already stopped her aspirin.  No other medication adjustments are necessary.  - EKG 12-lead, tracing only  - Asymptomatic COVID-19 Virus (Coronavirus) by PCR Nasopharyngeal    Urge incontinence  See above    Type 2 diabetes mellitus with hyperglycemia, with long-term current use of insulin (H)  New referral for diabetic shoes were placed.  Repeat hemoglobin A1c again in 6 months.  - Orthotics and Prosthetics DME Orthotic; Diabetic Shoe(s)/Insert(s); 3 pair; Progress note must support the need for shoes/orthotics. Use .diabeticfootexam or diabetic foot exam picklist in SOAP[employer].    Essential hypertension  Controlled, continue current regimen.    Chronic diastolic congestive heart failure (H)  Stable, continue current regimen.    RECOMMENDATION:  APPROVAL GIVEN to proceed with proposed procedure, without further  diagnostic evaluation.      Subjective     HPI related to upcoming procedure: She has a history of urge incontinence.  She had a InterStim implant placed 8 years ago with good success.  She is due to have her generator replaced.  Is also found that one of her leads is nonfunctional which will also be fixed.  This is scheduled to be performed on October 18.    She has tolerated procedures in the past well.    Her diabetes under good control, most recent hemoglobin A1c was 6.9%.      Preop Questions 10/14/2022   1. Have you ever had a heart attack or stroke? No   2. Have you ever had surgery on your heart or blood vessels, such as a stent placement, a coronary artery bypass, or surgery on an artery in your head, neck, heart, or legs? YES - CABG   3. Do you have chest pain with activity? No   4. Do you have a history of  heart failure? No   5. Do you currently have a cold, bronchitis or symptoms of other infection? No   6. Do you have a cough, shortness of breath, or wheezing? No   7. Do you or anyone in your family have previous history of blood clots? No   8. Do you or does anyone in your family have a serious bleeding problem such as prolonged bleeding following surgeries or cuts? No   9. Have you ever had problems with anemia or been told to take iron pills? No   10. Have you had any abnormal blood loss such as black, tarry or bloody stools, or abnormal vaginal bleeding? No   11. Have you ever had a blood transfusion? UNKNOWN -    12. Are you willing to have a blood transfusion if it is medically needed before, during, or after your surgery? Yes   13. Have you or any of your relatives ever had problems with anesthesia? No   14. Do you have sleep apnea, excessive snoring or daytime drowsiness? No   15. Do you have any artifical heart valves or other implanted medical devices like a pacemaker, defibrillator, or continuous glucose monitor? YES - InterStim   16. Do you have artificial joints? No   17. Are you allergic  to latex? No     Health Care Directive:  Patient has a Health Care Directive on file      Preoperative Review of :   reviewed - no record of controlled substances prescribed.      Review of Systems  CONSTITUTIONAL: NEGATIVE for fever, chills, change in weight  ENT/MOUTH: NEGATIVE for ear, mouth and throat problems  RESP: NEGATIVE for significant cough or SOB  CV: NEGATIVE for chest pain, palpitations or peripheral edema    Patient Active Problem List    Diagnosis Date Noted     Chronic diastolic congestive heart failure (H) 10/10/2022     Priority: Medium     Sinus node dysfunction (H) 10/10/2022     Priority: Medium     Thrombocytopenia (H) 10/10/2022     Priority: Medium     Hypotension, unspecified hypotension type 06/21/2022     Priority: Medium     Gastroparesis 07/13/2021     Priority: Medium     4/2021       Acute kidney injury superimposed on CKD (H) 06/11/2021     Priority: Medium     Non-traumatic rhabdomyolysis 06/11/2021     Priority: Medium     Stage 3a chronic kidney disease (H) 06/11/2021     Priority: Medium     S/P cardiac pacemaker procedure 03/09/2021     Priority: Medium     Pacemaker 03/03/2021     Priority: Medium     Formatting of this note might be different from the original.    Implant Date 02/26/21  Implanting Physician Baldomero Owen MD     Generator  [employer]   Model L331 Accolade MRI  IS-1 ()   Serial Number [id number]    Lead   Right Atrium    Model 7841 Ingevity + IS-1 BiPositive Fix RA/RV 52 cm    Serial Number [id number]  Implant Date 02/26/21  Lead  Right Ventricle   Model 7842 Ingevity + IS-1 BiPositive Fix RA/RV 59 cm   Serial Number [id number]   Implant Date 02/26/21     Indication Sinus Node Dysfunction       Symptomatic bradycardia 02/24/2021     Priority: Medium     Constipation, unspecified 02/24/2021     Priority: Medium     COVID-19 virus infection 02/24/2021     Priority: Medium     Hydronephrosis 02/24/2021     Priority: Medium     Mild aortic insufficiency  02/24/2021     Priority: Medium     Proteinuria, unspecified type 07/20/2020     Priority: Medium     Pavon's esophagus without dysplasia 07/08/2020     Priority: Medium     History of coronary artery bypass graft x 2 12/30/2019     Priority: Medium     Stenosis of carotid artery, unspecified laterality 12/30/2019     Priority: Medium     Chronic total occlusion of coronary artery (RCA) 12/30/2019     Priority: Medium     CKD (chronic kidney disease) stage 4, GFR 15-29 ml/min (H) 01/17/2018     Priority: Medium     Nonrheumatic aortic valve stenosis 01/17/2018     Priority: Medium     Hyperlipidemia 01/17/2018     Priority: Medium     Essential hypertension 01/17/2018     Priority: Medium     AC (acromioclavicular) joint arthritis 03/10/2017     Priority: Medium     Impingement syndrome of right shoulder 03/10/2017     Priority: Medium     Right rotator cuff tendinitis 03/10/2017     Priority: Medium     Type 2 diabetes mellitus with hyperglycemia, with long-term current use of insulin (H) 02/13/2017     Priority: Medium     Light chain disease, 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  Per Dr. Ortiz, if hypoalbuminemia not improving by Nov-Dec 2016, needs referral to Hematology.        Vitamin D deficiency 10/23/2016     Priority: Medium     Hyperparathyroidism due to renal insufficiency (H) 03/28/2016     Priority: Medium     Coronary atherosclerosis 09/03/2013     Priority: Medium     Overview:   2 vessel bypass, August 2013       Hiatal hernia 07/26/2013     Priority: Medium     GERD (gastroesophageal reflux disease) 01/08/2013     Priority: Medium     Urge incontinence 10/25/2012     Priority: Medium     Insomnia 01/25/2012     Priority: Medium      Past Medical History:   Diagnosis Date     Atherosclerotic heart disease of native coronary artery without angina pectoris     No Comments Provided     Cardiac murmur     No Comments Provided     Chronic  kidney disease, stage III (moderate) (H)     No Comments Provided     Controlled type 2 diabetes mellitus with stage 3 chronic kidney disease, with long-term current use of insulin (H) 7/26/2013     COVID-19 virus detected 2/23/2021     Edema     No Comments Provided     Essential (primary) hypertension     No Comments Provided     Gastro-esophageal reflux disease without esophagitis     No Comments Provided     Hyperlipidemia     No Comments Provided     Impingement syndrome of right shoulder 03/10/2017          Insomnia 01/25/2012          Iron deficiency anemia     No Comments Provided     Neuromuscular dysfunction of bladder     No Comments Provided     Osteoarthritis     No Comments Provided     Other shoulder lesions, right shoulder 03/10/2017          Other shoulder lesions, unspecified shoulder     No Comments Provided     Other specified postprocedural states 04/19/2017          Personal history of other medical treatment (CODE)     Three childbirths     Plantar fascial fibromatosis     No Comments Provided     Presence of aortocoronary bypass graft 07/2013    [facility]     Primary osteoarthritis of shoulder 03/10/2017          Type 2 diabetes mellitus without complications (H)     No Comments Provided     Urgency of urination 03/08/2011          Uterovaginal prolapse     No Comments Provided     Past Surgical History:   Procedure Laterality Date     ARTHROSCOPY SHOULDER Right 1997          ARTHROSCOPY SHOULDER Left 2012          ARTHROSCOPY SHOULDER RT/LT Right [id number]          Cardiac Bypass  07/2013          COLONOSCOPY  11/24/2009 11/24/2009     CYSTOSCOPY  03/15/2001     DENTAL SURGERY      Dental extractions     ENDOSCOPIC RETROGRADE CHOLANGIOPANCREATOGRAPHY  05/2008    Bile leak with ERCP and stenting and drainage of bile collection through abdominal wall.     ESOPHAGOSCOPY, GASTROSCOPY, DUODENOSCOPY (EGD), COMBINED N/A 6/23/2020    Pavon's follow up 5 years, 6/23/2025     HYSTERECTOMY TOTAL  "ABDOMINAL, BILATERAL SALPINGO-OOPHORECTOMY, COMBINED  04/02/2001    Vag vault suspension with Cortex mesh     IMPLANT STIMULATOR AND LEADS SACRAL NERVE (STAGE ONE AND TWO) N/A 5/7/2019    Procedure: Interstim Implant Revision, GENERATOR AND TYING LEAD EXCHANGE;  Surgeon: Honorio Bowling MD;  Location: [facility] OR     Interstim Device Placement  04/14/2014    Dr. Bowling - [facility]     LAMINECTOMY LUMBAR ONE LEVEL  1997          LAPAROSCOPIC CHOLECYSTECTOMY  04/2008          LAPAROSCOPIC TUBAL LIGATION      No Comments Provided     Current Outpatient Medications   Medication Sig Dispense Refill     aspirin (ASPIRIN ADULT LOW STRENGTH) 81 MG EC tablet Take 1 tablet (81 mg) by mouth daily 90 tablet 3     B-D U/F 31G X 8 MM insulin pen needle USE 4 PEN NEEDLES DAILY 400 each 3     blood glucose monitoring ([employer] CONTOUR) test strip TEST 3 TIMES A DAY (Patient taking differently: TEST 4 TIMES A DAY) 300 each 3     Continuous Blood Gluc Sensor (DEXCOM G6 SENSOR) MISC Change every 10 days. 3 each 11     Continuous Blood Gluc Transmit (DEXCOM G6 TRANSMITTER) MISC Change every 3 months. 1 each 3     famotidine (PEPCID) 40 MG tablet Take 1 tablet (40 mg) by mouth 2 times daily 180 tablet 3     GAVILAX 17 GM/SCOOP powder daily as needed       glucose (BD GLUCOSE) 4 g chewable tablet Take 1 tablet by mouth every hour as needed for low blood sugar       Incontinence Supply Disposable MISC daily \"Depends\" size Large or dispense as insurance allows 150 each 11     Insulin Aspart FlexPen 100 UNIT/ML SOPN Inject 5 Units Subcutaneous 3 times daily (with meals) 60 mL 3     insulin glargine (BASAGLAR KWIKPEN) 100 UNIT/ML pen Inject 20 Units Subcutaneous At Bedtime       isosorbide mononitrate (IMDUR) 60 MG 24 hr tablet TAKE 2 TABLETS BY MOUTH EVERY  tablet 3     lisinopril (ZESTRIL) 2.5 MG tablet Take 1 tablet (2.5 mg) by mouth daily 90 tablet 3     metoprolol succinate ER (TOPROL-XL) 25 MG 24 hr tablet TAKE 1/2 TABLET BY MOUTH EVERY EVENING " "45 tablet 3     nitroGLYcerin (NITROSTAT) 0.4 MG sublingual tablet Place 0.4 mg under the tongue every 5 minutes as needed for chest pain       ondansetron (ZOFRAN) 4 MG tablet TAKE 1 TABLET BY MOUTH EVERY 6 HOURS AS NEEDED 120 tablet 4     order for DME Equipment being ordered: Diabetic shoes, pre-ulcer callous formation 2 each 3     rosuvastatin (CRESTOR) 40 MG tablet TAKE 1 TABLET BY MOUTH EVERY DAY 90 tablet 3     vitamin D3 (VITAMIN D3) 50 mcg (2000 units) tablet Take 1 tablet (50 mcg) by mouth daily 90 tablet 3     zolpidem (AMBIEN) 10 MG tablet Take 1 tablet (10 mg) by mouth nightly as needed for sleep 30 tablet 5       No Known Allergies     Social History     Tobacco Use     Smoking status: Never     Smokeless tobacco: Never   Substance Use Topics     Alcohol use: No     Alcohol/week: 0.0 standard drinks         Objective     /66 (BP Location: Right arm, Patient Position: Sitting, Cuff Size: Adult Regular)   Pulse 59   Temp 97.1  F (36.2  C) (Temporal)   Resp 15   Ht 1.651 m (5' 5\")   Wt 72.7 kg (160 lb 3.2 oz)   LMP  (LMP Unknown)   SpO2 99%   Breastfeeding No   BMI 26.66 kg/m      Physical Exam  GENERAL APPEARANCE: healthy, alert and no distress  HENT: ear canals and TM's normal and nose and mouth without ulcers or lesions  RESP: lungs clear to auscultation - no rales, rhonchi or wheezes  CV: regular rate and rhythm, normal S1 S2, no S3 or S4 and no murmur, click or rub   ABDOMEN: soft, nontender, no HSM or masses and bowel sounds normal  NEURO: Normal strength and tone, sensory exam grossly normal, mentation intact and speech normal  Musculoskeletal: 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  No ulceration.    Recent Labs   Lab Test 10/10/22  1022 12/03/21  0836 08/26/21  0940 08/22/21  1634 07/07/21  1050 06/30/21  1320 06/10/21  1310 06/10/21  1135 06/03/21  1704 02/24/21  1029   HGB  --   --  11.3* 9.8*  --   --   --  12.5   < > 11.2*   PLT  --   --   --  140*  --   --   -- "  125*   < > 96*   INR  --   --   --   --   --   --  1.07  --   --  1.05   NA  --   --   --  139 142   < >  --  136   < > 143   POTASSIUM  --   --   --  3.6 3.7   < >  --  4.9   < > 2.8*   CR  --   --   --  2.88* 3.66*   < >  --  13.78*   < > 1.98*   A1C 6.9* 6.8*  --   --   --   --   --   --    < >  --     < > = values in this interval not displayed.        Diagnostics:  No labs were ordered during this visit.  Recent labs were reviewed and are stable.  EKG: Shows paced rhythm without any ischemic changes.  Unchanged from previous.    Revised Cardiac Risk Index (RCRI):  The patient has the following serious cardiovascular risks for perioperative complications:   - Coronary Artery Disease (MI, positive stress test, angina, Qs on EKG) = 1 point   - Diabetes Mellitus (on Insulin) = 1 point   - Serum Creatinine >2.0 mg/dl = 1 point     RCRI Interpretation: 2 points: Class III (moderate risk - 6.6% complication rate)          Signed Electronically by: Sam Leal  Copy of this evaluation report is provided to requesting physician.

## 2022-10-14 NOTE — PROGRESS NOTES
[facility] AND [facility]  [address]  [place] [address]  Phone: [phone]  Fax: [phone]  Primary Provider: Celsa Buck  Pre-op Performing Provider: CELSA BUCK      PREOPERATIVE EVALUATION:  Today's date: 10/14/2022    Bailey Sierra is a 72 year old female who presents for a preoperative evaluation.    Surgical Information:  Surgery/Procedure: Interstim replacement  Surgery Location: [facility]   Surgeon: Dr. Bowling  Surgery Date: 10/18/22  Time of Surgery: TBD  Where patient plans to recover: At home with family  Fax number for surgical facility: Note does not need to be faxed, will be available electronically in Epic.    Type of Anesthesia Anticipated: to be determined    Assessment & Plan     The proposed surgical procedure is considered LOW risk.    Preoperative examination  She is currently optimized for upcoming procedure.  She will take half of her normal nighttime insulin dose the night before her procedure.  She will be n.p.o. after midnight and skip her morning insulin dose the morning of her procedure.  She can resume her insulin regimen postoperatively.  She is already stopped her aspirin.  No other medication adjustments are necessary.  - EKG 12-lead, tracing only  - Asymptomatic COVID-19 Virus (Coronavirus) by PCR Nasopharyngeal    Urge incontinence  See above    Type 2 diabetes mellitus with hyperglycemia, with long-term current use of insulin (H)  New referral for diabetic shoes were placed.  Repeat hemoglobin A1c again in 6 months.  - Orthotics and Prosthetics DME Orthotic; Diabetic Shoe(s)/Insert(s); 3 pair; Progress note must support the need for shoes/orthotics. Use .diabeticfootexam or diabetic foot exam picklist in SOAPO.    Essential hypertension  Controlled, continue current regimen.    Chronic diastolic congestive heart failure (H)  Stable, continue current regimen.    RECOMMENDATION:  APPROVAL GIVEN to proceed with proposed procedure, without further  diagnostic evaluation.      Subjective     HPI related to upcoming procedure: She has a history of urge incontinence.  She had a InterStim implant placed 8 years ago with good success.  She is due to have her generator replaced.  Is also found that one of her leads is nonfunctional which will also be fixed.  This is scheduled to be performed on October 18.    She has tolerated procedures in the past well.    Her diabetes under good control, most recent hemoglobin A1c was 6.9%.      Preop Questions 10/14/2022   1. Have you ever had a heart attack or stroke? No   2. Have you ever had surgery on your heart or blood vessels, such as a stent placement, a coronary artery bypass, or surgery on an artery in your head, neck, heart, or legs? YES - CABG   3. Do you have chest pain with activity? No   4. Do you have a history of  heart failure? No   5. Do you currently have a cold, bronchitis or symptoms of other infection? No   6. Do you have a cough, shortness of breath, or wheezing? No   7. Do you or anyone in your family have previous history of blood clots? No   8. Do you or does anyone in your family have a serious bleeding problem such as prolonged bleeding following surgeries or cuts? No   9. Have you ever had problems with anemia or been told to take iron pills? No   10. Have you had any abnormal blood loss such as black, tarry or bloody stools, or abnormal vaginal bleeding? No   11. Have you ever had a blood transfusion? UNKNOWN -    12. Are you willing to have a blood transfusion if it is medically needed before, during, or after your surgery? Yes   13. Have you or any of your relatives ever had problems with anesthesia? No   14. Do you have sleep apnea, excessive snoring or daytime drowsiness? No   15. Do you have any artifical heart valves or other implanted medical devices like a pacemaker, defibrillator, or continuous glucose monitor? YES - InterStim   16. Do you have artificial joints? No   17. Are you allergic  to latex? No     Health Care Directive:  Patient has a Health Care Directive on file      Preoperative Review of :   reviewed - no record of controlled substances prescribed.      Review of Systems  CONSTITUTIONAL: NEGATIVE for fever, chills, change in weight  ENT/MOUTH: NEGATIVE for ear, mouth and throat problems  RESP: NEGATIVE for significant cough or SOB  CV: NEGATIVE for chest pain, palpitations or peripheral edema    Patient Active Problem List    Diagnosis Date Noted     Chronic diastolic congestive heart failure (H) 10/10/2022     Priority: Medium     Sinus node dysfunction (H) 10/10/2022     Priority: Medium     Thrombocytopenia (H) 10/10/2022     Priority: Medium     Hypotension, unspecified hypotension type 06/21/2022     Priority: Medium     Gastroparesis 07/13/2021     Priority: Medium     4/2021       Acute kidney injury superimposed on CKD (H) 06/11/2021     Priority: Medium     Non-traumatic rhabdomyolysis 06/11/2021     Priority: Medium     Stage 3a chronic kidney disease (H) 06/11/2021     Priority: Medium     S/P cardiac pacemaker procedure 03/09/2021     Priority: Medium     Pacemaker 03/03/2021     Priority: Medium     Formatting of this note might be different from the original.    Implant Date 02/26/21  Implanting Physician Baldomero Owen MD     Generator  [employer]   Model L331 Accolade MRI  IS-1 ()   Serial Number [id number]    Lead   Right Atrium    Model 7841 Ingevity + IS-1 BiPositive Fix RA/RV 52 cm    Serial Number [id number]  Implant Date 02/26/21  Lead  Right Ventricle   Model 7842 Ingevity + IS-1 BiPositive Fix RA/RV 59 cm   Serial Number [id number]   Implant Date 02/26/21     Indication Sinus Node Dysfunction       Symptomatic bradycardia 02/24/2021     Priority: Medium     Constipation, unspecified 02/24/2021     Priority: Medium     COVID-19 virus infection 02/24/2021     Priority: Medium     Hydronephrosis 02/24/2021     Priority: Medium     Mild aortic insufficiency  02/24/2021     Priority: Medium     Proteinuria, unspecified type 07/20/2020     Priority: Medium     Pavon's esophagus without dysplasia 07/08/2020     Priority: Medium     History of coronary artery bypass graft x 2 12/30/2019     Priority: Medium     Stenosis of carotid artery, unspecified laterality 12/30/2019     Priority: Medium     Chronic total occlusion of coronary artery (RCA) 12/30/2019     Priority: Medium     CKD (chronic kidney disease) stage 4, GFR 15-29 ml/min (H) 01/17/2018     Priority: Medium     Nonrheumatic aortic valve stenosis 01/17/2018     Priority: Medium     Hyperlipidemia 01/17/2018     Priority: Medium     Essential hypertension 01/17/2018     Priority: Medium     AC (acromioclavicular) joint arthritis 03/10/2017     Priority: Medium     Impingement syndrome of right shoulder 03/10/2017     Priority: Medium     Right rotator cuff tendinitis 03/10/2017     Priority: Medium     Type 2 diabetes mellitus with hyperglycemia, with long-term current use of insulin (H) 02/13/2017     Priority: Medium     Light chain disease, 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  Per Dr. Ortiz, if hypoalbuminemia not improving by Nov-Dec 2016, needs referral to Hematology.        Vitamin D deficiency 10/23/2016     Priority: Medium     Hyperparathyroidism due to renal insufficiency (H) 03/28/2016     Priority: Medium     Coronary atherosclerosis 09/03/2013     Priority: Medium     Overview:   2 vessel bypass, August 2013       Hiatal hernia 07/26/2013     Priority: Medium     GERD (gastroesophageal reflux disease) 01/08/2013     Priority: Medium     Urge incontinence 10/25/2012     Priority: Medium     Insomnia 01/25/2012     Priority: Medium      Past Medical History:   Diagnosis Date     Atherosclerotic heart disease of native coronary artery without angina pectoris     No Comments Provided     Cardiac murmur     No Comments Provided     Chronic  kidney disease, stage III (moderate) (H)     No Comments Provided     Controlled type 2 diabetes mellitus with stage 3 chronic kidney disease, with long-term current use of insulin (H) 7/26/2013     COVID-19 virus detected 2/23/2021     Edema     No Comments Provided     Essential (primary) hypertension     No Comments Provided     Gastro-esophageal reflux disease without esophagitis     No Comments Provided     Hyperlipidemia     No Comments Provided     Impingement syndrome of right shoulder 03/10/2017          Insomnia 01/25/2012          Iron deficiency anemia     No Comments Provided     Neuromuscular dysfunction of bladder     No Comments Provided     Osteoarthritis     No Comments Provided     Other shoulder lesions, right shoulder 03/10/2017          Other shoulder lesions, unspecified shoulder     No Comments Provided     Other specified postprocedural states 04/19/2017          Personal history of other medical treatment (CODE)     Three childbirths     Plantar fascial fibromatosis     No Comments Provided     Presence of aortocoronary bypass graft 07/2013    [facility]     Primary osteoarthritis of shoulder 03/10/2017          Type 2 diabetes mellitus without complications (H)     No Comments Provided     Urgency of urination 03/08/2011          Uterovaginal prolapse     No Comments Provided     Past Surgical History:   Procedure Laterality Date     ARTHROSCOPY SHOULDER Right 1997          ARTHROSCOPY SHOULDER Left 2012          ARTHROSCOPY SHOULDER RT/LT Right [id number]          Cardiac Bypass  07/2013          COLONOSCOPY  11/24/2009 11/24/2009     CYSTOSCOPY  03/15/2001     DENTAL SURGERY      Dental extractions     ENDOSCOPIC RETROGRADE CHOLANGIOPANCREATOGRAPHY  05/2008    Bile leak with ERCP and stenting and drainage of bile collection through abdominal wall.     ESOPHAGOSCOPY, GASTROSCOPY, DUODENOSCOPY (EGD), COMBINED N/A 6/23/2020    Pavon's follow up 5 years, 6/23/2025     HYSTERECTOMY TOTAL  "ABDOMINAL, BILATERAL SALPINGO-OOPHORECTOMY, COMBINED  04/02/2001    Vag vault suspension with Cortex mesh     IMPLANT STIMULATOR AND LEADS SACRAL NERVE (STAGE ONE AND TWO) N/A 5/7/2019    Procedure: Interstim Implant Revision, GENERATOR AND TYING LEAD EXCHANGE;  Surgeon: Honorio Bowling MD;  Location: [facility] OR     Interstim Device Placement  04/14/2014    Dr. Bowling - [facility]     LAMINECTOMY LUMBAR ONE LEVEL  1997          LAPAROSCOPIC CHOLECYSTECTOMY  04/2008          LAPAROSCOPIC TUBAL LIGATION      No Comments Provided     Current Outpatient Medications   Medication Sig Dispense Refill     aspirin (ASPIRIN ADULT LOW STRENGTH) 81 MG EC tablet Take 1 tablet (81 mg) by mouth daily 90 tablet 3     B-D U/F 31G X 8 MM insulin pen needle USE 4 PEN NEEDLES DAILY 400 each 3     blood glucose monitoring (Surfingbird CONTOUR) test strip TEST 3 TIMES A DAY (Patient taking differently: TEST 4 TIMES A DAY) 300 each 3     Continuous Blood Gluc Sensor (DEXCOM G6 SENSOR) MISC Change every 10 days. 3 each 11     Continuous Blood Gluc Transmit (DEXCOM G6 TRANSMITTER) MISC Change every 3 months. 1 each 3     famotidine (PEPCID) 40 MG tablet Take 1 tablet (40 mg) by mouth 2 times daily 180 tablet 3     GAVILAX 17 GM/SCOOP powder daily as needed       glucose (BD GLUCOSE) 4 g chewable tablet Take 1 tablet by mouth every hour as needed for low blood sugar       Incontinence Supply Disposable MISC daily \"Depends\" size Large or dispense as insurance allows 150 each 11     Insulin Aspart FlexPen 100 UNIT/ML SOPN Inject 5 Units Subcutaneous 3 times daily (with meals) 60 mL 3     insulin glargine (BASAGLAR KWIKPEN) 100 UNIT/ML pen Inject 20 Units Subcutaneous At Bedtime       isosorbide mononitrate (IMDUR) 60 MG 24 hr tablet TAKE 2 TABLETS BY MOUTH EVERY  tablet 3     lisinopril (ZESTRIL) 2.5 MG tablet Take 1 tablet (2.5 mg) by mouth daily 90 tablet 3     metoprolol succinate ER (TOPROL-XL) 25 MG 24 hr tablet TAKE 1/2 TABLET BY MOUTH EVERY EVENING " "45 tablet 3     nitroGLYcerin (NITROSTAT) 0.4 MG sublingual tablet Place 0.4 mg under the tongue every 5 minutes as needed for chest pain       ondansetron (ZOFRAN) 4 MG tablet TAKE 1 TABLET BY MOUTH EVERY 6 HOURS AS NEEDED 120 tablet 4     order for DME Equipment being ordered: Diabetic shoes, pre-ulcer callous formation 2 each 3     rosuvastatin (CRESTOR) 40 MG tablet TAKE 1 TABLET BY MOUTH EVERY DAY 90 tablet 3     vitamin D3 (VITAMIN D3) 50 mcg (2000 units) tablet Take 1 tablet (50 mcg) by mouth daily 90 tablet 3     zolpidem (AMBIEN) 10 MG tablet Take 1 tablet (10 mg) by mouth nightly as needed for sleep 30 tablet 5       No Known Allergies     Social History     Tobacco Use     Smoking status: Never     Smokeless tobacco: Never   Substance Use Topics     Alcohol use: No     Alcohol/week: 0.0 standard drinks         Objective     /66 (BP Location: Right arm, Patient Position: Sitting, Cuff Size: Adult Regular)   Pulse 59   Temp 97.1  F (36.2  C) (Temporal)   Resp 15   Ht 1.651 m (5' 5\")   Wt 72.7 kg (160 lb 3.2 oz)   LMP  (LMP Unknown)   SpO2 99%   Breastfeeding No   BMI 26.66 kg/m      Physical Exam  GENERAL APPEARANCE: healthy, alert and no distress  HENT: ear canals and TM's normal and nose and mouth without ulcers or lesions  RESP: lungs clear to auscultation - no rales, rhonchi or wheezes  CV: regular rate and rhythm, normal S1 S2, no S3 or S4 and no murmur, click or rub   ABDOMEN: soft, nontender, no HSM or masses and bowel sounds normal  NEURO: Normal strength and tone, sensory exam grossly normal, mentation intact and speech normal  Musculoskeletal: 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  No ulceration.    Recent Labs   Lab Test 10/10/22  1022 12/03/21  0836 08/26/21  0940 08/22/21  1634 07/07/21  1050 06/30/21  1320 06/10/21  1310 06/10/21  1135 06/03/21  1704 02/24/21  1029   HGB  --   --  11.3* 9.8*  --   --   --  12.5   < > 11.2*   PLT  --   --   --  140*  --   --   -- "  125*   < > 96*   INR  --   --   --   --   --   --  1.07  --   --  1.05   NA  --   --   --  139 142   < >  --  136   < > 143   POTASSIUM  --   --   --  3.6 3.7   < >  --  4.9   < > 2.8*   CR  --   --   --  2.88* 3.66*   < >  --  13.78*   < > 1.98*   A1C 6.9* 6.8*  --   --   --   --   --   --    < >  --     < > = values in this interval not displayed.        Diagnostics:  No labs were ordered during this visit.  Recent labs were reviewed and are stable.  EKG: Shows paced rhythm without any ischemic changes.  Unchanged from previous.    Revised Cardiac Risk Index (RCRI):  The patient has the following serious cardiovascular risks for perioperative complications:   - Coronary Artery Disease (MI, positive stress test, angina, Qs on EKG) = 1 point   - Diabetes Mellitus (on Insulin) = 1 point   - Serum Creatinine >2.0 mg/dl = 1 point     RCRI Interpretation: 2 points: Class III (moderate risk - 6.6% complication rate)          Signed Electronically by: Sam Leal  Copy of this evaluation report is provided to requesting physician.

## 2022-10-14 NOTE — NURSING NOTE
"Chief Complaint   Patient presents with     Pre-Op Exam     Interstim replacement- Bowling 10/18 at [facility]     Pt here for pre-op      Initial /66 (BP Location: Right arm, Patient Position: Sitting, Cuff Size: Adult Regular)   Pulse 59   Temp 97.1  F (36.2  C) (Temporal)   Resp 15   Ht 1.651 m (5' 5\")   Wt 72.7 kg (160 lb 3.2 oz)   LMP  (LMP Unknown)   SpO2 99%   Breastfeeding No   BMI 26.66 kg/m   Estimated body mass index is 26.66 kg/m  as calculated from the following:    Height as of this encounter: 1.651 m (5' 5\").    Weight as of this encounter: 72.7 kg (160 lb 3.2 oz).     Advance Care Directive on file? No  Advance Care Directive provided to patient? Has information    FOOD SECURITY SCREENING QUESTIONS:    The next two questions are to help us understand your food security.  If you are feeling you need any assistance in this area, we have resources available to support you today.    Hunger Vital Signs:  Within the past 12 months we worried whether our food would run out before we got money to buy more. Never  Within the past 12 months the food we bought just didn't last and we didn't have money to get more. Never        Ivonne Pierson LPN   "

## 2022-10-15 LAB — SARS-COV-2 RNA RESP QL NAA+PROBE: NEGATIVE

## 2022-10-16 LAB
ATRIAL RATE - MUSE: 70 BPM
DIASTOLIC BLOOD PRESSURE - MUSE: NORMAL MMHG
INTERPRETATION ECG - MUSE: NORMAL
P AXIS - MUSE: NORMAL DEGREES
PR INTERVAL - MUSE: 238 MS
QRS DURATION - MUSE: 86 MS
QT - MUSE: 420 MS
QTC - MUSE: 453 MS
R AXIS - MUSE: 31 DEGREES
SYSTOLIC BLOOD PRESSURE - MUSE: NORMAL MMHG
T AXIS - MUSE: 13 DEGREES
VENTRICULAR RATE- MUSE: 70 BPM

## 2022-10-17 ENCOUNTER — ANESTHESIA EVENT (OUTPATIENT)
Dept: SURGERY | Facility: OTHER | Age: 72
End: 2022-10-17
Payer: COMMERCIAL

## 2022-10-17 RX ORDER — AMPICILLIN 2 G/1
2 INJECTION, POWDER, FOR SOLUTION INTRAVENOUS
Status: COMPLETED | OUTPATIENT
Start: 2022-10-17 | End: 2022-10-18

## 2022-10-18 ENCOUNTER — HOSPITAL ENCOUNTER (OUTPATIENT)
Dept: GENERAL RADIOLOGY | Facility: OTHER | Age: 72
Discharge: HOME | End: 2022-10-18
Attending: UROLOGY | Admitting: UROLOGY
Payer: COMMERCIAL

## 2022-10-18 ENCOUNTER — ANESTHESIA (OUTPATIENT)
Dept: SURGERY | Facility: OTHER | Age: 72
End: 2022-10-18
Payer: COMMERCIAL

## 2022-10-18 ENCOUNTER — HOSPITAL ENCOUNTER (OUTPATIENT)
Facility: OTHER | Age: 72
Discharge: HOME | End: 2022-10-18
Attending: UROLOGY | Admitting: UROLOGY
Payer: COMMERCIAL

## 2022-10-18 VITALS
RESPIRATION RATE: 14 BRPM | HEIGHT: 65 IN | OXYGEN SATURATION: 100 % | BODY MASS INDEX: 26.66 KG/M2 | WEIGHT: 160 LBS | DIASTOLIC BLOOD PRESSURE: 69 MMHG | TEMPERATURE: 97.8 F | SYSTOLIC BLOOD PRESSURE: 134 MMHG | HEART RATE: 60 BPM

## 2022-10-18 DIAGNOSIS — N39.41 URGE INCONTINENCE: ICD-10-CM

## 2022-10-18 PROCEDURE — C1767 GENERATOR, NEURO NON-RECHARG: HCPCS | Performed by: UROLOGY

## 2022-10-18 PROCEDURE — 250N000011 HC RX IP 250 OP 636: Performed by: UROLOGY

## 2022-10-18 PROCEDURE — 999N000180 XR SURGERY CARM FLUORO LESS THAN 5 MIN: Mod: TC

## 2022-10-18 PROCEDURE — 258N000003 HC RX IP 258 OP 636: Performed by: UROLOGY

## 2022-10-18 PROCEDURE — 272N000002 HC OR SUPPLY OTHER OPNP: Performed by: UROLOGY

## 2022-10-18 PROCEDURE — 258N000003 HC RX IP 258 OP 636: Performed by: NURSE ANESTHETIST, CERTIFIED REGISTERED

## 2022-10-18 PROCEDURE — C1820 GENERATOR NEURO RECHG BAT SY: HCPCS | Performed by: UROLOGY

## 2022-10-18 PROCEDURE — 370N000017 HC ANESTHESIA TECHNICAL FEE, PER MIN: Performed by: UROLOGY

## 2022-10-18 PROCEDURE — 64590 INS/RPL PRPH SAC/GSTR NPG/R: CPT | Performed by: NURSE ANESTHETIST, CERTIFIED REGISTERED

## 2022-10-18 PROCEDURE — 64590 INS/RPL PRPH SAC/GSTR NPG/R: CPT | Performed by: UROLOGY

## 2022-10-18 PROCEDURE — 250N000009 HC RX 250: Performed by: NURSE ANESTHETIST, CERTIFIED REGISTERED

## 2022-10-18 PROCEDURE — 64585 REV/RMV PERPH NSTIM ELTRD RA: CPT | Performed by: UROLOGY

## 2022-10-18 PROCEDURE — 250N000009 HC RX 250: Performed by: UROLOGY

## 2022-10-18 PROCEDURE — 710N000012 HC RECOVERY PHASE 2, PER MINUTE: Performed by: UROLOGY

## 2022-10-18 PROCEDURE — C1822 GEN, NEURO, HF, RECHG BAT: HCPCS | Performed by: UROLOGY

## 2022-10-18 PROCEDURE — 999N000141 HC STATISTIC PRE-PROCEDURE NURSING ASSESSMENT: Performed by: UROLOGY

## 2022-10-18 PROCEDURE — 250N000013 HC RX MED GY IP 250 OP 250 PS 637: Performed by: NURSE ANESTHETIST, CERTIFIED REGISTERED

## 2022-10-18 PROCEDURE — 99100 ANES PT EXTEME AGE<1 YR&>70: CPT | Performed by: NURSE ANESTHETIST, CERTIFIED REGISTERED

## 2022-10-18 PROCEDURE — 360N000084 HC SURGERY LEVEL 4 W/ FLUORO, PER MIN: Performed by: UROLOGY

## 2022-10-18 PROCEDURE — 272N000001 HC OR GENERAL SUPPLY STERILE: Performed by: UROLOGY

## 2022-10-18 PROCEDURE — 250N000011 HC RX IP 250 OP 636: Performed by: NURSE ANESTHETIST, CERTIFIED REGISTERED

## 2022-10-18 PROCEDURE — C1787 PATIENT PROGR, NEUROSTIM: HCPCS | Performed by: UROLOGY

## 2022-10-18 DEVICE — LEAD 978B128 ISTM SSMRI 4.32MM EMAN US
Type: IMPLANTABLE DEVICE | Site: BACK | Status: FUNCTIONAL
Brand: INTERSTIM™ SURESCAN™

## 2022-10-18 DEVICE — INS 97800 INTERSTIM X SSMRI AMER
Type: IMPLANTABLE DEVICE | Site: BACK | Status: FUNCTIONAL
Brand: INTERSTIM™ X

## 2022-10-18 RX ORDER — SODIUM CHLORIDE 9 MG/ML
INJECTION, SOLUTION INTRAVENOUS CONTINUOUS
Status: DISCONTINUED | OUTPATIENT
Start: 2022-10-18 | End: 2022-10-18 | Stop reason: HOSPADM

## 2022-10-18 RX ORDER — PROPOFOL 10 MG/ML
INJECTION, EMULSION INTRAVENOUS CONTINUOUS PRN
Status: DISCONTINUED | OUTPATIENT
Start: 2022-10-18 | End: 2022-10-18

## 2022-10-18 RX ORDER — FENTANYL CITRATE 50 UG/ML
25 INJECTION, SOLUTION INTRAMUSCULAR; INTRAVENOUS EVERY 5 MIN PRN
Status: DISCONTINUED | OUTPATIENT
Start: 2022-10-18 | End: 2022-10-18 | Stop reason: HOSPADM

## 2022-10-18 RX ORDER — ONDANSETRON 4 MG/1
4 TABLET, ORALLY DISINTEGRATING ORAL EVERY 30 MIN PRN
Status: DISCONTINUED | OUTPATIENT
Start: 2022-10-18 | End: 2022-10-18 | Stop reason: HOSPADM

## 2022-10-18 RX ORDER — NALOXONE HYDROCHLORIDE 0.4 MG/ML
0.4 INJECTION, SOLUTION INTRAMUSCULAR; INTRAVENOUS; SUBCUTANEOUS
Status: DISCONTINUED | OUTPATIENT
Start: 2022-10-18 | End: 2022-10-18 | Stop reason: HOSPADM

## 2022-10-18 RX ORDER — HYDROMORPHONE HYDROCHLORIDE 1 MG/ML
0.2 INJECTION, SOLUTION INTRAMUSCULAR; INTRAVENOUS; SUBCUTANEOUS EVERY 5 MIN PRN
Status: DISCONTINUED | OUTPATIENT
Start: 2022-10-18 | End: 2022-10-18 | Stop reason: HOSPADM

## 2022-10-18 RX ORDER — FENTANYL CITRATE 50 UG/ML
25 INJECTION, SOLUTION INTRAMUSCULAR; INTRAVENOUS
Status: DISCONTINUED | OUTPATIENT
Start: 2022-10-18 | End: 2022-10-18 | Stop reason: HOSPADM

## 2022-10-18 RX ORDER — PROPOFOL 10 MG/ML
INJECTION, EMULSION INTRAVENOUS PRN
Status: DISCONTINUED | OUTPATIENT
Start: 2022-10-18 | End: 2022-10-18

## 2022-10-18 RX ORDER — ONDANSETRON 2 MG/ML
4 INJECTION INTRAMUSCULAR; INTRAVENOUS EVERY 30 MIN PRN
Status: DISCONTINUED | OUTPATIENT
Start: 2022-10-18 | End: 2022-10-18 | Stop reason: HOSPADM

## 2022-10-18 RX ORDER — NALOXONE HYDROCHLORIDE 0.4 MG/ML
0.2 INJECTION, SOLUTION INTRAMUSCULAR; INTRAVENOUS; SUBCUTANEOUS
Status: DISCONTINUED | OUTPATIENT
Start: 2022-10-18 | End: 2022-10-18 | Stop reason: HOSPADM

## 2022-10-18 RX ORDER — LIDOCAINE HYDROCHLORIDE AND EPINEPHRINE 10; 10 MG/ML; UG/ML
INJECTION, SOLUTION INFILTRATION; PERINEURAL PRN
Status: DISCONTINUED | OUTPATIENT
Start: 2022-10-18 | End: 2022-10-18 | Stop reason: HOSPADM

## 2022-10-18 RX ORDER — LIDOCAINE 40 MG/G
CREAM TOPICAL
Status: DISCONTINUED | OUTPATIENT
Start: 2022-10-18 | End: 2022-10-18 | Stop reason: HOSPADM

## 2022-10-18 RX ORDER — OXYCODONE HYDROCHLORIDE 5 MG/1
5 TABLET ORAL EVERY 4 HOURS PRN
Status: DISCONTINUED | OUTPATIENT
Start: 2022-10-18 | End: 2022-10-18 | Stop reason: HOSPADM

## 2022-10-18 RX ORDER — LIDOCAINE HYDROCHLORIDE 20 MG/ML
INJECTION, SOLUTION INFILTRATION; PERINEURAL PRN
Status: DISCONTINUED | OUTPATIENT
Start: 2022-10-18 | End: 2022-10-18

## 2022-10-18 RX ORDER — MEPERIDINE HYDROCHLORIDE 50 MG/ML
12.5 INJECTION INTRAMUSCULAR; INTRAVENOUS; SUBCUTANEOUS
Status: DISCONTINUED | OUTPATIENT
Start: 2022-10-18 | End: 2022-10-18 | Stop reason: HOSPADM

## 2022-10-18 RX ADMIN — MIDAZOLAM HYDROCHLORIDE 1 MG: 1 INJECTION, SOLUTION INTRAMUSCULAR; INTRAVENOUS at 13:00

## 2022-10-18 RX ADMIN — FENTANYL CITRATE 25 MCG: 50 INJECTION, SOLUTION INTRAMUSCULAR; INTRAVENOUS at 14:00

## 2022-10-18 RX ADMIN — PROPOFOL 95 MCG/KG/MIN: 10 INJECTION, EMULSION INTRAVENOUS at 13:03

## 2022-10-18 RX ADMIN — PROPOFOL 50 MG: 10 INJECTION, EMULSION INTRAVENOUS at 13:03

## 2022-10-18 RX ADMIN — LIDOCAINE HYDROCHLORIDE 0.1 ML: 10 INJECTION, SOLUTION EPIDURAL; INFILTRATION; INTRACAUDAL; PERINEURAL at 11:26

## 2022-10-18 RX ADMIN — LIDOCAINE HYDROCHLORIDE 50 MG: 20 INJECTION, SOLUTION INFILTRATION; PERINEURAL at 13:03

## 2022-10-18 RX ADMIN — OXYCODONE HYDROCHLORIDE 5 MG: 5 TABLET ORAL at 14:35

## 2022-10-18 RX ADMIN — GENTAMICIN SULFATE 310 MG: 40 INJECTION, SOLUTION INTRAMUSCULAR; INTRAVENOUS at 11:26

## 2022-10-18 RX ADMIN — AMPICILLIN SODIUM 2 G: 2 INJECTION, POWDER, FOR SOLUTION INTRAMUSCULAR; INTRAVENOUS at 12:39

## 2022-10-18 RX ADMIN — PROPOFOL 30 MG: 10 INJECTION, EMULSION INTRAVENOUS at 13:07

## 2022-10-18 RX ADMIN — SODIUM CHLORIDE 10 ML/HR: 9 INJECTION, SOLUTION INTRAVENOUS at 11:25

## 2022-10-18 ASSESSMENT — ACTIVITIES OF DAILY LIVING (ADL)
ADLS_ACUITY_SCORE: 35
ADLS_ACUITY_SCORE: 33
ADLS_ACUITY_SCORE: 35

## 2022-10-18 NOTE — DISCHARGE INSTRUCTIONS
[facility]  Adult Discharge Orders & Instructions    ________________________________________________________________          For 12 hours after surgery  Get plenty of rest.  A responsible adult must stay with you for at least 12 hours after you leave the hospital.   You may feel lightheaded.  IF so, sit for a few minutes before standing.  Have someone help you get up.   You may have a slight fever. Call the doctor if your fever is over 101 F (38.3 C) (taken under the tongue) or lasts longer than 24 hours.  You may have a dry mouth, a sore throat, muscle aches or trouble sleeping.  These should go away after 24 hours.  Do not make important or legal decisions.  6.   Do not drive or use heavy equipment.  If you have weakness or tingling, don't drive or use heavy equipment until this feeling goes away.    To contact a doctor, call   [phone]_______________________      Eloped (saw a physician/midlevel provider but left without telling anyone)

## 2022-10-18 NOTE — OR NURSING
Bailey has been discharged to home at 1530 via  accompanied by daughter.     Written discharge instructions were provided to patient.    Patient and adult caring for them verbalize understanding of discharge instructions including no driving until tomorrow and no longer taking narcotic pain medications - no operating mechanical equipment and no making any important decisions.They understand reason for discharge, and necessary follow-up appointments.

## 2022-10-18 NOTE — INTERVAL H&P NOTE
"I have reviewed the surgical (or preoperative) H&P that is linked to this encounter, and examined the patient. There are no significant changes    Clinical Conditions Present on Arrival:  Clinically Significant Risk Factors Present on Admission                   # Overweight: Estimated body mass index is 26.63 kg/m  as calculated from the following:    Height as of this encounter: 1.651 m (5' 5\").    Weight as of this encounter: 72.6 kg (160 lb).       "

## 2022-10-18 NOTE — ANESTHESIA POSTPROCEDURE EVALUATION
Patient: Bailey Sierra    Procedure: Procedure(s):  InterStim nonrechargeable generator and tined lead revision       Anesthesia Type:  MAC    Note:  Disposition: Outpatient   Postop Pain Control: Uneventful            Sign Out: Well controlled pain   PONV: No   Neuro/Psych: Uneventful            Sign Out: Acceptable/Baseline neuro status   Airway/Respiratory: Uneventful            Sign Out: Acceptable/Baseline resp. status   CV/Hemodynamics: Uneventful            Sign Out: Acceptable CV status   Other NRE: NONE   DID A NON-ROUTINE EVENT OCCUR? No           Last vitals:  Vitals Value Taken Time   /69 10/18/22 1515   Temp 97.8  F (36.6  C) 10/18/22 1515   Pulse 60 10/18/22 1515   Resp 14 10/18/22 1400   SpO2 100 % 10/18/22 1515       Electronically Signed By: BRITNEY Rascon CRNA  October 18, 2022  3:39 PM

## 2022-10-18 NOTE — ANESTHESIA PREPROCEDURE EVALUATION
Anesthesia Pre-Procedure Evaluation    Patient: Bailey Sierra   MRN: [id number] : 1950        Procedure : Procedure(s):  InterStim nonrechargeable generator and tined lead revision          Past Medical History:   Diagnosis Date     Atherosclerotic heart disease of native coronary artery without angina pectoris     No Comments Provided     Cardiac murmur     No Comments Provided     Chronic kidney disease, stage III (moderate) (H)     No Comments Provided     Controlled type 2 diabetes mellitus with stage 3 chronic kidney disease, with long-term current use of insulin (H) 2013     COVID-19 virus detected 2021     Edema     No Comments Provided     Essential (primary) hypertension     No Comments Provided     Gastro-esophageal reflux disease without esophagitis     No Comments Provided     Hyperlipidemia     No Comments Provided     Impingement syndrome of right shoulder 03/10/2017          Insomnia 2012          Iron deficiency anemia     No Comments Provided     Neuromuscular dysfunction of bladder     No Comments Provided     Osteoarthritis     No Comments Provided     Other shoulder lesions, right shoulder 03/10/2017          Other shoulder lesions, unspecified shoulder     No Comments Provided     Other specified postprocedural states 2017          Personal history of other medical treatment (CODE)     Three childbirths     Plantar fascial fibromatosis     No Comments Provided     Pres[facility]     Primary osteoarthritis of shoulder 03/10/2017          Type 2 diabetes mellitus without complications (H)     No Comments Provided     Urgency of urination 2011          Uterovaginal prolapse     No Comments Provided      Past Surgical History:   Procedure Laterality Date     ARTHROSCOPY SHOULDER Right           ARTHROSCOPY SHOULDER Left           ARTHROSCOPY SHOULDER RT/LT Right [id number]          Cardiac Bypass  2013           COLONOSCOPY  11/24/2009 11/24/2009     CYSTOSCOPY  03/15/2001     DENTAL SURGERY      Dental extractions     ENDOSCOPIC RETROGRADE CHOLANGIOPANCREATOGRAPHY  05/2008    Bile leak with ERCP and stenting and drainage of bile collection through abdominal wall.     ESOPHAGOSCOPY, GASTROSCOPY, DUODENOSCOPY (EGD), COMBINED N/A 6/23/2020    Pavon's follow up 5 years, 6/23/2025     HYSTERECTOMY TOTAL ABDOMINAL, BILATERAL SALPINGO-OOPHORECTOMY, COMBINED  04/02/2001    Vag vault suspension with Cortex mesh     IMPLANT STIMULATOR AND LEADS SACRAL NERVE (STAGE ONE AND TWO) N/A 5/7/2019    Procedure: Interstim Implant Revision, GENERATOR AND TYING LEAD EXCHANGE;  Surgeon: Honorio Bowling MD;  Location: [facility] OR     Interstim Device Placement  04/14/2014    Dr. Bowling - [facility]     LAMINECTOMY LUMBAR ONE LEVEL  1997          LAPAROSCOPIC CHOLECYSTECTOMY  04/2008          LAPAROSCOPIC TUBAL LIGATION      No Comments Provided      No Known Allergies   Social History     Tobacco Use     Smoking status: Never     Smokeless tobacco: Never   Substance Use Topics     Alcohol use: No     Alcohol/week: 0.0 standard drinks      Wt Readings from Last 1 Encounters:   10/18/22 72.6 kg (160 lb)        Anesthesia Evaluation   Pt has had prior anesthetic.     No history of anesthetic complications       ROS/MED HX  ENT/Pulmonary:  - neg pulmonary ROS     Neurologic:       Cardiovascular:     (+) hypertension--CAD ---CHF pacemaker, valvular problems/murmurs type: AS     METS/Exercise Tolerance: 3 - Able to walk 1-2 blocks without stopping    Hematologic:       Musculoskeletal:   (+) arthritis,     GI/Hepatic: Comment: Urge incontinence    (+) GERD, Asymptomatic on medication, hiatal hernia,     Renal/Genitourinary:     (+) renal disease,     Endo:     (+) type II DM,     Psychiatric/Substance Use:       Infectious Disease:       Malignancy:       Other:            Physical Exam    Airway        Mallampati: II   TM distance: > 3 FB   Neck ROM: full    Mouth opening: > 3 cm    Respiratory Devices and Support         Dental       (+) upper dentures      Cardiovascular   cardiovascular exam normal       Rhythm and rate: regular and normal     Pulmonary   pulmonary exam normal        breath sounds clear to auscultation           OUTSIDE LABS:  CBC:   Lab Results   Component Value Date    WBC 4.9 08/22/2021    WBC 10.4 06/10/2021    HGB 11.3 (L) 08/26/2021    HGB 9.8 (L) 08/22/2021    HCT 30.7 (L) 08/22/2021    HCT 38.5 06/10/2021     (L) 08/22/2021     (L) 06/10/2021     BMP:   Lab Results   Component Value Date     08/22/2021     07/07/2021    POTASSIUM 3.6 08/22/2021    POTASSIUM 3.7 07/07/2021    CHLORIDE 105 08/22/2021    CHLORIDE 108 (H) 07/07/2021    CO2 25 08/22/2021    CO2 26 07/07/2021    BUN 27 (H) 08/22/2021    BUN 25 07/07/2021    CR 2.88 (H) 08/22/2021    CR 3.66 (H) 07/07/2021     (H) 08/22/2021     (H) 07/07/2021     COAGS:   Lab Results   Component Value Date    PTT 26 02/24/2021    INR 1.07 06/10/2021    FIBR 386 02/24/2021     POC: No results found for: BGM, HCG, HCGS  HEPATIC:   Lab Results   Component Value Date    ALBUMIN 3.6 08/22/2021    PROTTOTAL 6.1 (L) 08/22/2021    ALT 35 08/22/2021    AST 26 08/22/2021    ALKPHOS 44 08/22/2021    BILITOTAL 0.6 08/22/2021     OTHER:   Lab Results   Component Value Date    LACT 1.4 07/18/2019    A1C 6.9 (H) 10/10/2022    ELICIA 9.6 08/22/2021    PHOS 3.0 06/02/2017    MAG 2.1 07/23/2013    LIPASE 46 06/10/2021    CRP <1.0 06/03/2021       Anesthesia Plan    ASA Status:  3   NPO Status:  NPO Appropriate    Anesthesia Type: MAC.     - Reason for MAC: straight local not clinically adequate   Induction: Intravenous.   Maintenance: Balanced.        Consents    Anesthesia Plan(s) and associated risks, benefits, and realistic alternatives discussed. Questions answered and patient/representative(s) expressed understanding.     - Discussed: Risks, Benefits and Alternatives for  BOTH SEDATION and the PROCEDURE were discussed     - Discussed with:  Patient (Caron PCA)      - Extended Intubation/Ventilatory Support Discussed: No.      - Patient is DNR/DNI Status: No    Use of blood products discussed: No .     Postoperative Care    Pain management: IV analgesics, Multi-modal analgesia.   PONV prophylaxis: Ondansetron (or other 5HT-3)     Comments:    Other Comments: Risks, benefits and alternatives discussed and would like to proceed. General anesthesia ok if indicated.             BRITNEY Rascon CRNA

## 2022-10-18 NOTE — OP NOTE
Preprocedure diagnosis  Urge Incontinence    Postprocedure diagnosis  Urge Incontinece    Procedure  InterStim implant with fluoroscopically guided lead exchange, right side  Interstim generator replacement    Surgeon and Assistants (if any)  Surgeon(s):  Honorio Bowling MD  Circulator: Evelyn Hernandez RN  Relief Circulator: Brittanie Juarez RN  Scrub Person: Ne Cardenas  First Assistant: Noemy Cedeno RN    Specimen(s)  No    (EBL) Estimated blood loss (ml)  <5    Anesthesia  MAC    Complications  None    Indications  The patient previously failed anticholinergic medication for treatment of the above named indication.   Informed consent was obtained.      Findings  Electrode 0 Nikolas then toe curl  Electrode 1 Nikolas then toe curl  Electrode 2 Nikolas then toe curl  Electrode 3 Nikolas then toe curl  Fluoroscopic guidance revealed lead with characteristic appearance in S3 foramen in A-P and lateral view    Procedure  The patient was taken to the operating room and placed prone on the operating table.  Pre-operative antibiotics were administered.  Bilateral lower extremity SCDs were placed.  Induction of anesthesia was performed followed by a time-out was performed.  Patient was prepped and draped and an Ioban was placed.    Lidocaine 2% with epinephrine was instilled subcutaneously near the S3 foramen and previous generator pocket.   Skin incision was made with a scalpel over the previous pocket incision. The wire was cut and the percutaneous portion was removed intact.  The wire that was still connected to the lead was gently tugged revealing the site of the lead.  A scalpel was used to make a small incision over this site.  The lead was then removed using a Odilia.  At this point all hardware was removed completely.  The incisions were copiously irrigated.    Using fluoroscopic guidance and sacral bony landmarks the foramen needle was placed on the right.  Testing revealed Nikolas followed by toe curl.   Fluoroscopy was used to confirm appropriate position.  Skin incision was made with a scalpel near the needle.  Stylet was removed and the directional guide was placed.  The introducer was passed over the directional guide.  The dilator was removed and the lead was placed using fluoroscopic guidance in both the A-P and lateral view.  The 2-3 electrodes straddled the anterior sacrum at the point of elizabeth deployment.  Testing at this point again resulted in positive responses.  The previously made lateral pocket was utilized.  A tunneling tool was used to pass the lead to the pocket subcutaneously.  The pocket was irrigated with 1 liter of irrigant solution (1 gram Ancef and 1 Liter).  The generator was placed in the pocket with the correct side facing up.  The skin was closed in two layers with 3-0 Vicryl and 4-0 Moncryl.  The impedence was tested and within acceptable limits.  Dermabond was applied.    The patient tolerated the procedure well and was taken to the recovery room.    Plan  Follow up in 2 weeks for wound check

## 2022-10-18 NOTE — ANESTHESIA CARE TRANSFER NOTE
Patient: Bailey Sierra    Procedure: Procedure(s):  InterStim nonrechargeable generator and tined lead revision       Diagnosis: Urge incontinence [N39.41]  Diagnosis Additional Information: No value filed.    Anesthesia Type:   MAC     Note:    Oropharynx: oropharynx clear of all foreign objects  Level of Consciousness: drowsy  Oxygen Supplementation: nasal cannula    Independent Airway: airway patency satisfactory and stable  Dentition: dentition unchanged  Vital Signs Stable: post-procedure vital signs reviewed and stable  Report to RN Given: handoff report given  Patient transferred to: Phase II    Handoff Report: Identifed the Patient, Identified the Reponsible Provider, Reviewed the pertinent medical history, Discussed the surgical course, Reviewed Intra-OP anesthesia mangement and issues during anesthesia, Set expectations for post-procedure period and Allowed opportunity for questions and acknowledgement of understanding      Vitals:  Vitals Value Taken Time   BP     Temp     Pulse     Resp     SpO2 99 % 10/18/22 1348   Vitals shown include unvalidated device data.    Electronically Signed By: BRITNEY SUAREZ CRNA  October 18, 2022  1:49 PM

## 2022-10-30 DIAGNOSIS — I10 ESSENTIAL HYPERTENSION: ICD-10-CM

## 2022-11-02 ENCOUNTER — OFFICE VISIT (OUTPATIENT)
Dept: UROLOGY | Facility: OTHER | Age: 72
End: 2022-11-02
Attending: UROLOGY
Payer: COMMERCIAL

## 2022-11-02 VITALS
SYSTOLIC BLOOD PRESSURE: 118 MMHG | WEIGHT: 154.6 LBS | HEART RATE: 87 BPM | RESPIRATION RATE: 16 BRPM | DIASTOLIC BLOOD PRESSURE: 80 MMHG | BODY MASS INDEX: 25.73 KG/M2 | OXYGEN SATURATION: 98 %

## 2022-11-02 DIAGNOSIS — N39.41 URGE INCONTINENCE: Primary | ICD-10-CM

## 2022-11-02 PROCEDURE — G0463 HOSPITAL OUTPT CLINIC VISIT: HCPCS

## 2022-11-02 PROCEDURE — 99024 POSTOP FOLLOW-UP VISIT: CPT | Performed by: UROLOGY

## 2022-11-02 RX ORDER — LISINOPRIL 2.5 MG/1
2.5 TABLET ORAL DAILY
Qty: 90 TABLET | Refills: 3 | Status: SHIPPED | OUTPATIENT
Start: 2022-11-02 | End: 2023-10-23

## 2022-11-02 ASSESSMENT — PAIN SCALES - GENERAL: PAINLEVEL: NO PAIN (0)

## 2022-11-02 NOTE — NURSING NOTE
Chief Complaint   Patient presents with     Follow Up     2 week wound check        Patient presents to the clinic today for a 2 week wound check.    Review of Systems:    Weight loss:    No     Recent fever/chills:  No   Night sweats:   No  Current skin rash:  No   Recent hair loss:  No  Heat intolerance:  No   Cold intolerance:  No  Chest pain:   No   Palpitations:   No  Shortness of breath:  No   Wheezing:   No  Constipation:    No   Diarrhea:   No   Nausea:   No   Vomiting:   No   Kidney/side pain:  No   Back pain:   No  Frequent headaches:  No   Dizziness:     No  Leg swelling:   No   Calf pain:    No      Medication Reconciliation: completed   Kellee Alfaro LPN  11/2/2022 8:15 AM

## 2022-11-02 NOTE — PROGRESS NOTES
Patient is 2 weeks status post InterStim exchange.  Incision appears healthy and healing.  No concerns.  Patient can follow-up once annually with [facility] for battery assessment.

## 2022-11-02 NOTE — TELEPHONE ENCOUNTER
Last Prescription Date: 12/13/21  Last Qty/Refills: 90 / 3  Last Office Visit: 10/14/22  Future Office Visit: none    Routing to covering provider due to PCP's absence.    Corinne R Thayer, RN on 11/2/2022 at 8:13 AM     Requested Prescriptions   Pending Prescriptions Disp Refills     lisinopril (ZESTRIL) 2.5 MG tablet [Pharmacy Med Name: LISINOPRIL 2.5MG TABLET] 90 tablet 3     Sig: TAKE 1 TABLET (2.5 MG) BY MOUTH DAILY       ACE Inhibitors (Including Combos) Protocol Failed - 10/30/2022  5:00 AM        Failed - Normal serum creatinine on file in past 12 months     Recent Labs   Lab Test 08/22/21  1634   CR 2.88*       Ok to refill medication if creatinine is low          Failed - Normal serum potassium on file in past 12 months     Recent Labs   Lab Test 08/22/21  1634 08/17/21  1200   POTASSIUM 3.6  --    54850  --  3.6           Passed - Blood pressure under 140/90 in past 12 months     BP Readings from Last 3 Encounters:   10/18/22 134/69   10/14/22 130/66   10/10/22 132/68

## 2022-11-10 ENCOUNTER — TELEPHONE (OUTPATIENT)
Dept: FAMILY MEDICINE | Facility: OTHER | Age: 72
End: 2022-11-10

## 2022-11-10 NOTE — TELEPHONE ENCOUNTER
Placed call to Lisa. States approval is needed for ambien. [facility] is recommending patient try Doxepin first and if not, contraindications as to why she can not use doxepin. Due to patient's age, it was recommended on their end that ambien not be used long term. Will need that benefits outweigh risks due to patient's age and fall risk. Patient has ambien through the weekend. This can be a verbal ok. Please advise    Corinne R Thayer, RN on 11/10/2022 at 4:11 PM

## 2022-11-18 DIAGNOSIS — E11.9 TYPE 2 DIABETES MELLITUS WITHOUT COMPLICATION, WITH LONG-TERM CURRENT USE OF INSULIN (H): ICD-10-CM

## 2022-11-18 DIAGNOSIS — Z79.4 TYPE 2 DIABETES MELLITUS WITHOUT COMPLICATION, WITH LONG-TERM CURRENT USE OF INSULIN (H): ICD-10-CM

## 2022-11-21 DIAGNOSIS — N39.41 URGE INCONTINENCE: ICD-10-CM

## 2022-11-21 RX ORDER — PROCHLORPERAZINE 25 MG/1
SUPPOSITORY RECTAL
Qty: 9 EACH | Refills: 1 | Status: SHIPPED | OUTPATIENT
Start: 2022-11-21 | End: 2023-04-17

## 2022-11-21 NOTE — TELEPHONE ENCOUNTER
[id number] of [place] sent Rx request for the following:      Requested Prescriptions   Pending Prescriptions Disp Refills     Continuous Blood Gluc Sensor (DEXCOM G6 SENSOR) MISC [Pharmacy Med Name: DEXCOM G6 RETAIL SENSOR KIT]  11     Sig: CHANGE EVERY 10 DAYS.   Last Prescription Date:   11/14/21  Last Fill Qty/Refills:         3, R-11    Last Office Visit:              10/14/22   Future Office visit:           4/3/23    Pretty Rodríguez RN .............. 11/21/2022  3:37 PM

## 2022-11-23 NOTE — TELEPHONE ENCOUNTER
[facility] of [place] sent Rx request for the following:      Requested Prescriptions   Pending Prescriptions Disp Refills     Incontinence Supply Disposable MISC [Pharmacy Med Name: DEPEND UWEAR MARIANNE LGE 17 CS2] 150 each 11     Sig: DEPEND FIT-FLEX FRED  #[id number]/LARGE 17/PP DX: [facility]: #136/8PKGS/27DS   Last Prescription Date:   9/24/21  Last Fill Qty/Refills:         150, R-11    Last Office Visit:              10/14/22   Future Office visit:           4/3/23    In clinical absence of patient's primary, Sam Leal, patient is requesting that this message be sent to the covering provider for consideration please.    Pretty Rodríguez RN .............. 11/23/2022  11:12 AM

## 2022-12-13 DIAGNOSIS — E11.9 TYPE 2 DIABETES MELLITUS WITHOUT COMPLICATION, WITH LONG-TERM CURRENT USE OF INSULIN (H): ICD-10-CM

## 2022-12-13 DIAGNOSIS — Z79.4 TYPE 2 DIABETES MELLITUS WITHOUT COMPLICATION, WITH LONG-TERM CURRENT USE OF INSULIN (H): ICD-10-CM

## 2022-12-15 RX ORDER — PROCHLORPERAZINE 25 MG/1
SUPPOSITORY RECTAL
Qty: 1 EACH | Refills: 3 | Status: SHIPPED | OUTPATIENT
Start: 2022-12-15 | End: 2023-04-17

## 2022-12-29 DIAGNOSIS — I10 ESSENTIAL HYPERTENSION: ICD-10-CM

## 2022-12-29 RX ORDER — ISOSORBIDE MONONITRATE 60 MG/1
TABLET, EXTENDED RELEASE ORAL
Qty: 180 TABLET | Refills: 3 | Status: SHIPPED | OUTPATIENT
Start: 2022-12-29 | End: 2023-10-23

## 2023-01-24 DIAGNOSIS — E11.8 TYPE 2 DIABETES MELLITUS WITH COMPLICATION, WITH LONG-TERM CURRENT USE OF INSULIN (H): ICD-10-CM

## 2023-01-24 DIAGNOSIS — Z79.4 TYPE 2 DIABETES MELLITUS WITH COMPLICATION, WITH LONG-TERM CURRENT USE OF INSULIN (H): ICD-10-CM

## 2023-01-28 DIAGNOSIS — Z95.0 S/P CARDIAC PACEMAKER PROCEDURE: ICD-10-CM

## 2023-01-28 DIAGNOSIS — E11.8 TYPE 2 DIABETES MELLITUS WITH COMPLICATION, WITH LONG-TERM CURRENT USE OF INSULIN (H): ICD-10-CM

## 2023-01-28 DIAGNOSIS — Z79.4 TYPE 2 DIABETES MELLITUS WITH COMPLICATION, WITH LONG-TERM CURRENT USE OF INSULIN (H): ICD-10-CM

## 2023-01-30 RX ORDER — ROSUVASTATIN CALCIUM 40 MG/1
TABLET, COATED ORAL
Qty: 90 TABLET | Refills: 3 | Status: SHIPPED | OUTPATIENT
Start: 2023-01-30 | End: 2023-10-23

## 2023-01-30 RX ORDER — INSULIN ASPART 100 [IU]/ML
5 INJECTION, SOLUTION INTRAVENOUS; SUBCUTANEOUS
Qty: 60 ML | Refills: 3 | Status: SHIPPED | OUTPATIENT
Start: 2023-01-30

## 2023-01-30 RX ORDER — METOPROLOL SUCCINATE 25 MG/1
TABLET, EXTENDED RELEASE ORAL
Qty: 45 TABLET | Refills: 3 | Status: SHIPPED | OUTPATIENT
Start: 2023-01-30 | End: 2023-10-23

## 2023-01-30 NOTE — TELEPHONE ENCOUNTER
Sig adjusted to reflect most recent dose scripting. Alyse Allen RN on 1/30/2023 at 7:58 AM      Per 7/15/22 OV:   Type 2 diabetes mellitus with complication, with long-term current use of insulin (H)  I am worried that she continues to have frequent hypoglycemic episodes after breakfast.  I also suspect that a sliding scale regimen is becoming too complicated for her.  We will simplify her regimen by continuing Basaglar at 20 units which she is currently using, not 25 as was directed.  We will also change aspart to 5 units 3 times daily with meals.  She will follow-up in 3 months for reassessment.  - Insulin Aspart FlexPen 100 UNIT/ML SOPN; Inject 5 Units Subcutaneous 3 times daily (with meals)    Per 10/10/22 OV:   Type 2 diabetes mellitus with hyperglycemia, with long-term current use of insulin (H)  We will repeat hemoglobin A1c given recent change in insulin regimen.  If this remains in the the recommended range we will follow-up in 6 months.  - Hemoglobin A1c; Future  - Albumin Random Urine Quantitative with Creat Ratio; Future    Last Prescription Date: 7/15/22 sig change to 5 units three times daily with meals  Last Qty/Refills: 60 ml / 3  Last Office Visit: 10/14/22 Elvis  Future Office Visit: 4/17/23 Elvis     Requested Prescriptions   Pending Prescriptions Disp Refills     NOVOLOG FLEXPEN 100 UNIT/ML soln [Pharmacy Med Name: NOVOLOG FLEXPEN SOLN FOR INJ] 60 mL 3     Sig: INJECT 7-16 UNITS BEFORE EACH MEAL BASED ON SLIDING SCALE. MAX DAILY DOSE 56 UNITS       Short Acting Insulin Protocol Failed - 1/24/2023  1:00 AM        Failed - Serum creatinine on file in past 12 months     Recent Labs   Lab Test 08/22/21  1634   CR 2.88*       Ok to refill medication if creatinine is low          Passed - HgbA1C in past 3 or 6 months     If HgbA1C is 8 or greater, it needs to be on file within the past 3 months.  If less than 8, must be on file within the past 6 months.     Recent Labs   Lab Test 10/10/22  1022  "06/20/18  0845 12/11/17  0923   A1C 6.9*   < >  --    [id number]  --   --  6.4*    < > = values in this interval not displayed.             Passed - Medication is active on med list        Passed - Patient is age 18 or older        Passed - Recent (6 mo) or future (30 days) visit within the authorizing provider's specialty     Patient had office visit in the last 6 months or has a visit in the next 30 days with authorizing provider or within the authorizing provider's specialty.  See \"Patient Info\" tab in inbasket, or \"Choose Columns\" in Meds & Orders section of the refill encounter.                   "

## 2023-02-03 ENCOUNTER — TRANSFERRED RECORDS (OUTPATIENT)
Dept: HEALTH INFORMATION MANAGEMENT | Facility: OTHER | Age: 73
End: 2023-02-03
Payer: COMMERCIAL

## 2023-02-03 LAB — RETINOPATHY: NEGATIVE

## 2023-03-30 ENCOUNTER — LAB (OUTPATIENT)
Dept: LAB | Facility: OTHER | Age: 73
End: 2023-03-30
Attending: FAMILY MEDICINE
Payer: COMMERCIAL

## 2023-03-30 DIAGNOSIS — Z79.4 TYPE 2 DIABETES MELLITUS WITH HYPERGLYCEMIA, WITH LONG-TERM CURRENT USE OF INSULIN (H): ICD-10-CM

## 2023-03-30 DIAGNOSIS — E11.65 TYPE 2 DIABETES MELLITUS WITH HYPERGLYCEMIA, WITH LONG-TERM CURRENT USE OF INSULIN (H): ICD-10-CM

## 2023-03-30 LAB
ALBUMIN SERPL BCG-MCNC: 3.8 G/DL (ref 3.5–5.2)
ALP SERPL-CCNC: 107 U/L (ref 35–104)
ALT SERPL W P-5'-P-CCNC: 20 U/L (ref 10–35)
ANION GAP SERPL CALCULATED.3IONS-SCNC: 8 MMOL/L (ref 7–15)
AST SERPL W P-5'-P-CCNC: 19 U/L (ref 10–35)
BILIRUB SERPL-MCNC: 0.4 MG/DL
BUN SERPL-MCNC: 21.4 MG/DL (ref 8–23)
CALCIUM SERPL-MCNC: 9 MG/DL (ref 8.8–10.2)
CHLORIDE SERPL-SCNC: 111 MMOL/L (ref 98–107)
CHOLEST SERPL-MCNC: 152 MG/DL
CREAT SERPL-MCNC: 2.33 MG/DL (ref 0.51–0.95)
DEPRECATED HCO3 PLAS-SCNC: 23 MMOL/L (ref 22–29)
GFR SERPL CREATININE-BSD FRML MDRD: 21 ML/MIN/1.73M2
GLUCOSE SERPL-MCNC: 129 MG/DL (ref 70–99)
HBA1C MFR BLD: 7.2 % (ref 4–6.2)
HDLC SERPL-MCNC: 68 MG/DL
LDLC SERPL CALC-MCNC: 73 MG/DL
NONHDLC SERPL-MCNC: 84 MG/DL
POTASSIUM SERPL-SCNC: 3.7 MMOL/L (ref 3.4–5.3)
PROT SERPL-MCNC: 6.3 G/DL (ref 6.4–8.3)
SODIUM SERPL-SCNC: 142 MMOL/L (ref 136–145)
TRIGL SERPL-MCNC: 57 MG/DL

## 2023-03-30 PROCEDURE — 80053 COMPREHEN METABOLIC PANEL: CPT | Mod: ZL

## 2023-03-30 PROCEDURE — 36415 COLL VENOUS BLD VENIPUNCTURE: CPT | Mod: ZL

## 2023-03-30 PROCEDURE — 83036 HEMOGLOBIN GLYCOSYLATED A1C: CPT | Mod: ZL

## 2023-03-30 PROCEDURE — 80061 LIPID PANEL: CPT | Mod: ZL

## 2023-04-12 PROBLEM — R74.8 ELEVATED LIVER ENZYMES: Status: ACTIVE | Noted: 2021-02-24

## 2023-04-12 NOTE — PATIENT INSTRUCTIONS
For informational purposes only. Not to replace the advice of your health care provider. Copyright   2003,  [facility]. All rights reserved. Clinically reviewed by Marilee Mederos MD. [place] [id number] - REV .  Preparing for Your Surgery  Getting started  A nurse will call you to review your health history and instructions. They will give you an arrival time based on your scheduled surgery time. Please be ready to share:    Your doctor's clinic name and phone number    Your medical, surgical, and anesthesia history    A list of allergies and sensitivities    A list of medicines, including herbal treatments and over-the-counter drugs    Whether the patient has a legal guardian (ask how to send us the papers in advance)  Please tell us if you're pregnant--or if there's any chance you might be pregnant. Some surgeries may injure a fetus (unborn baby), so they require a pregnancy test. Surgeries that are safe for a fetus don't always need a test, and you can choose whether to have one.   If you have a child who's having surgery, please ask for a copy of Preparing for Your Child's Surgery.    Preparing for surgery    Within 10 to 30 days of surgery: Have a pre-op exam (sometimes called an H&P, or History and Physical). This can be done at a clinic or pre-operative center.  ? If you're having a , you may not need this exam. Talk to your care team.    At your pre-op exam, talk to your care team about all medicines you take. If you need to stop any medicines before surgery, ask when to start taking them again.  ? We do this for your safety. Many medicines can make you bleed too much during surgery. Some change how well surgery (anesthesia) drugs work.    Call your insurance company to let them know you're having surgery. (If you don't have insurance, call [phone].)    Call your clinic if there's any change in your health. This includes signs of a cold or flu (sore throat, runny nose,  cough, rash, fever). It also includes a scrape or scratch near the surgery site.    If you have questions on the day of surgery, call your hospital or surgery center.  Eating and drinking guidelines  For your safety: Unless your surgeon tells you otherwise, follow the guidelines below.    Eat and drink as usual until 8 hours before you arrive for surgery. After that, no food or milk.    Drink clear liquids until 2 hours before you arrive. These are liquids you can see through, like water, Gatorade, and Propel Water. They also include plain black coffee and tea (no cream or milk), candy, and breath mints. You can spit out gum when you arrive.    If you drink alcohol: Stop drinking it the night before surgery.    If your care team tells you to take medicine on the morning of surgery, it's okay to take it with a sip of water.  Preventing infection    Shower or bathe the night before and morning of your surgery. Follow the instructions your clinic gave you. (If no instructions, use regular soap.)    Don't shave or clip hair near your surgery site. We'll remove the hair if needed.    Don't smoke or vape the morning of surgery. You may chew nicotine gum up to 2 hours before surgery. A nicotine patch is okay.  ? Note: Some surgeries require you to completely quit smoking and nicotine. Check with your surgeon.    Your care team will make every effort to keep you safe from infection. We will:  ? Clean our hands often with soap and water (or an alcohol-based hand rub).  ? Clean the skin at your surgery site with a special soap that kills germs.  ? Give you a special gown to keep you warm. (Cold raises the risk of infection.)  ? Wear special hair covers, masks, gowns and gloves during surgery.  ? Give antibiotic medicine, if prescribed. Not all surgeries need antibiotics.  What to bring on the day of surgery    Photo ID and insurance card    Copy of your health care directive, if you have one    Glasses and hearing aids (bring  cases)  ? You can't wear contacts during surgery    Inhaler and eye drops, if you use them (tell us about these when you arrive)    CPAP machine or breathing device, if you use them    A few personal items, if spending the night    If you have . . .  ? A pacemaker, ICD (cardiac defibrillator) or other implant: Bring the ID card.  ? An implanted stimulator: Bring the remote control.  ? A legal guardian: Bring a copy of the certified (court-stamped) guardianship papers.  Please remove any jewelry, including body piercings. Leave jewelry and other valuables at home.  If you're going home the day of surgery    You must have a responsible adult drive you home. They should stay with you overnight as well.    If you don't have someone to stay with you, and you aren't safe to go home alone, we may keep you overnight. Insurance often won't pay for this.  After surgery  If it's hard to control your pain or you need more pain medicine, please call your surgeon's office.  Questions?   If you have any questions for your care team, list them here: _________________________________________________________________________________________________________________________________________________________________________ ____________________________________ ____________________________________ ____________________________________

## 2023-04-12 NOTE — PROGRESS NOTES
[facility] AND [facility]  [address]  [place] [address]  Phone: [phone]  Fax: [phone]  Primary Provider: Sam Leal  Pre-op Performing Provider: ZAIN HOLLIDAY      PREOPERATIVE EVALUATION:  Today's date: 4/13/2023    Bailey Sierra is a 73 year old female who presents for a preoperative evaluation.       View : No data to display.              Surgical Information:  Surgery/Procedure: L cataract on 5/3 and right cataract on 5/24  Surgery Location: [facility]  Surgeon: Elijah  Surgery Date: 5/3 for left and 5/24 for right  Time of Surgery: TBD  Where patient plans to recover: At home with Home Care  Fax number for surgical facility: [phone]      Assessment & Plan     The proposed surgical procedure is considered LOW risk.    1. Preop general physical exam    2. Age-related cataract of both eyes, unspecified age-related cataract type    3. Hyperlipidemia, unspecified hyperlipidemia type    4. Hyperparathyroidism due to renal insufficiency (H)    5. Type 2 diabetes mellitus with hyperglycemia, with long-term current use of insulin (H)    6. Chronic diastolic congestive heart failure (H)    7. Chronic total occlusion of coronary artery (RCA)    8. Essential hypertension    9. Mild aortic insufficiency    10. Pacemaker    11. Sinus node dysfunction (H)    12. History of coronary artery bypass graft x 2    13. CKD (chronic kidney disease) stage 4, GFR 15-29 ml/min (H)      Vitals and exam stable. Labs completed within the past 30 days and were stable. EKG not indicated based on low risk procedure.     Needing orders for updated walker. Currently has 4 wheel with seat that was her 's she has been using. Brakes are failing and wires are broken which is presenting hazards to walking. Patient does have history of falls and unstable gait. Currently works with [facility].     Risks and Recommendations:  The patient has the following additional risks and recommendations for  perioperative complications:   - No identified additional risk factors other than previously addressed    Medication Instructions:  Patient is to take all scheduled medications on the day of surgery EXCEPT for modifications listed below:  Ok to hold ASA if recommended by surgeon    RECOMMENDATION:  APPROVAL GIVEN to proceed with proposed procedure, without further diagnostic evaluation.    Mahogany Guerrero PA-C on 4/12/2023 at 11:39 AM      Subjective     HPI related to upcoming procedure:   History of bilateral cataracts affecting vision.           4/13/2023    12:02 PM   Preop Questions   1. Have you ever had a heart attack or stroke? Yes, prior bypass graft   2. Have you ever had surgery on your heart or blood vessels, such as a stent placement, a coronary artery bypass, or surgery on an artery in your head, neck, heart, or legs? YES - as above   3. Do you have chest pain with activity? No   4. Do you have a history of  heart failure? No   5. Do you currently have a cold, bronchitis or symptoms of other infection? No   6. Do you have a cough, shortness of breath, or wheezing? No   7. Do you or anyone in your family have previous history of blood clots? No   8. Do you or does anyone in your family have a serious bleeding problem such as prolonged bleeding following surgeries or cuts? No   9. Have you ever had problems with anemia or been told to take iron pills? No   10. Have you had any abnormal blood loss such as black, tarry or bloody stools, or abnormal vaginal bleeding? No   11. Have you ever had a blood transfusion? YES    11a. Have you ever had a transfusion reaction? No   12. Are you willing to have a blood transfusion if it is medically needed before, during, or after your surgery? Yes   13. Have you or any of your relatives ever had problems with anesthesia? No   14. Do you have sleep apnea, excessive snoring or daytime drowsiness? No   15. Do you have any artifical heart valves or other implanted  medical devices like a pacemaker, defibrillator, or continuous glucose monitor? YES - pacemaker   15a. What type of device do you have? pacemaker   15b. Name of the clinic that manages your device:  [facility]   16. Do you have artificial joints? No   17. Are you allergic to latex? No     Health Care Directive:  Patient has a Health Care Directive on file      Preoperative Review of :   reviewed - no record of controlled substances prescribed.      Status of Chronic Conditions:  See problem list for active medical problems.  Problems all longstanding and stable, except as noted/documented.  See ROS for pertinent symptoms related to these conditions.      Review of Systems  CONSTITUTIONAL: NEGATIVE for fever, chills, change in weight  ENT/MOUTH: NEGATIVE for ear, mouth and throat problems  RESP: NEGATIVE for significant cough or SOB  CV: NEGATIVE for chest pain, palpitations or peripheral edema    Patient Active Problem List    Diagnosis Date Noted     Chronic diastolic congestive heart failure (H) 10/10/2022     Priority: Medium     Sinus node dysfunction (H) 10/10/2022     Priority: Medium     Thrombocytopenia (H) 10/10/2022     Priority: Medium     Hypotension, unspecified hypotension type 06/21/2022     Priority: Medium     Gastroparesis 07/13/2021     Priority: Medium     4/2021       Acute kidney injury superimposed on CKD (H) 06/11/2021     Priority: Medium     Non-traumatic rhabdomyolysis 06/11/2021     Priority: Medium     Stage 3a chronic kidney disease (H) 06/11/2021     Priority: Medium     S/P cardiac pacemaker procedure 03/09/2021     Priority: Medium     Pacemaker 03/03/2021     Priority: Medium     Formatting of this note might be different from the original.    Implant Date 02/26/21  Implanting Physician Baldomero Owen MD     Generator  Soxiable   Model L331 Accolade MRI  IS-1 ()   Serial Number [id number]    Lead   Right Atrium    Model 7841 Ingevity + IS-1 BiPositive Fix RA/RV 52  cm    Serial Number [id number]  Implant Date 02/26/21  Lead  Right Ventricle   Model 7842 Ingevity + IS-1 BiPositive Fix RA/RV 59 cm   Serial Number [id number]   Implant Date 02/26/21     Indication Sinus Node Dysfunction       Symptomatic bradycardia 02/24/2021     Priority: Medium     Constipation, unspecified 02/24/2021     Priority: Medium     COVID-19 virus infection 02/24/2021     Priority: Medium     Hydronephrosis 02/24/2021     Priority: Medium     Mild aortic insufficiency 02/24/2021     Priority: Medium     Proteinuria, unspecified type 07/20/2020     Priority: Medium     Pavon's esophagus without dysplasia 07/08/2020     Priority: Medium     History of coronary artery bypass graft x 2 12/30/2019     Priority: Medium     Stenosis of carotid artery, unspecified laterality 12/30/2019     Priority: Medium     Chronic total occlusion of coronary artery (RCA) 12/30/2019     Priority: Medium     CKD (chronic kidney disease) stage 4, GFR 15-29 ml/min (H) 01/17/2018     Priority: Medium     Nonrheumatic aortic valve stenosis 01/17/2018     Priority: Medium     Hyperlipidemia 01/17/2018     Priority: Medium     Essential hypertension 01/17/2018     Priority: Medium     AC (acromioclavicular) joint arthritis 03/10/2017     Priority: Medium     Impingement syndrome of right shoulder 03/10/2017     Priority: Medium     Right rotator cuff tendinitis 03/10/2017     Priority: Medium     Type 2 diabetes mellitus with hyperglycemia, with long-term current use of insulin (H) 02/13/2017     Priority: Medium     Light chain disease, 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  Per Dr. Ortiz, if hypoalbuminemia not improving by Nov-Dec 2016, needs referral to Hematology.        Vitamin D deficiency 10/23/2016     Priority: Medium     Hyperparathyroidism due to renal insufficiency (H) 03/28/2016     Priority: Medium     Coronary atherosclerosis 09/03/2013     Priority:  Medium     Overview:   2 vessel bypass, August 2013       Hiatal hernia 07/26/2013     Priority: Medium     GERD (gastroesophageal reflux disease) 01/08/2013     Priority: Medium     Urge incontinence 10/25/2012     Priority: Medium     Insomnia 01/25/2012     Priority: Medium      Past Medical History:   Diagnosis Date     Atherosclerotic heart disease of native coronary artery without angina pectoris     No Comments Provided     Cardiac murmur     No Comments Provided     Chronic kidney disease, stage III (moderate) (H)     No Comments Provided     Controlled type 2 diabetes mellitus with stage 3 chronic kidney disease, with long-term current use of insulin (H) 7/26/2013     COVID-19 virus detected 2/23/2021     Edema     No Comments Provided     Essential (primary) hypertension     No Comments Provided     Gastro-esophageal reflux disease without esophagitis     No Comments Provided     Hyperlipidemia     No Comments Provided     Impingement syndrome of right shoulder 03/10/2017          Insomnia 01/25/2012          Iron deficiency anemia     No Comments Provided     Neuromuscular dysfunction of bladder     No Comments Provided     Osteoarthritis     No Comments Provided     Other shoulder lesions, right shoulder 03/10/2017          Other shoulder lesions, unspecified shoulder     No Comments Provided     Other specified postprocedural states 04/19/2017          Personal history of other medical treatment (CODE)     Three childbirths     Plantar fascial fibromatosis     No Comments Provided     Presence of aortocoronary bypass graft 07/2013    [facility]     Primary osteoarthritis of shoulder 03/10/2017          Type 2 diabetes mellitus without complications (H)     No Comments Provided     Urgency of urination 03/08/2011          Uterovaginal prolapse     No Comments Provided     Past Surgical History:   Procedure Laterality Date     ARTHROSCOPY SHOULDER Right 1997          ARTHROSCOPY SHOULDER Left 2012           ARTHROSCOPY SHOULDER RT/LT Right [id number]          Cardiac Bypass  07/2013          COLONOSCOPY  11/24/2009 11/24/2009     CYSTOSCOPY  03/15/2001     DENTAL SURGERY      Dental extractions     ENDOSCOPIC RETROGRADE CHOLANGIOPANCREATOGRAPHY  05/2008    Bile leak with ERCP and stenting and drainage of bile collection through abdominal wall.     ESOPHAGOSCOPY, GASTROSCOPY, DUODENOSCOPY (EGD), COMBINED N/A 6/23/2020    Pavon's follow up 5 years, 6/23/2025     HYSTERECTOMY TOTAL ABDOMINAL, BILATERAL SALPINGO-OOPHORECTOMY, COMBINED  04/02/2001    Vag vault suspension with Cortex mesh     IMPLANT STIMULATOR AND LEADS SACRAL NERVE (STAGE ONE AND TWO) N/A 5/7/2019    Procedure: Interstim Implant Revision, GENERATOR AND TYING LEAD EXCHANGE;  Surgeon: Honorio Bowling MD;  Location: [facility] OR     IMPLANT STIMULATOR AND LEADS SACRAL NERVE (STAGE ONE AND TWO) N/A 10/18/2022    Procedure: InterStim nonrechargeable generator and tined lead revision;  Surgeon: Honorio Bowling MD;  Location: [facility] OR     Interstim Device Placement  04/14/2014    Dr. Bowling - [facility]     LAMINECTOMY LUMBAR ONE LEVEL  1997          LAPAROSCOPIC CHOLECYSTECTOMY  04/2008          LAPAROSCOPIC TUBAL LIGATION      No Comments Provided     Current Outpatient Medications   Medication Sig Dispense Refill     aspirin (ASPIRIN ADULT LOW STRENGTH) 81 MG EC tablet Take 1 tablet (81 mg) by mouth daily 90 tablet 3     B-D U/F 31G X 8 MM insulin pen needle USE 4 PEN NEEDLES DAILY 400 each 3     blood glucose monitoring (TRICIA CONTOUR) test strip TEST 3 TIMES A DAY (Patient taking differently: TEST 4 TIMES A DAY) 300 each 3     Continuous Blood Gluc Sensor (DEXCOM G6 SENSOR) MISC CHANGE EVERY 10 DAYS. 9 each 1     Continuous Blood Gluc Transmit (DEXCOM G6 TRANSMITTER) MISC CHANGE EVERY 3 MONTHS. 1 each 3     famotidine (PEPCID) 40 MG tablet Take 1 tablet (40 mg) by mouth 2 times daily 180 tablet 3     GAVILAX 17 GM/SCOOP powder daily as needed       glucose (BD GLUCOSE) 4 g  "chewable tablet Take 1 tablet by mouth every hour as needed for low blood sugar       Incontinence Supply Disposable MISC Depend Fit-flex WMNS UW #26221/LARGE 17/PP DX\" [facility]:  #136/8PKGS/27DS 150 each 11     insulin aspart (NOVOLOG FLEXPEN) 100 UNIT/ML pen Inject 5 Units Subcutaneous 3 times daily (with meals) 60 mL 3     insulin glargine (BASAGLAR KWIKPEN) 100 UNIT/ML pen Inject 20 Units Subcutaneous At Bedtime       isosorbide mononitrate (IMDUR) 60 MG 24 hr tablet TAKE 2 TABLETS BY MOUTH EVERY  tablet 3     lisinopril (ZESTRIL) 2.5 MG tablet Take 1 tablet (2.5 mg) by mouth daily 90 tablet 3     metoprolol succinate ER (TOPROL XL) 25 MG 24 hr tablet TAKE 1/2 TABLET BY MOUTH EVERY EVENING 45 tablet 3     nitroGLYcerin (NITROSTAT) 0.4 MG sublingual tablet Place 0.4 mg under the tongue every 5 minutes as needed for chest pain       ondansetron (ZOFRAN) 4 MG tablet TAKE 1 TABLET BY MOUTH EVERY 6 HOURS AS NEEDED 120 tablet 4     order for DME Equipment being ordered: Diabetic shoes, pre-ulcer callous formation 2 each 3     rosuvastatin (CRESTOR) 40 MG tablet TAKE 1 TABLET BY MOUTH EVERY DAY 90 tablet 3     vitamin D3 (VITAMIN D3) 50 mcg (2000 units) tablet Take 1 tablet (50 mcg) by mouth daily 90 tablet 3     zolpidem (AMBIEN) 10 MG tablet Take 1 tablet (10 mg) by mouth nightly as needed for sleep 30 tablet 5       No Known Allergies     Social History     Tobacco Use     Smoking status: Never     Smokeless tobacco: Never   Vaping Use     Vaping status: Never Used   Substance Use Topics     Alcohol use: No     Alcohol/week: 0.0 standard drinks of alcohol     Family History   Problem Relation Age of Onset     Other - See Comments Father         MI     Cancer Mother         Cancer,Some type of cancer, aneurysm     Diabetes Paternal Grandmother         Diabetes,Type II     Cancer Sister         Cancer,unknown type     Diabetes Sister         Diabetes,Type II     Diabetes Sister         Diabetes,Type II "     Diabetes Sister         Diabetes,Type II     Other - See Comments Daughter         depression     Other - See Comments Daughter         ovarian cancer and depression     Heart Disease Brother         Heart Disease,CAD, MI     History   Drug Use No     Comment: Drug use: No         Objective     LMP  (LMP Unknown)     Physical Exam  HENT: ear canals and TM's normal and nose and mouth without ulcers or lesions  RESP: lungs clear to auscultation - no rales, rhonchi or wheezes  CV: regular rate and rhythm, normal S1 S2, no S3 or S4 and no murmur, click or rub   ABDOMEN: soft, nontender, no HSM or masses and bowel sounds normal  NEURO: Normal strength and tone, sensory exam grossly normal, mentation intact and speech normal    Recent Labs   Lab Test 03/30/23  0831 10/10/22  1022 12/03/21  0836 08/26/21  0940 08/22/21  1634 06/30/21  1320 06/10/21  1310 06/10/21  1135   HGB  --   --   --  11.3* 9.8*  --   --  12.5   PLT  --   --   --   --  140*  --   --  125*   INR  --   --   --   --   --   --  1.07  --      --   --   --  139   < >  --  136   POTASSIUM 3.7  --   --   --  3.6   < >  --  4.9   CR 2.33*  --   --   --  2.88*   < >  --  13.78*   A1C 7.2* 6.9*   < >  --   --   --   --   --     < > = values in this interval not displayed.        Diagnostics:  Recent Results (from the past 720 hour(s))   Comprehensive Metabolic Panel    Collection Time: 03/30/23  8:31 AM   Result Value Ref Range    Sodium 142 136 - 145 mmol/L    Potassium 3.7 3.4 - 5.3 mmol/L    Chloride 111 (H) 98 - 107 mmol/L    Carbon Dioxide (CO2) 23 22 - 29 mmol/L    Anion Gap 8 7 - 15 mmol/L    Urea Nitrogen 21.4 8.0 - 23.0 mg/dL    Creatinine 2.33 (H) 0.51 - 0.95 mg/dL    Calcium 9.0 8.8 - 10.2 mg/dL    Glucose 129 (H) 70 - 99 mg/dL    Alkaline Phosphatase 107 (H) 35 - 104 U/L    AST 19 10 - 35 U/L    ALT 20 10 - 35 U/L    Protein Total 6.3 (L) 6.4 - 8.3 g/dL    Albumin 3.8 3.5 - 5.2 g/dL    Bilirubin Total 0.4 <=1.2 mg/dL    GFR Estimate 21 (L)  >60 mL/min/1.73m2   Lipid Panel    Collection Time: 03/30/23  8:31 AM   Result Value Ref Range    Cholesterol 152 <200 mg/dL    Triglycerides 57 <150 mg/dL    Direct Measure HDL 68 >=50 mg/dL    LDL Cholesterol Calculated 73 <=100 mg/dL    Non HDL Cholesterol 84 <130 mg/dL   Hemoglobin A1c    Collection Time: 03/30/23  8:31 AM   Result Value Ref Range    Hemoglobin A1C 7.2 (H) 4.0 - 6.2 %      No EKG required for low risk surgery (cataract, skin procedure, breast biopsy, etc).    Revised Cardiac Risk Index (RCRI):  The patient has the following serious cardiovascular risks for perioperative complications:   - Coronary Artery Disease (MI, positive stress test, angina, Qs on EKG) = 1 point   - Diabetes Mellitus (on Insulin) = 1 point   - Serum Creatinine >2.0 mg/dl = 1 point     RCRI Interpretation: 3 points: Class IV (high risk - >11% complication rate)    Estimated Functional Capacity: Performs 4 METS exercise without symptoms (e.g., light housework, stairs, 4 mph walk, 7 mph bike, slow step dance)           Signed Electronically by: Mahogany Guerrero PA-C  Copy of this evaluation report is provided to requesting physician.

## 2023-04-13 ENCOUNTER — OFFICE VISIT (OUTPATIENT)
Dept: FAMILY MEDICINE | Facility: OTHER | Age: 73
End: 2023-04-13
Attending: PHYSICIAN ASSISTANT
Payer: COMMERCIAL

## 2023-04-13 VITALS
TEMPERATURE: 97.2 F | OXYGEN SATURATION: 99 % | DIASTOLIC BLOOD PRESSURE: 50 MMHG | RESPIRATION RATE: 12 BRPM | BODY MASS INDEX: 26.23 KG/M2 | SYSTOLIC BLOOD PRESSURE: 102 MMHG | HEIGHT: 65 IN | WEIGHT: 157.4 LBS | HEART RATE: 62 BPM

## 2023-04-13 DIAGNOSIS — I49.5 SINUS NODE DYSFUNCTION (H): ICD-10-CM

## 2023-04-13 DIAGNOSIS — I35.1 MILD AORTIC INSUFFICIENCY: ICD-10-CM

## 2023-04-13 DIAGNOSIS — I10 ESSENTIAL HYPERTENSION: ICD-10-CM

## 2023-04-13 DIAGNOSIS — Z95.1 HISTORY OF CORONARY ARTERY BYPASS GRAFT X 2: ICD-10-CM

## 2023-04-13 DIAGNOSIS — N18.4 CKD (CHRONIC KIDNEY DISEASE) STAGE 4, GFR 15-29 ML/MIN (H): ICD-10-CM

## 2023-04-13 DIAGNOSIS — Z79.4 TYPE 2 DIABETES MELLITUS WITH HYPERGLYCEMIA, WITH LONG-TERM CURRENT USE OF INSULIN (H): ICD-10-CM

## 2023-04-13 DIAGNOSIS — E78.5 HYPERLIPIDEMIA, UNSPECIFIED HYPERLIPIDEMIA TYPE: ICD-10-CM

## 2023-04-13 DIAGNOSIS — N25.81 HYPERPARATHYROIDISM DUE TO RENAL INSUFFICIENCY (H): ICD-10-CM

## 2023-04-13 DIAGNOSIS — H25.9 AGE-RELATED CATARACT OF BOTH EYES, UNSPECIFIED AGE-RELATED CATARACT TYPE: ICD-10-CM

## 2023-04-13 DIAGNOSIS — Z95.0 PACEMAKER: ICD-10-CM

## 2023-04-13 DIAGNOSIS — Z01.818 PREOP GENERAL PHYSICAL EXAM: Primary | ICD-10-CM

## 2023-04-13 DIAGNOSIS — R26.89 IMPAIRED GAIT AND MOBILITY: ICD-10-CM

## 2023-04-13 DIAGNOSIS — I50.32 CHRONIC DIASTOLIC CONGESTIVE HEART FAILURE (H): ICD-10-CM

## 2023-04-13 DIAGNOSIS — E11.65 TYPE 2 DIABETES MELLITUS WITH HYPERGLYCEMIA, WITH LONG-TERM CURRENT USE OF INSULIN (H): ICD-10-CM

## 2023-04-13 DIAGNOSIS — I25.82 CHRONIC TOTAL OCCLUSION OF CORONARY ARTERY: ICD-10-CM

## 2023-04-13 PROCEDURE — G0463 HOSPITAL OUTPT CLINIC VISIT: HCPCS

## 2023-04-13 PROCEDURE — 99214 OFFICE O/P EST MOD 30 MIN: CPT | Performed by: PHYSICIAN ASSISTANT

## 2023-04-13 RX ORDER — ONDANSETRON 4 MG/1
1 TABLET, FILM COATED ORAL
COMMUNITY
Start: 2022-02-09 | End: 2023-09-20

## 2023-04-13 RX ORDER — AMMONIUM LACTATE 12 G/100G
LOTION TOPICAL 2 TIMES DAILY
COMMUNITY
Start: 2022-08-03

## 2023-04-13 RX ORDER — SENNOSIDES 8.6 MG/1
1 TABLET ORAL
COMMUNITY
Start: 2023-04-10 | End: 2023-07-27

## 2023-04-13 ASSESSMENT — PAIN SCALES - GENERAL: PAINLEVEL: NO PAIN (0)

## 2023-04-13 NOTE — NURSING NOTE
Chief Complaint   Patient presents with     Pre-Op Exam     DOS 5/3/23 [place] Left Cataract Surgery          Medication Reconciliation: stiven Baird

## 2023-04-17 ENCOUNTER — OFFICE VISIT (OUTPATIENT)
Dept: FAMILY MEDICINE | Facility: OTHER | Age: 73
End: 2023-04-17
Attending: FAMILY MEDICINE
Payer: COMMERCIAL

## 2023-04-17 VITALS
TEMPERATURE: 97.6 F | SYSTOLIC BLOOD PRESSURE: 108 MMHG | OXYGEN SATURATION: 99 % | BODY MASS INDEX: 26.19 KG/M2 | DIASTOLIC BLOOD PRESSURE: 52 MMHG | WEIGHT: 155 LBS | RESPIRATION RATE: 18 BRPM | HEART RATE: 66 BPM

## 2023-04-17 DIAGNOSIS — Z12.31 ENCOUNTER FOR SCREENING MAMMOGRAM FOR BREAST CANCER: Primary | ICD-10-CM

## 2023-04-17 DIAGNOSIS — F51.01 PRIMARY INSOMNIA: ICD-10-CM

## 2023-04-17 DIAGNOSIS — E11.9 TYPE 2 DIABETES MELLITUS WITHOUT COMPLICATION, WITH LONG-TERM CURRENT USE OF INSULIN (H): ICD-10-CM

## 2023-04-17 DIAGNOSIS — I25.118 ATHEROSCLEROTIC HEART DISEASE OF NATIVE CORONARY ARTERY WITH OTHER FORMS OF ANGINA PECTORIS (H): ICD-10-CM

## 2023-04-17 DIAGNOSIS — D69.6 THROMBOCYTOPENIA (H): ICD-10-CM

## 2023-04-17 DIAGNOSIS — D89.89 LIGHT CHAIN DISEASE, KAPPA TYPE (H): ICD-10-CM

## 2023-04-17 DIAGNOSIS — Z79.4 TYPE 2 DIABETES MELLITUS WITHOUT COMPLICATION, WITH LONG-TERM CURRENT USE OF INSULIN (H): ICD-10-CM

## 2023-04-17 DIAGNOSIS — R11.0 NAUSEA: ICD-10-CM

## 2023-04-17 PROCEDURE — 99214 OFFICE O/P EST MOD 30 MIN: CPT | Performed by: FAMILY MEDICINE

## 2023-04-17 PROCEDURE — G0463 HOSPITAL OUTPT CLINIC VISIT: HCPCS | Performed by: FAMILY MEDICINE

## 2023-04-17 RX ORDER — FAMOTIDINE 40 MG/1
40 TABLET, FILM COATED ORAL 2 TIMES DAILY
Qty: 180 TABLET | Refills: 3 | Status: SHIPPED | OUTPATIENT
Start: 2023-04-17 | End: 2024-04-22

## 2023-04-17 RX ORDER — ZOLPIDEM TARTRATE 10 MG/1
10 TABLET ORAL
Qty: 30 TABLET | Refills: 5 | Status: SHIPPED | OUTPATIENT
Start: 2023-04-17

## 2023-04-17 RX ORDER — CHOLECALCIFEROL (VITAMIN D3) 50 MCG
1 TABLET ORAL DAILY
Qty: 90 TABLET | Refills: 3 | Status: SHIPPED | OUTPATIENT
Start: 2023-04-17

## 2023-04-17 RX ORDER — PEN NEEDLE, DIABETIC 31 GX5/16"
4 NEEDLE, DISPOSABLE MISCELLANEOUS 4 TIMES DAILY
Qty: 400 EACH | Refills: 3 | Status: SHIPPED | OUTPATIENT
Start: 2023-04-17 | End: 2024-06-20

## 2023-04-17 RX ORDER — PROCHLORPERAZINE 25 MG/1
SUPPOSITORY RECTAL
Qty: 1 EACH | Refills: 3 | Status: SHIPPED | OUTPATIENT
Start: 2023-04-17 | End: 2023-11-06 | Stop reason: ALTCHOICE

## 2023-04-17 RX ORDER — PROCHLORPERAZINE 25 MG/1
SUPPOSITORY RECTAL
Qty: 9 EACH | Refills: 1 | Status: SHIPPED | OUTPATIENT
Start: 2023-04-17 | End: 2023-10-23

## 2023-04-17 ASSESSMENT — PAIN SCALES - GENERAL: PAINLEVEL: NO PAIN (0)

## 2023-04-17 NOTE — NURSING NOTE
Patient is here with [facility] for diabetic check.  Previous A1C is at goal of <8  Lab Results   Component Value Date    A1C 7.2 03/30/2023    A1C 6.9 10/10/2022    A1C 6.8 12/03/2021    A1C 6.7 06/04/2021    A1C 7.0 12/17/2020    A1C Canceled, Test credited 11/03/2020    A1C 7.1 07/08/2020    A1C 6.8 04/20/2020     Urine Microalbumin/Creat:    Albumin Urine mg/L   Date Value Ref Range Status   10/11/2022 193.0 mg/L Final     Comment:     The reference ranges have not been established in urine albumin. The results should be integrated into the clinical context for interpretation.   04/20/2020 273 mg/L Final     Albumin Urine mg/g Cr   Date Value Ref Range Status   10/11/2022 85.40 (H) 0.00 - 25.00 mg/g Cr Final     Comment:     Microalbuminuria is defined as an albumin:creatinine ratio of 17 to 299 for males and 25 to 299 for females. A ratio of albumin:creatinine of 300 or higher is indicative of overt proteinuria.  Due to biologic variability, positive results should be confirmed by a second, first-morning random or 24-hour timed urine specimen. If there is discrepancy, a third specimen is recommended. When 2 out of 3 results are in the microalbuminuria range, this is evidence for incipient nephropathy and warrants increased efforts at glucose control, blood pressure control, and institution of therapy with an angiotensin-converting-enzyme (ACE) inhibitor (if the patient can tolerate it).     04/20/2020 176.13 (H) 0 - 25 mg/g Cr Final     Creatinine Urine   Date Value Ref Range Status   04/20/2020 155 mg/dL Final     Creatinine Urine mg/dL   Date Value Ref Range Status   10/11/2022 226.0 mg/dL Final     Comment:     The reference ranges have not been established in urine creatinine. The results should be integrated into the clinical context for interpretation.     Foot exam 5/2020 due  Eye exam 2/2023    Tobacco User no  Patient is on a daily aspirin  Patient is on a Statin.  Blood pressure today of:     BP Readings  from Last 1 Encounters:   04/17/23 108/52      is at the goal of <139/89 for diabetics.    Ariella Rivera LPN on 4/17/2023 at 9:59 AM        No LMP recorded (lmp unknown). Patient has had a hysterectomy.  Medication Reconciliation: complete        Advance care directive on file? yes  Advance care directive provided to patient? na       Ariella Rivera LPN

## 2023-04-17 NOTE — PROGRESS NOTES
"  Assessment & Plan     Atherosclerotic heart disease of native coronary artery with other forms of angina pectoris (H)  Continue with daily aspirin  - aspirin (ASPIRIN ADULT LOW STRENGTH) 81 MG EC tablet; Take 1 tablet (81 mg) by mouth daily    Type 2 diabetes mellitus without complication, with long-term current use of insulin (H)  Reviewed hemoglobin A1c which continues to be adequately controlled.  Continue current regimen.  Follow-up in 6 months for fasting labs.  - insulin pen needle (B-D U/F) 31G X 8 MM miscellaneous; Inject 4 Pen Needle Subcutaneous 4 times daily  - Continuous Blood Gluc Sensor (DEXCOM G6 SENSOR) MISC; Change every 10 days.  - Continuous Blood Gluc Transmit (DEXCOM G6 TRANSMITTER) MISC; Change every 3 months.  - FOOT EXAM  - blood glucose (TRICIA CONTOUR) test strip; TEST 4 TIMES A DAY    Nausea  Continue Pepcid with good success.  - famotidine (PEPCID) 40 MG tablet; Take 1 tablet (40 mg) by mouth 2 times daily    Primary insomnia  Continue with Ambien as needed.  - zolpidem (AMBIEN) 10 MG tablet; Take 1 tablet (10 mg) by mouth nightly as needed for sleep    Light chain disease, kappa type (H)    Thrombocytopenia (H)  - Vitamin D3 50 mcg (2000 units) tablet; Take 1 tablet (50 mcg) by mouth daily    Encounter for screening mammogram for breast cancer  Referral for mammography completed  - MA Screen Bilateral w/[place]; Future             BMI:   Estimated body mass index is 26.19 kg/m  as calculated from the following:    Height as of 4/13/23: 1.638 m (5' 4.5\").    Weight as of this encounter: 70.3 kg (155 lb).   Weight management plan: Discussed healthy diet and exercise guidelines  Blood sugar testing frequency justification:  Risk of hypoglycemia with medication(s)      No follow-ups on file.    Sam Leal MD  [facility]    April Avalos is a 73 year old, presenting for the following health issues:  Diabetes    She has a history of type 2 diabetes.  This been " historically fairly well controlled however her glucose readings at home have been in the mid 100s to 200.  She is had no hypoglycemia.    She is planning on having cataract procedure over the next few weeks on both of her eyes.    She continues to use Ambien periodically for insomnia with good success and no side effects.        4/17/2023     9:54 AM   Additional Questions   Roomed by Ariella crawford   Accompanied by DYANA Reardon     History of Present Illness       Diabetes:   She presents for follow up of diabetes.  She is checking home blood glucose four or more times daily. She checks blood glucose before meals.  Blood glucose is sometimes over 200 and never under 70. When her blood glucose is low, the patient is asymptomatic for confusion, blurred vision, lethargy and reports not feeling dizzy, shaky, or weak.  She has no concerns regarding her diabetes at this time.  She is having blurry vision.         She eats 2-3 servings of fruits and vegetables daily.She consumes 0 sweetened beverage(s) daily.She exercises with enough effort to increase her heart rate 20 to 29 minutes per day.  She exercises with enough effort to increase her heart rate 3 or less days per week.   She is taking medications regularly.               Review of Systems         Objective    /52   Pulse 66   Temp 97.6  F (36.4  C) (Tympanic)   Resp 18   Wt 70.3 kg (155 lb)   LMP  (LMP Unknown)   SpO2 99%   Breastfeeding No   BMI 26.19 kg/m    Body mass index is 26.19 kg/m .  Physical Exam   GENERAL: healthy, alert and no distress  MS: no gross musculoskeletal defects noted, no edema  PSYCH: mentation appears normal, affect normal/bright  Diabetic foot exam: normal DP and PT pulses, no trophic changes or ulcerative lesions and normal sensory exam

## 2023-05-22 ENCOUNTER — HOSPITAL ENCOUNTER (OUTPATIENT)
Dept: MAMMOGRAPHY | Facility: OTHER | Age: 73
Discharge: HOME | End: 2023-05-22
Attending: FAMILY MEDICINE | Admitting: FAMILY MEDICINE
Payer: COMMERCIAL

## 2023-05-22 DIAGNOSIS — Z12.31 ENCOUNTER FOR SCREENING MAMMOGRAM FOR BREAST CANCER: ICD-10-CM

## 2023-05-22 PROCEDURE — 77067 SCR MAMMO BI INCL CAD: CPT

## 2023-06-11 DIAGNOSIS — Z79.4 TYPE 2 DIABETES MELLITUS WITHOUT COMPLICATION, WITH LONG-TERM CURRENT USE OF INSULIN (H): ICD-10-CM

## 2023-06-11 DIAGNOSIS — E11.9 TYPE 2 DIABETES MELLITUS WITHOUT COMPLICATION, WITH LONG-TERM CURRENT USE OF INSULIN (H): ICD-10-CM

## 2023-06-15 RX ORDER — INSULIN GLARGINE 100 [IU]/ML
INJECTION, SOLUTION SUBCUTANEOUS
Qty: 15 ML | Refills: 11 | Status: SHIPPED | OUTPATIENT
Start: 2023-06-15 | End: 2024-07-05

## 2023-06-15 NOTE — TELEPHONE ENCOUNTER
Refill request from [facility] pharmacy for insulin glargine (BASAGLAR KWIKPEN) 100 UNIT/ML pen.  Last office visit 4/17/2023, last refill 10/10/2022.  Medication passes RN Refill protocol, routing to PCP or teamlet to address refill per policy.  Unable to complete prescription refill per RN Medication Refill Policy. Grisel Montoya RN 6/15/2023 10:10 AM

## 2023-07-27 DIAGNOSIS — K31.84 GASTROPARESIS: Primary | ICD-10-CM

## 2023-07-27 RX ORDER — SENNOSIDES 8.6 MG/1
TABLET ORAL
Qty: 90 TABLET | Refills: 3 | Status: SHIPPED | OUTPATIENT
Start: 2023-07-27 | End: 2024-04-22

## 2023-08-03 ENCOUNTER — TRANSFERRED RECORDS (OUTPATIENT)
Dept: HEALTH INFORMATION MANAGEMENT | Facility: OTHER | Age: 73
End: 2023-08-03
Payer: COMMERCIAL

## 2023-08-28 DIAGNOSIS — Z79.4 TYPE 2 DIABETES MELLITUS WITHOUT COMPLICATION, WITH LONG-TERM CURRENT USE OF INSULIN (H): Primary | ICD-10-CM

## 2023-08-28 DIAGNOSIS — E11.9 TYPE 2 DIABETES MELLITUS WITHOUT COMPLICATION, WITH LONG-TERM CURRENT USE OF INSULIN (H): Primary | ICD-10-CM

## 2023-08-29 RX ORDER — PROCHLORPERAZINE 25 MG/1
SUPPOSITORY RECTAL
Qty: 1 EACH | Refills: 0 | OUTPATIENT
Start: 2023-08-29

## 2023-08-29 RX ORDER — PROCHLORPERAZINE 25 MG/1
SUPPOSITORY RECTAL
Qty: 1 EACH | Refills: 0 | Status: SHIPPED | OUTPATIENT
Start: 2023-08-29 | End: 2023-11-06 | Stop reason: ALTCHOICE

## 2023-08-29 NOTE — TELEPHONE ENCOUNTER
[id number] of [place] sent Rx request for the following:      Requested Prescriptions   Pending Prescriptions Disp Refills    Continuous Blood Gluc  (DEXCOM G6 ) JOSÉ MIGUEL [Pharmacy Med Name: DEXCOM G6 RETAIL  KIT] 1 each 0     Sig: USE WITH DEXCOM SYSTEM **TW CALL WHEN READY**     Last Prescription Date:   ***  Last Fill Qty/Refills:         ***, R-***    Last Office Visit:              ***   Future Office visit:           ***

## 2023-08-29 NOTE — TELEPHONE ENCOUNTER
[id number] sent Rx request for the following:      Requested Prescriptions   Pending Prescriptions Disp Refills    Continuous Blood Gluc  (DEXCOM G6 ) JOSÉ MIGUEL [Pharmacy Med Name: DEXCOM G6 RETAIL  KIT] 1 each 0     Sig: USE WITH DEXCOM SYSTEM **TW CALL WHEN READY**       There is no refill protocol information for this order          Last Prescription Date:   None  Last Fill Qty/Refills:         0, R-0    Last Office Visit:              4/17/23   Future Office visit:           10/23/23    Patient is in need of  for her Dex Com. Chanelle Casas, RN on 8/29/2023 at 10:56 AM

## 2023-09-20 ENCOUNTER — OFFICE VISIT (OUTPATIENT)
Dept: FAMILY MEDICINE | Facility: OTHER | Age: 73
End: 2023-09-20
Attending: FAMILY MEDICINE
Payer: COMMERCIAL

## 2023-09-20 VITALS
RESPIRATION RATE: 20 BRPM | BODY MASS INDEX: 25.82 KG/M2 | DIASTOLIC BLOOD PRESSURE: 56 MMHG | OXYGEN SATURATION: 99 % | TEMPERATURE: 97.7 F | HEART RATE: 65 BPM | WEIGHT: 152.8 LBS | SYSTOLIC BLOOD PRESSURE: 100 MMHG

## 2023-09-20 DIAGNOSIS — R19.7 DIARRHEA, UNSPECIFIED TYPE: ICD-10-CM

## 2023-09-20 DIAGNOSIS — K22.70 BARRETT'S ESOPHAGUS WITHOUT DYSPLASIA: Primary | ICD-10-CM

## 2023-09-20 PROCEDURE — 99214 OFFICE O/P EST MOD 30 MIN: CPT | Performed by: FAMILY MEDICINE

## 2023-09-20 PROCEDURE — G0463 HOSPITAL OUTPT CLINIC VISIT: HCPCS

## 2023-09-20 ASSESSMENT — PATIENT HEALTH QUESTIONNAIRE - PHQ9
SUM OF ALL RESPONSES TO PHQ QUESTIONS 1-9: 3
10. IF YOU CHECKED OFF ANY PROBLEMS, HOW DIFFICULT HAVE THESE PROBLEMS MADE IT FOR YOU TO DO YOUR WORK, TAKE CARE OF THINGS AT HOME, OR GET ALONG WITH OTHER PEOPLE: NOT DIFFICULT AT ALL
SUM OF ALL RESPONSES TO PHQ QUESTIONS 1-9: 3

## 2023-09-20 ASSESSMENT — PAIN SCALES - GENERAL: PAINLEVEL: NO PAIN (0)

## 2023-09-20 NOTE — COMMUNITY RESOURCES LIST (ENGLISH)
09/20/2023   [facility]  N/A  For additional resource needs, please contact your health insurance member services or your primary care team.  Phone: [phone]   Email: N/A   Address: [facility] [facility] [address] [place] [address]   Hours: N/A        Financial Stability       Utility payment assistance  1  [place] [employer] - [place]'s Telephone Assistance Plan (TAP) and Federal Lifeline and Affordable Connectivity Program (ACP) Distance: 158.85 miles      Phone/Virtual   [address] [place] [address]  Language: English  Fees: Free   Phone: (397) 227-8305 Email: consumer.puc@[facility].mn. Website: https://[place].gov/puc/consumers/telephone/     2  [place] [employer] - Energy and Utilities Distance: 159.56 miles      In-Person, Phone/Virtual   [address] [place] [address]  Language: English  Hours: Mon - Fri 8:30 AM - 4:30 PM  Fees: Free   Phone: (981) 681-1404 Website: https://[place].gov/"[employer]e/energy/consumer-assistance/energy-assistance-program/          Important Numbers & Websites       [facility]   211 211itedway.org  Poison Control   (715) 847-4949 [place]poison.[place]  Suicide and Crisis Lifeline   [address] [place], [place] [address]line.org  Childhelp [place] Child Abuse Hotline   986.668.8788 Childhelphotline.org  National Sexual Assault Hotline   (102) 238-5278 (HOPE) Rainn.org  National Runaway Safeline   (351) 422-9900 (RUNAWAY) 1800runaway.org  Pregnancy & Postpartum Support [place]   Call/text 124-670-7275 Ppsupportmn.org  Substance Abuse National Helpline ([facility]   010-421-HELP (1735) Findtreatment.gov  Emergency Services   911

## 2023-09-20 NOTE — NURSING NOTE
Patient here for diarrhea for the past 2 months. Medication Reconciliation: complete.    Martita Sofia LPN  9/20/2023 9:27 AM   Adequate: hears normal conversation without difficulty

## 2023-09-21 ENCOUNTER — LAB (OUTPATIENT)
Dept: LAB | Facility: OTHER | Age: 73
End: 2023-09-21
Attending: FAMILY MEDICINE
Payer: COMMERCIAL

## 2023-09-21 DIAGNOSIS — R19.7 DIARRHEA, UNSPECIFIED TYPE: ICD-10-CM

## 2023-09-21 LAB — C DIFF TOX B STL QL: NEGATIVE

## 2023-09-21 PROCEDURE — 87209 SMEAR COMPLEX STAIN: CPT | Mod: ZL

## 2023-09-21 PROCEDURE — 87507 IADNA-DNA/RNA PROBE TQ 12-25: CPT | Mod: ZL

## 2023-09-21 PROCEDURE — 87493 C DIFF AMPLIFIED PROBE: CPT | Mod: ZL

## 2023-09-21 ASSESSMENT — ENCOUNTER SYMPTOMS: DIARRHEA: 1

## 2023-09-21 NOTE — PROGRESS NOTES
SUBJECTIVE:   Bailey Sierra is a 73 year old female who presents to clinic today for the following health issues: Diarrhea    Patient arrives here for 2-month history of diarrhea.  Reports is on and off.  When she does not have diarrhea it only last for a day or 2.  Does not change with food.  She has not had any recent food has been abnormal.  She reports she has 3-4 bowel movements a day.  Anywhere from loose to watery.  Does not change with eating dairy products.  Or gluten.  She does have an appointment with her PCP next month.  She is not tried any medication.  Does not seem to be tied with taking any of her medication.  No known weight loss.    Diarrhea    History of Present Illness       Reason for visit:  Diarrhea  Symptom onset:  More than a month  Symptoms include:  Diarrhea  Symptom intensity:  Severe  Symptom progression:  Staying the same  Had these symptoms before:  No  What makes it worse:  No  What makes it better:  No    She eats 2-3 servings of fruits and vegetables daily.She consumes 0 sweetened beverage(s) daily.She exercises with enough effort to increase her heart rate 30 to 60 minutes per day.  She exercises with enough effort to increase her heart rate 4 days per week.   She is taking medications regularly.            Review of Systems   Gastrointestinal:  Positive for diarrhea.        OBJECTIVE:     /56   Pulse 65   Temp 97.7  F (36.5  C)   Resp 20   Wt 69.3 kg (152 lb 12.8 oz)   LMP  (LMP Unknown)   SpO2 99%   BMI 25.82 kg/m    Body mass index is 25.82 kg/m .  Physical Exam  Constitutional:       Appearance: Normal appearance.   Abdominal:      General: Abdomen is flat. There is no distension.      Palpations: Abdomen is soft. There is no mass.      Tenderness: There is no abdominal tenderness. There is no guarding.   Skin:     General: Skin is warm.   Neurological:      Mental Status: She is alert.         Diagnostic Test Results:  No results found. However, due to the size  of the patient record, not all encounters were searched. Please check Results Review for a complete set of results.    ASSESSMENT/PLAN:           (R19.7) Diarrhea, unspecified type  Comment: Unknown etiology.  We will proceed with basic labs.  Doubt medication.  Get back to patient when labs return  Plan: Ova and Parasite Exam Routine, Enteric Bacteria        and Virus Panel by REUBEN Stool, C. difficile         Toxin B PCR with reflex to C. difficile Antigen        and Toxins A/B EIA              Sammy Umana MD  [facility]

## 2023-09-22 LAB

## 2023-10-10 NOTE — PHARMACY-ADMISSION MEDICATION HISTORY
Pharmacy -- Admission Medication Reconciliation    Prior to admission (PTA) medications were reviewed and the patient's PTA medication list was updated.    Sources Consulted: patient, sure scripts, chart review    The reliability of this Medication Reconciliation is: Reliability: Reliable    The following significant changes were made:  Glucose tabs ADDED  Metoprolol and lisinopril UPDATED to PM dosing per patient    Note: patient states she has been using 20 units of Basaglar at  for the past few days due to lower blood sugar readings.  Prior to that, patient was using 30 units.  Patient states she has also still been using her Novolog TID, usually 9-12 units even though she has not been eating.  Patient states this drops her blood sugars to the 40s, but that she is asymptomatic.  Patient will need education on this prior to discharge.  Spoke briefly with patient at time of interview    In addition, the patient's allergies were reviewed with the patient and updated as follows:   Allergies: Patient has no known allergies.    The pharmacist has reviewed with the patient that all personal medications should be removed from the building or locked in the belongings safe.  Patient shall only take medications ordered by the physician and administered by the nursing staff.       Medication barriers identified: still using Novolog insulin when not eating - is reporting this causes low blood sugars, has not been able to keep her medications down   Medication adherence concerns: see above   Understanding of emergency medications: patient states she has glucose tabs and candy at home for low blood sugar     Rochelle Davis [facility], 2/23/2021,  5:25 PM      No

## 2023-10-14 DIAGNOSIS — Z79.4 TYPE 2 DIABETES MELLITUS WITHOUT COMPLICATION, WITH LONG-TERM CURRENT USE OF INSULIN (H): ICD-10-CM

## 2023-10-14 DIAGNOSIS — E11.9 TYPE 2 DIABETES MELLITUS WITHOUT COMPLICATION, WITH LONG-TERM CURRENT USE OF INSULIN (H): ICD-10-CM

## 2023-10-19 ENCOUNTER — LAB (OUTPATIENT)
Dept: LAB | Facility: OTHER | Age: 73
End: 2023-10-19
Attending: FAMILY MEDICINE
Payer: COMMERCIAL

## 2023-10-19 DIAGNOSIS — Z79.4 TYPE 2 DIABETES MELLITUS WITHOUT COMPLICATION, WITH LONG-TERM CURRENT USE OF INSULIN (H): ICD-10-CM

## 2023-10-19 DIAGNOSIS — E11.9 TYPE 2 DIABETES MELLITUS WITHOUT COMPLICATION, WITH LONG-TERM CURRENT USE OF INSULIN (H): ICD-10-CM

## 2023-10-19 LAB — HBA1C MFR BLD: 6.9 % (ref 4–6.2)

## 2023-10-19 PROCEDURE — 36415 COLL VENOUS BLD VENIPUNCTURE: CPT | Mod: ZL

## 2023-10-19 PROCEDURE — 83036 HEMOGLOBIN GLYCOSYLATED A1C: CPT | Mod: ZL

## 2023-10-21 NOTE — TELEPHONE ENCOUNTER
[facility] sent Rx request for the following:      Requested Prescriptions   Pending Prescriptions Disp Refills    Continuous Blood Gluc Sensor (DEXCOM G6 SENSOR) MISC [Pharmacy Med Name: DEXCOM G6 RETAIL SENSOR KIT]  1     Sig: CHANGE EVERY 10 DAYS.       There is no refill protocol information for this order          Last Prescription Date:   4/17/23  Last Fill Qty/Refills:         9, R-1    Last Office Visit:              9/20/23   Future Office visit:           10/23/23    Odilia Robin RN on 10/20/2023 at 7:21 PM

## 2023-10-23 ENCOUNTER — OFFICE VISIT (OUTPATIENT)
Dept: FAMILY MEDICINE | Facility: OTHER | Age: 73
End: 2023-10-23
Attending: FAMILY MEDICINE
Payer: COMMERCIAL

## 2023-10-23 VITALS
OXYGEN SATURATION: 100 % | HEIGHT: 65 IN | TEMPERATURE: 97.5 F | BODY MASS INDEX: 24.86 KG/M2 | WEIGHT: 149.2 LBS | SYSTOLIC BLOOD PRESSURE: 104 MMHG | DIASTOLIC BLOOD PRESSURE: 56 MMHG | HEART RATE: 58 BPM | RESPIRATION RATE: 18 BRPM

## 2023-10-23 DIAGNOSIS — E11.22 TYPE 2 DIABETES MELLITUS WITH STAGE 3 CHRONIC KIDNEY DISEASE, WITH LONG-TERM CURRENT USE OF INSULIN, UNSPECIFIED WHETHER STAGE 3A OR 3B CKD (H): ICD-10-CM

## 2023-10-23 DIAGNOSIS — N18.30 TYPE 2 DIABETES MELLITUS WITH STAGE 3 CHRONIC KIDNEY DISEASE, WITH LONG-TERM CURRENT USE OF INSULIN, UNSPECIFIED WHETHER STAGE 3A OR 3B CKD (H): ICD-10-CM

## 2023-10-23 DIAGNOSIS — E11.8 TYPE 2 DIABETES MELLITUS WITH COMPLICATION, WITH LONG-TERM CURRENT USE OF INSULIN (H): ICD-10-CM

## 2023-10-23 DIAGNOSIS — Z79.4 TYPE 2 DIABETES MELLITUS WITH STAGE 3 CHRONIC KIDNEY DISEASE, WITH LONG-TERM CURRENT USE OF INSULIN, UNSPECIFIED WHETHER STAGE 3A OR 3B CKD (H): ICD-10-CM

## 2023-10-23 DIAGNOSIS — Z79.4 TYPE 2 DIABETES MELLITUS WITH COMPLICATION, WITH LONG-TERM CURRENT USE OF INSULIN (H): ICD-10-CM

## 2023-10-23 DIAGNOSIS — D69.6 THROMBOCYTOPENIA (H): ICD-10-CM

## 2023-10-23 DIAGNOSIS — Z95.0 S/P CARDIAC PACEMAKER PROCEDURE: ICD-10-CM

## 2023-10-23 DIAGNOSIS — I10 ESSENTIAL HYPERTENSION: ICD-10-CM

## 2023-10-23 DIAGNOSIS — F51.01 PRIMARY INSOMNIA: ICD-10-CM

## 2023-10-23 DIAGNOSIS — N18.5 CKD (CHRONIC KIDNEY DISEASE) STAGE 5, GFR LESS THAN 15 ML/MIN (H): Primary | ICD-10-CM

## 2023-10-23 DIAGNOSIS — D89.89 LIGHT CHAIN DISEASE, KAPPA TYPE (H): ICD-10-CM

## 2023-10-23 DIAGNOSIS — I25.118 ATHEROSCLEROTIC HEART DISEASE OF NATIVE CORONARY ARTERY WITH OTHER FORMS OF ANGINA PECTORIS (H): ICD-10-CM

## 2023-10-23 PROCEDURE — G0463 HOSPITAL OUTPT CLINIC VISIT: HCPCS

## 2023-10-23 PROCEDURE — 99214 OFFICE O/P EST MOD 30 MIN: CPT | Performed by: FAMILY MEDICINE

## 2023-10-23 RX ORDER — LISINOPRIL 2.5 MG/1
2.5 TABLET ORAL DAILY
Qty: 90 TABLET | Refills: 3 | Status: SHIPPED | OUTPATIENT
Start: 2023-10-23

## 2023-10-23 RX ORDER — ACYCLOVIR 400 MG/1
1 TABLET ORAL
Qty: 3 EACH | Refills: 11 | Status: SHIPPED | OUTPATIENT
Start: 2023-10-23 | End: 2024-09-30

## 2023-10-23 RX ORDER — ZOLPIDEM TARTRATE 10 MG/1
10 TABLET ORAL
Qty: 30 TABLET | Refills: 5 | Status: CANCELLED | OUTPATIENT
Start: 2023-10-23

## 2023-10-23 RX ORDER — METOPROLOL SUCCINATE 25 MG/1
TABLET, EXTENDED RELEASE ORAL
Qty: 45 TABLET | Refills: 3 | Status: SHIPPED | OUTPATIENT
Start: 2023-10-23

## 2023-10-23 RX ORDER — ISOSORBIDE MONONITRATE 60 MG/1
120 TABLET, EXTENDED RELEASE ORAL DAILY
Qty: 180 TABLET | Refills: 3 | Status: SHIPPED | OUTPATIENT
Start: 2023-10-23

## 2023-10-23 RX ORDER — PROCHLORPERAZINE 25 MG/1
SUPPOSITORY RECTAL
Qty: 9 EACH | Refills: 1 | Status: SHIPPED | OUTPATIENT
Start: 2023-10-23 | End: 2023-11-06 | Stop reason: ALTCHOICE

## 2023-10-23 RX ORDER — ROSUVASTATIN CALCIUM 40 MG/1
TABLET, COATED ORAL
Qty: 90 TABLET | Refills: 3 | Status: SHIPPED | OUTPATIENT
Start: 2023-10-23

## 2023-10-23 RX ORDER — ACYCLOVIR 400 MG/1
1 TABLET ORAL ONCE
Qty: 1 EACH | Refills: 0 | Status: SHIPPED | OUTPATIENT
Start: 2023-10-23 | End: 2023-10-23

## 2023-10-23 ASSESSMENT — PAIN SCALES - GENERAL: PAINLEVEL: NO PAIN (0)

## 2023-10-23 NOTE — NURSING NOTE
Patient is here for diabetic check, had labs done on Thursday.  States had diarrhea for months, has stopped and on Saturday has been having rectal bleeding.       Previous A1C is at goal of <8  Lab Results   Component Value Date    A1C 6.9 10/19/2023    A1C 7.2 03/30/2023    A1C 6.9 10/10/2022    A1C 6.8 12/03/2021    A1C 6.7 06/04/2021    A1C 7.0 12/17/2020    A1C Canceled, Test credited 11/03/2020    A1C 7.1 07/08/2020    A1C 6.8 04/20/2020     Urine Microalbumin/Creat:    Albumin Urine mg/L   Date Value Ref Range Status   10/11/2022 193.0 mg/L Final     Comment:     The reference ranges have not been established in urine albumin. The results should be integrated into the clinical context for interpretation.   04/20/2020 273 mg/L Final     Albumin Urine mg/g Cr   Date Value Ref Range Status   10/11/2022 85.40 (H) 0.00 - 25.00 mg/g Cr Final     Comment:     Microalbuminuria is defined as an albumin:creatinine ratio of 17 to 299 for males and 25 to 299 for females. A ratio of albumin:creatinine of 300 or higher is indicative of overt proteinuria.  Due to biologic variability, positive results should be confirmed by a second, first-morning random or 24-hour timed urine specimen. If there is discrepancy, a third specimen is recommended. When 2 out of 3 results are in the microalbuminuria range, this is evidence for incipient nephropathy and warrants increased efforts at glucose control, blood pressure control, and institution of therapy with an angiotensin-converting-enzyme (ACE) inhibitor (if the patient can tolerate it).     04/20/2020 176.13 (H) 0 - 25 mg/g Cr Final     Creatinine Urine   Date Value Ref Range Status   04/20/2020 155 mg/dL Final     Creatinine Urine mg/dL   Date Value Ref Range Status   10/11/2022 226.0 mg/dL Final     Comment:     The reference ranges have not been established in urine creatinine. The results should be integrated into the clinical context for interpretation.     Foot exam  4/2023  Eye exam 2/2023    Tobacco User no  Patient is on a daily aspirin  Patient is on a Statin.  Blood pressure today of:     BP Readings from Last 1 Encounters:   10/23/23 104/56      is at the goal of <139/89 for diabetics.    Ariella Rivera LPN on 10/23/2023 at 11:02 AM

## 2023-10-23 NOTE — PROGRESS NOTES
"  Assessment & Plan     Essential hypertension  Controlled, continue current regimen  - isosorbide mononitrate (IMDUR) 60 MG 24 hr tablet; Take 2 tablets (120 mg) by mouth daily  - lisinopril (ZESTRIL) 2.5 MG tablet; Take 1 tablet (2.5 mg) by mouth daily    Primary insomnia  She continues using Ambien occasionally so this was refilled for her.    CKD (chronic kidney disease) stage 5, GFR less than 15 ml/min (H)  Stable, continue with nephrology    Light chain disease, kappa type (H24)  Chronic    Thrombocytopenia (H24)  Chronic and stable    Type 2 diabetes mellitus with stage 3 chronic kidney disease, with long-term current use of insulin, unspecified whether stage 3a or 3b CKD (H)  Stable    Atherosclerotic heart disease of native coronary artery with other forms of angina pectoris (H24)  Stable    Type 2 diabetes mellitus with complication, with long-term current use of insulin (H)  Her hemoglobin A1c continues to be well controlled, continue current regimen.  She is interested in trying a Dexcom G7 so a new prescription was sent in to see if her insurance will cover that.  - rosuvastatin (CRESTOR) 40 MG tablet; TAKE 1 TABLET BY MOUTH EVERY DAY  - Continuous Blood Gluc  (DEXCOM G7 ) JOSÉ MIGUEL; 1 each once for 1 dose Use to read blood sugars as per manufacturers instructions  - Continuous Blood Gluc Sensor (DEXCOM G7 SENSOR) MISC; 1 each every 10 days Change sensor every 10 days    S/P cardiac pacemaker procedure  - metoprolol succinate ER (TOPROL XL) 25 MG 24 hr tablet; TAKE 1/2 TABLET BY MOUTH EVERY EVENING       BMI:   Estimated body mass index is 25.21 kg/m  as calculated from the following:    Height as of this encounter: 1.638 m (5' 4.5\").    Weight as of this encounter: 67.7 kg (149 lb 3.2 oz).   Weight management plan: Discussed healthy diet and exercise guidelines  Blood sugar testing frequency justification:  Frequent hypoglycemia and Risk of hypoglycemia with medication(s)      No follow-ups " "on file.    Sam Leal MD  [facility]    Subjective   Bailey is a 73 year old, presenting for the following health issues:  Diabetes      10/23/2023    10:58 AM   Additional Questions   Roomed by Ariella crawford     She has a history of type 2 diabetes which has historically been well controlled.  Her blood sugars continue to be quite labile throughout the day however she does not have significant hypoglycemia.    History of Present Illness       Diabetes:   She presents for follow up of diabetes.  She is checking home blood glucose four or more times daily.   She checks blood glucose before meals.  Blood glucose is sometimes over 200 and sometimes under 70. She is aware of hypoglycemia symptoms including none.    She has no concerns regarding her diabetes at this time.   She is not experiencing numbness or burning in feet, excessive thirst, blurry vision, weight changes or redness, sores or blisters on feet.           She eats 2-3 servings of fruits and vegetables daily.She consumes 0 sweetened beverage(s) daily.She exercises with enough effort to increase her heart rate 30 to 60 minutes per day.  She exercises with enough effort to increase her heart rate 3 or less days per week. She is missing 7 dose(s) of medications per week.                 Review of Systems         Objective    /56   Pulse 58   Temp 97.5  F (36.4  C) (Temporal)   Resp 18   Ht 1.638 m (5' 4.5\")   Wt 67.7 kg (149 lb 3.2 oz)   LMP  (LMP Unknown)   SpO2 100%   Breastfeeding No   BMI 25.21 kg/m    Body mass index is 25.21 kg/m .  Physical Exam   GENERAL: healthy, alert and no distress                      "

## 2023-10-31 ENCOUNTER — ALLIED HEALTH/NURSE VISIT (OUTPATIENT)
Dept: FAMILY MEDICINE | Facility: OTHER | Age: 73
End: 2023-10-31
Attending: FAMILY MEDICINE
Payer: COMMERCIAL

## 2023-10-31 DIAGNOSIS — Z79.4 TYPE 2 DIABETES MELLITUS WITH STAGE 3 CHRONIC KIDNEY DISEASE, WITH LONG-TERM CURRENT USE OF INSULIN, UNSPECIFIED WHETHER STAGE 3A OR 3B CKD (H): Primary | ICD-10-CM

## 2023-10-31 DIAGNOSIS — N18.30 TYPE 2 DIABETES MELLITUS WITH STAGE 3 CHRONIC KIDNEY DISEASE, WITH LONG-TERM CURRENT USE OF INSULIN, UNSPECIFIED WHETHER STAGE 3A OR 3B CKD (H): Primary | ICD-10-CM

## 2023-10-31 DIAGNOSIS — E11.22 TYPE 2 DIABETES MELLITUS WITH STAGE 3 CHRONIC KIDNEY DISEASE, WITH LONG-TERM CURRENT USE OF INSULIN, UNSPECIFIED WHETHER STAGE 3A OR 3B CKD (H): Primary | ICD-10-CM

## 2023-10-31 PROCEDURE — 95250 CONT GLUC MNTR PHYS/QHP EQP: CPT

## 2023-10-31 PROCEDURE — G0463 HOSPITAL OUTPT CLINIC VISIT: HCPCS

## 2023-10-31 NOTE — PROGRESS NOTES
Patient and grandchild here for Dexcom G7 placement. Sensor applied to LUE without issues. Education provided for patient and grandchild, both verbalized understanding. Patient states she will cancel appointment with DM educator since she was only coming in to have this placed.     DEVIN COPELAND RN on 10/31/2023 at 11:13 AM

## 2023-11-01 ENCOUNTER — TRANSFERRED RECORDS (OUTPATIENT)
Dept: MULTI SPECIALTY CLINIC | Facility: CLINIC | Age: 73
End: 2023-11-01

## 2023-11-01 LAB — RETINOPATHY: NORMAL

## 2023-11-06 ENCOUNTER — ALLIED HEALTH/NURSE VISIT (OUTPATIENT)
Dept: EDUCATION SERVICES | Facility: OTHER | Age: 73
End: 2023-11-06
Payer: COMMERCIAL

## 2023-11-06 DIAGNOSIS — N18.30 TYPE 2 DIABETES MELLITUS WITH STAGE 3 CHRONIC KIDNEY DISEASE, WITH LONG-TERM CURRENT USE OF INSULIN, UNSPECIFIED WHETHER STAGE 3A OR 3B CKD (H): Primary | ICD-10-CM

## 2023-11-06 DIAGNOSIS — E11.22 TYPE 2 DIABETES MELLITUS WITH STAGE 3 CHRONIC KIDNEY DISEASE, WITH LONG-TERM CURRENT USE OF INSULIN, UNSPECIFIED WHETHER STAGE 3A OR 3B CKD (H): Primary | ICD-10-CM

## 2023-11-06 DIAGNOSIS — Z79.4 TYPE 2 DIABETES MELLITUS WITH STAGE 3 CHRONIC KIDNEY DISEASE, WITH LONG-TERM CURRENT USE OF INSULIN, UNSPECIFIED WHETHER STAGE 3A OR 3B CKD (H): Primary | ICD-10-CM

## 2023-11-06 PROCEDURE — G0108 DIAB MANAGE TRN  PER INDIV: HCPCS | Performed by: REGISTERED NURSE

## 2023-11-06 NOTE — PATIENT INSTRUCTIONS
Diabetes Goals and Reminders    Your A1c test should be done every 3 months.  Your goal is between 6.5 - 7.5%.   Your last result is:  Lab Results   Component Value Date    A1C 6.9 10/19/2023    A1C 6.7 06/04/2021       Your LDL cholesterol test should be done at least once a year.  Take a statin, if prescribed by your doctor, based on age and risk factors.  Your last result is:  LDL Cholesterol Calculated   Date Value Ref Range Status   03/30/2023 73 <=100 mg/dL Final   06/04/2021 71 <100 mg/dL Final     Comment:     Desirable:       <100 mg/dl       Have blood pressure and weight checked every three months.  Your blood pressure goal is 130/80 or less.  Your last blood pressure reading was:   BP Readings from Last 1 Encounters:   10/23/23 104/56       Use your CGM to check sensor glucose readings a minimum of 4 times per day.  Your home glucose target ranges are:  Before meals:    2 hours after a meal:  Less than 180  Bedtime:  100-140 mg/dL      Avoid all tobacco.  Follow your healthy diet and exercise plan.  See the eye doctor every year.  See the dentist every six months.  Have kidney function tested yearly.    Take all medicine as prescribed.  Please let me know if you are having symptoms you don t expect or if you wish to stop any medicine.    Follow Up Plan  Please call or visit Diabetes Education if blood sugars are consistently out of target.  Your future lab plan is:  HgA1c in January 2023.    If you need your cholesterol checked at your next appointment, you should fast 8 to 10 hours before your appointment.  Do not eat or drink anything besides water.  Drink plenty of water and take all your usual medicine.    SUMMARY FOR TODAY'S VISIT    1.  Continue using CGM for testing blood sugar daily.    2. Discussed carbohydrate counting using the Plate Method for portion control.  Reviewed balanced diet, food groups and incorporating variety, including fruits/vegetables/whole grains/lean protein/nuts and  seeds into meals.      A key strategy in this plan is, along with medication need, to participate in low to moderate physical activity, such as walking, working up to a minimum of 30 minutes most days, as tolerated.       3.  Reviewed sensor placement for new Dexcom G7 CGM.   Please see training guide for more information.    4.  Treatment options:  No new medication changes at this time.  Try consuming 30 grams of carb (2 servings on your chart) for every 1 - 2 units of Novolog at the meal.  Add the extra Novolog for high blood sugar, as usual.    5.  Please follow-up for continued diabetes education, as needed.       Hazel Del Rio RN, BSN, [facility]  11/6/2023 11:08 AM

## 2023-11-07 NOTE — PROGRESS NOTES
Diabetes Self-Management Education & Support    Presents for: CGM Review    Type of Service: In Person Visit      ASSESSMENT:  REPORTS:    -----------------------------  Dexcom Clarity  -----------------------------  Bailey Sierra  YOB: 1950  Generated at: Mon, Nov 6, 2023 10:23 AM CST  Reporting period: Tue Oct 24, 2023 - Mon Nov 6, 2023  -----------------------------  Glucose Details  Average glucose: 157 mg/dL  Standard deviation: 54 mg/dL  GMI: N/A  -----------------------------  Time in Range  Very High: 6%  High: 30%  In Range: 61%  Low: 2%  Very Low: 1%    Target Range  Day (6:00 AM - 10:00 PM):  mg/dL  Night (10:00 PM - 6:00 AM):  mg/dL  -----------------------------  CGM Details  Sensor usage: 43%  Days with CGM data: 6/14    Bailey was instructed in features of DEXCOM G7 CGMS.  She was instructed in programming events for meds, CHO, exercise and medications.  She will wear the sensor for 10 days and if she would like, can download at home using Dexcom Clarity software found at www.dexcom.com.       Patient states understanding for sensor insertion.   Helped patient program review .         Pt verbalized understanding of concepts discussed and recommendations provided today.       Continue education with the following diabetes management concepts: Monitoring, Taking Medication, and Problem Solving      PLAN    Discussed meal planning and Healthy Meals planner given.  Recommend patient try consuming 30 grams of carb (2 servings on your chart) for every 1 - 2 units of Novolog at the meal.  Add the extra Novolog for high blood sugar, as usual.    Topics to cover at upcoming visits: Problem Solving and Reducing Risks    Follow-up: TBD per patient schedule.     See Care Plan for co-developed, patient-state behavior change goals.  AVS provided for patient today.    Education Materials Provided:  Planning Healthy Meals, Activity Pyramid, Dexcom G7 for  guide.   "      SUBJECTIVE/OBJECTIVE:  Presents for: CGM Review  Accompanied by: Self, Daughter  Diabetes education in the past 24mo: No  Focus of Visit: CGM, Problem Solving, Healthy Eating  Type of CGM visit: Personal CGM Follow-up  Diabetes type: Type 2  Disease course: Stable  How confident are you filling out medical forms by yourself:: A little bit  Cultural Influences/Ethnic Background:  Not  or       Diabetes Symptoms & Complications:  Fatigue: No  Neuropathy: No  Polydipsia: No  Polyphagia: No  Polyuria: No  Visual change: No  Slow healing wounds: Sometimes  Autonomic neuropathy: No  CVA: No  Heart disease: Yes  Nephropathy: Yes  Peripheral neuropathy: No  Peripheral Vascular Disease: No  Retinopathy: No  Sexual dysfunction: No    Patient Problem List and Family Medical History reviewed for relevant medical history, current medical status, and diabetes risk factors.    Vitals:  LMP  (LMP Unknown)   Estimated body mass index is 25.21 kg/m  as calculated from the following:    Height as of 10/23/23: 1.638 m (5' 4.5\").    Weight as of 10/23/23: 67.7 kg (149 lb 3.2 oz).   Last 3 BP:   BP Readings from Last 3 Encounters:   10/23/23 104/56   09/20/23 100/56   04/17/23 108/52       History   Smoking Status    Never   Smokeless Tobacco    Never       Labs:  Lab Results   Component Value Date    A1C 6.9 10/19/2023    A1C 6.7 06/04/2021     Lab Results   Component Value Date     03/30/2023     08/22/2021     07/07/2021     Lab Results   Component Value Date    LDL 73 03/30/2023    LDL 71 06/04/2021     HDL Cholesterol   Date Value Ref Range Status   06/04/2021 44 23 - 92 mg/dL Final     Direct Measure HDL   Date Value Ref Range Status   03/30/2023 68 >=50 mg/dL Final   ]  GFR Estimate   Date Value Ref Range Status   03/30/2023 21 (L) >60 mL/min/1.73m2 Final     Comment:     eGFR calculated using 2021 CKD-EPI equation.   07/07/2021 12 (L) >60 mL/min/[1.73_m2] Final     GFR Estimate If Black " "  Date Value Ref Range Status   07/07/2021 15 (L) >60 mL/min/[1.73_m2] Final     Lab Results   Component Value Date    CR 2.33 03/30/2023    CR 3.66 07/07/2021     No results found for: \"MICROALBUMIN\"    Healthy Eating:  Healthy Eating Assessed Today: Yes  Cultural/Religion diet restrictions?: No  Meal planning/habits: Other  How many times a week on average do you eat food made away from home (restaurant/take-out)?: 0  Meals include: Breakfast, Lunch, Dinner, Evening Snack  Beverages: Water, Coffee    Being Active:  Days per week of moderate to strenuous exercise (like a brisk walk): (P) 0  On average, minutes per day of exercise at this level: (P) 10  How intense was your typical exercise? : (P) Light (like stretching or slow walking)  Exercise Minutes per Week: (P) 0  Barrier to exercise: None    Monitoring:  Blood Glucose Meter: Other  Times checking blood sugar at home (number): 4  Times checking blood sugar at home (per): Day  Blood glucose trend: Other      Taking Medications:  Diabetes Medication(s)       Diabetic Other       glucose (BD GLUCOSE) 4 g chewable tablet    Take 1 tablet by mouth every hour as needed for low blood sugar      Insulin       insulin aspart (NOVOLOG FLEXPEN) 100 UNIT/ML pen    Inject 5 Units Subcutaneous 3 times daily (with meals)     insulin glargine (BASAGLAR KWIKPEN) 100 UNIT/ML pen    INJECT 25 UNITS SUBCUTANEOUS AT BEDTIME            Current Treatments: Insulin Injections  Dose schedule: (P) Pre-breakfast, Pre-dinner, At bedtime  Given by: (P) Patient  Injection/Infusion sites: (P) Abdomen    Problem Solving:                 Reducing Risks:  CAD Risks: Diabetes Mellitus, Dyslipidemia, Hypertension  Has dilated eye exam at least once a year?: Yes  Sees dentist every 6 months?: No  Feet checked by healthcare provider in the last year?: Yes    Healthy Coping:  Informal Support system:: Children, Friends  Stage of change: ACTION (Actively working towards change)  Support resources: " Offerings in [facility]  Patient Activation Measure Survey Score:       No data to display                  Care Plan and Education Provided:  Care Plan: Diabetes   Updates made by Hazel Del Rio RN since 11/6/2023 12:00 AM        Problem: HbA1C Not In Goal         Goal: Establish Regular Follow-Ups with PCP         Task: Discuss with PCP the recommended timing for patient's next follow up visit(s)    Responsible User: Hazel Del Rio RN        Task: Discuss schedule for PCP visits with patient Completed 11/6/2023   Responsible User: Hazel Del Rio RN        Goal: Get HbA1C Level in Goal         Task: Educate patient on diabetes education self-management topics Completed 11/6/2023   Responsible User: Hazel Del Rio RN        Task: Educate patient on benefits of regular glucose monitoring Completed 11/6/2023   Responsible User: Hazel Del Rio RN        Task: Refer patient to appropriate extended care team member, as needed (Medication Therapy Management, Behavioral Health, Physical Therapy, etc.)    Responsible User: Hazel Del Rio RN        Task: Discuss diabetes treatment plan with patient Completed 11/6/2023   Responsible User: Hazel Del Rio RN        Problem: Diabetes Self-Management Education Needed to Optimize Self-Care Behaviors         Goal: Understand diabetes pathophysiology and disease progression         Task: Provide education on diabetes pathophysiology and disease progression specfic to patient's diabetes type Completed 11/6/2023   Responsible User: Hazel Del Rio RN        Goal: Healthy Eating - follow a healthy eating pattern for diabetes         Task: Provide education on portion control and consistency in amount, composition and timing of food intake    Responsible User: Hazel Del Rio RN        Task: Provide education on managing carbohydrate intake (carbohydrate counting, plate planning method, etc.) Completed 11/6/2023   Responsible User: Hazel Del Rio RN         Task: Provide education on weight management    Responsible User: Hazel Del Rio RN        Task: Provide education on heart healthy eating    Responsible User: Hazel Del Rio RN        Task: Provide education on eating out    Responsible User: Hazel Del Rio RN        Task: Develop individualized healthy eating plan with patient    Responsible User: Hazel Del Rio RN        Goal: Being Active - get regular physical activity, working up to at least 150 minutes per week         Task: Provide education on relationship of activity to glucose and precautions to take if at risk for low glucose Completed 11/6/2023   Responsible User: Hazel Del Rio RN        Task: Discuss barriers to physical activity with patient    Responsible User: Hazel Del Rio RN        Task: Develop physical activity plan with patient    Responsible User: Hazel Del Rio RN        Task: Explore community resources including walking groups, assistance programs, and home videos    Responsible User: Hazel Del Rio RN        Goal: Monitoring - monitor glucose and ketones as directed         Task: Provide education on blood glucose monitoring (purpose, proper technique, frequency, glucose targets, interpreting results, when to use glucose control solution, sharps disposal)    Responsible User: Hazel Del Rio RN        Task: Provide education on continuous glucose monitoring (sensor placement, use of jb or /reader, understanding glucose trends, alerts and alarms, differences between sensor glucose and blood glucose) Completed 11/6/2023   Responsible User: Hazel Del Rio RN        Task: Provide education on ketone monitoring (when to monitor, frequency, etc.)    Responsible User: Hazel Del Rio RN        Goal: Taking Medication - patient is consistently taking medications as directed         Task: Provide education on action of prescribed medication, including when to take and possible side effects Completed 11/6/2023    Responsible User: Hazel Del Rio RN        Task: Provide education on insulin and injectable diabetes medications, including administration, storage, site selection and rotation for injection sites Completed 11/6/2023   Responsible User: Hazel Del Rio RN        Task: Discuss barriers to medication adherence with patient and provide management technique ideas as appropriate    Responsible User: Hazel Del Rio RN        Task: Provide education on frequency and refill details of medications    Responsible User: Hazel Del Rio RN        Goal: Problem Solving - know how to prevent and manage short-term diabetes complications         Task: Provide education on high blood glucose - causes, signs/symptoms, prevention and treatment Completed 11/6/2023   Responsible User: Hazel Del Rio RN        Task: Provide education on low blood glucose - causes, signs/symptoms, prevention, treatment, carrying a carbohydrate source at all times, and medical identification Completed 11/6/2023   Responsible User: Hazel Del Rio RN        Task: Provide education on safe travel with diabetes    Responsible User: Hazel Del Rio RN        Task: Provide education on how to care for diabetes on sick days    Responsible User: Hazel Del Rio RN        Task: Provide education on when to call a health care provider    Responsible User: Hazel Del Rio RN        Goal: Reducing Risks - know how to prevent and treat long-term diabetes complications         Task: Provide education on major complications of diabetes, prevention, early diagnostic measures and treatment of complications Completed 11/6/2023   Responsible User: Hazel Del Rio RN        Task: Provide education on recommended care for dental, eye and foot health    Responsible User: Hazel Del Rio RN        Task: Provide education on Hemoglobin A1c - goals and relationship to blood glucose levels Completed 11/6/2023   Responsible User: Hazel Del Rio RN        Task:  Provide education on recommendations for heart health - lipid levels and goals, blood pressure and goals, and aspirin therapy, if indicated    Responsible User: Hazel Del Rio RN        Task: Provide education on tobacco cessation    Responsible User: Hazel Del Rio RN        Goal: Healthy Coping - use available resources to cope with the challenges of managing diabetes         Task: Discuss recognizing feelings about having diabetes    Responsible User: Hazel Del Rio RN        Task: Provide education on the benefits of making appropriate lifestyle changes Completed 11/6/2023   Responsible User: Hazel Del Rio RN        Task: Provide education on benefits of utilizing support systems    Responsible User: Hazel Del Rio RN        Task: Discuss methods for coping with stress    Responsible User: Hazel Del Rio RN        Task: Provide education on when to seek professional counseling    Responsible User: Hazel Del Rio RN Suzette Childs, RN, BSN, [facility]  11/6/2023 10:49 AM     Time Spent: 60 minutes  Encounter Type: Individual    Any diabetes medication dose changes were made via the CDE Protocol per the patient's primary care provider. A copy of this encounter was shared with the provider.

## 2023-11-29 DIAGNOSIS — N39.41 URGE INCONTINENCE: ICD-10-CM

## 2023-11-29 NOTE — TELEPHONE ENCOUNTER
Reason for call: Medication or medication refill    Name of medication requested: depends    How many days of medication do you have left? FIVE    What pharmacy do you use? [facility]    Preferred method for responding to this message: Telephone Call    Phone number patient can be reached at: Cell number on file:    Telephone Information:   Mobile [phone]       If we cannot reach you directly, may we leave a detailed response at the number you provided? Yes      Patient stated the pharmacy has yet to receive a reply.      Maribell Harden on 11/29/2023 at 2:02 PM

## 2023-12-04 DIAGNOSIS — N39.41 URGE INCONTINENCE: ICD-10-CM

## 2023-12-04 NOTE — TELEPHONE ENCOUNTER
Reason for call: Medication or medication refill    Name of medication requested: Depends    How many days of medication do you have left? 2    What pharmacy do you use? [facility]    Preferred method for responding to this message: Telephone Call    Phone number patient can be reached at: Home number on file [phone] (home)    If we cannot reach you directly, may we leave a detailed response at the number you provided? Yes    Patient states 2nd call and pharmacy states they sent over- she has 2 days left of depends

## 2023-12-04 NOTE — TELEPHONE ENCOUNTER
"Incontinence Supply Disposable MISC 150 each 11 2023  No   Sig: Depend Fit-flex [facility] #85694/LARGE 17/PP DX\" [facility]:  #136/8PKGS/27DS   Sent to pharmacy as: Incontinence Supply Disposable   Class: E-Prescribe   Order: [id number]   E-Prescribing Status: Receipt confirmed by pharmacy (2023  2:19 PM CST)     [id number] - [place], [place] - [address]     Called and spoke with Mica at [facility], after verifying Pt's last name and . She confirmed receipt of prescription and verbalized plan to get this ready for Pt. Pretty Rodríguez RN .............. 2023  10:39 AM    "

## 2023-12-16 ENCOUNTER — HEALTH MAINTENANCE LETTER (OUTPATIENT)
Age: 73
End: 2023-12-16

## 2024-01-01 NOTE — NURSING NOTE
"Chief Complaint   Patient presents with     Follow Up     Discuss interstim  replacement       Medication reconciliation completed.    Review of Systems:    Weight loss:    yes     Recent fever/chills:  No   Night sweats:   No  Current skin rash:  No   Recent hair loss:  No  Heat intolerance:  No   Cold intolerance:  No  Chest pain:   No   Palpitations:   No  Shortness of breath:  No   Wheezing:   No  Constipation:    No   Diarrhea:   No   Nausea:   No   Vomiting:   No   Kidney/side pain:  No   Back pain:   No  Frequent headaches:  No   Dizziness:     No  Leg swelling:   No   Calf pain:    No        Initial /68 (BP Location: Left arm, Patient Position: Sitting, Cuff Size: Adult Regular)   Pulse 70   Temp 98  F (36.7  C) (Temporal)   Resp 16   LMP  (LMP Unknown)   SpO2 97%   Breastfeeding No  Estimated body mass index is 26.51 kg/m  as calculated from the following:    Height as of 12/13/21: 1.651 m (5' 5\").    Weight as of an earlier encounter on 10/10/22: 72.3 kg (159 lb 4.8 oz).       Di Marquez LPN .......  10/10/2022  11:55 AM  " [phone]

## 2024-01-22 NOTE — PROGRESS NOTES
Assessment & Plan     1. Gross hematuria  Chronic, persistent.  Described symptoms are suggestive of gross hematuria as symptoms are only noticed after urination.  Offered  exam which patient deferred given no vaginal concerns.  Patient is status post total hysterectomy.  Will start with UA, patient unable to leave sample in clinic so kit was sent home.  Will notify with results and likely follow-up with urology. Patient is due to establish with neurology for ongoing management since InterStim placement in 10/22 for urge incontinence.   - UA Macroscopic with reflex to Microscopic and Culture    2. CKD (chronic kidney disease) stage 5, GFR less than 15 ml/min (H)  Chronic, stable. Following regularly with nephrology.     3. Urge incontinence  Chronic, InterStim placement previously as above. Will schedule follow up to establish with urology for ongoing management.     No follow-ups on file.    April Avalos is a 73 year old, presenting for the following health issues:  Vaginal Bleeding    History of Present Illness       Reason for visit:  Bleeding    She eats 4 or more servings of fruits and vegetables daily.She consumes 0 sweetened beverage(s) daily.She exercises with enough effort to increase her heart rate 10 to 19 minutes per day.  She exercises with enough effort to increase her heart rate 3 or less days per week.   She is taking medications regularly.     Here for evaluation of what patient thought was vaginal bleeding.  Patient reports she has noticed on and off right red blood in the toilet bowl after urinating for the past couple months.  Reports she initially told her PCP about this at visit in October and he briefly mentioned she may be struggling with hemorrhoids.  Patient reports she only notices the symptoms after urination.  Is not noticing any blood in stool or on toilet paper after bowel movements.  She is not noticing any blood on toilet paper after wiping with urination.  Has not noticed  any blood in underwear or any other time during the day other than with urination.  No associated dysuria, flank pain, urgency or frequency, abdominal pain, diarrhea, constipation.  Has not noticed any blood in stool.  Patient is status post total hysterectomy.  No associated fever/chills, vaginal itching or burning, discharge changes.  No STD concerns.      PAST MEDICAL HISTORY:   Past Medical History:   Diagnosis Date    Atherosclerotic heart disease of native coronary artery without angina pectoris     No Comments Provided    Cardiac murmur     No Comments Provided    Chronic kidney disease, stage III (moderate) (H)     No Comments Provided    Controlled type 2 diabetes mellitus with stage 3 chronic kidney disease, with long-term current use of insulin (H) 7/26/2013    COVID-19 virus detected 2/23/2021    Edema     No Comments Provided    Essential (primary) hypertension     No Comments Provided    Gastro-esophageal reflux disease without esophagitis     No Comments Provided    Hyperlipidemia     No Comments Provided    Impingement syndrome of right shoulder 03/10/2017         Insomnia 01/25/2012         Iron deficiency anemia     No Comments Provided    Neuromuscular dysfunction of bladder     No Comments Provided    Osteoarthritis     No Comments Provided    Other shoulder lesions, right shoulder 03/10/2017         Other shoulder lesions, unspecified shoulder     No Comments Provided    Other specified postprocedural states 04/19/2017         Personal history of other medical treatment (CODE)     Three childbirths    Plantar fascial fibromatosis     No Comments Provided    Presence of aortocoronary bypass graft 07/2013    [facility]    Primary osteoarthritis of shoulder 03/10/2017         Type 2 diabetes mellitus without complications (H)     No Comments Provided    Urgency of urination 03/08/2011         Uterovaginal prolapse     No Comments Provided       PAST SURGICAL HISTORY:   Past Surgical History:    Procedure Laterality Date    ARTHROSCOPY SHOULDER Right 1997         ARTHROSCOPY SHOULDER Left 2012         ARTHROSCOPY SHOULDER RT/LT Right [id number]         Cardiac Bypass  07/2013         COLONOSCOPY  11/24/2009 11/24/2009    CYSTOSCOPY  03/15/2001    DENTAL SURGERY      Dental extractions    ENDOSCOPIC RETROGRADE CHOLANGIOPANCREATOGRAPHY  05/2008    Bile leak with ERCP and stenting and drainage of bile collection through abdominal wall.    ESOPHAGOSCOPY, GASTROSCOPY, DUODENOSCOPY (EGD), COMBINED N/A 6/23/2020    [facility] follow up 5 years, 6/23/2025    HYSTERECTOMY TOTAL ABDOMINAL, BILATERAL SALPINGO-OOPHORECTOMY, COMBINED  04/02/2001    Vag vault suspension with Cortex mesh    IMPLANT STIMULATOR AND LEADS SACRAL NERVE (STAGE ONE AND TWO) N/A 5/7/2019    Procedure: Interstim Implant Revision, GENERATOR AND TYING LEAD EXCHANGE;  Surgeon: Honorio Bowling MD;  Location: [facility] OR    IMPLANT STIMULATOR AND LEADS SACRAL NERVE (STAGE ONE AND TWO) N/A 10/18/2022    Procedure: InterStim nonrechargeable generator and tined lead revision;  Surgeon: Honorio Bowling MD;  Location: [facility] OR    Interstim Device Placement  04/14/2014    Dr. Bowling - [facility]    LAMINECTOMY LUMBAR ONE LEVEL  1997         LAPAROSCOPIC CHOLECYSTECTOMY  04/2008         LAPAROSCOPIC TUBAL LIGATION      No Comments Provided       FAMILY HISTORY:   Family History   Problem Relation Age of Onset    Other - See Comments Father         MI    Cancer Mother         Cancer,Some type of cancer, aneurysm    Diabetes Paternal Grandmother         Diabetes,Type II    Cancer Sister         Cancer,unknown type    Diabetes Sister         Diabetes,Type II    Diabetes Sister         Diabetes,Type II    Diabetes Sister         Diabetes,Type II    Other - See Comments Daughter         depression    Other - See Comments Daughter         ovarian cancer and depression    Heart Disease Brother         Heart Disease,CAD, MI       SOCIAL HISTORY:   Social History     Tobacco Use     "Smoking status: Never     Passive exposure: Past    Smokeless tobacco: Never   Substance Use Topics    Alcohol use: No     Alcohol/week: 0.0 standard drinks of alcohol      No Known Allergies  Current Outpatient Medications   Medication    ammonium lactate (LAC-HYDRIN) 12 % external lotion    aspirin (ASPIRIN ADULT LOW STRENGTH) 81 MG EC tablet    blood glucose (TRICIA CONTOUR) test strip    cholecalciferol 50 MCG (2000 UT) tablet    Continuous Blood Gluc Sensor (DEXCOM G7 SENSOR) MISC    famotidine (PEPCID) 40 MG tablet    GAVILAX 17 GM/SCOOP powder    HANSA-ISMAEL 8.6 MG tablet    glucose (BD GLUCOSE) 4 g chewable tablet    Incontinence Supply Disposable MISC    insulin aspart (NOVOLOG FLEXPEN) 100 UNIT/ML pen    insulin glargine (BASAGLAR KWIKPEN) 100 UNIT/ML pen    insulin pen needle (B-D U/F) 31G X 8 MM miscellaneous    isosorbide mononitrate (IMDUR) 60 MG 24 hr tablet    lisinopril (ZESTRIL) 2.5 MG tablet    metoprolol succinate ER (TOPROL XL) 25 MG 24 hr tablet    nitroGLYcerin (NITROSTAT) 0.4 MG sublingual tablet    ondansetron (ZOFRAN) 4 MG tablet    order for DME    rosuvastatin (CRESTOR) 40 MG tablet    Vitamin D3 50 mcg (2000 units) tablet    zolpidem (AMBIEN) 10 MG tablet     No current facility-administered medications for this visit.         Review of Systems  Per HPI        Objective    /52   Pulse 65   Temp (!) 96.2  F (35.7  C)   Resp 12   Ht 1.638 m (5' 4.5\")   Wt 69 kg (152 lb 3.2 oz)   LMP  (LMP Unknown)   SpO2 98%   BMI 25.72 kg/m    Body mass index is 25.72 kg/m .  Physical Exam   General: Pleasant, in no apparent distress.  : Patient deferred.   Psych: Appropriate mood and affect.      Signed Electronically by: Mahogany Guerrero PA-C    "

## 2024-01-26 ENCOUNTER — OFFICE VISIT (OUTPATIENT)
Dept: FAMILY MEDICINE | Facility: OTHER | Age: 74
End: 2024-01-26
Attending: PHYSICIAN ASSISTANT
Payer: COMMERCIAL

## 2024-01-26 VITALS
HEART RATE: 65 BPM | HEIGHT: 65 IN | DIASTOLIC BLOOD PRESSURE: 52 MMHG | RESPIRATION RATE: 12 BRPM | SYSTOLIC BLOOD PRESSURE: 124 MMHG | OXYGEN SATURATION: 98 % | WEIGHT: 152.2 LBS | TEMPERATURE: 96.2 F | BODY MASS INDEX: 25.36 KG/M2

## 2024-01-26 DIAGNOSIS — N39.41 URGE INCONTINENCE: ICD-10-CM

## 2024-01-26 DIAGNOSIS — N18.5 CKD (CHRONIC KIDNEY DISEASE) STAGE 5, GFR LESS THAN 15 ML/MIN (H): ICD-10-CM

## 2024-01-26 DIAGNOSIS — R31.0 GROSS HEMATURIA: Primary | ICD-10-CM

## 2024-01-26 PROCEDURE — 81001 URINALYSIS AUTO W/SCOPE: CPT | Mod: ZL | Performed by: PHYSICIAN ASSISTANT

## 2024-01-26 PROCEDURE — 87086 URINE CULTURE/COLONY COUNT: CPT | Mod: ZL | Performed by: PHYSICIAN ASSISTANT

## 2024-01-26 PROCEDURE — G0463 HOSPITAL OUTPT CLINIC VISIT: HCPCS

## 2024-01-26 PROCEDURE — 99213 OFFICE O/P EST LOW 20 MIN: CPT | Performed by: PHYSICIAN ASSISTANT

## 2024-01-26 ASSESSMENT — PAIN SCALES - GENERAL: PAINLEVEL: NO PAIN (0)

## 2024-01-26 NOTE — NURSING NOTE
Patient presents to clinic with concerns about vaginal bleeding on/off. She states that she only sees the blood in the toilet.  Debra Pierson, CARLEY ....................  1/26/2024   12:19 PM  EXT [id number]

## 2024-01-30 ENCOUNTER — APPOINTMENT (OUTPATIENT)
Dept: LAB | Facility: OTHER | Age: 74
End: 2024-01-30
Attending: PHYSICIAN ASSISTANT
Payer: COMMERCIAL

## 2024-01-30 LAB
ALBUMIN UR-MCNC: 100 MG/DL
APPEARANCE UR: CLEAR
BACTERIA #/AREA URNS HPF: ABNORMAL /HPF
BILIRUB UR QL STRIP: NEGATIVE
COLOR UR AUTO: YELLOW
GLUCOSE UR STRIP-MCNC: NEGATIVE MG/DL
HGB UR QL STRIP: ABNORMAL
KETONES UR STRIP-MCNC: NEGATIVE MG/DL
LEUKOCYTE ESTERASE UR QL STRIP: ABNORMAL
MUCOUS THREADS #/AREA URNS LPF: PRESENT /LPF
NITRATE UR QL: NEGATIVE
PH UR STRIP: 6.5 [PH] (ref 5–9)
RBC URINE: 36 /HPF
SP GR UR STRIP: 1.02 (ref 1–1.03)
SQUAMOUS EPITHELIAL: 1 /HPF
UROBILINOGEN UR STRIP-MCNC: NORMAL MG/DL
WBC CLUMPS #/AREA URNS HPF: PRESENT /HPF
WBC URINE: >182 /HPF

## 2024-01-31 LAB — BACTERIA UR CULT: NORMAL

## 2024-03-04 DIAGNOSIS — R31.9 HEMATURIA: Primary | ICD-10-CM

## 2024-03-05 DIAGNOSIS — Z79.4 TYPE 2 DIABETES MELLITUS WITH STAGE 3 CHRONIC KIDNEY DISEASE, WITH LONG-TERM CURRENT USE OF INSULIN, UNSPECIFIED WHETHER STAGE 3A OR 3B CKD (H): Primary | ICD-10-CM

## 2024-03-05 DIAGNOSIS — E11.22 TYPE 2 DIABETES MELLITUS WITH STAGE 3 CHRONIC KIDNEY DISEASE, WITH LONG-TERM CURRENT USE OF INSULIN, UNSPECIFIED WHETHER STAGE 3A OR 3B CKD (H): Primary | ICD-10-CM

## 2024-03-05 DIAGNOSIS — N18.30 TYPE 2 DIABETES MELLITUS WITH STAGE 3 CHRONIC KIDNEY DISEASE, WITH LONG-TERM CURRENT USE OF INSULIN, UNSPECIFIED WHETHER STAGE 3A OR 3B CKD (H): Primary | ICD-10-CM

## 2024-03-07 ENCOUNTER — VIRTUAL VISIT (OUTPATIENT)
Dept: UROLOGY | Facility: OTHER | Age: 74
End: 2024-03-07
Attending: UROLOGY
Payer: COMMERCIAL

## 2024-03-07 ENCOUNTER — LAB (OUTPATIENT)
Dept: LAB | Facility: OTHER | Age: 74
End: 2024-03-07
Attending: UROLOGY
Payer: COMMERCIAL

## 2024-03-07 VITALS
SYSTOLIC BLOOD PRESSURE: 102 MMHG | TEMPERATURE: 98.2 F | DIASTOLIC BLOOD PRESSURE: 58 MMHG | RESPIRATION RATE: 16 BRPM | HEART RATE: 65 BPM | OXYGEN SATURATION: 99 %

## 2024-03-07 DIAGNOSIS — R31.0 GROSS HEMATURIA: ICD-10-CM

## 2024-03-07 DIAGNOSIS — R31.21 ASYMPTOMATIC MICROSCOPIC HEMATURIA: Primary | ICD-10-CM

## 2024-03-07 DIAGNOSIS — N92.6 ABNORMAL MENSTRUAL PERIODS: ICD-10-CM

## 2024-03-07 DIAGNOSIS — R31.9 HEMATURIA: ICD-10-CM

## 2024-03-07 LAB
ALBUMIN UR-MCNC: 70 MG/DL
APPEARANCE UR: ABNORMAL
BACTERIA #/AREA URNS HPF: ABNORMAL /HPF
BILIRUB UR QL STRIP: NEGATIVE
COLOR UR AUTO: YELLOW
GLUCOSE UR STRIP-MCNC: 100 MG/DL
HGB UR QL STRIP: ABNORMAL
KETONES UR STRIP-MCNC: NEGATIVE MG/DL
LEUKOCYTE ESTERASE UR QL STRIP: ABNORMAL
MUCOUS THREADS #/AREA URNS LPF: PRESENT /LPF
NITRATE UR QL: NEGATIVE
PH UR STRIP: 5.5 [PH] (ref 5–9)
RBC URINE: 42 /HPF
SP GR UR STRIP: 1.02 (ref 1–1.03)
SQUAMOUS EPITHELIAL: 2 /HPF
UROBILINOGEN UR STRIP-MCNC: NORMAL MG/DL
WBC CLUMPS #/AREA URNS HPF: PRESENT /HPF
WBC URINE: 121 /HPF

## 2024-03-07 PROCEDURE — 81003 URINALYSIS AUTO W/O SCOPE: CPT | Mod: ZL

## 2024-03-07 PROCEDURE — 99202 OFFICE O/P NEW SF 15 MIN: CPT | Performed by: UROLOGY

## 2024-03-07 PROCEDURE — 88112 CYTOPATH CELL ENHANCE TECH: CPT

## 2024-03-07 PROCEDURE — 51798 US URINE CAPACITY MEASURE: CPT | Mod: GT | Performed by: UROLOGY

## 2024-03-07 PROCEDURE — G0463 HOSPITAL OUTPT CLINIC VISIT: HCPCS | Mod: 25,GT

## 2024-03-07 PROCEDURE — 81001 URINALYSIS AUTO W/SCOPE: CPT | Mod: ZL

## 2024-03-07 RX ORDER — INSULIN ASPART INJECTION 100 [IU]/ML
INJECTION, SOLUTION SUBCUTANEOUS
Qty: 15 ML | Refills: 12 | Status: SHIPPED | OUTPATIENT
Start: 2024-03-07

## 2024-03-07 NOTE — NURSING NOTE
"Chief Complaint   Patient presents with    Consult For     Hematuria       Initial /58   Pulse 65   Temp 98.2  F (36.8  C)   Resp 16   LMP  (LMP Unknown)   SpO2 99%  Estimated body mass index is 25.72 kg/m  as calculated from the following:    Height as of 1/26/24: 1.638 m (5' 4.5\").    Weight as of 1/26/24: 69 kg (152 lb 3.2 oz).  Medication Reconciliation: complete  Review Of Systems  Skin: negative  Eyes: negative  Ears/Nose/Throat: negative  Respiratory: No shortness of breath, dyspnea on exertion, cough, or hemoptysis  Cardiovascular: negative  Gastrointestinal: positive for diarrhea  Genitourinary: positive for nocturia x 1, urgency, hematuria, and incontinence  Musculoskeletal: positive for back pain and neck pain  Neurologic: negative  Psychiatric: positive for excessive stress and sleep disturbance  Hematologic/Lymphatic/Immunologic: negative  Endocrine: positive for diabetes  PVR: 0 mL    Chanelle Casas RN    "

## 2024-03-07 NOTE — RESULT ENCOUNTER NOTE
Significant blood on microanalysis.  Planning cystoscopy and CT imaging given her limitation with iodinated contrast and her CKD.  Dr. Bolanos

## 2024-03-07 NOTE — TELEPHONE ENCOUNTER
[id number] sent Rx request for the following:      Requested Prescriptions   Pending Prescriptions Disp Refills    insulin aspart (FIASP FLEXTOUCH) 100 UNIT/ML pen-injector [Pharmacy Med Name: FIASP FLEX 100UNIT/ML PEN INJ] 15 mL 12     Sig: INJECT 5 UNITS UNDER THE SKIN 3 TIMES DAILY WITH MEALS       There is no refill protocol information for this order        Last Prescription Date:   1/30/23  Last Fill Qty/Refills:         60ml, R-3   Last Office Visit:              10/23/23 [facility]  [facility]  Routing refill request to provider for review/approval because:  Drug not on the [facility], [facility] or [facility] refill protocol or controlled substance  Noemy Sparrow RN ....................  3/7/2024   9:04 AM     No

## 2024-03-07 NOTE — PROGRESS NOTES
Type of service:  Video Visit   Video Visit Start Time: 1:35  Video Visit End Time: 1:55    Originating Location (pt. Location): [facility]  Distant Location (provider location): [place], [place]  Platform used for Video Visit: Epic Virtual Visit Platform    I have reviewed the note as documented above.  This accurately captures the substance of my virtual visit with the patient. The patient states an understanding and is agreeable with the plan.   Thomas Bolanos MD   Urology     Chief Complaint: Consult For (Hematuria)      HPI: Ms. Bailey Sierra is a 73 year old year old female presenting today March 7, 2024 virtually in follow up of gross hematuria which occurred about 2 months ago.    Reports bleeding on her underwear last week as well.    Nonsmoker, however has been exposed to second hand smoke.    Denies a history of kidney stones however CT scan done several years ago mentioned the possibility of stone disease versus vascular calcifications.    In addition patient has severe kidney disease and will not be able to tolerate iodinated contrast.        She has a PMH significant for OAB with urge incontinence status post InterStim with Dr. Bowling.  Generator was replaced in October 2022.    .    Current Outpatient Medications   Medication Sig Dispense Refill    ammonium lactate (LAC-HYDRIN) 12 % external lotion Apply topically 2 times daily      aspirin (ASPIRIN ADULT LOW STRENGTH) 81 MG EC tablet Take 1 tablet (81 mg) by mouth daily 90 tablet 3    blood glucose (TRICIA CONTOUR) test strip TEST 4 TIMES A  strip 3    cholecalciferol 50 MCG (2000 UT) tablet Take 50 mcg by mouth      Continuous Blood Gluc Sensor (DEXCOM G7 SENSOR) MISC 1 each every 10 days Change sensor every 10 days 3 each 11    famotidine (PEPCID) 40 MG tablet Take 1 tablet (40 mg) by mouth 2 times daily 180 tablet 3    GAVILAX 17 GM/SCOOP powder daily as needed      HANSA-ISMAEL 8.6 MG tablet TAKE 1 TABLET BY MOUTH DAILY 90  "tablet 3    glucose (BD GLUCOSE) 4 g chewable tablet Take 1 tablet by mouth every hour as needed for low blood sugar      Incontinence Supply Disposable MISC Depend Fit-flex [facility]NS [facility] #25160/LARGE 17/PP DX\" [facility]:  #136/8PKGS/27DS 150 each 11    insulin aspart (FIASP FLEXTOUCH) 100 UNIT/ML pen-injector INJECT 5 UNITS UNDER THE SKIN 3 TIMES DAILY WITH MEALS 15 mL 12    insulin aspart (NOVOLOG FLEXPEN) 100 UNIT/ML pen Inject 5 Units Subcutaneous 3 times daily (with meals) 60 mL 3    insulin glargine (BASAGLAR KWIKPEN) 100 UNIT/ML pen INJECT 25 UNITS SUBCUTANEOUS AT BEDTIME 15 mL 11    insulin pen needle (B-D U/F) 31G X 8 MM miscellaneous Inject 4 Pen Needle Subcutaneous 4 times daily 400 each 3    isosorbide mononitrate (IMDUR) 60 MG 24 hr tablet Take 2 tablets (120 mg) by mouth daily 180 tablet 3    lisinopril (ZESTRIL) 2.5 MG tablet Take 1 tablet (2.5 mg) by mouth daily 90 tablet 3    metoprolol succinate ER (TOPROL XL) 25 MG 24 hr tablet TAKE 1/2 TABLET BY MOUTH EVERY EVENING 45 tablet 3    nitroGLYcerin (NITROSTAT) 0.4 MG sublingual tablet Place 0.4 mg under the tongue every 5 minutes as needed for chest pain      ondansetron (ZOFRAN) 4 MG tablet TAKE 1 TABLET BY MOUTH EVERY 6 HOURS AS NEEDED 120 tablet 4    order for DME Equipment being ordered: Diabetic shoes, pre-ulcer callous formation 2 each 3    rosuvastatin (CRESTOR) 40 MG tablet TAKE 1 TABLET BY MOUTH EVERY DAY 90 tablet 3    Vitamin D3 50 mcg (2000 units) tablet Take 1 tablet (50 mcg) by mouth daily 90 tablet 3    zolpidem (AMBIEN) 10 MG tablet Take 1 tablet (10 mg) by mouth nightly as needed for sleep 30 tablet 5       ALLERGIES: Patient has no known allergies.      Review Of Systems  Skin: negative  Eyes: negative  Ears/Nose/Throat: negative  Respiratory: No shortness of breath, dyspnea on exertion, cough, or hemoptysis  Cardiovascular: negative  Gastrointestinal: positive for diarrhea  Genitourinary: positive for nocturia x 1, urgency, " hematuria, and incontinence  Musculoskeletal: positive for back pain and neck pain  Neurologic: negative  Psychiatric: positive for excessive stress and sleep disturbance  Hematologic/Lymphatic/Immunologic: negative  Endocrine: positive for diabetes    GENERAL PHYSICAL EXAM:   Vitals: /58   Pulse 65   Temp 98.2  F (36.8  C)   Resp 16   LMP  (LMP Unknown)   SpO2 99%   There is no height or weight on file to calculate BMI.    GENERAL: Well groomed, well developed, well nourished female in NAD.  ENT:  ENT exam normal  RESPIRATORY:  Normal respiratory effort   MS: Visibly moving all four   NEURO: Alert and oriented x 3.  PSYCH: Normal mood and affect, pleasant and agreeable during interview and exam.      PVR: Residual urine by ultrasound was 0 ml.           RADIOLOGY: The following tests were reviewed:   CT abdomen and pelvis without contrast dated 2/23/2021 personally reviewed by me    LABS: The last test results for Ms. Bailey Sierra were reviewed.   Results for orders placed or performed in visit on 03/07/24 (from the past 24 hour(s))   Urinalysis Macroscopic   Result Value Ref Range    Color Urine Yellow Colorless, Straw, Light Yellow, Yellow    Appearance Urine Slightly Cloudy (A) Clear    Glucose Urine 100 (A) Negative mg/dL    Bilirubin Urine Negative Negative    Ketones Urine Negative Negative mg/dL    Specific Gravity Urine 1.020 1.000 - 1.030    Blood Urine Moderate (A) Negative    pH Urine 5.5 5.0 - 9.0    Protein Albumin Urine 70 (A) Negative mg/dL    Urobilinogen Urine Normal Normal, 2.0 mg/dL    Nitrite Urine Negative Negative    Leukocyte Esterase Urine Large (A) Negative     *Note: Due to a large number of results and/or encounters for the requested time period, some results have not been displayed. A complete set of results can be found in Results Review.       BMP -   Recent Labs   Lab Test 03/30/23  0831 08/22/21  1634 07/07/21  1050 12/11/17  0932 06/02/17  1000 09/30/16  1516  06/28/16  1743 03/28/16  1049    139 142   < > 139   < > 135 141   POTASSIUM 3.7 3.6 3.7   < > 3.5   < > 3.9 4.6   CHLORIDE 111* 105 108*   < > 111*   < > 103 110*   CO2 23 25 26   < > 24   < > 25 27   BUN 21.4 27* 25   < > 21   < > 31* 24   CR 2.33* 2.88* 3.66*   < > 2.23*   < > 2.17* 1.55*   * 193* 200*   < > 126*   < > 313* 107*   ELICIA 9.0 9.6 8.2*   < > 9.3   < > 9.4 9.3   PHOS  --   --   --   --  3.0  --  3.0 2.9    < > = values in this interval not displayed.       CBC -   Recent Labs   Lab Test 08/26/21  0940 08/22/21  1634 06/10/21  1135 06/03/21  1704   WBC  --  4.9 10.4 6.9   HGB 11.3* 9.8* 12.5 13.4   PLT  --  140* 125* 132*       ASSESSMENT:   Gross hematuria  Severe kidney disease  History of OAB status post InterStim    PLAN:     Discussion with patient about her gross hematuria and what it may mean. Causes of hematuria discussed including UTI, cystitis, BPH/Prostatitis in men, bladder stones, bladder cancer, renal stone, renal or ureteral malignancy, glomerulonephritis and unknown causes.    Recommendations made including cystoscopy, urinary cytology(Gross) and CT imaging with 3 phase CT Urogram with/without IV contrast.  Unfortunately her kidney function will not support contrast therefore CT abdomen and pelvis without contrast will be done.    She will need to hold her aspirin 7 days before her cystoscopy.    Patient understands.  All questions addressed.  Workup will be initiated.      20 minutes spent on the date of this encounter doing chart review, history and exam, documentation and further activities as noted above.      Thomas Bolanos MD  [facility]rology

## 2024-03-11 LAB — PATH REPORT.FINAL DX SPEC: NORMAL

## 2024-04-18 ENCOUNTER — LAB (OUTPATIENT)
Dept: LAB | Facility: OTHER | Age: 74
End: 2024-04-18
Attending: FAMILY MEDICINE
Payer: COMMERCIAL

## 2024-04-18 DIAGNOSIS — E11.22 TYPE 2 DIABETES MELLITUS WITH STAGE 3 CHRONIC KIDNEY DISEASE, WITH LONG-TERM CURRENT USE OF INSULIN, UNSPECIFIED WHETHER STAGE 3A OR 3B CKD (H): ICD-10-CM

## 2024-04-18 DIAGNOSIS — Z79.4 TYPE 2 DIABETES MELLITUS WITH STAGE 3 CHRONIC KIDNEY DISEASE, WITH LONG-TERM CURRENT USE OF INSULIN, UNSPECIFIED WHETHER STAGE 3A OR 3B CKD (H): ICD-10-CM

## 2024-04-18 DIAGNOSIS — N18.5 CKD (CHRONIC KIDNEY DISEASE) STAGE 5, GFR LESS THAN 15 ML/MIN (H): ICD-10-CM

## 2024-04-18 DIAGNOSIS — N18.30 TYPE 2 DIABETES MELLITUS WITH STAGE 3 CHRONIC KIDNEY DISEASE, WITH LONG-TERM CURRENT USE OF INSULIN, UNSPECIFIED WHETHER STAGE 3A OR 3B CKD (H): ICD-10-CM

## 2024-04-18 LAB
ALBUMIN SERPL BCG-MCNC: 3.7 G/DL (ref 3.5–5.2)
ALP SERPL-CCNC: 95 U/L (ref 40–150)
ALT SERPL W P-5'-P-CCNC: 32 U/L (ref 0–50)
ANION GAP SERPL CALCULATED.3IONS-SCNC: 9 MMOL/L (ref 7–15)
AST SERPL W P-5'-P-CCNC: 28 U/L (ref 0–45)
BASOPHILS # BLD AUTO: 0 10E3/UL (ref 0–0.2)
BASOPHILS NFR BLD AUTO: 0 %
BILIRUB SERPL-MCNC: 0.3 MG/DL
BUN SERPL-MCNC: 26.9 MG/DL (ref 8–23)
CALCIUM SERPL-MCNC: 9.7 MG/DL (ref 8.8–10.2)
CHLORIDE SERPL-SCNC: 111 MMOL/L (ref 98–107)
CHOLEST SERPL-MCNC: 136 MG/DL
CREAT SERPL-MCNC: 2.47 MG/DL (ref 0.51–0.95)
CREAT UR-MCNC: 142.2 MG/DL
DEPRECATED HCO3 PLAS-SCNC: 23 MMOL/L (ref 22–29)
EGFRCR SERPLBLD CKD-EPI 2021: 20 ML/MIN/1.73M2
EOSINOPHIL # BLD AUTO: 0.2 10E3/UL (ref 0–0.7)
EOSINOPHIL NFR BLD AUTO: 3 %
ERYTHROCYTE [DISTWIDTH] IN BLOOD BY AUTOMATED COUNT: 14.3 % (ref 10–15)
FASTING STATUS PATIENT QL REPORTED: YES
GLUCOSE SERPL-MCNC: 173 MG/DL (ref 70–99)
HBA1C MFR BLD: 7.2 % (ref 4–6.2)
HCT VFR BLD AUTO: 40.6 % (ref 35–47)
HDLC SERPL-MCNC: 46 MG/DL
HGB BLD-MCNC: 12.8 G/DL (ref 11.7–15.7)
IMM GRANULOCYTES # BLD: 0 10E3/UL
IMM GRANULOCYTES NFR BLD: 0 %
LDLC SERPL CALC-MCNC: 75 MG/DL
LYMPHOCYTES # BLD AUTO: 2.3 10E3/UL (ref 0.8–5.3)
LYMPHOCYTES NFR BLD AUTO: 43 %
MCH RBC QN AUTO: 28.6 PG (ref 26.5–33)
MCHC RBC AUTO-ENTMCNC: 31.5 G/DL (ref 31.5–36.5)
MCV RBC AUTO: 91 FL (ref 78–100)
MICROALBUMIN UR-MCNC: 170.9 MG/L
MICROALBUMIN/CREAT UR: 120.18 MG/G CR (ref 0–25)
MONOCYTES # BLD AUTO: 0.6 10E3/UL (ref 0–1.3)
MONOCYTES NFR BLD AUTO: 10 %
NEUTROPHILS # BLD AUTO: 2.3 10E3/UL (ref 1.6–8.3)
NEUTROPHILS NFR BLD AUTO: 43 %
NONHDLC SERPL-MCNC: 90 MG/DL
NRBC # BLD AUTO: 0 10E3/UL
NRBC BLD AUTO-RTO: 0 /100
PLATELET # BLD AUTO: 155 10E3/UL (ref 150–450)
POTASSIUM SERPL-SCNC: 4.2 MMOL/L (ref 3.4–5.3)
PROT SERPL-MCNC: 6.5 G/DL (ref 6.4–8.3)
RBC # BLD AUTO: 4.47 10E6/UL (ref 3.8–5.2)
SODIUM SERPL-SCNC: 143 MMOL/L (ref 135–145)
TRIGL SERPL-MCNC: 75 MG/DL
WBC # BLD AUTO: 5.3 10E3/UL (ref 4–11)

## 2024-04-18 PROCEDURE — 82570 ASSAY OF URINE CREATININE: CPT | Mod: ZL

## 2024-04-18 PROCEDURE — 82465 ASSAY BLD/SERUM CHOLESTEROL: CPT | Mod: ZL

## 2024-04-18 PROCEDURE — 82040 ASSAY OF SERUM ALBUMIN: CPT | Mod: ZL

## 2024-04-18 PROCEDURE — 36415 COLL VENOUS BLD VENIPUNCTURE: CPT | Mod: ZL

## 2024-04-18 PROCEDURE — 85004 AUTOMATED DIFF WBC COUNT: CPT | Mod: ZL

## 2024-04-18 PROCEDURE — 83036 HEMOGLOBIN GLYCOSYLATED A1C: CPT | Mod: ZL

## 2024-04-22 ENCOUNTER — OFFICE VISIT (OUTPATIENT)
Dept: FAMILY MEDICINE | Facility: OTHER | Age: 74
End: 2024-04-22
Attending: FAMILY MEDICINE
Payer: COMMERCIAL

## 2024-04-22 VITALS
RESPIRATION RATE: 16 BRPM | WEIGHT: 146.4 LBS | HEART RATE: 69 BPM | OXYGEN SATURATION: 99 % | HEIGHT: 64 IN | BODY MASS INDEX: 25 KG/M2 | DIASTOLIC BLOOD PRESSURE: 70 MMHG | SYSTOLIC BLOOD PRESSURE: 122 MMHG

## 2024-04-22 DIAGNOSIS — E55.9 VITAMIN D DEFICIENCY: Primary | ICD-10-CM

## 2024-04-22 DIAGNOSIS — I25.118 ATHEROSCLEROTIC HEART DISEASE OF NATIVE CORONARY ARTERY WITH OTHER FORMS OF ANGINA PECTORIS (H): Primary | ICD-10-CM

## 2024-04-22 DIAGNOSIS — R11.0 NAUSEA: ICD-10-CM

## 2024-04-22 DIAGNOSIS — N25.81 HYPERPARATHYROIDISM DUE TO RENAL INSUFFICIENCY (H): ICD-10-CM

## 2024-04-22 DIAGNOSIS — I50.32 CHRONIC DIASTOLIC CONGESTIVE HEART FAILURE (H): ICD-10-CM

## 2024-04-22 DIAGNOSIS — N18.30 TYPE 2 DIABETES MELLITUS WITH STAGE 3 CHRONIC KIDNEY DISEASE, WITH LONG-TERM CURRENT USE OF INSULIN, UNSPECIFIED WHETHER STAGE 3A OR 3B CKD (H): ICD-10-CM

## 2024-04-22 DIAGNOSIS — N39.41 URGE INCONTINENCE: ICD-10-CM

## 2024-04-22 DIAGNOSIS — I49.5 SINUS NODE DYSFUNCTION (H): ICD-10-CM

## 2024-04-22 DIAGNOSIS — Z79.4 TYPE 2 DIABETES MELLITUS WITH STAGE 3 CHRONIC KIDNEY DISEASE, WITH LONG-TERM CURRENT USE OF INSULIN, UNSPECIFIED WHETHER STAGE 3A OR 3B CKD (H): ICD-10-CM

## 2024-04-22 DIAGNOSIS — I25.118 ATHEROSCLEROTIC HEART DISEASE OF NATIVE CORONARY ARTERY WITH OTHER FORMS OF ANGINA PECTORIS (H): ICD-10-CM

## 2024-04-22 DIAGNOSIS — D89.89 LIGHT CHAIN DISEASE, KAPPA TYPE (H): ICD-10-CM

## 2024-04-22 DIAGNOSIS — E11.22 TYPE 2 DIABETES MELLITUS WITH STAGE 3 CHRONIC KIDNEY DISEASE, WITH LONG-TERM CURRENT USE OF INSULIN, UNSPECIFIED WHETHER STAGE 3A OR 3B CKD (H): ICD-10-CM

## 2024-04-22 DIAGNOSIS — D69.6 THROMBOCYTOPENIA (H): ICD-10-CM

## 2024-04-22 PROCEDURE — 99214 OFFICE O/P EST MOD 30 MIN: CPT | Performed by: FAMILY MEDICINE

## 2024-04-22 PROCEDURE — G0463 HOSPITAL OUTPT CLINIC VISIT: HCPCS

## 2024-04-22 RX ORDER — LOPERAMIDE HYDROCHLORIDE 2 MG/1
2 TABLET ORAL 4 TIMES DAILY PRN
Qty: 90 TABLET | Refills: 3 | Status: SHIPPED | OUTPATIENT
Start: 2024-04-22

## 2024-04-22 RX ORDER — CHOLECALCIFEROL (VITAMIN D3) 50 MCG
50 TABLET ORAL DAILY
Qty: 90 TABLET | Refills: 4 | Status: SHIPPED | OUTPATIENT
Start: 2024-04-22

## 2024-04-22 RX ORDER — NITROGLYCERIN 0.4 MG/1
0.4 TABLET SUBLINGUAL EVERY 5 MIN PRN
Qty: 10 TABLET | Refills: 3 | Status: SHIPPED | OUTPATIENT
Start: 2024-04-22 | End: 2024-08-12

## 2024-04-22 RX ORDER — ASPIRIN 81 MG/1
81 TABLET, COATED ORAL DAILY
Qty: 90 TABLET | Refills: 4 | Status: SHIPPED | OUTPATIENT
Start: 2024-04-22

## 2024-04-22 RX ORDER — FAMOTIDINE 40 MG/1
40 TABLET, FILM COATED ORAL 2 TIMES DAILY
Qty: 180 TABLET | Refills: 4 | OUTPATIENT
Start: 2024-04-22

## 2024-04-22 ASSESSMENT — PAIN SCALES - GENERAL: PAINLEVEL: NO PAIN (0)

## 2024-04-22 NOTE — NURSING NOTE
"Chief Complaint   Patient presents with    RECHECK     A1C follow up        Initial /70   Pulse 69   Resp 16   Ht 1.626 m (5' 4\")   Wt 66.4 kg (146 lb 6.4 oz)   LMP  (LMP Unknown)   SpO2 99%   BMI 25.13 kg/m   Estimated body mass index is 25.13 kg/m  as calculated from the following:    Height as of this encounter: 1.626 m (5' 4\").    Weight as of this encounter: 66.4 kg (146 lb 6.4 oz).  Medication Review: complete  Светлана Luu LPN on 4/22/2024 at 10:19 AM    "

## 2024-04-22 NOTE — PROGRESS NOTES
"  Assessment & Plan     Atherosclerotic heart disease of native coronary artery with other forms of angina pectoris (H24)  Nitros refilled.  She has been asymptomatic over the past year.  - nitroGLYcerin (NITROSTAT) 0.4 MG sublingual tablet; Place 1 tablet (0.4 mg) under the tongue every 5 minutes as needed for chest pain    Light chain disease, kappa type (H24)  Stable, chronic    Chronic diastolic congestive heart failure (H)  Stable, continue current regimen    Sinus node dysfunction (H)  Stable, chronic    Hyperparathyroidism due to renal insufficiency (H24)  Chronic, continue current regimen    Thrombocytopenia (H24)  CBC was normal    Type 2 diabetes mellitus with stage 3 chronic kidney disease, with long-term current use of insulin, unspecified whether stage 3a or 3b CKD (H)  A1c is stable.  Continue current regimen.  New order for diabetic footwear was sent into her pharmacy as requested.  - Orthotics, Mastectomy and Custom Compression Orders    Urge incontinence  She is requesting refill of depends.  She is not using home medical  - Incontinence Supply Disposable MISC; Depend Fit-flex NS  #41696/LARGE 17/PP DX\" [facility]:  #136/8PKGS/27DS    Diarrhea  She has had some diarrhea at home.  Will send in Imodium to be used as needed.  - loperamide (IMODIUM A-D) 2 MG tablet; Take 1 tablet (2 mg) by mouth 4 times daily as needed for diarrhea        BMI  Estimated body mass index is 25.13 kg/m  as calculated from the following:    Height as of this encounter: 1.626 m (5' 4\").    Weight as of this encounter: 66.4 kg (146 lb 6.4 oz).   Weight management plan: Discussed healthy diet and exercise guidelines      No follow-ups on file.    April Avalos is a 74 year old, presenting for the following health issues:  RECHECK (A1C follow up )      4/22/2024    10:08 AM   Additional Questions   Roomed by radha, lpn     Patient has a history of type 2 diabetes, thrombocytopenia, hyperparathyroidism, chronic " kidney disease and diastolic heart failure.  She reports that she has had no new symptoms recently.  She has no new concerns today.    Via the Health Maintenance questionnaire, the patient has reported the following services have been completed -Eye Exam, this information has been sent to the abstraction team.  History of Present Illness       She eats 0-1 servings of fruits and vegetables daily.She consumes 3 sweetened beverage(s) daily.She exercises with enough effort to increase her heart rate 30 to 60 minutes per day.  She exercises with enough effort to increase her heart rate 3 or less days per week.   She is taking medications regularly.       Diabetes Follow-up    How often are you checking your blood sugar? Four or more times daily  Blood sugar testing frequency justification:   before each meal and before bed   What time of day are you checking your blood sugars (select all that apply)?  Before meals and At bedtime  Have you had any blood sugars above 200?  Yes she did calibration on dexcom   Have you had any blood sugars below 70?  Yes   What symptoms do you notice when your blood sugar is low?  None  What concerns do you have today about your diabetes? None   Do you have any of these symptoms? (Select all that apply)  Weight loss      BP Readings from Last 2 Encounters:   04/22/24 122/70   03/07/24 102/58     Hemoglobin A1C (%)   Date Value   04/18/2024 7.2 (H)   10/19/2023 6.9 (H)   06/04/2021 6.7 (H)   12/17/2020 7.0 (H)     LDL Cholesterol Calculated (mg/dL)   Date Value   04/18/2024 75   03/30/2023 73   06/04/2021 71   04/20/2020 58         How many servings of fruits and vegetables do you eat daily?  0-1  On average, how many sweetened beverages do you drink each day (Examples: soda, juice, sweet tea, etc.  Do NOT count diet or artificially sweetened beverages)?   3  How many days per week do you exercise enough to make your heart beat faster? 3 or less  How many minutes a day do you exercise enough  "to make your heart beat faster? 30 - 60  How many days per week do you miss taking your medication? 0          Objective    /70   Pulse 69   Resp 16   Ht 1.626 m (5' 4\")   Wt 66.4 kg (146 lb 6.4 oz)   LMP  (LMP Unknown)   SpO2 99%   BMI 25.13 kg/m    Body mass index is 25.13 kg/m .  Physical Exam   GENERAL: alert and no distress  RESP: lungs clear to auscultation - no rales, rhonchi or wheezes  CV: regular rate and rhythm, normal S1 S2, no S3 or S4, no murmur, click or rub, no peripheral edema   Diabetic foot exam: normal DP and PT pulses, no trophic changes or ulcerative lesions, normal sensory exam, and calluses seen on both feet.          Signed Electronically by: Sam Leal MD    "

## 2024-04-22 NOTE — TELEPHONE ENCOUNTER
[id number] sent Rx request for the following:      Requested Prescriptions   Pending Prescriptions Disp Refills    ASPIRIN LOW DOSE 81 MG EC tablet [Pharmacy Med Name: ASPIRIN 81MG EC TABLET] 90 tablet 3     Sig: TAKE 1 TABLET (81 MG) BY MOUTH DAILY   Last Prescription Date:   4/17/23  Last Fill Qty/Refills:         90, R-3        famotidine (PEPCID) 40 MG tablet [Pharmacy Med Name: FAMOTIDINE 40MG TABLET] 180 tablet 3     Sig: TAKE 1 TABLET (40 MG) BY MOUTH 2 TIMES DAILY         H2 Blockers Protocol Failed - 4/22/2024 11:27 AM        Failed - Medication is active on med list       VITAMIN D3 50 MCG (2000 UT) tablet [Pharmacy Med Name: VITAMIN D3 2000IU TABLET] 90 tablet 2     Sig: TAKE 1 TABLET (50 MCG) BY MOUTH DAILY   Last Prescription Date:   4/17/23  Last Fill Qty/Refills:         90, R-3      Vitamin Supplements Protocol Failed - 4/22/2024 11:27 AM        Failed - Medication indicated for associated diagnosis     The medication is prescribed for one or more of the following conditions:     Vitamin deficiency     Appointment today.    Pretty Rodríguez RN .............. 4/22/2024  11:54 AM

## 2024-04-23 ENCOUNTER — TELEPHONE (OUTPATIENT)
Dept: FAMILY MEDICINE | Facility: OTHER | Age: 74
End: 2024-04-23
Payer: COMMERCIAL

## 2024-04-23 NOTE — TELEPHONE ENCOUNTER
Patient is stating that none of the paperwork that should have been  faxed from her recent visit has not been received by [facility].  [facility] fax: [phone]  [facility] phone: [phone]    Please call patient to advise.  Susie Rangel on 4/23/2024 at 11:51 AM

## 2024-04-23 NOTE — TELEPHONE ENCOUNTER
Patient states [facility] needs the prescription and office visit notes related to it faxed to them. Patient was informed script was able to be printed and re-faxed. However, she will need to call [facility] and request notes be faxed to them. [facility] number provided. Script re-faxed to fax number provided by patient.     Lisa Phillips CMA on 4/23/2024 at 1:06 PM

## 2024-04-30 ENCOUNTER — TELEPHONE (OUTPATIENT)
Dept: UROLOGY | Facility: OTHER | Age: 74
End: 2024-04-30
Payer: COMMERCIAL

## 2024-04-30 DIAGNOSIS — N18.4 CHRONIC KIDNEY DISEASE, STAGE 4 (SEVERE) (H): Primary | ICD-10-CM

## 2024-04-30 NOTE — TELEPHONE ENCOUNTER
Needs to be off asa 7 days prior to procedure on May 16 th . Chelle Summers LPN ....................4/30/2024  2:25 PM

## 2024-05-01 DIAGNOSIS — K21.9 GASTROESOPHAGEAL REFLUX DISEASE WITHOUT ESOPHAGITIS: Primary | ICD-10-CM

## 2024-05-01 RX ORDER — FAMOTIDINE 40 MG/1
40 TABLET, FILM COATED ORAL 2 TIMES DAILY
COMMUNITY
End: 2024-05-01

## 2024-05-01 NOTE — TELEPHONE ENCOUNTER
Refill was denied for pepcid for unknown code, but pt is in [facility] program.  Please authorize or inform pharmacy why denied.  Kalee Barone LPN .......5/1/2024 4:00 PM

## 2024-05-02 RX ORDER — FAMOTIDINE 40 MG/1
40 TABLET, FILM COATED ORAL 2 TIMES DAILY
Qty: 180 TABLET | Refills: 3 | Status: SHIPPED | OUTPATIENT
Start: 2024-05-02

## 2024-05-02 NOTE — TELEPHONE ENCOUNTER
[facility] sent Rx request for the following:      Requested Prescriptions   Pending Prescriptions Disp Refills    famotidine (PEPCID) 40 MG tablet 180 tablet 3     Sig: Take 1 tablet (40 mg) by mouth 2 times daily        Last Prescription Date:   5/1/24  Last Fill Qty/Refills:         Historical   Last Office Visit:              4/22/24   Future Office visit:           10/21/24    No amount was sent to pharmacy, routing to PCP to send refill to pharmacy.  Scarlet Levy RN on 5/2/2024 at 11:26 AM

## 2024-05-06 RX ORDER — FAMOTIDINE 40 MG/1
40 TABLET, FILM COATED ORAL 2 TIMES DAILY
Qty: 180 TABLET | Refills: 3 | OUTPATIENT
Start: 2024-05-06

## 2024-05-06 NOTE — TELEPHONE ENCOUNTER
Left message to stop asa for 7 days .prior to May 16 th appointment. Chelle Summers LPN ....................5/6/2024  3:59 PM

## 2024-05-16 ENCOUNTER — LAB (OUTPATIENT)
Dept: LAB | Facility: OTHER | Age: 74
End: 2024-05-16
Attending: UROLOGY
Payer: COMMERCIAL

## 2024-05-16 ENCOUNTER — HOSPITAL ENCOUNTER (OUTPATIENT)
Dept: CT IMAGING | Facility: OTHER | Age: 74
Discharge: HOME | End: 2024-05-16
Attending: UROLOGY
Payer: COMMERCIAL

## 2024-05-16 ENCOUNTER — OFFICE VISIT (OUTPATIENT)
Dept: UROLOGY | Facility: OTHER | Age: 74
End: 2024-05-16
Attending: UROLOGY
Payer: COMMERCIAL

## 2024-05-16 DIAGNOSIS — N20.0 LEFT RENAL STONE: ICD-10-CM

## 2024-05-16 DIAGNOSIS — N28.89 RIGHT RENAL MASS: ICD-10-CM

## 2024-05-16 DIAGNOSIS — N18.4 CHRONIC KIDNEY DISEASE, STAGE 4 (SEVERE) (H): ICD-10-CM

## 2024-05-16 DIAGNOSIS — R31.0 GROSS HEMATURIA: Primary | ICD-10-CM

## 2024-05-16 DIAGNOSIS — R31.21 ASYMPTOMATIC MICROSCOPIC HEMATURIA: ICD-10-CM

## 2024-05-16 LAB
ALBUMIN UR-MCNC: 100 MG/DL
APPEARANCE UR: ABNORMAL
BILIRUB UR QL STRIP: NEGATIVE
COLOR UR AUTO: YELLOW
GLUCOSE UR STRIP-MCNC: 200 MG/DL
HGB UR QL STRIP: ABNORMAL
KETONES UR STRIP-MCNC: NEGATIVE MG/DL
LEUKOCYTE ESTERASE UR QL STRIP: ABNORMAL
NITRATE UR QL: NEGATIVE
PH UR STRIP: 6 [PH] (ref 5–9)
SP GR UR STRIP: 1.02 (ref 1–1.03)
UROBILINOGEN UR STRIP-MCNC: NORMAL MG/DL

## 2024-05-16 PROCEDURE — 250N000009 HC RX 250: Performed by: UROLOGY

## 2024-05-16 PROCEDURE — 74176 CT ABD & PELVIS W/O CONTRAST: CPT

## 2024-05-16 PROCEDURE — 87086 URINE CULTURE/COLONY COUNT: CPT | Mod: ZL | Performed by: UROLOGY

## 2024-05-16 PROCEDURE — 52000 CYSTOURETHROSCOPY: CPT | Performed by: UROLOGY

## 2024-05-16 PROCEDURE — 250N000013 HC RX MED GY IP 250 OP 250 PS 637: Performed by: UROLOGY

## 2024-05-16 PROCEDURE — 81003 URINALYSIS AUTO W/O SCOPE: CPT | Mod: ZL

## 2024-05-16 PROCEDURE — G0463 HOSPITAL OUTPT CLINIC VISIT: HCPCS | Mod: 25

## 2024-05-16 RX ORDER — LIDOCAINE HYDROCHLORIDE 20 MG/ML
JELLY TOPICAL ONCE
Status: COMPLETED | OUTPATIENT
Start: 2024-05-16 | End: 2024-05-16

## 2024-05-16 RX ADMIN — LIDOCAINE HYDROCHLORIDE: 20 JELLY TOPICAL at 16:57

## 2024-05-16 RX ADMIN — CEPHALEXIN 250 MG: 250 CAPSULE ORAL at 10:00

## 2024-05-16 NOTE — NURSING NOTE
Patient positioned in Stir-ups and patient prepped with chlorhexidine gluconate cleanser, Betadine swabs, patient draped per sterile technique. Per verbal order with read back by Thomas Bolanos MD, Urojet 10mL 2% lidocaine jelly to be instilled into urethra.      Universal Protocol    A. Pre-procedure verification complete Yes   1-relevant information / documentation available, reviewed and properly matched to the patient; 2-consent accurate and complete, 3-equipment and supplies available    B. Site marking complete Yes  Site marked if not in continuous attendance with patient    C. TIME OUT completed Yes  Time Out was conducted just prior to starting procedure to verify the eight required elements: 1-patient identity, 2-consent accurate and complete, 3-position, 4-correct side/site marked (if applicable), 5-procedure, 6-relevant images / results properly labeled and displayed (if applicable), 7-antibiotics / irrigation fluids (if applicable), 8-safety precautions.    After procedure perineum area rinsed. Discharge instructions reviewed with patient. Patient verbalized understanding of discharge instructions and discharged ambulatory.  Chanelle Casas RN..................5/16/2024  5:00 PM

## 2024-05-16 NOTE — PROGRESS NOTES
74-year-old female seen virtually March 7, 2024 for evaluation of gross hematuria.    Non-smoker however with secondhand smoke exposure.    History of OAB status post InterStim with Dr. Bowling in October 2022.    Cytology done and was negative for malignancy.  I recommended a CT abdomen and pelvis without contrast given her CKD.  That is pending as of today.    Here today for cystoscopy.  Off all blood thinners.  CT scan is pending but does demonstrate a large 2 cm left renal stone nonobstructing with a right renal mass of uncertain malignant potential.    This CT was done without contrast.  It looks as though it was possibly present in 2021 although very difficult to see at that time.    Female Cystoscopy Procedure Note     PRE-PROCEDURE DIAGNOSIS: Gross hematuria  POST-PROCEDURE DIAGNOSIS: Same  PROCEDURE: cystoscopy    RISKS DISCUSSED:  Bleeding, infection, urethral stricture, irritative voiding symptoms, retention of urine.      DESCRIPTION OF PROCEDURE:  After informed consent was obtained, the patient was brought to the procedure room where he was placed in the supine position with all pressure points well padded.  The urethra was prepped and draped in a sterile fashion. A flexible cystoscope was introduced through a well-lubricated urethra.      FINDINGS:    Meatus: normal  Urethra: Normal  Bladder Neck: Open  Bladder: No visible tumor, stones or lesions but floating debris in the bladder.  No evidence of active cystitis trigone:  Normal ureters, normal efflux    The flexible cystoscope was removed and the findings were described to the patient.     FINDINGS: Normal cystoscopy    Assessment:  Gross hematuria  Left large renal stone nonobstructing  Incidental right renal mass of uncertain malignant potential    Plan:  Cystoscopy negative without evidence of malignancy.    CT pending but evidence of large left renal stones nonobstructing which may/may not need treatment.  Given her age we may be inclined to  watch as she is asymptomatic.     I do see an incidental finding of a right renal mass for which I am going to order a renal US.  This could be malignancy but will await further imaging.    Follow up to discuss.      All questions addressed.

## 2024-05-16 NOTE — RESULT ENCOUNTER NOTE
Discussed with patient.  Radiologist calls the lesion on the kidney and a renal cyst.  Ultrasound has been ordered to confirm.  We will discuss treatment or watchful waiting once the ultrasound is back.  Dr. Bolanos

## 2024-05-16 NOTE — RESULT ENCOUNTER NOTE
I suspect this is the sequela of her stone disease.  Culture will be sent nonetheless.  Dr. Bolanos    Can we make sure culture is sent.  Thanks

## 2024-05-18 LAB — BACTERIA UR CULT: NO GROWTH

## 2024-05-20 ENCOUNTER — MEDICAL CORRESPONDENCE (OUTPATIENT)
Dept: HEALTH INFORMATION MANAGEMENT | Facility: OTHER | Age: 74
End: 2024-05-20
Payer: COMMERCIAL

## 2024-06-10 ENCOUNTER — OFFICE VISIT (OUTPATIENT)
Dept: FAMILY MEDICINE | Facility: OTHER | Age: 74
End: 2024-06-10
Attending: FAMILY MEDICINE
Payer: COMMERCIAL

## 2024-06-10 VITALS
WEIGHT: 147.6 LBS | DIASTOLIC BLOOD PRESSURE: 66 MMHG | HEIGHT: 64 IN | TEMPERATURE: 96.9 F | OXYGEN SATURATION: 99 % | HEART RATE: 58 BPM | RESPIRATION RATE: 16 BRPM | BODY MASS INDEX: 25.2 KG/M2 | SYSTOLIC BLOOD PRESSURE: 111 MMHG

## 2024-06-10 DIAGNOSIS — N28.1 RENAL CYST, RIGHT: ICD-10-CM

## 2024-06-10 DIAGNOSIS — R31.9 HEMATURIA, UNSPECIFIED TYPE: ICD-10-CM

## 2024-06-10 DIAGNOSIS — M16.11 PRIMARY OSTEOARTHRITIS OF RIGHT HIP: Primary | ICD-10-CM

## 2024-06-10 PROBLEM — N18.9 ACUTE KIDNEY INJURY SUPERIMPOSED ON CKD (H): Status: RESOLVED | Noted: 2021-06-11 | Resolved: 2024-06-10

## 2024-06-10 PROBLEM — I95.9 HYPOTENSION, UNSPECIFIED HYPOTENSION TYPE: Status: RESOLVED | Noted: 2022-06-21 | Resolved: 2024-06-10

## 2024-06-10 PROBLEM — U07.1 COVID-19 VIRUS INFECTION: Status: RESOLVED | Noted: 2021-02-24 | Resolved: 2024-06-10

## 2024-06-10 PROBLEM — N17.9 ACUTE KIDNEY INJURY SUPERIMPOSED ON CKD (H): Status: RESOLVED | Noted: 2021-06-11 | Resolved: 2024-06-10

## 2024-06-10 PROBLEM — N18.5 CKD (CHRONIC KIDNEY DISEASE) STAGE 5, GFR LESS THAN 15 ML/MIN (H): Status: RESOLVED | Noted: 2023-10-23 | Resolved: 2024-06-10

## 2024-06-10 PROBLEM — N18.31 STAGE 3A CHRONIC KIDNEY DISEASE (H): Status: RESOLVED | Noted: 2021-06-11 | Resolved: 2024-06-10

## 2024-06-10 PROBLEM — M62.82 NON-TRAUMATIC RHABDOMYOLYSIS: Status: RESOLVED | Noted: 2021-06-11 | Resolved: 2024-06-10

## 2024-06-10 PROBLEM — R74.8 ELEVATED LIVER ENZYMES: Status: RESOLVED | Noted: 2021-02-24 | Resolved: 2024-06-10

## 2024-06-10 PROCEDURE — 99213 OFFICE O/P EST LOW 20 MIN: CPT | Performed by: FAMILY MEDICINE

## 2024-06-10 PROCEDURE — G2211 COMPLEX E/M VISIT ADD ON: HCPCS | Performed by: FAMILY MEDICINE

## 2024-06-10 PROCEDURE — G0463 HOSPITAL OUTPT CLINIC VISIT: HCPCS

## 2024-06-10 ASSESSMENT — PAIN SCALES - GENERAL: PAINLEVEL: NO PAIN (0)

## 2024-06-10 NOTE — NURSING NOTE
"Chief Complaint   Patient presents with    Face to Face lift chair    Patient presents to the clinic today for a face to face for a lift chair.     Initial /66 (BP Location: Right arm, Patient Position: Sitting, Cuff Size: Adult Regular)   Pulse 58   Temp 96.9  F (36.1  C) (Tympanic)   Resp 16   Ht 1.626 m (5' 4\")   Wt 67 kg (147 lb 9.6 oz)   LMP  (LMP Unknown)   SpO2 99%   BMI 25.34 kg/m   Estimated body mass index is 25.34 kg/m  as calculated from the following:    Height as of this encounter: 1.626 m (5' 4\").    Weight as of this encounter: 67 kg (147 lb 9.6 oz).  Medication Review: complete    The next two questions are to help us understand your food security.  If you are feeling you need any assistance in this area, we have resources available to support you today.          1/26/2024   SDOH- Food Insecurity   Within the past 12 months, did you worry that your food would run out before you got money to buy more? N   Within the past 12 months, did the food you bought just not last and you didn t have money to get more? N         Health Care Directive:  Patient does not have a Health Care Directive or Living Will: Discussed advance care planning with patient; however, patient declined at this time.    Daksha De Luna      "

## 2024-06-10 NOTE — PROGRESS NOTES
Assessment & Plan     Primary osteoarthritis of right hip  Reviewed recent CT of her abdomen pelvis which did not show any significant bony abnormalities.  Certainly her symptoms are consistent with arthritis of her right hip.  She is requesting a lift chair mechanism and this was printed off for her she will bring to her distributor of choice.  - Lift Chair W Seat Lift Mechanism Order for DME - ONLY FOR DME    Renal cyst, right  Reviewed imaging with her.  Ultrasound is scheduled for later this week.    Hematuria, unspecified type  Reviewed CT results and images with patient showing large kidney stone and negative cystoscopy.  She is planning on following up with Dr. Bolanos later this month.    The longitudinal plan of care for the diagnosis(es)/condition(s) as documented were addressed during this visit. Due to the added complexity in care, I will continue to support Bailey in the subsequent management and with ongoing continuity of care.        No follow-ups on file.    April Avalos is a 74 year old, presenting for the following health issues:  Face to Face lift chair       6/10/2024     8:11 AM   Additional Questions   Roomed by Daksha HODGE   Accompanied by self     She comes in today to discuss continuing right hip pain.  This been bothersome for many years.  She reports that her hip seems to bother her most when she tries to change positions such as going from a seated to a standing position.  Occasionally she will fall backwards because of the pain.  No recent trauma.  Her knees are generally well.  When she is up she is usually able to ambulate pretty well with a wheeled walker.    She has a history of hematuria, was recently seen by urology where cystoscopy was unremarkable.  She has a large kidney stone on her left side and likely renal cyst on her right side but this will be confirmed with an ultrasound later this week.    History of Present Illness       Reason for visit:  Lift Chair    She eats  "2-3 servings of fruits and vegetables daily.She consumes 0 sweetened beverage(s) daily.She exercises with enough effort to increase her heart rate 9 or less minutes per day.  She exercises with enough effort to increase her heart rate 3 or less days per week.   She is taking medications regularly.        Objective    /66 (BP Location: Right arm, Patient Position: Sitting, Cuff Size: Adult Regular)   Pulse 58   Temp 96.9  F (36.1  C) (Tympanic)   Resp 16   Ht 1.626 m (5' 4\")   Wt 67 kg (147 lb 9.6 oz)   LMP  (LMP Unknown)   SpO2 99%   BMI 25.34 kg/m    Body mass index is 25.34 kg/m .  Physical Exam   GENERAL: alert and no distress  MS: Right hip shows flexion 90 degrees, internal rotation 10 degrees, external rotation 45 degrees.  She has no tenderness over her greater trochanter.  She has some pain over her anterior thigh with internal rotation.  No SI joint tenderness.            Signed Electronically by: Sam Leal MD    "

## 2024-06-14 ENCOUNTER — HOSPITAL ENCOUNTER (OUTPATIENT)
Dept: ULTRASOUND IMAGING | Facility: OTHER | Age: 74
Discharge: HOME | End: 2024-06-14
Attending: UROLOGY | Admitting: UROLOGY
Payer: COMMERCIAL

## 2024-06-14 DIAGNOSIS — N20.0 LEFT RENAL STONE: ICD-10-CM

## 2024-06-14 DIAGNOSIS — N28.89 RIGHT RENAL MASS: ICD-10-CM

## 2024-06-14 PROCEDURE — 76770 US EXAM ABDO BACK WALL COMP: CPT

## 2024-06-17 NOTE — RESULT ENCOUNTER NOTE
Bailey, good news.  The area on the right kidney is a benign, non concerning renal cyst.  Nothing has to be done.  Not a concern.  Dr. Bolanos

## 2024-06-18 DIAGNOSIS — E11.9 TYPE 2 DIABETES MELLITUS WITHOUT COMPLICATION, WITH LONG-TERM CURRENT USE OF INSULIN (H): ICD-10-CM

## 2024-06-18 DIAGNOSIS — R31.0 GROSS HEMATURIA: Primary | ICD-10-CM

## 2024-06-18 DIAGNOSIS — Z79.4 TYPE 2 DIABETES MELLITUS WITHOUT COMPLICATION, WITH LONG-TERM CURRENT USE OF INSULIN (H): ICD-10-CM

## 2024-06-18 NOTE — TELEPHONE ENCOUNTER
Reason for call: Medication or medication refill    Have you contacted your pharmacy regarding this refill? yes    If No, direct the patient to contact the Pharmacy and discontinue telephone note using Erroneous Encounter.  If Yes, continue.    Name of medication requested: pen needles for insulin     How many days of medication do you have left? week    What pharmacy do you use? [facility]     Preferred method for responding to this message: Telephone Call    Phone number patient can be reached at: Cell number on file:    Telephone Information:   Mobile [phone]       If we cannot reach you directly, may we leave a detailed response at the number you provided? Yes

## 2024-06-20 RX ORDER — PEN NEEDLE, DIABETIC 31 GX5/16"
NEEDLE, DISPOSABLE MISCELLANEOUS
Qty: 400 EACH | Refills: 3 | Status: SHIPPED | OUTPATIENT
Start: 2024-06-20

## 2024-06-20 NOTE — TELEPHONE ENCOUNTER
[id number] sent Rx request for the following:      Requested Prescriptions   Pending Prescriptions Disp Refills    B-D U/F 31G X 8 MM insulin pen needle [Pharmacy Med Name: BD 31G 5/16IN SHORT PEN NEEDLE]  3     Sig: USE 4 PENS DAILY       Diabetic Supplies Protocol Passed - 6/20/2024  2:15 PM        Passed - Medication is active on med list        Passed - Recent (12 month) or future (90 days) visit with authorizing provider s specialty     The patient must have completed an in-person or virtual visit within the past 12 months or has a future visit scheduled within the next 90 days with the authorizing provider s specialty.  Urgent care and e-visits do not quality as an office visit for this protocol.          Passed - Medication indicated for associated diagnosis        Passed - Patient is 18 years of age or older             Last Prescription Date:   4/17/23  Last Fill Qty/Refills:         400, R-3    Last Office Visit:              4/22/24   Future Office visit:           10/21/24    Prescription approved per [facility] Refill Protocol.  Caron Hubbard RN on 6/20/2024 at 2:16 PM

## 2024-06-25 ENCOUNTER — LAB (OUTPATIENT)
Dept: LAB | Facility: OTHER | Age: 74
End: 2024-06-25
Attending: UROLOGY
Payer: COMMERCIAL

## 2024-06-25 ENCOUNTER — VIRTUAL VISIT (OUTPATIENT)
Dept: UROLOGY | Facility: OTHER | Age: 74
End: 2024-06-25
Attending: UROLOGY
Payer: COMMERCIAL

## 2024-06-25 VITALS
RESPIRATION RATE: 20 BRPM | HEART RATE: 63 BPM | OXYGEN SATURATION: 97 % | WEIGHT: 147 LBS | HEIGHT: 64 IN | BODY MASS INDEX: 25.1 KG/M2

## 2024-06-25 DIAGNOSIS — N28.1 ACQUIRED RENAL CYST OF RIGHT KIDNEY: ICD-10-CM

## 2024-06-25 DIAGNOSIS — R31.0 GROSS HEMATURIA: ICD-10-CM

## 2024-06-25 DIAGNOSIS — Z87.898 HISTORY OF GROSS HEMATURIA: ICD-10-CM

## 2024-06-25 DIAGNOSIS — N20.0 LEFT RENAL STONE: ICD-10-CM

## 2024-06-25 DIAGNOSIS — N20.0 LEFT RENAL STONE: Primary | ICD-10-CM

## 2024-06-25 LAB
ALBUMIN UR-MCNC: 100 MG/DL
APPEARANCE UR: ABNORMAL
BILIRUB UR QL STRIP: NEGATIVE
CALCIUM SERPL-MCNC: 9.5 MG/DL (ref 8.8–10.2)
COLOR UR AUTO: YELLOW
GLUCOSE UR STRIP-MCNC: 500 MG/DL
HGB UR QL STRIP: ABNORMAL
KETONES UR STRIP-MCNC: NEGATIVE MG/DL
LEUKOCYTE ESTERASE UR QL STRIP: ABNORMAL
NITRATE UR QL: NEGATIVE
PH UR STRIP: 6 [PH] (ref 5–9)
PHOSPHATE SERPL-MCNC: 3.7 MG/DL (ref 2.5–4.5)
SP GR UR STRIP: 1.02 (ref 1–1.03)
URATE SERPL-MCNC: 2.1 MG/DL (ref 2.4–5.7)
UROBILINOGEN UR STRIP-MCNC: NORMAL MG/DL

## 2024-06-25 PROCEDURE — 83970 ASSAY OF PARATHORMONE: CPT | Mod: ZL

## 2024-06-25 PROCEDURE — 36415 COLL VENOUS BLD VENIPUNCTURE: CPT | Mod: ZL

## 2024-06-25 PROCEDURE — 84100 ASSAY OF PHOSPHORUS: CPT | Mod: ZL

## 2024-06-25 PROCEDURE — 84550 ASSAY OF BLOOD/URIC ACID: CPT | Mod: ZL

## 2024-06-25 PROCEDURE — 82310 ASSAY OF CALCIUM: CPT | Mod: ZL

## 2024-06-25 PROCEDURE — G0463 HOSPITAL OUTPT CLINIC VISIT: HCPCS | Mod: 25,GT

## 2024-06-25 PROCEDURE — 99213 OFFICE O/P EST LOW 20 MIN: CPT | Mod: 95 | Performed by: UROLOGY

## 2024-06-25 PROCEDURE — 81003 URINALYSIS AUTO W/O SCOPE: CPT | Mod: ZL

## 2024-06-25 ASSESSMENT — PAIN SCALES - GENERAL: PAINLEVEL: NO PAIN (0)

## 2024-06-25 NOTE — PROGRESS NOTES
Type of service:  Video Visit   Video Visit Start Time: 11:18   Video Visit End Time: 11:26    Originating Location (pt. Location): [facility]  Distant Location (provider location): [place], [place]  Platform used for Video Visit: Epic Virtual Visit Platform    I have reviewed the note as documented above.  This accurately captures the substance of my virtual visit with the patient. The patient states an understanding and is agreeable with the plan.   Thomas Bolanos MD   Urology     Chief Complaint: No chief complaint on file.      HPI: Ms. Bailey Sierra is a 74 year old year old female presenting today June 25, 2024 virtually in follow up of gross hematuria in addition to a concerning right renal lesion and left renal stone.     Non-smoker however with secondhand smoke exposure.     History of OAB status post InterStim with Dr. Bowling in October 2022.     Cytology done and was negative for malignancy.  I recommended a CT abdomen and pelvis without contrast given her CKD.  That was done demonstrating a large 2 cm left renal stone with a right renal mass possibly a cyst of uncertain malignant potential.     Cystoscopy was done and was negative.  Renal ultrasound was done in follow-up confirming that the right renal lesion was cystic in nature and benign without concern.    Here today 6/25/2024 to discuss possible treatment of her left renal stone and ultrasound imaging results.      Current Outpatient Medications   Medication Sig Dispense Refill    ammonium lactate (LAC-HYDRIN) 12 % external lotion Apply topically 2 times daily      ASPIRIN LOW DOSE 81 MG EC tablet TAKE 1 TABLET (81 MG) BY MOUTH DAILY 90 tablet 4    blood glucose (TRICIA CONTOUR) test strip TEST 4 TIMES A  strip 3    cholecalciferol 50 MCG (2000 UT) tablet Take 50 mcg by mouth      Continuous Blood Gluc Sensor (DEXCOM G7 SENSOR) MISC 1 each every 10 days Change sensor every 10 days 3 each 11    famotidine (PEPCID) 40 MG  "tablet Take 1 tablet (40 mg) by mouth 2 times daily 180 tablet 3    glucose (BD GLUCOSE) 4 g chewable tablet Take 1 tablet by mouth every hour as needed for low blood sugar      Incontinence Supply Disposable MISC Depend Fit-flex WMNS  #47982/LARGE 17/PP DX\" [facility]:  #136/8PKGS/27DS 150 each 11    insulin aspart (FIASP FLEXTOUCH) 100 UNIT/ML pen-injector INJECT 5 UNITS UNDER THE SKIN 3 TIMES DAILY WITH MEALS 15 mL 12    insulin aspart (NOVOLOG FLEXPEN) 100 UNIT/ML pen Inject 5 Units Subcutaneous 3 times daily (with meals) 60 mL 3    insulin glargine (BASAGLAR KWIKPEN) 100 UNIT/ML pen INJECT 25 UNITS SUBCUTANEOUS AT BEDTIME 15 mL 11    insulin pen needle (B-D U/F) 31G X 8 MM miscellaneous USE 4 PENS DAILY 400 each 3    isosorbide mononitrate (IMDUR) 60 MG 24 hr tablet Take 2 tablets (120 mg) by mouth daily 180 tablet 3    lisinopril (ZESTRIL) 2.5 MG tablet Take 1 tablet (2.5 mg) by mouth daily 90 tablet 3    loperamide (IMODIUM A-D) 2 MG tablet Take 1 tablet (2 mg) by mouth 4 times daily as needed for diarrhea 90 tablet 3    metoprolol succinate ER (TOPROL XL) 25 MG 24 hr tablet TAKE 1/2 TABLET BY MOUTH EVERY EVENING 45 tablet 3    nitroGLYcerin (NITROSTAT) 0.4 MG sublingual tablet Place 1 tablet (0.4 mg) under the tongue every 5 minutes as needed for chest pain 10 tablet 3    order for DME Equipment being ordered: Diabetic shoes, pre-ulcer callous formation 2 each 3    rosuvastatin (CRESTOR) 40 MG tablet TAKE 1 TABLET BY MOUTH EVERY DAY 90 tablet 3    Vitamin D3 50 mcg (2000 units) tablet Take 1 tablet (50 mcg) by mouth daily 90 tablet 3    VITAMIN D3 50 MCG (2000 UT) tablet TAKE 1 TABLET (50 MCG) BY MOUTH DAILY 90 tablet 4    zolpidem (AMBIEN) 10 MG tablet Take 1 tablet (10 mg) by mouth nightly as needed for sleep 30 tablet 5       ALLERGIES: Patient has no known allergies.      GENERAL PHYSICAL EXAM:   Vitals: LMP  (LMP Unknown)   There is no height or weight on file to calculate BMI.    GENERAL: Well " groomed, well developed, well nourished female in NAD.  ENT:  ENT exam normal  RESPIRATORY:  Normal respiratory effort   MS: Visibly moving all four   NEURO: Alert and oriented x 3.  PSYCH: Normal mood and affect, pleasant and agreeable during interview and exam.    RADIOLOGY: The following tests were reviewed:   US RENAL COMPLETE NON-VASCULAR     HISTORY: . Right renal mass. Left renal calculus     TECHNIQUE: Targeted sonographic assessment of the kidneys and bladder  was performed.     COMPARISON:  None.     MEASUREMENTS:     Right renal length: 9.8 cm  Left renal length: 10.4 cm     RENAL FINDINGS: In the right kidney there is a benign-appearing cyst  seen correlating with the lesion on a recent CT scan. In the left  kidney there is a left renal calculus noted also correlating with the  CT findings. There is no hydronephrosis.     BLADDER: Bladder appears normal.                                                                      IMPRESSION:  Benign-appearing cyst in the right kidney.       KRUNAL CMOBS MD        LABS: The last test results for Ms. Bailey Sierra were reviewed.   No results found. However, due to the size of the patient record, not all encounters were searched. Please check Results Review for a complete set of results.    BMP -   Recent Labs   Lab Test 04/18/24  0855 03/30/23  0831 08/22/21  1634 12/11/17  0932 06/02/17  1000 09/30/16  1516 06/28/16  1743    142 139   < > 139   < > 135   POTASSIUM 4.2 3.7 3.6   < > 3.5   < > 3.9   CHLORIDE 111* 111* 105   < > 111*   < > 103   CO2 23 23 25   < > 24   < > 25   BUN 26.9* 21.4 27*   < > 21   < > 31*   CR 2.47* 2.33* 2.88*   < > 2.23*   < > 2.17*   * 129* 193*   < > 126*   < > 313*   ELICIA 9.7 9.0 9.6   < > 9.3   < > 9.4   PHOS  --   --   --   --  3.0  --  3.0    < > = values in this interval not displayed.       CBC -   Recent Labs   Lab Test 04/18/24  0855 08/26/21  0940 08/22/21  1634 06/10/21  1135   WBC 5.3  --  4.9 10.4   HGB  12.8 11.3* 9.8* 12.5     --  140* 125*       ASSESSMENT:   Left renal stone  Right renal cyst  History of gross hematuria    PLAN:   Patient with a large 18 mm stone for which she is relatively asymptomatic.  I assume the gross hematuria was from this.  Overall very little bother.    Given her age and her high risk with general anesthesia I am inclined to watch this unless it begins to grow or grows quickly.    I do recommend KUB in 6 months with reassessment.  If it is larger than all ureteroscopy would be my recommendation.  The stones too big for ESWL.    Patient voiced an understanding.  She was advised to drink a little bit more fluid.  We will check parathyroid level and other blood work to see if there is a metabolic issue.  All questions were addressed    18 minutes spent on the date of this encounter doing chart review, history and exam, documentation and further activities as noted above.      Thomas Bolanos MD  [facility]

## 2024-06-25 NOTE — RESULT ENCOUNTER NOTE
Ashlyn Gillespie, can you let Bailey know her labs look normal.  You can wait to call her until we get her PTH level back so you do not have to call her twice.  Antonio

## 2024-06-25 NOTE — RESULT ENCOUNTER NOTE
Denies symptoms of UTI.  Blood is likely from her large stone in her left kidney.  Asymptomatic otherwise.  Negative cystoscopy previously.  Dr. Bolanos

## 2024-06-25 NOTE — NURSING NOTE
"Chief Complaint   Patient presents with    Follow Up     CATARINA follow up        Initial Pulse 63   Resp 20   Ht 1.626 m (5' 4\")   Wt 66.7 kg (147 lb)   LMP  (LMP Unknown)   SpO2 97%   BMI 25.23 kg/m   Estimated body mass index is 25.23 kg/m  as calculated from the following:    Height as of this encounter: 1.626 m (5' 4\").    Weight as of this encounter: 66.7 kg (147 lb).  Medication Reconciliation: complete    Chelle Summers LPN    "

## 2024-06-26 LAB — PTH-INTACT SERPL-MCNC: 47 PG/ML (ref 15–65)

## 2024-06-26 NOTE — RESULT ENCOUNTER NOTE
Verna Goldberg, patient does not check her MyChart.  Can you let her know that her parathyroid level is normal.  Thanks Antonio Avalos, your parathyroid level is normal which is encouraging.  Dr. Bolanos

## 2024-06-30 DIAGNOSIS — E11.9 TYPE 2 DIABETES MELLITUS WITHOUT COMPLICATION, WITH LONG-TERM CURRENT USE OF INSULIN (H): ICD-10-CM

## 2024-06-30 DIAGNOSIS — Z79.4 TYPE 2 DIABETES MELLITUS WITHOUT COMPLICATION, WITH LONG-TERM CURRENT USE OF INSULIN (H): ICD-10-CM

## 2024-07-05 RX ORDER — INSULIN GLARGINE 100 [IU]/ML
INJECTION, SOLUTION SUBCUTANEOUS
Qty: 15 ML | Refills: 11 | Status: SHIPPED | OUTPATIENT
Start: 2024-07-05

## 2024-07-05 NOTE — TELEPHONE ENCOUNTER
I signed it and I guess it needed to be faxed because the note to pharmacy is too long. Signed and in my office.   Lisa Casiano NP on 7/5/2024 at 2:21 PM

## 2024-07-05 NOTE — TELEPHONE ENCOUNTER
[facility] sent Rx request for the following:      Requested Prescriptions   Pending Prescriptions Disp Refills    INSULIN GLARGINE 100 UNIT/ML pen [Pharmacy Med Name: BASAGLAR [id number]/ML INJ] 15 mL 11     Sig: INJECT 25 UNITS SUBCUTANEOUS AT BEDTIME       Insulin Protocol Failed - 6/30/2024  5:01 AM        Failed - Has GFR on file in past 12 months and most recent value is normal        Failed - Chart Review Required     Review Chart.    Do not approve if insulin is used in a pump.  Instead, direct refill request to the patient's endocrinologist.  If the patient doesn't have an endocrinologist, then send the refill to the patient's PCP for review            Passed - Medication is active on med list        Passed - Recent (6 mo) or future (90 days) visit within the authorizing provider's specialty     The patient must have completed an in-person or virtual visit within the past 6 months or has a future visit scheduled within the next 90 days with the authorizing provider s specialty.  Urgent care and e-visits do not quality as an office visit for this protocol.          Passed - Medication indicated for associated diagnosis     Medication is associated with one or more of the following diagnoses:   - Type 1 diabetes mellitus  - Type 2 diabetes mellitus  - Diabetic nephropathy; Prophylaxis  - Neuropathy due to diabetes mellitus; Prophylaxis  - Retinopathy due to diabetes mellitus; Prophylaxis  - Diabetes mellitus associated with cystic fibrosis  - Disorder of cardiovascular system; Prophylaxis - Type 1 diabetes mellitus   - Disorder of cardiovascular system; Prophylaxis - Type 2 diabetes mellitus            Passed - Patient is 18 years of age or older             Last Prescription Date:   6/15/23  Last Fill Qty/Refills:         15 mL, R-11    Last Office Visit:              6/10/24   Future Office visit:        8/15/24 with cardiology  Unable to complete prescription refill per RN Medication Refill  Policy  Peyton Mcgee RN on 7/5/2024 at 1:26 PM

## 2024-07-30 ENCOUNTER — TRANSFERRED RECORDS (OUTPATIENT)
Dept: HEALTH INFORMATION MANAGEMENT | Facility: OTHER | Age: 74
End: 2024-07-30
Payer: COMMERCIAL

## 2024-08-07 DIAGNOSIS — I25.118 ATHEROSCLEROTIC HEART DISEASE OF NATIVE CORONARY ARTERY WITH OTHER FORMS OF ANGINA PECTORIS (H): ICD-10-CM

## 2024-08-09 NOTE — TELEPHONE ENCOUNTER
[id number] sent Rx request for the following:      Requested Prescriptions   Pending Prescriptions Disp Refills    nitroGLYcerin (NITROSTAT) 0.4 MG sublingual tablet [Pharmacy Med Name: NITROGLYCERIN 0.4MG SL TABLET] 10 tablet 3     Sig: DISSOLVE 1 TAB UNDER TONGUE EVERY 5 MIN AS NEEDED FOR CHEST PAIN UP TO 3 DOSES       Nitrates Failed - 8/9/2024  3:57 PM        Failed - Sublingual nitro order needs review     If refill exceeds 1 bottle per month, please forward request to provider.        Failed - Always fail criteria - needs review     Review chart. Forward refill request to provider if patient has exceeded one bottle per 3 months.      Last Prescription Date:   4/22/24  Last Fill Qty/Refills:         10, R-3    Last Office Visit:                6/10/24  4/22/24 (atherosclerotic heart disease discussed)     Future Office visit:           10/21/24    Unable to complete prescription refill per RN Medication Refill Policy. Pretty Rodríguez RN .............. 8/9/2024  3:58 PM

## 2024-08-12 RX ORDER — NITROGLYCERIN 0.4 MG/1
TABLET SUBLINGUAL
Qty: 10 TABLET | Refills: 1 | Status: SHIPPED | OUTPATIENT
Start: 2024-08-12

## 2024-09-10 ENCOUNTER — ALLIED HEALTH/NURSE VISIT (OUTPATIENT)
Dept: UROLOGY | Facility: OTHER | Age: 74
End: 2024-09-10
Attending: UROLOGY
Payer: COMMERCIAL

## 2024-09-10 DIAGNOSIS — N32.81 OVERACTIVE BLADDER: Primary | ICD-10-CM

## 2024-09-10 PROCEDURE — 99207 PR NO CHARGE LOS: CPT

## 2024-09-12 NOTE — NURSING NOTE
Interstim  Patient was seen today by [employer] representatives for Interstim management and changes to their implanted device.  Patient is in agreement with the plan. She will follow up:  Chanelle Casas RN on 9/12/2024 at 4:31 PM

## 2024-09-30 DIAGNOSIS — E11.8 TYPE 2 DIABETES MELLITUS WITH COMPLICATION, WITH LONG-TERM CURRENT USE OF INSULIN (H): ICD-10-CM

## 2024-09-30 DIAGNOSIS — Z79.4 TYPE 2 DIABETES MELLITUS WITH COMPLICATION, WITH LONG-TERM CURRENT USE OF INSULIN (H): ICD-10-CM

## 2024-09-30 RX ORDER — ACYCLOVIR 400 MG/1
TABLET ORAL
Qty: 3 EACH | Refills: 6 | Status: SHIPPED | OUTPATIENT
Start: 2024-09-30

## 2024-09-30 NOTE — TELEPHONE ENCOUNTER
[facility] [place] sent Rx request for the following:      Requested Prescriptions   Pending Prescriptions Disp Refills    Continuous Glucose Sensor (DEXCOM G7 SENSOR) MISC [Pharmacy Med Name: DEXCOM G7 MOBILE SENSOR KIT]  11     Sig: CHANGE 1 SENSOR EVERY 10 DAYS       There is no refill protocol information for this order        Last Prescription Date:   10/23/23  Last Fill Qty/Refills:         3, R-11    Last Office Visit:                6/10/24   4/22/24 (DM discussed)    Future Office visit:           10/21/24    Unable to complete prescription refill per RN Medication Refill Policy. Pretty Rodríguez RN .............. 9/30/2024  10:56 AM

## 2024-10-05 NOTE — TELEPHONE ENCOUNTER
A1c Rafi'd up for PCP to sign.  Emilio Kelly LPN, LPN ..............9/26/2019 1:03 PM    
Left message for patient to call back.    Noemy Holt LPN on 9/27/2019 at 8:24 AM  '  
Only a1c is needed, ordered  
Patient notified of lab orders and I confirmed her appointment with Dr. Leal on Friday.    Noemy Holt LPN on 9/30/2019 at 9:06 AM    
Pt has appt for [facility] on 10/4/19 for Diab.check would like to have orders put in so can do labs before-needs orders for A1C and whatever you would like  
self-care/home management/community/leisure

## 2024-10-11 ENCOUNTER — DOCUMENTATION ONLY (OUTPATIENT)
Dept: FAMILY MEDICINE | Facility: OTHER | Age: 74
End: 2024-10-11
Payer: COMMERCIAL

## 2024-10-11 DIAGNOSIS — Z00.00 ROUTINE HISTORY AND PHYSICAL EXAMINATION OF ADULT: Primary | ICD-10-CM

## 2024-10-11 NOTE — TELEPHONE ENCOUNTER
I have signed orders for labs; please inform her that ideally fasting is preferred for labs.     Lisa Casiano NP on 10/11/2024 at 3:47 PM

## 2024-10-11 NOTE — PROGRESS NOTES
Bailey Sierra has an upcoming lab appointment and there are no orders available. Please review and place future orders, as appropriate.    Savannah Lal

## 2024-10-18 ENCOUNTER — LAB (OUTPATIENT)
Dept: LAB | Facility: OTHER | Age: 74
End: 2024-10-18
Attending: FAMILY MEDICINE
Payer: COMMERCIAL

## 2024-10-18 DIAGNOSIS — Z00.00 ROUTINE HISTORY AND PHYSICAL EXAMINATION OF ADULT: ICD-10-CM

## 2024-10-18 LAB
ALBUMIN SERPL BCG-MCNC: 3.7 G/DL (ref 3.5–5.2)
ALP SERPL-CCNC: 90 U/L (ref 40–150)
ALT SERPL W P-5'-P-CCNC: 25 U/L (ref 0–50)
ANION GAP SERPL CALCULATED.3IONS-SCNC: 8 MMOL/L (ref 7–15)
AST SERPL W P-5'-P-CCNC: 31 U/L (ref 0–45)
BASOPHILS # BLD AUTO: 0 10E3/UL (ref 0–0.2)
BASOPHILS NFR BLD AUTO: 0 %
BILIRUB SERPL-MCNC: 0.4 MG/DL
BUN SERPL-MCNC: 22.3 MG/DL (ref 8–23)
CALCIUM SERPL-MCNC: 9.3 MG/DL (ref 8.8–10.4)
CHLORIDE SERPL-SCNC: 110 MMOL/L (ref 98–107)
CHOLEST SERPL-MCNC: 126 MG/DL
CREAT SERPL-MCNC: 2.37 MG/DL (ref 0.51–0.95)
CREAT UR-MCNC: 136.3 MG/DL
EGFRCR SERPLBLD CKD-EPI 2021: 21 ML/MIN/1.73M2
EOSINOPHIL # BLD AUTO: 0.1 10E3/UL (ref 0–0.7)
EOSINOPHIL NFR BLD AUTO: 2 %
ERYTHROCYTE [DISTWIDTH] IN BLOOD BY AUTOMATED COUNT: 14.6 % (ref 10–15)
EST. AVERAGE GLUCOSE BLD GHB EST-MCNC: 163 MG/DL
FASTING STATUS PATIENT QL REPORTED: YES
FASTING STATUS PATIENT QL REPORTED: YES
GLUCOSE SERPL-MCNC: 187 MG/DL (ref 70–99)
HBA1C MFR BLD: 7.3 %
HCO3 SERPL-SCNC: 23 MMOL/L (ref 22–29)
HCT VFR BLD AUTO: 41.3 % (ref 35–47)
HDLC SERPL-MCNC: 49 MG/DL
HGB BLD-MCNC: 12.9 G/DL (ref 11.7–15.7)
IMM GRANULOCYTES # BLD: 0 10E3/UL
IMM GRANULOCYTES NFR BLD: 0 %
LDLC SERPL CALC-MCNC: 65 MG/DL
LYMPHOCYTES # BLD AUTO: 1.5 10E3/UL (ref 0.8–5.3)
LYMPHOCYTES NFR BLD AUTO: 32 %
MCH RBC QN AUTO: 28.8 PG (ref 26.5–33)
MCHC RBC AUTO-ENTMCNC: 31.2 G/DL (ref 31.5–36.5)
MCV RBC AUTO: 92 FL (ref 78–100)
MICROALBUMIN UR-MCNC: 197.7 MG/L
MICROALBUMIN/CREAT UR: 145.05 MG/G CR (ref 0–25)
MONOCYTES # BLD AUTO: 0.6 10E3/UL (ref 0–1.3)
MONOCYTES NFR BLD AUTO: 11 %
NEUTROPHILS # BLD AUTO: 2.6 10E3/UL (ref 1.6–8.3)
NEUTROPHILS NFR BLD AUTO: 54 %
NONHDLC SERPL-MCNC: 77 MG/DL
NRBC # BLD AUTO: 0 10E3/UL
NRBC BLD AUTO-RTO: 0 /100
PLATELET # BLD AUTO: 175 10E3/UL (ref 150–450)
POTASSIUM SERPL-SCNC: 4.2 MMOL/L (ref 3.4–5.3)
PROT SERPL-MCNC: 6.5 G/DL (ref 6.4–8.3)
RBC # BLD AUTO: 4.48 10E6/UL (ref 3.8–5.2)
SODIUM SERPL-SCNC: 141 MMOL/L (ref 135–145)
TRIGL SERPL-MCNC: 58 MG/DL
WBC # BLD AUTO: 4.9 10E3/UL (ref 4–11)

## 2024-10-18 PROCEDURE — 36415 COLL VENOUS BLD VENIPUNCTURE: CPT | Mod: ZL

## 2024-10-18 PROCEDURE — 82465 ASSAY BLD/SERUM CHOLESTEROL: CPT | Mod: ZL

## 2024-10-18 PROCEDURE — 82043 UR ALBUMIN QUANTITATIVE: CPT | Mod: ZL

## 2024-10-18 PROCEDURE — 80053 COMPREHEN METABOLIC PANEL: CPT | Mod: ZL

## 2024-10-18 PROCEDURE — 83036 HEMOGLOBIN GLYCOSYLATED A1C: CPT | Mod: ZL

## 2024-10-18 PROCEDURE — 85025 COMPLETE CBC W/AUTO DIFF WBC: CPT | Mod: ZL

## 2024-10-19 DIAGNOSIS — E11.8 TYPE 2 DIABETES MELLITUS WITH COMPLICATION, WITH LONG-TERM CURRENT USE OF INSULIN (H): ICD-10-CM

## 2024-10-19 DIAGNOSIS — I10 ESSENTIAL HYPERTENSION: ICD-10-CM

## 2024-10-19 DIAGNOSIS — Z95.0 S/P CARDIAC PACEMAKER PROCEDURE: ICD-10-CM

## 2024-10-19 DIAGNOSIS — Z79.4 TYPE 2 DIABETES MELLITUS WITH COMPLICATION, WITH LONG-TERM CURRENT USE OF INSULIN (H): ICD-10-CM

## 2024-10-21 ENCOUNTER — OFFICE VISIT (OUTPATIENT)
Dept: FAMILY MEDICINE | Facility: OTHER | Age: 74
End: 2024-10-21
Attending: FAMILY MEDICINE
Payer: COMMERCIAL

## 2024-10-21 VITALS
SYSTOLIC BLOOD PRESSURE: 108 MMHG | OXYGEN SATURATION: 99 % | TEMPERATURE: 98.4 F | HEART RATE: 59 BPM | WEIGHT: 147 LBS | BODY MASS INDEX: 25.1 KG/M2 | HEIGHT: 64 IN | RESPIRATION RATE: 18 BRPM | DIASTOLIC BLOOD PRESSURE: 54 MMHG

## 2024-10-21 DIAGNOSIS — Z95.0 S/P CARDIAC PACEMAKER PROCEDURE: ICD-10-CM

## 2024-10-21 DIAGNOSIS — Z79.4 TYPE 2 DIABETES MELLITUS WITH COMPLICATION, WITH LONG-TERM CURRENT USE OF INSULIN (H): ICD-10-CM

## 2024-10-21 DIAGNOSIS — F51.01 PRIMARY INSOMNIA: ICD-10-CM

## 2024-10-21 DIAGNOSIS — Z00.00 MEDICARE ANNUAL WELLNESS VISIT, SUBSEQUENT: Primary | ICD-10-CM

## 2024-10-21 DIAGNOSIS — I10 ESSENTIAL HYPERTENSION: ICD-10-CM

## 2024-10-21 DIAGNOSIS — E11.8 TYPE 2 DIABETES MELLITUS WITH COMPLICATION, WITH LONG-TERM CURRENT USE OF INSULIN (H): ICD-10-CM

## 2024-10-21 DIAGNOSIS — Z79.4 TYPE 2 DIABETES MELLITUS WITHOUT COMPLICATION, WITH LONG-TERM CURRENT USE OF INSULIN (H): ICD-10-CM

## 2024-10-21 DIAGNOSIS — E11.9 TYPE 2 DIABETES MELLITUS WITHOUT COMPLICATION, WITH LONG-TERM CURRENT USE OF INSULIN (H): ICD-10-CM

## 2024-10-21 PROCEDURE — G0463 HOSPITAL OUTPT CLINIC VISIT: HCPCS

## 2024-10-21 PROCEDURE — 99213 OFFICE O/P EST LOW 20 MIN: CPT | Mod: 25 | Performed by: FAMILY MEDICINE

## 2024-10-21 PROCEDURE — 99397 PER PM REEVAL EST PAT 65+ YR: CPT | Performed by: FAMILY MEDICINE

## 2024-10-21 RX ORDER — LISINOPRIL 2.5 MG/1
2.5 TABLET ORAL DAILY
Qty: 90 TABLET | Refills: 3 | Status: SHIPPED | OUTPATIENT
Start: 2024-10-21

## 2024-10-21 RX ORDER — ZOLPIDEM TARTRATE 10 MG/1
10 TABLET ORAL
Qty: 30 TABLET | Refills: 5 | Status: CANCELLED | OUTPATIENT
Start: 2024-10-21

## 2024-10-21 RX ORDER — ROSUVASTATIN CALCIUM 40 MG/1
TABLET, COATED ORAL
Qty: 90 TABLET | Refills: 4 | Status: SHIPPED | OUTPATIENT
Start: 2024-10-21

## 2024-10-21 RX ORDER — METOPROLOL SUCCINATE 25 MG/1
TABLET, EXTENDED RELEASE ORAL
Qty: 45 TABLET | Refills: 3 | Status: SHIPPED | OUTPATIENT
Start: 2024-10-21

## 2024-10-21 RX ORDER — METOPROLOL SUCCINATE 25 MG/1
TABLET, EXTENDED RELEASE ORAL
Qty: 45 TABLET | Refills: 4 | Status: SHIPPED | OUTPATIENT
Start: 2024-10-21

## 2024-10-21 RX ORDER — ISOSORBIDE MONONITRATE 60 MG/1
120 TABLET, EXTENDED RELEASE ORAL DAILY
Qty: 180 TABLET | Refills: 3 | Status: SHIPPED | OUTPATIENT
Start: 2024-10-21

## 2024-10-21 RX ORDER — ROSUVASTATIN CALCIUM 40 MG/1
TABLET, COATED ORAL
Qty: 90 TABLET | Refills: 3 | Status: SHIPPED | OUTPATIENT
Start: 2024-10-21

## 2024-10-21 RX ORDER — EMPAGLIFLOZIN 10 MG/1
10 TABLET, FILM COATED ORAL DAILY
Qty: 90 TABLET | Refills: 3 | Status: SHIPPED | OUTPATIENT
Start: 2024-10-21

## 2024-10-21 RX ORDER — LISINOPRIL 2.5 MG/1
2.5 TABLET ORAL DAILY
Qty: 90 TABLET | Refills: 4 | Status: SHIPPED | OUTPATIENT
Start: 2024-10-21

## 2024-10-21 RX ORDER — ISOSORBIDE MONONITRATE 60 MG/1
120 TABLET, EXTENDED RELEASE ORAL DAILY
Qty: 180 TABLET | Refills: 4 | Status: SHIPPED | OUTPATIENT
Start: 2024-10-21

## 2024-10-21 RX ORDER — EMPAGLIFLOZIN 10 MG/1
TABLET, FILM COATED ORAL
COMMUNITY
Start: 2024-09-19 | End: 2024-10-21

## 2024-10-21 RX ORDER — INSULIN GLARGINE 100 [IU]/ML
15-20 INJECTION, SOLUTION SUBCUTANEOUS AT BEDTIME
Qty: 15 ML | Refills: 11 | Status: SHIPPED | OUTPATIENT
Start: 2024-10-21

## 2024-10-21 SDOH — HEALTH STABILITY: PHYSICAL HEALTH: ON AVERAGE, HOW MANY MINUTES DO YOU ENGAGE IN EXERCISE AT THIS LEVEL?: 40 MIN

## 2024-10-21 SDOH — HEALTH STABILITY: PHYSICAL HEALTH: ON AVERAGE, HOW MANY DAYS PER WEEK DO YOU ENGAGE IN MODERATE TO STRENUOUS EXERCISE (LIKE A BRISK WALK)?: 3 DAYS

## 2024-10-21 ASSESSMENT — PAIN SCALES - GENERAL: PAINLEVEL: MODERATE PAIN (5)

## 2024-10-21 ASSESSMENT — SOCIAL DETERMINANTS OF HEALTH (SDOH): HOW OFTEN DO YOU GET TOGETHER WITH FRIENDS OR RELATIVES?: THREE TIMES A WEEK

## 2024-10-21 NOTE — NURSING NOTE
"Chief Complaint   Patient presents with    Physical       Initial /54 (BP Location: Left arm, Patient Position: Sitting, Cuff Size: Adult Regular)   Pulse 59   Temp 98.4  F (36.9  C) (Oral)   Resp 18   Ht 1.626 m (5' 4\")   Wt 66.7 kg (147 lb)   LMP  (LMP Unknown)   SpO2 99%   BMI 25.23 kg/m   Estimated body mass index is 25.23 kg/m  as calculated from the following:    Height as of this encounter: 1.626 m (5' 4\").    Weight as of this encounter: 66.7 kg (147 lb).  Medication Review: complete    The next two questions are to help us understand your food security.  If you are feeling you need any assistance in this area, we have resources available to support you today.          1/26/2024   SDOH- Food Insecurity   Within the past 12 months, did you worry that your food would run out before you got money to buy more? N   Within the past 12 months, did the food you bought just not last and you didn t have money to get more? N            Health Care Directive:  Patient has a Health Care Directive on file      Sondra Hu LPN      "

## 2024-10-21 NOTE — TELEPHONE ENCOUNTER
[id number] sent Rx request for the following:      Requested Prescriptions   Pending Prescriptions Disp Refills    isosorbide mononitrate (IMDUR) 60 MG 24 hr tablet [Pharmacy Med Name: ISOSORBIDE MONO 60MG ER TAB] 180 tablet 3     Sig: TAKE 2 TABLETS (120 MG) BY MOUTH DAILY   Last Prescription Date:   10/23/23  Last Fill Qty/Refills:         180, R-3      Nitrates Failed - 10/21/2024 11:07 AM        Failed - Always fail criteria - needs review     Review chart. Forward refill request to provider if patient has exceeded one bottle per 3 months.        lisinopril (ZESTRIL) 2.5 MG tablet [Pharmacy Med Name: LISINOPRIL 2.5MG TABLET] 90 tablet 3     Sig: TAKE 1 TABLET (2.5 MG) BY MOUTH DAILY   Last Prescription Date:   10/23/23  Last Fill Qty/Refills:         90, R-3      ACE Inhibitors (Including Combos) Protocol Failed - 10/21/2024 11:07 AM        Failed - Most recent GFR on file in the past 12 months >30       rosuvastatin (CRESTOR) 40 MG tablet [Pharmacy Med Name: ROSUVASTATIN 40MG TABLET] 90 tablet 3     Sig: TAKE 1 TABLET BY MOUTH EVERY DAY   Last Prescription Date:   10/23/23  Last Fill Qty/Refills:         90, R-3        metoprolol succinate ER (TOPROL XL) 25 MG 24 hr tablet [Pharmacy Med Name: METOPROLOL SUCC 25MG ER TABLET] 45 tablet 3     Sig: TAKE 1/2 TABLET BY MOUTH EVERY EVENING   Last Prescription Date:   10/23/23  Last Fill Qty/Refills:         45, R-3      Physical today- note open.    Unable to complete prescription refill per RN Medication Refill Policy. Pretty Rodríguez RN .............. 10/21/2024  11:14 AM

## 2024-10-21 NOTE — PROGRESS NOTES
"Preventive Care Visit  [facility] AND [facility]  Sam Leal MD, Family Medicine  Oct 21, 2024      Assessment & Plan     Medicare annual wellness visit, subsequent  Immunization recommendations, she declined all immunizations today.    Primary insomnia  She has a history of insomnia but has not used Ambien for quite some time.  Will remove this from her list.    Type 2 diabetes mellitus with complication, with long-term current use of insulin (H)  Reviewed hemoglobin A1c.  Her diabetes is under adequate control especially given her age.  Will continue current regimen.  Follow-up in 6 months.  - rosuvastatin (CRESTOR) 40 MG tablet; TAKE 1 TABLET BY MOUTH EVERY DAY  - JARDIANCE 10 MG TABS tablet; Take 1 tablet (10 mg) by mouth daily.    S/P cardiac pacemaker procedure  - metoprolol succinate ER (TOPROL XL) 25 MG 24 hr tablet; TAKE 1/2 TABLET BY MOUTH EVERY EVENING    Essential hypertension  Stable, continue current regimen  - lisinopril (ZESTRIL) 2.5 MG tablet; Take 1 tablet (2.5 mg) by mouth daily.  - isosorbide mononitrate (IMDUR) 60 MG 24 hr tablet; Take 2 tablets (120 mg) by mouth daily.    Type 2 diabetes mellitus without complication, with long-term current use of insulin (H)  - insulin glargine 100 UNIT/ML pen; Inject 15-20 Units subcutaneously at bedtime.    Patient has been advised of split billing requirements and indicates understanding: Yes        BMI  Estimated body mass index is 25.23 kg/m  as calculated from the following:    Height as of this encounter: 1.626 m (5' 4\").    Weight as of this encounter: 66.7 kg (147 lb).   Weight management plan: Discussed healthy diet and exercise guidelines  Blood sugar testing frequency justification:  Risk of hypoglycemia with medication(s)  Counseling  Appropriate preventive services were addressed with this patient via screening, questionnaire, or discussion as appropriate for fall prevention, nutrition, physical activity, Tobacco-use cessation, social " engagement, weight loss and cognition.  Checklist reviewing preventive services available has been given to the patient.  Reviewed patient's diet, addressing concerns and/or questions.   She is at risk for lack of exercise and has been provided with information to increase physical activity for the benefit of her well-being.   The patient was instructed to see the dentist every 6 months.     No follow-ups on file.    April Avalos is a 74 year old, presenting for the following:  Physical    She has a history of type 2 diabetes.  She brings in her continuous glucose monitor which has been showing her sugars average in the mid 100s up to the low 200s.  She is having no hypoglycemia.  She continues with 15 to 20 units of Lantus at night and 5 units of NovoLog with meals.    She continues to follow with nephrology due to her chronic kidney disease which has been stable.    She otherwise has no new concerns today.        10/21/2024    10:55 AM   Additional Questions   Roomed by Sondra Hu LPN         Health Care Directive  Patient has a Health Care Directive on file  Discussed advance care planning with patient.    HPI          No data to display                   No data to display                   No data to display                  1/26/2024   Social Factors   Worry food won't last until get money to buy more No   Food not last or not have enough money for food? No   Do you have housing? (Housing is defined as stable permanent housing and does not include staying ouside in a car, in a tent, in an abandoned building, in an overnight shelter, or couch-surfing.) Yes   Are you worried about losing your housing? No   Lack of transportation? No   Unable to get utilities (heat,electricity)? No            10/21/2024   Fall Risk   Fallen 2 or more times in the past year? No   Trouble with walking or balance? No              No data to display                   No data to display                   No data to  display                     No data to display                            No data to display              Social History     Tobacco Use    Smoking status: Never     Passive exposure: Past    Smokeless tobacco: Never   Vaping Use    Vaping status: Never Used   Substance Use Topics    Alcohol use: No     Alcohol/week: 0.0 standard drinks of alcohol    Drug use: No     Comment: Drug use: No           5/22/2023   LAST FHS-7 RESULTS   1st degree relative breast or ovarian cancer No   Any relative bilateral breast cancer No   Any male have breast cancer No   Any ONE woman have BOTH breast AND ovarian cancer No   Any woman with breast cancer before 50yrs No   2 or more relatives with breast AND/OR ovarian cancer No   2 or more relatives with breast AND/OR bowel cancer No               ASCVD Risk   The ASCVD Risk score (Christopher DK, et al., 2019) failed to calculate for the following reasons:    The valid total cholesterol range is 130 to 320 mg/dL          Reviewed and updated as needed this visit by Provider                  Current providers sharing in care for this patient include:  Patient Care Team:  Sam Leal MD as PCP - General (Family Practice)  Hazel Del Rio RN as Diabetes Educator (Diabetes Education)  Sam Leal MD as Assigned PCP    The following health maintenance items are reviewed in Epic and correct as of today:  Health Maintenance   Topic Date Due    HF ACTION PLAN  Never done    Pneumococcal Vaccine: 65+ Years (2 of 2 - PCV) 10/01/1999    ZOSTER IMMUNIZATION (2 of 3) 02/11/2013    MEDICARE ANNUAL WELLNESS VISIT  Never done    DTAP/TDAP/TD IMMUNIZATION (2 - Td or Tdap) 07/15/2023    RSV VACCINE (1 - 1-dose 75+ series) Never done    COVID-19 Vaccine (1 - 2024-25 season) Never done    EYE EXAM  11/01/2024    INFLUENZA VACCINE (1) 09/28/2028 (Originally 9/1/2024)    BMP  01/18/2025    MICROALBUMIN  01/18/2025    A1C  04/18/2025    HEMOGLOBIN  04/18/2025    DIABETIC FOOT EXAM  04/22/2025  "   ALT  10/18/2025    LIPID  10/18/2025    CBC  10/18/2025    FALL RISK ASSESSMENT  10/21/2025    DEXA  11/01/2025    ADVANCE CARE PLANNING  06/10/2029    COLORECTAL CANCER SCREENING  04/12/2032    PARATHYROID  Completed    PHOSPHORUS  Completed    PHQ-2 (once per calendar year)  Completed    URINALYSIS  Completed    ALK PHOS  Completed    HPV IMMUNIZATION  Aged Out    MENINGITIS IMMUNIZATION  Aged Out    RSV MONOCLONAL ANTIBODY  Aged Out    TSH W/FREE T4 REFLEX  Discontinued    HEPATITIS C SCREENING  Discontinued    MAMMO SCREENING  Discontinued          Objective    Exam  /54 (BP Location: Left arm, Patient Position: Sitting, Cuff Size: Adult Regular)   Pulse 59   Temp 98.4  F (36.9  C) (Oral)   Resp 18   Ht 1.626 m (5' 4\")   Wt 66.7 kg (147 lb)   LMP  (LMP Unknown)   SpO2 99%   BMI 25.23 kg/m     Estimated body mass index is 25.23 kg/m  as calculated from the following:    Height as of this encounter: 1.626 m (5' 4\").    Weight as of this encounter: 66.7 kg (147 lb).    Physical Exam  GENERAL: alert and no distress  EYES: Eyes grossly normal to inspection, PERRL and conjunctivae and sclerae normal  HENT: ear canals and TM's normal, nose and mouth without ulcers or lesions  NECK: no adenopathy, no asymmetry, masses, or scars  RESP: lungs clear to auscultation - no rales, rhonchi or wheezes  CV: regular rate and rhythm, normal S1 S2, no S3 or S4, no murmur, click or rub, no peripheral edema  ABDOMEN: soft, nontender, no hepatosplenomegaly, no masses and bowel sounds normal  MS: no gross musculoskeletal defects noted, no edema  SKIN: no suspicious lesions or rashes  NEURO: Normal strength and tone, mentation intact and speech normal  PSYCH: mentation appears normal, affect normal/bright        10/21/2024   Mini Cog   Clock Draw Score 0 Abnormal   3 Item Recall 2 objects recalled   Mini Cog Total Score 2                 Signed Electronically by: Sam Leal MD    "

## 2024-12-19 DIAGNOSIS — N20.0 LEFT RENAL STONE: Primary | ICD-10-CM

## 2024-12-20 ENCOUNTER — TELEPHONE (OUTPATIENT)
Dept: FAMILY MEDICINE | Facility: OTHER | Age: 74
End: 2024-12-20
Payer: COMMERCIAL

## 2024-12-20 DIAGNOSIS — E11.8 TYPE 2 DIABETES MELLITUS WITH COMPLICATION, WITH LONG-TERM CURRENT USE OF INSULIN (H): Primary | ICD-10-CM

## 2024-12-20 DIAGNOSIS — Z79.4 TYPE 2 DIABETES MELLITUS WITH COMPLICATION, WITH LONG-TERM CURRENT USE OF INSULIN (H): Primary | ICD-10-CM

## 2024-12-20 NOTE — TELEPHONE ENCOUNTER
Lab appointment 4/18/24    Diabetic check 4/21/24 with PCP    Orders needed.     A1C, BMP, and microalbumin pending as BMP and microalbumin due 1/2025    Lisa Phillips CMA on 12/20/2024 at 11:38 AM

## 2024-12-31 ENCOUNTER — TRANSFERRED RECORDS (OUTPATIENT)
Dept: HEALTH INFORMATION MANAGEMENT | Facility: OTHER | Age: 74
End: 2024-12-31
Payer: COMMERCIAL

## 2025-01-06 ENCOUNTER — LAB (OUTPATIENT)
Dept: LAB | Facility: OTHER | Age: 75
End: 2025-01-06
Attending: UROLOGY
Payer: COMMERCIAL

## 2025-01-06 ENCOUNTER — HOSPITAL ENCOUNTER (OUTPATIENT)
Dept: GENERAL RADIOLOGY | Facility: OTHER | Age: 75
Discharge: HOME | End: 2025-01-06
Attending: UROLOGY
Payer: COMMERCIAL

## 2025-01-06 ENCOUNTER — OFFICE VISIT (OUTPATIENT)
Dept: UROLOGY | Facility: OTHER | Age: 75
End: 2025-01-06
Attending: UROLOGY
Payer: COMMERCIAL

## 2025-01-06 VITALS
HEIGHT: 64 IN | WEIGHT: 147 LBS | BODY MASS INDEX: 25.1 KG/M2 | RESPIRATION RATE: 20 BRPM | TEMPERATURE: 97.7 F | OXYGEN SATURATION: 97 % | HEART RATE: 70 BPM

## 2025-01-06 DIAGNOSIS — N23 RENAL COLIC ON LEFT SIDE: ICD-10-CM

## 2025-01-06 DIAGNOSIS — N20.0 LEFT RENAL STONE: ICD-10-CM

## 2025-01-06 DIAGNOSIS — N20.0 LEFT RENAL STONE: Primary | ICD-10-CM

## 2025-01-06 LAB
ALBUMIN UR-MCNC: 70 MG/DL
APPEARANCE UR: ABNORMAL
BILIRUB UR QL STRIP: NEGATIVE
COLOR UR AUTO: YELLOW
GLUCOSE UR STRIP-MCNC: >1000 MG/DL
HGB UR QL STRIP: ABNORMAL
KETONES UR STRIP-MCNC: NEGATIVE MG/DL
LEUKOCYTE ESTERASE UR QL STRIP: ABNORMAL
NITRATE UR QL: NEGATIVE
PH UR STRIP: 6.5 [PH] (ref 5–9)
SP GR UR STRIP: 1.03 (ref 1–1.03)
UROBILINOGEN UR STRIP-MCNC: NORMAL MG/DL

## 2025-01-06 PROCEDURE — G0463 HOSPITAL OUTPT CLINIC VISIT: HCPCS | Mod: 25

## 2025-01-06 PROCEDURE — 81003 URINALYSIS AUTO W/O SCOPE: CPT | Mod: ZL

## 2025-01-06 PROCEDURE — 74018 RADEX ABDOMEN 1 VIEW: CPT

## 2025-01-06 NOTE — PROGRESS NOTES
Chief Complaint: No chief complaint on file.  Left renal stone    HPI: Ms. Bailey Sierra is a 74 year old year old female presenting today January 6, 2025 in follow up of AV known 2 cm left renal stone on active surveillance.    History of a right renal cyst worked up and found to be benign.    Last seen in June 2024 virtually.  At that time we recommended a KUB in 6 months time to see if the stone is changed.  If it were to enlarge then ureteroscopy was recommended.    She was advised to drink more fluid.  Parathyroid levels and other blood work were normal.  She has been seen by nephrology who was recommended she increase her fluid intake.    Here today for follow-up.  Woke up last night with 10/10 left flank pain/LUQ pain.  Some nausea.  No emesis.  No blood in her urine.  No fever/chills.  Difficult to see the stone on her KUB today.  There is some overlying bowel and gas obscuring our view of the stone.    Past Medical History:   Diagnosis Date    Atherosclerotic heart disease of native coronary artery without angina pectoris     No Comments Provided    Cardiac murmur     No Comments Provided    Chronic kidney disease, stage III (moderate) (H)     No Comments Provided    Controlled type 2 diabetes mellitus with stage 3 chronic kidney disease, with long-term current use of insulin (H) 7/26/2013    COVID-19 virus detected 2/23/2021    Edema     No Comments Provided    Essential (primary) hypertension     No Comments Provided    Gastro-esophageal reflux disease without esophagitis     No Comments Provided    Hyperlipidemia     No Comments Provided    Impingement syndrome of right shoulder 03/10/2017         Insomnia 01/25/2012         Iron deficiency anemia     No Comments Provided    Neuromuscular dysfunction of bladder     No Comments Provided    Osteoarthritis     No Comments Provided    Other shoulder lesions, right shoulder 03/10/2017         Other shoulder lesions, unspecified shoulder     No Comments  Provided    Other specified postprocedural states 04/19/2017         Personal history of other medical treatment (CODE)     Three childbirths    Plantar fascial fibromatosis     No Comments Provided    Presence of aortocoronary bypass graft 07/2013    [facility]    Primary osteoarthritis of shoulder 03/10/2017         Type 2 diabetes mellitus without complications (H)     No Comments Provided    Urgency of urination 03/08/2011         Uterovaginal prolapse     No Comments Provided       Past Surgical History:   Procedure Laterality Date    ARTHROSCOPY SHOULDER Right 1997         ARTHROSCOPY SHOULDER Left 2012         ARTHROSCOPY SHOULDER RT/LT Right [id number]         Cardiac Bypass  07/2013         COLONOSCOPY  11/24/2009 11/24/2009    CYSTOSCOPY  03/15/2001    DENTAL SURGERY      Dental extractions    ENDOSCOPIC RETROGRADE CHOLANGIOPANCREATOGRAPHY  05/2008    Bile leak with ERCP and stenting and drainage of bile collection through abdominal wall.    ESOPHAGOSCOPY, GASTROSCOPY, DUODENOSCOPY (EGD), COMBINED N/A 6/23/2020    Pavon's follow up 5 years, 6/23/2025    HYSTERECTOMY TOTAL ABDOMINAL, BILATERAL SALPINGO-OOPHORECTOMY, COMBINED  04/02/2001    Vag vault suspension with Cortex mesh    IMPLANT STIMULATOR AND LEADS SACRAL NERVE (STAGE ONE AND TWO) N/A 5/7/2019    Procedure: Interstim Implant Revision, GENERATOR AND TYING LEAD EXCHANGE;  Surgeon: Honorio Bowling MD;  Location: [facility] OR    IMPLANT STIMULATOR AND LEADS SACRAL NERVE (STAGE ONE AND TWO) N/A 10/18/2022    Procedure: InterStim nonrechargeable generator and tined lead revision;  Surgeon: Honorio Bowling MD;  Location: [facility] OR    Interstim Device Placement  04/14/2014    Dr. Bowling - [facility]    LAMINECTOMY LUMBAR ONE LEVEL  1997         LAPAROSCOPIC CHOLECYSTECTOMY  04/2008         LAPAROSCOPIC TUBAL LIGATION      No Comments Provided       Current Outpatient Medications   Medication Sig Dispense Refill    ammonium lactate (LAC-HYDRIN) 12 % external lotion Apply  "topically 2 times daily      ASPIRIN LOW DOSE 81 MG EC tablet TAKE 1 TABLET (81 MG) BY MOUTH DAILY 90 tablet 4    blood glucose (TRICIA CONTOUR) test strip TEST 4 TIMES A  strip 3    cholecalciferol 50 MCG (2000 UT) tablet Take 50 mcg by mouth      Continuous Glucose Sensor (DEXCOM G7 SENSOR) MISC CHANGE 1 SENSOR EVERY 10 DAYS 3 each 6    famotidine (PEPCID) 40 MG tablet Take 1 tablet (40 mg) by mouth 2 times daily 180 tablet 3    glucose (BD GLUCOSE) 4 g chewable tablet Take 1 tablet by mouth every hour as needed for low blood sugar      Incontinence Supply Disposable MISC Depend Fit-flex [facility] #[id number]/LARGE 17/PP DX\" [facility]:  #[id number] 150 each 11    insulin aspart (FIASP FLEXTOUCH) 100 UNIT/ML pen-injector INJECT 5 UNITS UNDER THE SKIN 3 TIMES DAILY WITH MEALS 15 mL 12    insulin aspart (NOVOLOG FLEXPEN) 100 UNIT/ML pen Inject 5 Units Subcutaneous 3 times daily (with meals) 60 mL 3    insulin glargine 100 UNIT/ML pen Inject 15-20 Units subcutaneously at bedtime. 15 mL 11    insulin pen needle (B-D U/F) 31G X 8 MM miscellaneous USE 4 PENS DAILY 400 each 3    isosorbide mononitrate (IMDUR) 60 MG 24 hr tablet Take 2 tablets (120 mg) by mouth daily. 180 tablet 3    isosorbide mononitrate (IMDUR) 60 MG 24 hr tablet TAKE 2 TABLETS (120 MG) BY MOUTH DAILY 180 tablet 4    JARDIANCE 10 MG TABS tablet Take 1 tablet (10 mg) by mouth daily. 90 tablet 3    lisinopril (ZESTRIL) 2.5 MG tablet Take 1 tablet (2.5 mg) by mouth daily. 90 tablet 3    lisinopril (ZESTRIL) 2.5 MG tablet TAKE 1 TABLET (2.5 MG) BY MOUTH DAILY 90 tablet 4    loperamide (IMODIUM A-D) 2 MG tablet Take 1 tablet (2 mg) by mouth 4 times daily as needed for diarrhea 90 tablet 3    metoprolol succinate ER (TOPROL XL) 25 MG 24 hr tablet TAKE 1/2 TABLET BY MOUTH EVERY EVENING 45 tablet 3    metoprolol succinate ER (TOPROL XL) 25 MG 24 hr tablet TAKE 1/2 TABLET BY MOUTH EVERY EVENING 45 tablet 4    nitroGLYcerin (NITROSTAT) 0.4 MG sublingual " tablet DISSOLVE 1 TAB UNDER TONGUE EVERY 5 MIN AS NEEDED FOR CHEST PAIN UP TO 3 DOSES 10 tablet 1    order for DME Equipment being ordered: Diabetic shoes, pre-ulcer callous formation 2 each 3    rosuvastatin (CRESTOR) 40 MG tablet TAKE 1 TABLET BY MOUTH EVERY DAY 90 tablet 3    rosuvastatin (CRESTOR) 40 MG tablet TAKE 1 TABLET BY MOUTH EVERY DAY 90 tablet 4    Vitamin D3 50 mcg (2000 units) tablet Take 1 tablet (50 mcg) by mouth daily 90 tablet 3    VITAMIN D3 50 MCG (2000 UT) tablet TAKE 1 TABLET (50 MCG) BY MOUTH DAILY 90 tablet 4       ALLERGIES: Patient has no known allergies.     GENERAL PHYSICAL EXAM:   Vitals: LMP  (LMP Unknown)   There is no height or weight on file to calculate BMI.    GENERAL: Well groomed, well developed, well nourished female in NAD.  CV:  Warm Extremities   RESPIRATORY:  Normal respiratory effort   GI:  Soft, Tender LUQ, left flank pain, no rebound/guarding  MS: Moving all four  NEURO: Alert and oriented x 3.  PSYCH: Normal mood and affect, pleasant and agreeable during interview and exam.    RADIOLOGY: The following tests were reviewed: KUB 1/6/2025    LABS: The last test results for Ms. Bailey Sierra were reviewed.   Results for orders placed or performed in visit on 01/06/25 (from the past 24 hours)   Urinalysis Macroscopic   Result Value Ref Range    Color Urine Yellow Colorless, Straw, Light Yellow, Yellow    Appearance Urine Slightly Cloudy (A) Clear    Glucose Urine >1000 (A) Negative mg/dL    Bilirubin Urine Negative Negative    Ketones Urine Negative Negative mg/dL    Specific Gravity Urine 1.027 1.000 - 1.030    Blood Urine Large (A) Negative    pH Urine 6.5 5.0 - 9.0    Protein Albumin Urine 70 (A) Negative mg/dL    Urobilinogen Urine Normal Normal, 2.0 mg/dL    Nitrite Urine Negative Negative    Leukocyte Esterase Urine Large (A) Negative     *Note: Due to a large number of results and/or encounters for the requested time period, some results have not been displayed. A  complete set of results can be found in Results Review.       BMP -   Recent Labs   Lab Test 10/18/24  0851 06/25/24  1138 04/18/24  0855 03/30/23  0831 12/11/17  0932 06/02/17  1000     --  143 142   < > 139   POTASSIUM 4.2  --  4.2 3.7   < > 3.5   CHLORIDE 110*  --  111* 111*   < > 111*   CO2 23  --  23 23   < > 24   BUN 22.3  --  26.9* 21.4   < > 21   CR 2.37*  --  2.47* 2.33*   < > 2.23*   *  --  173* 129*   < > 126*   ELICIA 9.3 9.5 9.7 9.0   < > 9.3   PHOS  --  3.7  --   --   --  3.0    < > = values in this interval not displayed.       CBC -   Recent Labs   Lab Test 10/18/24  0851 04/18/24  0855 08/26/21  0940 08/22/21  1634   WBC 4.9 5.3  --  4.9   HGB 12.9 12.8 11.3* 9.8*    155  --  140*       ASSESSMENT:   Left renal colic  Left renal stone      PLAN:   Patient with acute renal colic or concern for such since last night.    KUB suboptimal.  I have concern over renal colic.    I recommend a NCCT A/P to further evaluate.    If patient worsens or n/v worsens then she was advised to go to the ER for urgent evaluation.    I anticipate left Ureteroscopy in the OR regardless given the tremendous stone burden, 2 cm.    All questions addressed.     22 minutes spent on the date of this encounter doing chart review, history and exam, documentation and further activities as noted above.        Thomas Bolanos MD  [facility]

## 2025-01-06 NOTE — NURSING NOTE
"Chief Complaint   Patient presents with    Hematuria     6 month follow up , renal stone        Initial Pulse 70   Temp 97.7  F (36.5  C) (Temporal)   Resp 20   Ht 1.626 m (5' 4\")   Wt 66.7 kg (147 lb)   LMP  (LMP Unknown)   SpO2 97%   BMI 25.23 kg/m   Estimated body mass index is 25.23 kg/m  as calculated from the following:    Height as of this encounter: 1.626 m (5' 4\").    Weight as of this encounter: 66.7 kg (147 lb).  Medication Reconciliation: complete    post void residual- 0 ml    Chelle Summers LPN    "

## 2025-01-10 ENCOUNTER — HOSPITAL ENCOUNTER (OUTPATIENT)
Facility: OTHER | Age: 75
Discharge: HOME | End: 2025-01-10
Attending: UROLOGY | Admitting: UROLOGY
Payer: COMMERCIAL

## 2025-01-10 ENCOUNTER — HOSPITAL ENCOUNTER (OUTPATIENT)
Dept: CT IMAGING | Facility: OTHER | Age: 75
Discharge: HOME | End: 2025-01-10
Attending: UROLOGY | Admitting: UROLOGY
Payer: COMMERCIAL

## 2025-01-10 ENCOUNTER — APPOINTMENT (OUTPATIENT)
Dept: GENERAL RADIOLOGY | Facility: OTHER | Age: 75
End: 2025-01-10
Attending: UROLOGY
Payer: COMMERCIAL

## 2025-01-10 VITALS
WEIGHT: 147 LBS | SYSTOLIC BLOOD PRESSURE: 115 MMHG | OXYGEN SATURATION: 99 % | RESPIRATION RATE: 13 BRPM | BODY MASS INDEX: 25.23 KG/M2 | DIASTOLIC BLOOD PRESSURE: 44 MMHG | TEMPERATURE: 96.9 F | HEART RATE: 60 BPM

## 2025-01-10 DIAGNOSIS — N30.00 ACUTE CYSTITIS WITHOUT HEMATURIA: ICD-10-CM

## 2025-01-10 DIAGNOSIS — N20.0 LEFT RENAL STONE: ICD-10-CM

## 2025-01-10 DIAGNOSIS — N20.1 CALCULUS OF DISTAL LEFT URETER: Primary | ICD-10-CM

## 2025-01-10 DIAGNOSIS — N23 RENAL COLIC ON LEFT SIDE: ICD-10-CM

## 2025-01-10 LAB
ANION GAP SERPL CALCULATED.3IONS-SCNC: 11 MMOL/L (ref 7–15)
BUN SERPL-MCNC: 19.9 MG/DL (ref 8–23)
CALCIUM SERPL-MCNC: 9.1 MG/DL (ref 8.8–10.4)
CHLORIDE SERPL-SCNC: 106 MMOL/L (ref 98–107)
CREAT SERPL-MCNC: 2.59 MG/DL (ref 0.51–0.95)
EGFRCR SERPLBLD CKD-EPI 2021: 19 ML/MIN/1.73M2
GLUCOSE BLDC GLUCOMTR-MCNC: 136 MG/DL (ref 70–99)
GLUCOSE SERPL-MCNC: 155 MG/DL (ref 70–99)
HCO3 SERPL-SCNC: 22 MMOL/L (ref 22–29)
HOLD SPECIMEN: NORMAL
POTASSIUM SERPL-SCNC: 3.7 MMOL/L (ref 3.4–5.3)
SODIUM SERPL-SCNC: 139 MMOL/L (ref 135–145)

## 2025-01-10 PROCEDURE — C1758 CATHETER, URETERAL: HCPCS | Performed by: UROLOGY

## 2025-01-10 PROCEDURE — C1769 GUIDE WIRE: HCPCS | Performed by: UROLOGY

## 2025-01-10 PROCEDURE — 52332 CYSTOSCOPY AND TREATMENT: CPT | Mod: LT | Performed by: UROLOGY

## 2025-01-10 PROCEDURE — 74176 CT ABD & PELVIS W/O CONTRAST: CPT

## 2025-01-10 PROCEDURE — 710N000010 HC RECOVERY PHASE 1, LEVEL 2, PER MIN: Performed by: UROLOGY

## 2025-01-10 PROCEDURE — 258N000003 HC RX IP 258 OP 636: Performed by: NURSE ANESTHETIST, CERTIFIED REGISTERED

## 2025-01-10 PROCEDURE — 999N000141 HC STATISTIC PRE-PROCEDURE NURSING ASSESSMENT: Performed by: UROLOGY

## 2025-01-10 PROCEDURE — C2617 STENT, NON-COR, TEM W/O DEL: HCPCS | Performed by: UROLOGY

## 2025-01-10 PROCEDURE — 272N000001 HC OR GENERAL SUPPLY STERILE: Performed by: UROLOGY

## 2025-01-10 PROCEDURE — 36415 COLL VENOUS BLD VENIPUNCTURE: CPT | Performed by: UROLOGY

## 2025-01-10 PROCEDURE — 999N000179 XR SURGERY CARM FLUORO LESS THAN 5 MIN W STILLS

## 2025-01-10 PROCEDURE — 370N000017 HC ANESTHESIA TECHNICAL FEE, PER MIN: Performed by: UROLOGY

## 2025-01-10 PROCEDURE — 250N000013 HC RX MED GY IP 250 OP 250 PS 637: Performed by: NURSE ANESTHETIST, CERTIFIED REGISTERED

## 2025-01-10 PROCEDURE — 250N000011 HC RX IP 250 OP 636: Performed by: UROLOGY

## 2025-01-10 PROCEDURE — 250N000013 HC RX MED GY IP 250 OP 250 PS 637: Performed by: UROLOGY

## 2025-01-10 PROCEDURE — 82310 ASSAY OF CALCIUM: CPT | Performed by: UROLOGY

## 2025-01-10 PROCEDURE — 250N000025 HC SEVOFLURANE, PER MIN: Performed by: UROLOGY

## 2025-01-10 PROCEDURE — 82962 GLUCOSE BLOOD TEST: CPT

## 2025-01-10 PROCEDURE — 80048 BASIC METABOLIC PNL TOTAL CA: CPT | Performed by: UROLOGY

## 2025-01-10 PROCEDURE — 360N000084 HC SURGERY LEVEL 4 W/ FLUORO, PER MIN: Performed by: UROLOGY

## 2025-01-10 PROCEDURE — 82365 CALCULUS SPECTROSCOPY: CPT | Performed by: UROLOGY

## 2025-01-10 PROCEDURE — 258N000001 HC RX 258: Performed by: UROLOGY

## 2025-01-10 PROCEDURE — 710N000012 HC RECOVERY PHASE 2, PER MINUTE: Performed by: UROLOGY

## 2025-01-10 DEVICE — URETERAL STENT
Type: IMPLANTABLE DEVICE | Site: URETER | Status: FUNCTIONAL
Brand: CONTOUR™

## 2025-01-10 RX ORDER — HYDROMORPHONE HCL IN WATER/PF 6 MG/30 ML
0.2 PATIENT CONTROLLED ANALGESIA SYRINGE INTRAVENOUS EVERY 5 MIN PRN
Status: DISCONTINUED | OUTPATIENT
Start: 2025-01-10 | End: 2025-01-10 | Stop reason: HOSPADM

## 2025-01-10 RX ORDER — DEXAMETHASONE SODIUM PHOSPHATE 10 MG/ML
4 INJECTION, SOLUTION INTRAMUSCULAR; INTRAVENOUS
Status: DISCONTINUED | OUTPATIENT
Start: 2025-01-10 | End: 2025-01-10 | Stop reason: HOSPADM

## 2025-01-10 RX ORDER — CEFAZOLIN SODIUM/WATER 2 G/20 ML
2 SYRINGE (ML) INTRAVENOUS
Status: COMPLETED | OUTPATIENT
Start: 2025-01-10 | End: 2025-01-10

## 2025-01-10 RX ORDER — OXYCODONE HYDROCHLORIDE 5 MG/1
5 TABLET ORAL
Status: DISCONTINUED | OUTPATIENT
Start: 2025-01-10 | End: 2025-01-10 | Stop reason: HOSPADM

## 2025-01-10 RX ORDER — ACETAMINOPHEN 325 MG/1
975 TABLET ORAL ONCE
Status: COMPLETED | OUTPATIENT
Start: 2025-01-10 | End: 2025-01-10

## 2025-01-10 RX ORDER — AMOXICILLIN 250 MG
1-2 CAPSULE ORAL 2 TIMES DAILY
Qty: 30 TABLET | Refills: 0 | Status: SHIPPED | OUTPATIENT
Start: 2025-01-10

## 2025-01-10 RX ORDER — NALOXONE HYDROCHLORIDE 0.4 MG/ML
0.1 INJECTION, SOLUTION INTRAMUSCULAR; INTRAVENOUS; SUBCUTANEOUS
Status: DISCONTINUED | OUTPATIENT
Start: 2025-01-10 | End: 2025-01-10 | Stop reason: HOSPADM

## 2025-01-10 RX ORDER — ONDANSETRON 2 MG/ML
4 INJECTION INTRAMUSCULAR; INTRAVENOUS EVERY 30 MIN PRN
Status: DISCONTINUED | OUTPATIENT
Start: 2025-01-10 | End: 2025-01-10 | Stop reason: HOSPADM

## 2025-01-10 RX ORDER — ACYCLOVIR 200 MG/1
CAPSULE ORAL PRN
Status: DISCONTINUED | OUTPATIENT
Start: 2025-01-10 | End: 2025-01-10 | Stop reason: HOSPADM

## 2025-01-10 RX ORDER — TOLTERODINE TARTRATE 1 MG/1
1 TABLET, EXTENDED RELEASE ORAL EVERY 12 HOURS PRN
Qty: 30 TABLET | Refills: 1 | Status: SHIPPED | OUTPATIENT
Start: 2025-01-10

## 2025-01-10 RX ORDER — FENTANYL CITRATE 50 UG/ML
50 INJECTION, SOLUTION INTRAMUSCULAR; INTRAVENOUS EVERY 5 MIN PRN
Status: DISCONTINUED | OUTPATIENT
Start: 2025-01-10 | End: 2025-01-10 | Stop reason: HOSPADM

## 2025-01-10 RX ORDER — IOPAMIDOL 755 MG/ML
INJECTION, SOLUTION INTRAVASCULAR PRN
Status: DISCONTINUED | OUTPATIENT
Start: 2025-01-10 | End: 2025-01-10 | Stop reason: HOSPADM

## 2025-01-10 RX ORDER — ONDANSETRON 4 MG/1
4 TABLET, ORALLY DISINTEGRATING ORAL EVERY 30 MIN PRN
Status: DISCONTINUED | OUTPATIENT
Start: 2025-01-10 | End: 2025-01-10 | Stop reason: HOSPADM

## 2025-01-10 RX ORDER — FENTANYL CITRATE 50 UG/ML
25 INJECTION, SOLUTION INTRAMUSCULAR; INTRAVENOUS
Status: DISCONTINUED | OUTPATIENT
Start: 2025-01-10 | End: 2025-01-10 | Stop reason: HOSPADM

## 2025-01-10 RX ORDER — ACETAMINOPHEN 650 MG/1
650 SUPPOSITORY RECTAL ONCE
Status: COMPLETED | OUTPATIENT
Start: 2025-01-10 | End: 2025-01-10

## 2025-01-10 RX ORDER — CEFAZOLIN SODIUM/WATER 2 G/20 ML
2 SYRINGE (ML) INTRAVENOUS SEE ADMIN INSTRUCTIONS
Status: DISCONTINUED | OUTPATIENT
Start: 2025-01-10 | End: 2025-01-10 | Stop reason: HOSPADM

## 2025-01-10 RX ORDER — OXYCODONE HYDROCHLORIDE 5 MG/1
10 TABLET ORAL
Status: DISCONTINUED | OUTPATIENT
Start: 2025-01-10 | End: 2025-01-10 | Stop reason: HOSPADM

## 2025-01-10 RX ORDER — FENTANYL CITRATE 50 UG/ML
25 INJECTION, SOLUTION INTRAMUSCULAR; INTRAVENOUS EVERY 5 MIN PRN
Status: DISCONTINUED | OUTPATIENT
Start: 2025-01-10 | End: 2025-01-10 | Stop reason: HOSPADM

## 2025-01-10 RX ORDER — OXYCODONE HYDROCHLORIDE 5 MG/1
2.5 TABLET ORAL EVERY 6 HOURS PRN
Qty: 10 TABLET | Refills: 0 | Status: SHIPPED | OUTPATIENT
Start: 2025-01-10

## 2025-01-10 RX ORDER — OXYCODONE HYDROCHLORIDE 5 MG/1
5 TABLET ORAL
Status: COMPLETED | OUTPATIENT
Start: 2025-01-10 | End: 2025-01-10

## 2025-01-10 RX ORDER — TOLTERODINE TARTRATE 2 MG/1
2 TABLET, EXTENDED RELEASE ORAL ONCE
Status: COMPLETED | OUTPATIENT
Start: 2025-01-10 | End: 2025-01-10

## 2025-01-10 RX ORDER — SODIUM CHLORIDE, SODIUM LACTATE, POTASSIUM CHLORIDE, CALCIUM CHLORIDE 600; 310; 30; 20 MG/100ML; MG/100ML; MG/100ML; MG/100ML
INJECTION, SOLUTION INTRAVENOUS CONTINUOUS
Status: DISCONTINUED | OUTPATIENT
Start: 2025-01-10 | End: 2025-01-10 | Stop reason: HOSPADM

## 2025-01-10 RX ORDER — HYDROMORPHONE HCL IN WATER/PF 6 MG/30 ML
0.4 PATIENT CONTROLLED ANALGESIA SYRINGE INTRAVENOUS EVERY 5 MIN PRN
Status: DISCONTINUED | OUTPATIENT
Start: 2025-01-10 | End: 2025-01-10 | Stop reason: HOSPADM

## 2025-01-10 RX ORDER — LIDOCAINE 40 MG/G
CREAM TOPICAL
Status: DISCONTINUED | OUTPATIENT
Start: 2025-01-10 | End: 2025-01-10 | Stop reason: HOSPADM

## 2025-01-10 RX ADMIN — Medication 2 G: at 09:33

## 2025-01-10 RX ADMIN — TOLTERODINE TARTRATE 2 MG: 2 TABLET, FILM COATED ORAL at 11:22

## 2025-01-10 RX ADMIN — ACETAMINOPHEN 975 MG: 325 TABLET, FILM COATED ORAL at 09:18

## 2025-01-10 RX ADMIN — OXYCODONE HYDROCHLORIDE 5 MG: 5 TABLET ORAL at 11:23

## 2025-01-10 RX ADMIN — SODIUM CHLORIDE, POTASSIUM CHLORIDE, SODIUM LACTATE AND CALCIUM CHLORIDE: 600; 310; 30; 20 INJECTION, SOLUTION INTRAVENOUS at 09:33

## 2025-01-10 ASSESSMENT — ACTIVITIES OF DAILY LIVING (ADL)
ADLS_ACUITY_SCORE: 41

## 2025-01-10 NOTE — OP NOTE
Operative report    Preoperative diagnosis: Left distal ureteral calculi, 5 mm, 3 mm, moderate left hydronephrosis, left multiple renal calculi    Postoperative diagnosis: Same including possible acute cystitis without hematuria    Procedure: Cystoscopy, left retrograde pyelogram, left ureteroscopy, basketing of stone fragments, left stent placement 6 x 24 without tether    Surgeon: Thomas Bolanos MD    Anesthesiologist: Kathryn Hopkins CRNA    Anesthesia: General    Description:    Informed consent was obtained.  Risks of bleeding, infection, ureteral injury or stricture, stent discomfort including urgency and frequency, urinary retention, bladder injury, the need for a staged procedure secondary to the inability to access the stone, DVT, PE, pneumonia and cardiac or anesthetic complications were explained to the patient.  Site had been marked and designated.  Films were reviewed.    The patient was taken to the procedure room.  General anesthesia was administered.  Antibiotics had been given prior to the procedure per protocol.  Timeout was done all were in agreement.    The patient was placed lithotomy.  Prepped and draped in sterile fashion.  The patient was personally positioned by me and all pressure points were padded appropriately.    Cystoscopy was done with the rigid cystoscope using a 30 and 70 degree lens and the 22 [place] sheath.  The bladder was surveyed and found to be normal without any visible tumor stone or lesion.  Some cloudiness to the urine but no distinct evidence of cystitis.    The left ureter was visualized.  An open-ended catheter was used to intubate the ureter and a retrograde was done.  Findings included the only defects in the left distal ureter consistent with 2 stones.  There was a small air bubble..    Through the open-ended catheter a 0.38 Glidewire was advanced past the stone into the collecting system and used as a safety wire.  The open-ended catheter was then offloaded.     The  semirigid ureteroscope was advanced over the second 0.35 sensor wire up to the level of the stones.  The stones were identified.    The stones were basketed and removed with a tipless nitinol tight basket.  The ureteroscope was advanced proximally and no further stones were seen although the ureter was dilated as was the collecting system moderately.    Contrast was instilled in the collecting system indicating no injury or extravasation.    Over the wire a 6 x 24 double-J stent was placed with a curl noted in the collecting system and in the bladder.    The bladder was drained and the patient was awakened.      The patient was then awakened and transported to recovery room in good condition.    Complications: None    Specimens: Left ureteral calculi x 2    Drains: 6 x  24 JJ without tether    Findings: Moderate hydronephrosis, 2 distal stones 5 mm, 3 mm, no injury    Disposition: To recovery room in good condition.  Patient will be dismissed home with follow-up 3 to 4 weeks for definitive left ureteroscopy and stone ablation in the left kidney    Thomas Bolanos MD

## 2025-01-10 NOTE — BRIEF OP NOTE
[facility]    Brief Operative Note    Pre-operative diagnosis: Calculus of distal left ureter [N20.1]  Post-operative diagnosis Distal ureteral calculi, 5 mm, 3 mm, left renal stones    Procedure: CYSTOURETEROSCOPY, RETROGRADES WITH PYELOGRAMS, BASKETING, LEFT URETEROSCOPY WITH LEFT STENT PLACEMENT., Left - Ureter    Surgeon: Surgeons and Role:     * Thomas Bolanos MD - Primary  Anesthesia: General   Estimated Blood Loss: None    Drains: 6 x 24 JJ left no tether  Specimens: * No specimens in log *left ureteral calculi  Findings:   2 left distal ureteral calculi, 5 mm, 3 mm in size, moderate hydronephrosis .  Complications: None.  Implants: * No implants in log *

## 2025-01-10 NOTE — DISCHARGE INSTRUCTIONS
1.  Finish the antibiotics that were prescribed today.  2.  Oxycodone for pain control only if needed.  May use regular Tylenol over-the-counter if your pain is mild.  3.  Use the tolterodine for urinary urgency and frequency from the stent.  4.  Expect blood in your urine which will come and go.  It will be more common if your activity is more aggressive.  5.  Stay hydrated.  6.  Avoid constipation.  Use the stool softeners provided.  7.  Tolterodine is used for urinary urgency and frequency from the stent.  8.  My office will contact you about your future surgery of your left kidney to remove the stones.  9.  You may continue all of your home medications including the aspirin.    [facility]  Adult Discharge Orders & Instructions      For 24 hours after surgery:  Get plenty of rest.  A responsible adult must stay with you for at least 24 hours after you leave the hospital.   You may feel lightheaded.  IF so, sit for a few minutes before standing.  Have someone help you get up.   You may have a slight fever. Call the doctor if your fever is over 101 F (38.3 C) (taken under the tongue) or lasts longer than 24 hours.  You may have a dry mouth, a sore throat, muscle aches or trouble sleeping.  These should go away after 24 hours.  Do not make important or legal decisions.  6.   Do not drive or use heavy equipment.  If you have weakness or tingling, don't drive or use heavy equipment until this feeling goes away.                                                                                                                                                                         To contact a doctor, call    [phone]______________

## 2025-01-10 NOTE — OR NURSING
.PACU Transfer Note    Bailey Sierra was transferred to [facility] via cart.  Equipment used for transport:  none.  Accompanied by:  RN  Prescriptions were: escribed    PACU Respiratory Event Documentation     1) Episodes of Apnea greater than or equal to 10 seconds: 0    2) Bradypnea - less than 8 breaths per minute: 0    3) Pain score on 0 to 10 scale: 0    4) Pain-sedation mismatch (yes or no): no    5) Repeated 02 desaturation less than 90% (yes or no): no    Anesthesia notified? (yes or no): na    Any of the above events occuring repeatedly in separate 30 minute intervals may be considered recurrent PACU respiratory events.    Patient stable and meets phase 1 discharge criteria for transport from PACU.

## 2025-01-10 NOTE — OR NURSING
Pt tolerated food and fluids without nausea. Pain is resolved after pain pill. Pt steady on feet, IV removed. AVS reviewed in detail with pt. Taken out by RN. Pt will be having surgery with Dr Bolanos late February. Preop appt and surgery scheduled, folder given to pt.

## 2025-01-13 LAB
APPEARANCE STONE: NORMAL
COMPN STONE: NORMAL
SPECIMEN WT: 30 MG

## 2025-02-04 DIAGNOSIS — N20.0 BILATERAL NEPHROLITHIASIS: Primary | ICD-10-CM

## 2025-02-10 ENCOUNTER — OFFICE VISIT (OUTPATIENT)
Dept: FAMILY MEDICINE | Facility: OTHER | Age: 75
End: 2025-02-10
Attending: NURSE PRACTITIONER
Payer: COMMERCIAL

## 2025-02-10 VITALS
BODY MASS INDEX: 23.22 KG/M2 | WEIGHT: 136 LBS | RESPIRATION RATE: 18 BRPM | OXYGEN SATURATION: 99 % | TEMPERATURE: 97.2 F | SYSTOLIC BLOOD PRESSURE: 102 MMHG | HEIGHT: 64 IN | HEART RATE: 72 BPM | DIASTOLIC BLOOD PRESSURE: 64 MMHG

## 2025-02-10 DIAGNOSIS — I10 ESSENTIAL HYPERTENSION: ICD-10-CM

## 2025-02-10 DIAGNOSIS — Z79.4 TYPE 2 DIABETES MELLITUS WITH STAGE 3 CHRONIC KIDNEY DISEASE, WITH LONG-TERM CURRENT USE OF INSULIN, UNSPECIFIED WHETHER STAGE 3A OR 3B CKD (H): ICD-10-CM

## 2025-02-10 DIAGNOSIS — N20.0 LEFT RENAL STONE: Primary | ICD-10-CM

## 2025-02-10 DIAGNOSIS — N18.4 CKD (CHRONIC KIDNEY DISEASE) STAGE 4, GFR 15-29 ML/MIN (H): ICD-10-CM

## 2025-02-10 DIAGNOSIS — Z95.0 S/P CARDIAC PACEMAKER PROCEDURE: ICD-10-CM

## 2025-02-10 DIAGNOSIS — Z01.818 PREOP GENERAL PHYSICAL EXAM: Primary | ICD-10-CM

## 2025-02-10 DIAGNOSIS — K21.9 GASTROESOPHAGEAL REFLUX DISEASE WITHOUT ESOPHAGITIS: ICD-10-CM

## 2025-02-10 DIAGNOSIS — I49.5 SINUS NODE DYSFUNCTION (H): ICD-10-CM

## 2025-02-10 DIAGNOSIS — N18.30 TYPE 2 DIABETES MELLITUS WITH STAGE 3 CHRONIC KIDNEY DISEASE, WITH LONG-TERM CURRENT USE OF INSULIN, UNSPECIFIED WHETHER STAGE 3A OR 3B CKD (H): ICD-10-CM

## 2025-02-10 DIAGNOSIS — N20.0 LEFT RENAL STONE: ICD-10-CM

## 2025-02-10 DIAGNOSIS — E11.22 TYPE 2 DIABETES MELLITUS WITH STAGE 3 CHRONIC KIDNEY DISEASE, WITH LONG-TERM CURRENT USE OF INSULIN, UNSPECIFIED WHETHER STAGE 3A OR 3B CKD (H): ICD-10-CM

## 2025-02-10 DIAGNOSIS — I25.118 ATHEROSCLEROTIC HEART DISEASE OF NATIVE CORONARY ARTERY WITH OTHER FORMS OF ANGINA PECTORIS: ICD-10-CM

## 2025-02-10 DIAGNOSIS — I50.32 CHRONIC DIASTOLIC CONGESTIVE HEART FAILURE (H): ICD-10-CM

## 2025-02-10 LAB
ALBUMIN UR-MCNC: 100 MG/DL
ANION GAP SERPL CALCULATED.3IONS-SCNC: 15 MMOL/L (ref 7–15)
APPEARANCE UR: CLEAR
ATRIAL RATE - MUSE: 64 BPM
BACTERIA #/AREA URNS HPF: ABNORMAL /HPF
BILIRUB UR QL STRIP: NEGATIVE
BUN SERPL-MCNC: 15.6 MG/DL (ref 8–23)
CALCIUM SERPL-MCNC: 9.5 MG/DL (ref 8.8–10.4)
CHLORIDE SERPL-SCNC: 107 MMOL/L (ref 98–107)
COLOR UR AUTO: YELLOW
CREAT SERPL-MCNC: 2.31 MG/DL (ref 0.51–0.95)
DIASTOLIC BLOOD PRESSURE - MUSE: NORMAL MMHG
EGFRCR SERPLBLD CKD-EPI 2021: 21 ML/MIN/1.73M2
GLUCOSE SERPL-MCNC: 81 MG/DL (ref 70–99)
GLUCOSE UR STRIP-MCNC: >=1000 MG/DL
HCO3 SERPL-SCNC: 22 MMOL/L (ref 22–29)
HGB UR QL STRIP: ABNORMAL
HOLD SPECIMEN: NORMAL
INTERPRETATION ECG - MUSE: NORMAL
KETONES UR STRIP-MCNC: NEGATIVE MG/DL
LEUKOCYTE ESTERASE UR QL STRIP: ABNORMAL
MUCOUS THREADS #/AREA URNS LPF: PRESENT /LPF
NITRATE UR QL: NEGATIVE
P AXIS - MUSE: NORMAL DEGREES
PH UR STRIP: 6 [PH] (ref 5–9)
POTASSIUM SERPL-SCNC: 3.2 MMOL/L (ref 3.4–5.3)
PR INTERVAL - MUSE: 230 MS
QRS DURATION - MUSE: 98 MS
QT - MUSE: 420 MS
QTC - MUSE: 433 MS
R AXIS - MUSE: 37 DEGREES
RBC URINE: 53 /HPF
SODIUM SERPL-SCNC: 144 MMOL/L (ref 135–145)
SP GR UR STRIP: >=1.03 (ref 1–1.03)
SQUAMOUS EPITHELIAL: 6 /HPF
SYSTOLIC BLOOD PRESSURE - MUSE: NORMAL MMHG
T AXIS - MUSE: 13 DEGREES
UROBILINOGEN UR STRIP-MCNC: NORMAL MG/DL
VENTRICULAR RATE- MUSE: 64 BPM
WBC CLUMPS #/AREA URNS HPF: PRESENT /HPF
WBC URINE: >182 /HPF

## 2025-02-10 PROCEDURE — G0463 HOSPITAL OUTPT CLINIC VISIT: HCPCS | Performed by: NURSE PRACTITIONER

## 2025-02-10 PROCEDURE — 81001 URINALYSIS AUTO W/SCOPE: CPT | Mod: ZL | Performed by: NURSE PRACTITIONER

## 2025-02-10 PROCEDURE — 87086 URINE CULTURE/COLONY COUNT: CPT | Mod: ZL | Performed by: NURSE PRACTITIONER

## 2025-02-10 PROCEDURE — 36415 COLL VENOUS BLD VENIPUNCTURE: CPT | Mod: ZL | Performed by: NURSE PRACTITIONER

## 2025-02-10 PROCEDURE — 93005 ELECTROCARDIOGRAM TRACING: CPT | Performed by: NURSE PRACTITIONER

## 2025-02-10 PROCEDURE — 80048 BASIC METABOLIC PNL TOTAL CA: CPT | Mod: ZL | Performed by: NURSE PRACTITIONER

## 2025-02-10 ASSESSMENT — ANXIETY QUESTIONNAIRES
1. FEELING NERVOUS, ANXIOUS, OR ON EDGE: NOT AT ALL
3. WORRYING TOO MUCH ABOUT DIFFERENT THINGS: NOT AT ALL
8. IF YOU CHECKED OFF ANY PROBLEMS, HOW DIFFICULT HAVE THESE MADE IT FOR YOU TO DO YOUR WORK, TAKE CARE OF THINGS AT HOME, OR GET ALONG WITH OTHER PEOPLE?: NOT DIFFICULT AT ALL
GAD7 TOTAL SCORE: 0
7. FEELING AFRAID AS IF SOMETHING AWFUL MIGHT HAPPEN: NOT AT ALL
GAD7 TOTAL SCORE: 0
6. BECOMING EASILY ANNOYED OR IRRITABLE: NOT AT ALL
5. BEING SO RESTLESS THAT IT IS HARD TO SIT STILL: NOT AT ALL
GAD7 TOTAL SCORE: 0
7. FEELING AFRAID AS IF SOMETHING AWFUL MIGHT HAPPEN: NOT AT ALL
4. TROUBLE RELAXING: NOT AT ALL
2. NOT BEING ABLE TO STOP OR CONTROL WORRYING: NOT AT ALL
IF YOU CHECKED OFF ANY PROBLEMS ON THIS QUESTIONNAIRE, HOW DIFFICULT HAVE THESE PROBLEMS MADE IT FOR YOU TO DO YOUR WORK, TAKE CARE OF THINGS AT HOME, OR GET ALONG WITH OTHER PEOPLE: NOT DIFFICULT AT ALL

## 2025-02-10 ASSESSMENT — PAIN SCALES - PAIN ENJOYMENT GENERAL ACTIVITY SCALE (PEG): PEG_TOTALSCORE: INCOMPLETE

## 2025-02-10 ASSESSMENT — PATIENT HEALTH QUESTIONNAIRE - PHQ9
10. IF YOU CHECKED OFF ANY PROBLEMS, HOW DIFFICULT HAVE THESE PROBLEMS MADE IT FOR YOU TO DO YOUR WORK, TAKE CARE OF THINGS AT HOME, OR GET ALONG WITH OTHER PEOPLE: NOT DIFFICULT AT ALL
SUM OF ALL RESPONSES TO PHQ QUESTIONS 1-9: 3
SUM OF ALL RESPONSES TO PHQ QUESTIONS 1-9: 3

## 2025-02-10 ASSESSMENT — PAIN SCALES - GENERAL: PAINLEVEL_OUTOF10: NO PAIN (0)

## 2025-02-10 NOTE — PATIENT INSTRUCTIONS
How to Take Your Medication Before Surgery  Preoperative Medication Instructions   Antiplatelet or Anticoagulation Medication Instructions   - aspirin: Discontinue aspirin 7 days prior to procedure to reduce bleeding risk. It should be resumed postoperatively.     Additional Medication Instructions  Take all scheduled medications on the day of surgery EXCEPT for modifications listed below:   - Long acting insulin (e.g. glargine, detemir): Take 80% of the usual evening or morning dose before surgery.     - short acting insulin (e.g. regular, lispro, aspart): DO NOT TAKE on the morning of surgery.    - Jardiance: DO NOT TAKE 3 days before surgery.        Patient Education   Preparing for Your Surgery  For Adults  Getting started  In most cases, a nurse will call to review your health history and instructions. They will give you an arrival time based on your scheduled surgery time. Please be ready to share:  Your doctor's clinic name and phone number  Your medical, surgical, and anesthesia history  A list of allergies and sensitivities  A list of medicines, including herbal treatments and over-the-counter drugs  Whether the patient has a legal guardian (ask how to send us the papers in advance)  Note: You may not receive a call if you were seen at our PAC (Preoperative Assessment Center).  Please tell us if you're pregnant--or if there's any chance you might be pregnant. Some surgeries may injure a fetus (unborn baby), so they require a pregnancy test. Surgeries that are safe for a fetus don't always need a test, and you can choose whether to have one.   Preparing for surgery  Within 10 to 30 days of surgery: Have a pre-op exam (sometimes called an H&P, or History and Physical). This can be done at a clinic or pre-operative center.  If you're having a , you may not need this exam. Talk to your care team.  At your pre-op exam, talk to your care team about all medicines you take. (This includes CBD oil and any  drugs, such as THC, marijuana, and other forms of cannabis.) If you need to stop any medicine before surgery, ask when to start taking it again.  This is for your safety. Many medicines and drugs can make you bleed too much during surgery. Some change how well surgery (anesthesia) drugs work.  Call your insurance company to let them know you're having surgery. (If you don't have insurance, call [phone].)  Call your clinic if there's any change in your health. This includes a scrape or scratch near the surgery site, or any signs of a cold (sore throat, runny nose, cough, rash, fever).  Eating and drinking guidelines  For your safety: Unless your surgeon tells you otherwise, follow the guidelines below.  Eat and drink as normal until 8 hours before you arrive for surgery. After that, no food or milk. You can spit out gum when you arrive.  Drink clear liquids until 2 hours before you arrive. These are liquids you can see through, like water, Gatorade, and Propel Water. They also include plain black coffee and tea (no cream or milk).  No alcohol for 24 hours before you arrive. The night before surgery, stop any drinks that contain THC.  If your care team tells you to take medicine on the morning of surgery, it's okay to take it with a sip of water. No other medicines or drugs are allowed (including CBD oil)--follow your care team's instructions.  If you have questions the day of surgery, call your hospital or surgery center.   Preventing infection  Shower or bathe the night before and the morning of surgery. Follow the instructions your clinic gave you. (If no instructions, use regular soap.)  Don't shave or clip hair near your surgery site. We'll remove the hair if needed.  Don't smoke or vape the morning of surgery. No chewing tobacco for 6 hours before you arrive. A nicotine patch is okay. You may spit out nicotine gum when you arrive.  For some surgeries, the surgeon will tell you to fully quit smoking and  nicotine.  We will make every effort to keep you safe from infection. We will:  Clean our hands often with soap and water (or an alcohol-based hand rub).  Clean the skin at your surgery site with a special soap that kills germs.  Give you a special gown to keep you warm. (Cold raises the risk of infection.)  Wear hair covers, masks, gowns, and gloves during surgery.  Give antibiotic medicine, if prescribed. Not all surgeries need this medicine.  What to bring on the day of surgery  Photo ID and insurance card  Copy of your health care directive, if you have one  Glasses and hearing aids (bring cases)  You can't wear contacts during surgery  Inhaler and eye drops, if you use them (tell us about these when you arrive)  CPAP machine or breathing device, if you use them  A few personal items, if spending the night  If you have . . .  A pacemaker, ICD (cardiac defibrillator), or other implant: Bring the ID card.  An implanted stimulator: Bring the remote control.  A legal guardian: Bring a copy of the certified (court-stamped) guardianship papers.  Please remove any jewelry, including body piercings. Leave jewelry and other valuables at home.  If you're going home the day of surgery  You must have a responsible adult drive you home. They should stay with you overnight as well.  If you don't have someone to stay with you, and you aren't safe to go home alone, we may keep you overnight. Insurance often won't pay for this.  After surgery  If it's hard to control your pain or you need more pain medicine, please call your surgeon's office.  Questions?   If you have any questions for your care team, list them here:   ____________________________________________________________________________________________________________________________________________________________________________________________________________________________________________________________  For informational purposes only. Not to replace the advice of  your health care provider. Copyright   2003, 2019 [facility]. All rights reserved. Clinically reviewed by Reji Villegas MD. [employer] [id number] - REV 08/24.

## 2025-02-10 NOTE — PROGRESS NOTES
Preoperative Evaluation  [facility]  [address]  [place] [address]  Phone: [phone]  Fax: [phone]  Primary Provider: Sam Leal MD  Pre-op Performing Provider: BRITNEY Lake CNP  Feb 10, 2025               2/10/2025   Surgical Information   What procedure is being done? CYSTOURETEROSCOPY, WITH LITHOTRIPSY USING LASER AND URETERAL STENT INSERTION   Facility or Hospital where procedure/surgery will be performed: [facility]   Who is doing the procedure / surgery? Dr. Bolanos   Date of surgery / procedure: 02/25/25   Time of surgery / procedure: unknown   Where do you plan to recover after surgery? at home with family     Fax number for surgical facility: Note does not need to be faxed, will be available electronically in Epic.    Assessment & Plan     The proposed surgical procedure is considered INTERMEDIATE risk.    Problem List Items Addressed This Visit          Nervous and Auditory    Atherosclerotic heart disease of native coronary artery with other forms of angina pectoris       Digestive    GERD (gastroesophageal reflux disease)       Endocrine    Type 2 diabetes mellitus with stage 3 chronic kidney disease, with long-term current use of insulin, unspecified whether stage 3a or 3b CKD (H)       Circulatory    Essential hypertension    Chronic diastolic congestive heart failure (H)    Sinus node dysfunction (H)       Urinary    CKD (chronic kidney disease) stage 4, GFR 15-29 ml/min (H)       Other    S/P cardiac pacemaker procedure     Other Visit Diagnoses       Preop general physical exam    -  Primary    Relevant Orders    EKG 12-lead, tracing only (Same Day) (Completed)    Left renal stone        Relevant Orders    Urine Culture          Reviewed medications that need to be adjusted or held for upcoming procedure.  She is optimized for upcoming procedure as requested.    Cardiac history including congestive heart failure, coronary artery disease,  pacemaker in place and sinus node dysfunction are currently stable, follows with cardiology.    CKD stable, labs obtained today and reviewed     Implanted Device   - Type of device: pacemaker Patient advised to bring device information on day of surgery.       - No identified additional risk factors other than previously addressed    Preoperative Medication Instructions  Antiplatelet or Anticoagulation Medication Instructions   - aspirin: Discontinue aspirin 7 days prior to procedure to reduce bleeding risk. It should be resumed postoperatively.     Additional Medication Instructions  Take all scheduled medications on the day of surgery EXCEPT for modifications listed below:   - Long acting insulin (e.g. glargine, detemir): Take 80% of the usual evening or morning dose before surgery.     - short acting insulin (e.g. regular, lispro, aspart): DO NOT TAKE on the morning of surgery.    - Jardiance: DO NOT TAKE 3 days before surgery.     Recommendation  Approval given to proceed with proposed procedure, without further diagnostic evaluation.    April Avalos is a 74 year old, presenting for the following:  Pre-Op Exam        HPI related to upcoming procedure: She presents to clinic today for preoperative clearance, urologic procedure due to left renal stone.  She is not having any health concerns today.  Reports she always has some mild swelling in her legs, diabetes has been under good control.          2/10/2025   Pre-Op Questionnaire   Have you ever had a heart attack or stroke? (!) YES    Have you ever had surgery on your heart or blood vessels, such as a stent placement, a coronary artery bypass, or surgery on an artery in your head, neck, heart, or legs? (!) YES    Do you have chest pain with activity? No   Do you have a history of heart failure? (!) YES    Do you currently have a cold, bronchitis or symptoms of other infection? No   Do you have a cough, shortness of breath, or wheezing? No   Do you or  anyone in your family have previous history of blood clots? No   Do you or does anyone in your family have a serious bleeding problem such as prolonged bleeding following surgeries or cuts? No   Have you ever had problems with anemia or been told to take iron pills? No   Have you had any abnormal blood loss such as black, tarry or bloody stools, or abnormal vaginal bleeding? No   Have you ever had a blood transfusion? (!) YES   Have you ever had a transfusion reaction? No   Are you willing to have a blood transfusion if it is medically needed before, during, or after your surgery? (!) NO    Have you or any of your relatives ever had problems with anesthesia? No   Do you have sleep apnea, excessive snoring or daytime drowsiness? No   Do you have any artifical heart valves or other implanted medical devices like a pacemaker, defibrillator, or continuous glucose monitor? (!) YES   What type of device do you have? pacemaker and continuous glucose monitor   Name of the clinic that manages your device [facility]  [address] [place] [id number]   Do you have artificial joints? No   Are you allergic to latex? No     Health Care Directive  Patient has a Health Care Directive on file      Preoperative Review of    reviewed - no record of controlled substances prescribed.      Status of Chronic Conditions:  See problem list for active medical problems.  Problems all longstanding and stable, except as noted/documented.  See ROS for pertinent symptoms related to these conditions.    Patient Active Problem List    Diagnosis Date Noted    Atherosclerotic heart disease of native coronary artery with other forms of angina pectoris 04/22/2024     Priority: Medium    Chronic diastolic congestive heart failure (H) 10/10/2022     Priority: Medium    Sinus node dysfunction (H) 10/10/2022     Priority: Medium    Thrombocytopenia 10/10/2022     Priority: Medium    Gastroparesis 07/13/2021      Priority: Medium     4/2021      S/P cardiac pacemaker procedure 03/09/2021     Priority: Medium    Pacemaker 03/03/2021     Priority: Medium     Formatting of this note might be different from the original.    Implant Date 02/26/21  Implanting Physician Baldomero Owen MD     Generator  [employer]   Model L331 Accolade MRI DR BENTLEY-1 ()   Serial Number [id number]    Lead   Right Atrium    Model 7841 Ingevity + IS-1 BiPositive Fix RA/RV 52 cm    Serial Number [id number]  Implant Date 02/26/21  Lead  Right Ventricle   Model 7842 Ingevity + IS-1 BiPositive Fix RA/RV 59 cm   Serial Number [id number]   Implant Date 02/26/21     Indication Sinus Node Dysfunction      Symptomatic bradycardia 02/24/2021     Priority: Medium    Constipation, unspecified 02/24/2021     Priority: Medium    Hydronephrosis 02/24/2021     Priority: Medium    Mild aortic insufficiency 02/24/2021     Priority: Medium    Proteinuria, unspecified type 07/20/2020     Priority: Medium    Pavon's esophagus without dysplasia 07/08/2020     Priority: Medium    History of coronary artery bypass graft x 2 12/30/2019     Priority: Medium    Stenosis of carotid artery, unspecified laterality 12/30/2019     Priority: Medium    Chronic total occlusion of coronary artery (RCA) 12/30/2019     Priority: Medium    CKD (chronic kidney disease) stage 4, GFR 15-29 ml/min (H) 01/17/2018     Priority: Medium    Nonrheumatic aortic valve stenosis 01/17/2018     Priority: Medium    Hyperlipidemia 01/17/2018     Priority: Medium    Essential hypertension 01/17/2018     Priority: Medium    AC (acromioclavicular) joint arthritis 03/10/2017     Priority: Medium    Impingement syndrome of right shoulder 03/10/2017     Priority: Medium    Right rotator cuff tendinitis 03/10/2017     Priority: Medium    Type 2 diabetes mellitus with stage 3 chronic kidney disease, with long-term current use of insulin, unspecified whether stage 3a or 3b CKD (H) 02/13/2017     Priority: Medium     Light chain disease, 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  Per Dr. Ortiz, if hypoalbuminemia not improving by Nov-Dec 2016, needs referral to Hematology.       Vitamin D deficiency 10/23/2016     Priority: Medium    Hyperparathyroidism due to renal insufficiency 03/28/2016     Priority: Medium    Coronary atherosclerosis 09/03/2013     Priority: Medium     Overview:   2 vessel bypass, August 2013      Hiatal hernia 07/26/2013     Priority: Medium    GERD (gastroesophageal reflux disease) 01/08/2013     Priority: Medium    Urge incontinence 10/25/2012     Priority: Medium    Insomnia 01/25/2012     Priority: Medium      Past Medical History:   Diagnosis Date    Atherosclerotic heart disease of native coronary artery without angina pectoris     No Comments Provided    Cardiac murmur     No Comments Provided    Chronic kidney disease, stage III (moderate) (H)     No Comments Provided    Controlled type 2 diabetes mellitus with stage 3 chronic kidney disease, with long-term current use of insulin (H) 7/26/2013    COVID-19 virus detected 2/23/2021    Edema     No Comments Provided    Essential (primary) hypertension     No Comments Provided    Gastro-esophageal reflux disease without esophagitis     No Comments Provided    Hyperlipidemia     No Comments Provided    Impingement syndrome of right shoulder 03/10/2017         Insomnia 01/25/2012         Iron deficiency anemia     No Comments Provided    Neuromuscular dysfunction of bladder     No Comments Provided    Osteoarthritis     No Comments Provided    Other shoulder lesions, right shoulder 03/10/2017         Other shoulder lesions, unspecified shoulder     No Comments Provided    Other specified postprocedural states 04/19/2017         Personal history of other medical treatment (CODE)     Three childbirths    Plantar fascial fibromatosis     No Comments Provided    Pres[facility]    Primary osteoarthritis of shoulder 03/10/2017         Type 2 diabetes mellitus without complications (H)     No Comments Provided    Urgency of urination 03/08/2011         Uterovaginal prolapse     No Comments Provided     Past Surgical History:   Procedure Laterality Date    ARTHROSCOPY SHOULDER Right 1997         ARTHROSCOPY SHOULDER Left 2012         ARTHROSCOPY SHOULDER RT/LT Right 194079         Cardiac Bypass  07/2013         COLONOSCOPY  11/24/2009 11/24/2009    CYSTOSCOPY  03/15/2001    DENTAL SURGERY      Dental extractions    ENDOSCOPIC RETROGRADE CHOLANGIOPANCREATOGRAPHY  05/2008    Bile leak with ERCP and stenting and drainage of bile collection through abdominal wall.    ESOPHAGOSCOPY, GASTROSCOPY, DUODENOSCOPY (EGD), COMBINED N/A 6/23/2020    Pavon's follow up 5 years, 6/23/2025    HYSTERECTOMY TOTAL ABDOMINAL, BILATERAL SALPINGO-OOPHORECTOMY, COMBINED  04/02/2001    Vag vault suspension with Cortex mesh    IMPLANT STIMULATOR AND LEADS SACRAL NERVE (STAGE ONE AND TWO) N/A 5/7/2019    Procedure: Interstim Implant Revision, GENERATOR AND TYING LEAD EXCHANGE;  Surgeon: Honorio Bowling MD;  Location:  OR    IMPLANT STIMULATOR AND LEADS SACRAL NERVE (STAGE ONE AND TWO) N/A 10/18/2022    Procedure: InterStim nonrechargeable generator and tined lead revision;  Surgeon: Honorio Bowling MD;  Location:  OR    Interstim Device Placement  04/14/2014    Dr. Bowling - [facility]    LAMINECTOMY LUMBAR ONE LEVEL  1997         LAPAROSCOPIC CHOLECYSTECTOMY  04/2008         LAPAROSCOPIC TUBAL LIGATION      No Comments Provided    LASER HOLMIUM LITHOTRIPSY URETER(S), INSERT STENT, COMBINED Left 1/10/2025    Procedure: CYSTOURETEROSCOPY, RETROGRADES WITH PYELOGRAMS, BASKETING, LEFT URETEROSCOPY WITH LEFT STENT PLACEMENT.;  Surgeon: Thomas Bolanos MD;  Location:  OR     Current Outpatient Medications   Medication Sig Dispense Refill    ammonium lactate (LAC-HYDRIN) 12 % external lotion Apply  "topically 2 times daily      ASPIRIN LOW DOSE 81 MG EC tablet TAKE 1 TABLET (81 MG) BY MOUTH DAILY 90 tablet 4    blood glucose (TRICIA CONTOUR) test strip TEST 4 TIMES A  strip 3    cephALEXin (KEFLEX) 250 MG capsule Take 1 capsule (250 mg) by mouth 3 times daily. 21 capsule 0    Continuous Glucose Sensor (DEXCOM G7 SENSOR) MISC CHANGE 1 SENSOR EVERY 10 DAYS 3 each 6    famotidine (PEPCID) 40 MG tablet Take 1 tablet (40 mg) by mouth 2 times daily 180 tablet 3    glucose (BD GLUCOSE) 4 g chewable tablet Take 1 tablet by mouth every hour as needed for low blood sugar      Incontinence Supply Disposable MISC Depend Fit-flex NS  #33642/LARGE 17/PP DX\" [facility]:  #136/8PKGS/27DS 150 each 11    insulin aspart (FIASP FLEXTOUCH) 100 UNIT/ML pen-injector INJECT 5 UNITS UNDER THE SKIN 3 TIMES DAILY WITH MEALS 15 mL 12    insulin aspart (NOVOLOG FLEXPEN) 100 UNIT/ML pen Inject 5 Units Subcutaneous 3 times daily (with meals) 60 mL 3    insulin glargine 100 UNIT/ML pen Inject 15-20 Units subcutaneously at bedtime. 15 mL 11    insulin pen needle (B-D U/F) 31G X 8 MM miscellaneous USE 4 PENS DAILY 400 each 3    isosorbide mononitrate (IMDUR) 60 MG 24 hr tablet Take 2 tablets (120 mg) by mouth daily. 180 tablet 3    JARDIANCE 10 MG TABS tablet Take 1 tablet (10 mg) by mouth daily. 90 tablet 3    lisinopril (ZESTRIL) 2.5 MG tablet Take 1 tablet (2.5 mg) by mouth daily. 90 tablet 3    loperamide (IMODIUM A-D) 2 MG tablet Take 1 tablet (2 mg) by mouth 4 times daily as needed for diarrhea 90 tablet 3    metoprolol succinate ER (TOPROL XL) 25 MG 24 hr tablet TAKE 1/2 TABLET BY MOUTH EVERY EVENING 45 tablet 3    nitroGLYcerin (NITROSTAT) 0.4 MG sublingual tablet DISSOLVE 1 TAB UNDER TONGUE EVERY 5 MIN AS NEEDED FOR CHEST PAIN UP TO 3 DOSES 10 tablet 1    order for DME Equipment being ordered: Diabetic shoes, pre-ulcer callous formation 2 each 3    oxyCODONE (ROXICODONE) 5 MG tablet Take 0.5 tablets (2.5 mg) by mouth " "every 6 hours as needed for moderate to severe pain. No driving or alcohol on this medicine.  Will cause constipation. 10 tablet 0    rosuvastatin (CRESTOR) 40 MG tablet TAKE 1 TABLET BY MOUTH EVERY DAY 90 tablet 3    senna-docusate (SENOKOT-S/PERICOLACE) 8.6-50 MG tablet Take 1-2 tablets by mouth 2 times daily. 30 tablet 0    tolterodine (DETROL) 1 MG tablet Take 1 tablet (1 mg) by mouth every 12 hours as needed for incontinence (Spasms). Use for urgency and frequency from the stent. 30 tablet 1    Vitamin D3 50 mcg (2000 units) tablet Take 1 tablet (50 mcg) by mouth daily 90 tablet 3       No Known Allergies     Social History     Tobacco Use    Smoking status: Never     Passive exposure: Past    Smokeless tobacco: Never   Substance Use Topics    Alcohol use: No     Alcohol/week: 0.0 standard drinks of alcohol     Family History   Problem Relation Age of Onset    Other - See Comments Father         MI    Cancer Mother         Cancer,Some type of cancer, aneurysm    Diabetes Paternal Grandmother         Diabetes,Type II    Cancer Sister         Cancer,unknown type    Diabetes Sister         Diabetes,Type II    Diabetes Sister         Diabetes,Type II    Diabetes Sister         Diabetes,Type II    Other - See Comments Daughter         depression    Other - See Comments Daughter         ovarian cancer and depression    Heart Disease Brother         Heart Disease,CAD, MI     History   Drug Use No     Comment: Drug use: No           Objective    /64   Pulse 72   Temp 97.2  F (36.2  C)   Resp 18   Ht 1.626 m (5' 4\")   Wt 61.7 kg (136 lb)   LMP  (LMP Unknown)   SpO2 99%   BMI 23.34 kg/m     Estimated body mass index is 23.34 kg/m  as calculated from the following:    Height as of this encounter: 1.626 m (5' 4\").    Weight as of this encounter: 61.7 kg (136 lb).  Physical Exam  GENERAL: alert and no distress  EYES: Eyes grossly normal to inspection, PERRL and conjunctivae and sclerae normal  HENT: ear canals " and TM's normal, nose and mouth without ulcers or lesions  NECK: no adenopathy, no asymmetry, masses, or scars  RESP: lungs clear to auscultation - no rales, rhonchi or wheezes  CV: regular rate and rhythm, normal S1 S2, no S3 or S4, no murmur, click or rub, no peripheral edema  ABDOMEN: soft, nontender, no hepatosplenomegaly, no masses and bowel sounds normal  MS: no gross musculoskeletal defects noted, no edema  SKIN: no suspicious lesions or rashes  NEURO: Normal strength and tone, mentation intact and speech normal  PSYCH: mentation appears normal, affect normal/bright    Recent Labs   Lab Test 01/10/25  0923 10/18/24  0851 04/18/24  0855   HGB  --  12.9 12.8   PLT  --  175 155    141 143   POTASSIUM 3.7 4.2 4.2   CR 2.59* 2.37* 2.47*   A1C  --  7.3* 7.2*        Diagnostics  Recent Results (from the past 24 hours)   EKG 12-lead, tracing only (Same Day)    Collection Time: 02/10/25 10:48 AM   Result Value Ref Range    Systolic Blood Pressure  mmHg    Diastolic Blood Pressure  mmHg    Ventricular Rate 64 BPM    Atrial Rate 64 BPM    WI Interval 230 ms    QRS Duration 98 ms     ms    QTc 433 ms    P Axis  degrees    R AXIS 37 degrees    T Axis 13 degrees    Interpretation ECG       Atrial-paced rhythm with prolonged AV conduction  Inferior infarct , age undetermined  Abnormal ECG  When compared with ECG of 14-Oct-2022 13:29,  No significant change was found     UA Macroscopic with reflex to Microscopic and Culture    Collection Time: 02/10/25 10:54 AM    Specimen: Urine, Clean Catch   Result Value Ref Range    Color Urine Yellow Colorless, Straw, Light Yellow, Yellow    Appearance Urine Clear Clear    Glucose Urine >=1000 (A) Negative mg/dL    Bilirubin Urine Negative Negative    Ketones Urine Negative Negative mg/dL    Specific Gravity Urine >=1.030 1.005 - 1.030    Blood Urine Large (A) Negative    pH Urine 6.0 5.0 - 9.0    Protein Albumin Urine 100 (A) Negative mg/dL    Urobilinogen Urine Normal 0.2,  1.0, Normal mg/dL    Nitrite Urine Negative Negative    Leukocyte Esterase Urine Trace (A) Negative    Bacteria Urine Few (A) None Seen /HPF    WBC Clumps Urine Present (A) None Seen /HPF    Mucus Urine Present (A) None Seen /LPF    RBC Urine 53 (H) <=2 /HPF    WBC Urine >182 (H) <=5 /HPF    Squamous Epithelials Urine 6 (H) <=1 /HPF   Basic metabolic panel    Collection Time: 02/10/25 10:54 AM   Result Value Ref Range    Sodium 144 135 - 145 mmol/L    Potassium 3.2 (L) 3.4 - 5.3 mmol/L    Chloride 107 98 - 107 mmol/L    Carbon Dioxide (CO2) 22 22 - 29 mmol/L    Anion Gap 15 7 - 15 mmol/L    Urea Nitrogen 15.6 8.0 - 23.0 mg/dL    Creatinine 2.31 (H) 0.51 - 0.95 mg/dL    GFR Estimate 21 (L) >60 mL/min/1.73m2    Calcium 9.5 8.8 - 10.4 mg/dL    Glucose 81 70 - 99 mg/dL      EKG: atrial paced, unchanged from previous tracings    Revised Cardiac Risk Index (RCRI)  The patient has the following serious cardiovascular risks for perioperative complications:   - Coronary Artery Disease (MI, positive stress test, angina, Qs on EKG) = 1 point   - Diabetes Mellitus (on Insulin) = 1 point     RCRI Interpretation: 2 points: Class III (moderate risk - 6.6% complication rate)     Estimated Functional Capacity: Performs 4 METS exercise without symptoms (e.g., light housework, stairs, 4 mph walk, 7 mph bike, slow step dance)           Signed Electronically by: BRITNEY Lake CNP  A copy of this evaluation report is provided to the requesting physician.

## 2025-02-11 LAB — BACTERIA UR CULT: NORMAL

## 2025-02-13 ENCOUNTER — APPOINTMENT (OUTPATIENT)
Dept: LAB | Facility: OTHER | Age: 75
End: 2025-02-13
Payer: COMMERCIAL

## 2025-02-13 ENCOUNTER — OFFICE VISIT (OUTPATIENT)
Dept: UROLOGY | Facility: OTHER | Age: 75
End: 2025-02-13
Payer: COMMERCIAL

## 2025-02-13 VITALS
BODY MASS INDEX: 23.38 KG/M2 | HEART RATE: 60 BPM | RESPIRATION RATE: 16 BRPM | DIASTOLIC BLOOD PRESSURE: 60 MMHG | OXYGEN SATURATION: 100 % | WEIGHT: 136.2 LBS | SYSTOLIC BLOOD PRESSURE: 102 MMHG | TEMPERATURE: 97.6 F

## 2025-02-13 DIAGNOSIS — Z96.0 URETERAL STENT PRESENT: ICD-10-CM

## 2025-02-13 DIAGNOSIS — N20.1 CALCULUS OF DISTAL LEFT URETER: ICD-10-CM

## 2025-02-13 DIAGNOSIS — N30.00 ACUTE CYSTITIS WITHOUT HEMATURIA: Primary | ICD-10-CM

## 2025-02-13 DIAGNOSIS — E87.6 HYPOKALEMIA: ICD-10-CM

## 2025-02-13 LAB
ALBUMIN UR-MCNC: 100 MG/DL
ANION GAP SERPL CALCULATED.3IONS-SCNC: 13 MMOL/L (ref 7–15)
APPEARANCE UR: ABNORMAL
BACTERIA #/AREA URNS HPF: ABNORMAL /HPF
BILIRUB UR QL STRIP: NEGATIVE
BUN SERPL-MCNC: 19.7 MG/DL (ref 8–23)
CALCIUM SERPL-MCNC: 9.3 MG/DL (ref 8.8–10.4)
CHLORIDE SERPL-SCNC: 105 MMOL/L (ref 98–107)
COLOR UR AUTO: YELLOW
CREAT SERPL-MCNC: 2.31 MG/DL (ref 0.51–0.95)
EGFRCR SERPLBLD CKD-EPI 2021: 21 ML/MIN/1.73M2
GLUCOSE SERPL-MCNC: 192 MG/DL (ref 70–99)
GLUCOSE UR STRIP-MCNC: >1000 MG/DL
HCO3 SERPL-SCNC: 23 MMOL/L (ref 22–29)
HGB UR QL STRIP: ABNORMAL
KETONES UR STRIP-MCNC: NEGATIVE MG/DL
LEUKOCYTE ESTERASE UR QL STRIP: ABNORMAL
MUCOUS THREADS #/AREA URNS LPF: PRESENT /LPF
NITRATE UR QL: NEGATIVE
PH UR STRIP: 5.5 [PH] (ref 5–9)
POTASSIUM SERPL-SCNC: 4.1 MMOL/L (ref 3.4–5.3)
RBC URINE: 49 /HPF
SODIUM SERPL-SCNC: 141 MMOL/L (ref 135–145)
SP GR UR STRIP: 1.02 (ref 1–1.03)
SQUAMOUS EPITHELIAL: 1 /HPF
UROBILINOGEN UR STRIP-MCNC: NORMAL MG/DL
WBC CLUMPS #/AREA URNS HPF: PRESENT /HPF
WBC URINE: >182 /HPF

## 2025-02-13 PROCEDURE — G0463 HOSPITAL OUTPT CLINIC VISIT: HCPCS

## 2025-02-13 PROCEDURE — 81001 URINALYSIS AUTO W/SCOPE: CPT | Mod: ZL | Performed by: NURSE PRACTITIONER

## 2025-02-13 PROCEDURE — G0463 HOSPITAL OUTPT CLINIC VISIT: HCPCS | Mod: 25,27

## 2025-02-13 PROCEDURE — 82374 ASSAY BLOOD CARBON DIOXIDE: CPT | Mod: ZL

## 2025-02-13 PROCEDURE — 84295 ASSAY OF SERUM SODIUM: CPT | Mod: ZL

## 2025-02-13 PROCEDURE — 80048 BASIC METABOLIC PNL TOTAL CA: CPT | Mod: ZL

## 2025-02-13 PROCEDURE — 36415 COLL VENOUS BLD VENIPUNCTURE: CPT | Mod: ZL

## 2025-02-13 PROCEDURE — 51798 US URINE CAPACITY MEASURE: CPT

## 2025-02-13 RX ORDER — TOLTERODINE TARTRATE 1 MG/1
1 TABLET, EXTENDED RELEASE ORAL EVERY 12 HOURS PRN
Qty: 30 TABLET | Refills: 1 | Status: SHIPPED | OUTPATIENT
Start: 2025-02-13

## 2025-02-13 RX ORDER — OXYCODONE HYDROCHLORIDE 5 MG/1
2.5 TABLET ORAL EVERY 6 HOURS PRN
Qty: 10 TABLET | Refills: 0 | Status: SHIPPED | OUTPATIENT
Start: 2025-02-13

## 2025-02-13 ASSESSMENT — PAIN SCALES - GENERAL: PAINLEVEL_OUTOF10: MODERATE PAIN (4)

## 2025-02-13 NOTE — PATIENT INSTRUCTIONS
Drink plenty of fluids, > 2 liters/day  Avoid constipation.  Consider Miralax Over the counter, metamucil or Citrucel with increased fiber in your diet  Shower using a shower wand to decrease the bacterial counts in the perineal area. If no wand available ensure that you are using a clean cloth with each cleansing.   Probiotics:  Yogurts that contain good bacteria.  Kefir 1% milkfat (plain), Cultured Los, Ellis or Vincent or Align from your local pharmacy.   You may use oxycodone 2.5 mg every 6 hours for moderate to severe pain, and tolterodine 1 mg every 12 hours for spasms with the stent.  Your urine shows bacteria today and I have sent in culture. Since you have an upcoming procedure I am going to initiate empiric antibiotics, we may need to modify these if the culture (that will grow out in 48-72 hours) indicates that the bacteria is resistant to the medicine I have selected. Keflex 250 mg three times daily for seven days.  If you develop fever, chills, night sweats, nausea, or vomting you should be evaluated.  I will be in touch with your results of your blood test today, if your potassium is low we will have you focus on potassium rich foods (see hand out for suggestions).

## 2025-02-13 NOTE — NURSING NOTE
"Chief Complaint   Patient presents with    Follow Up     Acute cystitis without hematuria   Patient presents to the clinic today for a follow up for acute cystitis without hematuria.    Post-Void Residual  A post-void residual was measured by ultrasonic bladder scanner.  0 mL  Kellee Alfaro LPN  2/13/2025 8:48 AM    Initial LMP  (LMP Unknown)  Estimated body mass index is 23.34 kg/m  as calculated from the following:    Height as of 2/10/25: 1.626 m (5' 4\").    Weight as of 2/10/25: 61.7 kg (136 lb).  Meds Reconciled: complete      Kellee Alfaro LPN,LPN on 2/13/2025 at 8:35 AM  Ext. 1193        Kellee Alfaro LPN  "

## 2025-02-13 NOTE — PROGRESS NOTES
"Chief Complaint: Follow Up (Acute cystitis without hematuria)      HPI: Ms. Bailey Sierra is a 74 year old year old female presenting today February 13, 2025 in follow up of hypokalemia with preop labs.    She was previously seen by Dr.. Bolanos on 01/10/25 for cystouresteroscopy with stent placement. Patient has scheduled ureteroscopy and stone ablation on 2/25/25. Preop labs on Monday revealed Potassium of 3.2. Discussed care with family practice MD and was advised to reassess patient and recheck today. Her UA on Monday had many bacteria, but ultimately grew mixed genital aisha.     Today patient endorses urinary urgency, urinary frequency, and leakage.  This has been ongoing since stent placement.  She did have 1 episode of nausea and vomiting on Tuesday last week, but this is since resolved.  She has not experienced any fever, chills, night sweats.  She has loose stools intermittently at baseline.  None since since Tuesday.  She notes that she has been having left low back pain,  and overall reports her body is just \" angry with the stent.\"  She is using tolterodine 1-2 times per day and oxycodone every 1 to 2 days, this does provide some temporary relief.  She reports she is almost out of these medications.  Past Medical History:   Diagnosis Date    Atherosclerotic heart disease of native coronary artery without angina pectoris     No Comments Provided    Cardiac murmur     No Comments Provided    Chronic kidney disease, stage III (moderate) (H)     No Comments Provided    Controlled type 2 diabetes mellitus with stage 3 chronic kidney disease, with long-term current use of insulin (H) 7/26/2013    COVID-19 virus detected 2/23/2021    Edema     No Comments Provided    Essential (primary) hypertension     No Comments Provided    Gastro-esophageal reflux disease without esophagitis     No Comments Provided    Hyperlipidemia     No Comments Provided    Impingement syndrome of right shoulder 03/10/2017         " Insomnia 01/25/2012         Iron deficiency anemia     No Comments Provided    Neuromuscular dysfunction of bladder     No Comments Provided    Osteoarthritis     No Comments Provided    Other shoulder lesions, right shoulder 03/10/2017         Other shoulder lesions, unspecified shoulder     No Comments Provided    Other specified postprocedural states 04/19/2017         Personal history of other medical treatment (CODE)     Three childbirths    Plantar fascial fibromatosis     No Comments Provided    Presence of aortocoronary bypass graft 07/2013    [facility]    Primary osteoarthritis of shoulder 03/10/2017         Type 2 diabetes mellitus without complications (H)     No Comments Provided    Urgency of urination 03/08/2011         Uterovaginal prolapse     No Comments Provided       Past Surgical History:   Procedure Laterality Date    ARTHROSCOPY SHOULDER Right 1997         ARTHROSCOPY SHOULDER Left 2012         ARTHROSCOPY SHOULDER RT/LT Right [id number]         Cardiac Bypass  07/2013         COLONOSCOPY  11/24/2009 11/24/2009    CYSTOSCOPY  03/15/2001    DENTAL SURGERY      Dental extractions    ENDOSCOPIC RETROGRADE CHOLANGIOPANCREATOGRAPHY  05/2008    Bile leak with ERCP and stenting and drainage of bile collection through abdominal wall.    ESOPHAGOSCOPY, GASTROSCOPY, DUODENOSCOPY (EGD), COMBINED N/A 6/23/2020    Pavon's follow up 5 years, 6/23/2025    HYSTERECTOMY TOTAL ABDOMINAL, BILATERAL SALPINGO-OOPHORECTOMY, COMBINED  04/02/2001    Vag vault suspension with Cortex mesh    IMPLANT STIMULATOR AND LEADS SACRAL NERVE (STAGE ONE AND TWO) N/A 5/7/2019    Procedure: Interstim Implant Revision, GENERATOR AND TYING LEAD EXCHANGE;  Surgeon: Honorio Bowling MD;  Location: [facility] OR    IMPLANT STIMULATOR AND LEADS SACRAL NERVE (STAGE ONE AND TWO) N/A 10/18/2022    Procedure: InterStim nonrechargeable generator and tined lead revision;  Surgeon: Honorio Bowling MD;  Location: [facility] OR    Interstim Device Placement   "04/14/2014    Dr. Dash WARD    LAMINECTOMY LUMBAR ONE LEVEL  1997         LAPAROSCOPIC CHOLECYSTECTOMY  04/2008         LAPAROSCOPIC TUBAL LIGATION      No Comments Provided    LASER HOLMIUM LITHOTRIPSY URETER(S), INSERT STENT, COMBINED Left 1/10/2025    Procedure: CYSTOURETEROSCOPY, RETROGRADES WITH PYELOGRAMS, BASKETING, LEFT URETEROSCOPY WITH LEFT STENT PLACEMENT.;  Surgeon: Thomas Bolanos MD;  Location: [facility] OR       Current Outpatient Medications   Medication Sig Dispense Refill    cephALEXin (KEFLEX) 250 MG capsule Take 1 capsule (250 mg) by mouth 3 times daily for 7 days. 21 capsule 0    oxyCODONE (ROXICODONE) 5 MG tablet Take 0.5 tablets (2.5 mg) by mouth every 6 hours as needed for moderate to severe pain. No driving or alcohol on this medicine.  Will cause constipation. 10 tablet 0    tolterodine (DETROL) 1 MG tablet Take 1 tablet (1 mg) by mouth every 12 hours as needed for incontinence (Spasms). Use for urgency and frequency from the stent. 30 tablet 1    ammonium lactate (LAC-HYDRIN) 12 % external lotion Apply topically 2 times daily      ASPIRIN LOW DOSE 81 MG EC tablet TAKE 1 TABLET (81 MG) BY MOUTH DAILY 90 tablet 4    blood glucose (TRICIA CONTOUR) test strip TEST 4 TIMES A  strip 3    cephALEXin (KEFLEX) 250 MG capsule Take 1 capsule (250 mg) by mouth 3 times daily. 21 capsule 0    Continuous Glucose Sensor (DEXCOM G7 SENSOR) MISC CHANGE 1 SENSOR EVERY 10 DAYS 3 each 6    famotidine (PEPCID) 40 MG tablet Take 1 tablet (40 mg) by mouth 2 times daily 180 tablet 3    glucose (BD GLUCOSE) 4 g chewable tablet Take 1 tablet by mouth every hour as needed for low blood sugar      Incontinence Supply Disposable MISC Depend Fit-flex NS  #46016/LARGE 17/PP DX\" [facility]  #136/8PKGS/27DS 150 each 11    insulin aspart (FIASP FLEXTOUCH) 100 UNIT/ML pen-injector INJECT 5 UNITS UNDER THE SKIN 3 TIMES DAILY WITH MEALS 15 mL 12    insulin aspart (NOVOLOG FLEXPEN) 100 UNIT/ML pen Inject 5 Units " Subcutaneous 3 times daily (with meals) 60 mL 3    insulin glargine 100 UNIT/ML pen Inject 15-20 Units subcutaneously at bedtime. 15 mL 11    insulin pen needle (B-D U/F) 31G X 8 MM miscellaneous USE 4 PENS DAILY 400 each 3    isosorbide mononitrate (IMDUR) 60 MG 24 hr tablet Take 2 tablets (120 mg) by mouth daily. 180 tablet 3    JARDIANCE 10 MG TABS tablet Take 1 tablet (10 mg) by mouth daily. 90 tablet 3    lisinopril (ZESTRIL) 2.5 MG tablet Take 1 tablet (2.5 mg) by mouth daily. 90 tablet 3    loperamide (IMODIUM A-D) 2 MG tablet Take 1 tablet (2 mg) by mouth 4 times daily as needed for diarrhea 90 tablet 3    metoprolol succinate ER (TOPROL XL) 25 MG 24 hr tablet TAKE 1/2 TABLET BY MOUTH EVERY EVENING 45 tablet 3    nitroGLYcerin (NITROSTAT) 0.4 MG sublingual tablet DISSOLVE 1 TAB UNDER TONGUE EVERY 5 MIN AS NEEDED FOR CHEST PAIN UP TO 3 DOSES 10 tablet 1    order for DME Equipment being ordered: Diabetic shoes, pre-ulcer callous formation 2 each 3    rosuvastatin (CRESTOR) 40 MG tablet TAKE 1 TABLET BY MOUTH EVERY DAY 90 tablet 3    senna-docusate (SENOKOT-S/PERICOLACE) 8.6-50 MG tablet Take 1-2 tablets by mouth 2 times daily. 30 tablet 0    Vitamin D3 50 mcg (2000 units) tablet Take 1 tablet (50 mcg) by mouth daily 90 tablet 3       ALLERGIES: Patient has no known allergies.     GENERAL PHYSICAL EXAM:   Vitals: /60 (BP Location: Right arm, Patient Position: Sitting, Cuff Size: Adult Regular)   Pulse 60   Temp 97.6  F (36.4  C) (Temporal)   Resp 16   Wt 61.8 kg (136 lb 3.2 oz)   LMP  (LMP Unknown)   SpO2 100%   BMI 23.38 kg/m    Body mass index is 23.38 kg/m .    GENERAL: Well groomed, well developed, well nourished female in NAD.  ENT:  ENT exam normal  CV:  Warm Extremities, heart rate is regular S1, S2 sounds appreciated no murmur appreciated.  RESPIRATORY:  Normal respiratory effort   GI:  Soft, ND, NT  MS: Moving all four  NEURO: Alert and oriented x 3.  PSYCH: Normal mood and affect,  pleasant and agreeable during interview and exam.    :  Left-sided flank pain. Pelvic deffered.      PVR: Residual urine by ultrasound was 0 ml.           RADIOLOGY: The following tests were reviewed: None.    LABS: The last test results for Ms. Bailey Sierra were reviewed.   Results for orders placed or performed in visit on 02/13/25 (from the past 24 hours)   Basic metabolic panel   Result Value Ref Range    Sodium 141 135 - 145 mmol/L    Potassium 4.1 3.4 - 5.3 mmol/L    Chloride 105 98 - 107 mmol/L    Carbon Dioxide (CO2) 23 22 - 29 mmol/L    Anion Gap 13 7 - 15 mmol/L    Urea Nitrogen 19.7 8.0 - 23.0 mg/dL    Creatinine 2.31 (H) 0.51 - 0.95 mg/dL    GFR Estimate 21 (L) >60 mL/min/1.73m2    Calcium 9.3 8.8 - 10.4 mg/dL    Glucose 192 (H) 70 - 99 mg/dL     *Note: Due to a large number of results and/or encounters for the requested time period, some results have not been displayed. A complete set of results can be found in Results Review.       BMP -   Recent Labs   Lab Test 02/13/25  0921 02/10/25  1054 01/10/25  0930 01/10/25  0923 10/18/24  0851 06/25/24  1138 12/11/17  0932 06/02/17  1000    144  --  139   < >  --    < > 139   POTASSIUM 4.1 3.2*  --  3.7   < >  --    < > 3.5   CHLORIDE 105 107  --  106   < >  --    < > 111*   CO2 23 22  --  22   < >  --    < > 24   BUN 19.7 15.6  --  19.9   < >  --    < > 21   CR 2.31* 2.31*  --  2.59*   < >  --    < > 2.23*   * 81 136* 155*   < >  --    < > 126*   ELICIA 9.3 9.5  --  9.1   < > 9.5   < > 9.3   PHOS  --   --   --   --   --  3.7  --  3.0    < > = values in this interval not displayed.       CBC -   Recent Labs   Lab Test 10/18/24  0851 04/18/24  0855 08/26/21  0940 08/22/21  1634   WBC 4.9 5.3  --  4.9   HGB 12.9 12.8 11.3* 9.8*    155  --  140*       ASSESSMENT:   Acute cystitis without hematuria  Hypokalemia  Ureteral stent in place    PLAN:   1. Acute cystitis without hematuria (Primary)  Urine is positive for blood, bacteria and leukocyte  esterase.  She has urinary urgency, frequency and leakage, some of this may be secondary stent.  But given her upcoming procedure I did opt to start her on empiric treatment of Keflex and will modify as needed when culture and sensitivities return.  - MEASUREMENT, POST-VOIDING RESIDUAL URINE &/OR BLADDER CAPACITY, US, NON-IMAGING  - cephALEXin (KEFLEX) 250 MG capsule; Take 1 capsule (250 mg) by mouth 3 times daily for 7 days.  Dispense: 21 capsule; Refill: 0    2. Calculus of distal left ureter  Patient has ongoing pain secondary to ureteral stent, her last prescription for oxycodone and tolterodine were prescribed on January 10 and she is almost out.  Given her discomfort I opted to refill this for her today to aid with pain management until her procedure in February.  She was advised to use oxycodone sparingly and to avoid operating any heavy machinery.  I also warned her of risk for CNS depression and explained the importance of following dosing on prescription.  She has thus far followed instructions.  - oxyCODONE (ROXICODONE) 5 MG tablet; Take 0.5 tablets (2.5 mg) by mouth every 6 hours as needed for moderate to severe pain. No driving or alcohol on this medicine.  Will cause constipation.  Dispense: 10 tablet; Refill: 0  - tolterodine (DETROL) 1 MG tablet; Take 1 tablet (1 mg) by mouth every 12 hours as needed for incontinence (Spasms). Use for urgency and frequency from the stent.  Dispense: 30 tablet; Refill: 1  - Basic metabolic panel    3. Hypokalemia  BMP reveals stable kidney function and potassium has normalized to 4.1 today.  Low value may have been secondary to insulin dose on morning of lab draw.  She has not taken insulin yet this morning.  Her GI symptoms are stable and we discussed use of Metamucil should she have ongoing loose stools.    4. Ureteral stent present  See #2.      42 minutes spent on the date of this encounter doing chart review, history and exam, documentation and further activities  as noted above.        BRITNEY Kwan [facility]

## 2025-02-15 LAB — BACTERIA UR CULT: NO GROWTH

## 2025-02-18 RX ORDER — EZETIMIBE 10 MG/1
10 TABLET ORAL DAILY
COMMUNITY

## 2025-02-24 ENCOUNTER — ANESTHESIA EVENT (OUTPATIENT)
Dept: SURGERY | Facility: OTHER | Age: 75
End: 2025-02-24
Payer: COMMERCIAL

## 2025-02-24 NOTE — H&P
Chief Complaint:  Left renal stones     HPI: Ms. Bailey Sierra is a 74 year old year old female presenting today January 6, 2025 in follow up of a known 2 cm left renal stone status post left ureteroscopy and stone extraction on 1/10/2025.    We were able to relieve the distal ureteral stones.  The stent was left in place in anticipation of a second stage left flexible ureteroscopic case to remove all renal stones with laser lithotripsy.    Here today for left renal stone extraction and ureteroscopy.       Past Medical History        Past Medical History:   Diagnosis Date    Atherosclerotic heart disease of native coronary artery without angina pectoris       No Comments Provided    Cardiac murmur       No Comments Provided    Chronic kidney disease, stage III (moderate) (H)       No Comments Provided    Controlled type 2 diabetes mellitus with stage 3 chronic kidney disease, with long-term current use of insulin (H) 7/26/2013    COVID-19 virus detected 2/23/2021    Edema       No Comments Provided    Essential (primary) hypertension       No Comments Provided    Gastro-esophageal reflux disease without esophagitis       No Comments Provided    Hyperlipidemia       No Comments Provided    Impingement syndrome of right shoulder 03/10/2017          Insomnia 01/25/2012          Iron deficiency anemia       No Comments Provided    Neuromuscular dysfunction of bladder       No Comments Provided    Osteoarthritis       No Comments Provided    Other shoulder lesions, right shoulder 03/10/2017          Other shoulder lesions, unspecified shoulder       No Comments Provided    Other specified postprocedural states 04/19/2017          Personal history of other medical treatment (CODE)       Three childbirths    Plantar fascial fibromatosis       No Comments Provided    Presence of aortocoronary bypass graft 07/2013     [facility]    Primary osteoarthritis of shoulder 03/10/2017          Type 2 diabetes mellitus without  complications (H)       No Comments Provided    Urgency of urination 03/08/2011          Uterovaginal prolapse       No Comments Provided            Past Surgical History         Past Surgical History:   Procedure Laterality Date    ARTHROSCOPY SHOULDER Right 1997          ARTHROSCOPY SHOULDER Left 2012          ARTHROSCOPY SHOULDER RT/LT Right [id number]          Cardiac Bypass   07/2013          COLONOSCOPY   11/24/2009 11/24/2009    CYSTOSCOPY   03/15/2001    DENTAL SURGERY         Dental extractions    ENDOSCOPIC RETROGRADE CHOLANGIOPANCREATOGRAPHY   05/2008     Bile leak with ERCP and stenting and drainage of bile collection through abdominal wall.    ESOPHAGOSCOPY, GASTROSCOPY, DUODENOSCOPY (EGD), COMBINED N/A 6/23/2020     [facility]on's follow up 5 years, 6/23/2025    HYSTERECTOMY TOTAL ABDOMINAL, BILATERAL SALPINGO-OOPHORECTOMY, COMBINED   04/02/2001     Vag vault suspension with Cortex mesh    IMPLANT STIMULATOR AND LEADS SACRAL NERVE (STAGE ONE AND TWO) N/A 5/7/2019     Procedure: Interstim Implant Revision, GENERATOR AND TYING LEAD EXCHANGE;  Surgeon: Honorio Bowling MD;  Location: [facility] OR    IMPLANT STIMULATOR AND LEADS SACRAL NERVE (STAGE ONE AND TWO) N/A 10/18/2022     Procedure: InterStim nonrechargeable generator and tined lead revision;  Surgeon: Honorio Bowling MD;  Location: [facility] OR    Interstim Device Placement   04/14/2014     Dr. Bowling [facility]    LAMINECTOMY LUMBAR ONE LEVEL   1997          LAPAROSCOPIC CHOLECYSTECTOMY   04/2008          LAPAROSCOPIC TUBAL LIGATION         No Comments Provided            Current Outpatient Prescriptions          Current Outpatient Medications   Medication Sig Dispense Refill    ammonium lactate (LAC-HYDRIN) 12 % external lotion Apply topically 2 times daily        ASPIRIN LOW DOSE 81 MG EC tablet TAKE 1 TABLET (81 MG) BY MOUTH DAILY 90 tablet 4    blood glucose (TRICIA CONTOUR) test strip TEST 4 TIMES A  strip 3    cholecalciferol 50 MCG (2000 UT) tablet Take 50 mcg  "by mouth        Continuous Glucose Sensor (DEXCOM G7 SENSOR) MISC CHANGE 1 SENSOR EVERY 10 DAYS 3 each 6    famotidine (PEPCID) 40 MG tablet Take 1 tablet (40 mg) by mouth 2 times daily 180 tablet 3    glucose (BD GLUCOSE) 4 g chewable tablet Take 1 tablet by mouth every hour as needed for low blood sugar        Incontinence Supply Disposable MISC Depend Fit-flex WMNS [facility]W #42966/LARGE 17/PP DX\" [facility]:  #136/8PKGS/27DS 150 each 11    insulin aspart (FIASP FLEXTOUCH) 100 UNIT/ML pen-injector INJECT 5 UNITS UNDER THE SKIN 3 TIMES DAILY WITH MEALS 15 mL 12    insulin aspart (NOVOLOG FLEXPEN) 100 UNIT/ML pen Inject 5 Units Subcutaneous 3 times daily (with meals) 60 mL 3    insulin glargine 100 UNIT/ML pen Inject 15-20 Units subcutaneously at bedtime. 15 mL 11    insulin pen needle (B-D U/F) 31G X 8 MM miscellaneous USE 4 PENS DAILY 400 each 3    isosorbide mononitrate (IMDUR) 60 MG 24 hr tablet Take 2 tablets (120 mg) by mouth daily. 180 tablet 3    isosorbide mononitrate (IMDUR) 60 MG 24 hr tablet TAKE 2 TABLETS (120 MG) BY MOUTH DAILY 180 tablet 4    JARDIANCE 10 MG TABS tablet Take 1 tablet (10 mg) by mouth daily. 90 tablet 3    lisinopril (ZESTRIL) 2.5 MG tablet Take 1 tablet (2.5 mg) by mouth daily. 90 tablet 3    lisinopril (ZESTRIL) 2.5 MG tablet TAKE 1 TABLET (2.5 MG) BY MOUTH DAILY 90 tablet 4    loperamide (IMODIUM A-D) 2 MG tablet Take 1 tablet (2 mg) by mouth 4 times daily as needed for diarrhea 90 tablet 3    metoprolol succinate ER (TOPROL XL) 25 MG 24 hr tablet TAKE 1/2 TABLET BY MOUTH EVERY EVENING 45 tablet 3    metoprolol succinate ER (TOPROL XL) 25 MG 24 hr tablet TAKE 1/2 TABLET BY MOUTH EVERY EVENING 45 tablet 4    nitroGLYcerin (NITROSTAT) 0.4 MG sublingual tablet DISSOLVE 1 TAB UNDER TONGUE EVERY 5 MIN AS NEEDED FOR CHEST PAIN UP TO 3 DOSES 10 tablet 1    order for DME Equipment being ordered: Diabetic shoes, pre-ulcer callous formation 2 each 3    rosuvastatin (CRESTOR) 40 MG tablet TAKE 1 " TABLET BY MOUTH EVERY DAY 90 tablet 3    rosuvastatin (CRESTOR) 40 MG tablet TAKE 1 TABLET BY MOUTH EVERY DAY 90 tablet 4    Vitamin D3 50 mcg (2000 units) tablet Take 1 tablet (50 mcg) by mouth daily 90 tablet 3    VITAMIN D3 50 MCG (2000 UT) tablet TAKE 1 TABLET (50 MCG) BY MOUTH DAILY 90 tablet 4            ALLERGIES: Patient has no known allergies.      GENERAL PHYSICAL EXAM:   Vitals: LMP  (LMP Unknown)   There is no height or weight on file to calculate BMI.     GENERAL: Well groomed, well developed, well nourished female in NAD.  CV:  RRR  RESPIRATORY:  CTA B  GI:  Soft, Tender LUQ, left flank pain, no rebound/guarding  MS: Moving all four  NEURO: Alert and oriented x 3.  PSYCH: Normal mood and affect, pleasant and agreeable during interview and exam.     RADIOLOGY: The following tests were reviewed: KUB 1/6/2025     LABS: The last test results for Ms. Bailey Sierra were reviewed.         Results for orders placed or performed in visit on 01/06/25 (from the past 24 hours)   Urinalysis Macroscopic   Result Value Ref Range     Color Urine Yellow Colorless, Straw, Light Yellow, Yellow     Appearance Urine Slightly Cloudy (A) Clear     Glucose Urine >1000 (A) Negative mg/dL     Bilirubin Urine Negative Negative     Ketones Urine Negative Negative mg/dL     Specific Gravity Urine 1.027 1.000 - 1.030     Blood Urine Large (A) Negative     pH Urine 6.5 5.0 - 9.0     Protein Albumin Urine 70 (A) Negative mg/dL     Urobilinogen Urine Normal Normal, 2.0 mg/dL     Nitrite Urine Negative Negative     Leukocyte Esterase Urine Large (A) Negative      *Note: Due to a large number of results and/or encounters for the requested time period, some results have not been displayed. A complete set of results can be found in Results Review.         BMP -            Recent Labs   Lab Test 10/18/24  0851 06/25/24  1138 04/18/24  0855 03/30/23  0831 12/11/17  0932 06/02/17  1000     --  143 142   < > 139   POTASSIUM 4.2  --   4.2 3.7   < > 3.5   CHLORIDE 110*  --  111* 111*   < > 111*   CO2 23  --  23 23   < > 24   BUN 22.3  --  26.9* 21.4   < > 21   CR 2.37*  --  2.47* 2.33*   < > 2.23*   *  --  173* 129*   < > 126*   ELICIA 9.3 9.5 9.7 9.0   < > 9.3   PHOS  --  3.7  --   --   --  3.0    < > = values in this interval not displayed.         CBC -          Recent Labs   Lab Test 10/18/24  0851 04/18/24  0855 08/26/21  0940 08/22/21  1634   WBC 4.9 5.3  --  4.9   HGB 12.9 12.8 11.3* 9.8*    155  --  140*         ASSESSMENT:   Residual left renal stones         PLAN:   Patient with residual left renal stones s/p left distal ureteroscopy with extraction of left distal stones and left stent placement.  Plan left Ureteroscopy with left ureteroscopy, laser and basketing of stones in the OR today.      Risks of bleeding, infection, ureteral injury, stricture and failure discussed.    Patient understands and wishes to proceed.        Thomas Bolanos MD  [facility]

## 2025-02-25 ENCOUNTER — HOSPITAL ENCOUNTER (OUTPATIENT)
Dept: GENERAL RADIOLOGY | Facility: OTHER | Age: 75
Discharge: HOME | End: 2025-02-25
Attending: UROLOGY | Admitting: UROLOGY
Payer: COMMERCIAL

## 2025-02-25 ENCOUNTER — ANESTHESIA (OUTPATIENT)
Dept: SURGERY | Facility: OTHER | Age: 75
End: 2025-02-25
Payer: COMMERCIAL

## 2025-02-25 ENCOUNTER — HOSPITAL ENCOUNTER (OUTPATIENT)
Facility: OTHER | Age: 75
Discharge: HOME | End: 2025-02-25
Attending: UROLOGY | Admitting: UROLOGY
Payer: COMMERCIAL

## 2025-02-25 VITALS
RESPIRATION RATE: 28 BRPM | OXYGEN SATURATION: 98 % | HEART RATE: 60 BPM | DIASTOLIC BLOOD PRESSURE: 54 MMHG | SYSTOLIC BLOOD PRESSURE: 100 MMHG | TEMPERATURE: 97.2 F

## 2025-02-25 DIAGNOSIS — Z96.0 S/P CYSTOSCOPY WITH URETERAL STENT PLACEMENT: ICD-10-CM

## 2025-02-25 DIAGNOSIS — N20.0 RENAL CALCULUS, LEFT: Primary | ICD-10-CM

## 2025-02-25 LAB — GLUCOSE BLDC GLUCOMTR-MCNC: 129 MG/DL (ref 70–99)

## 2025-02-25 PROCEDURE — 710N000012 HC RECOVERY PHASE 2, PER MINUTE: Performed by: UROLOGY

## 2025-02-25 PROCEDURE — 250N000011 HC RX IP 250 OP 636

## 2025-02-25 PROCEDURE — 999N000179 XR SURGERY CARM FLUORO LESS THAN 5 MIN W STILLS

## 2025-02-25 PROCEDURE — 82962 GLUCOSE BLOOD TEST: CPT

## 2025-02-25 PROCEDURE — 272N000002 HC OR SUPPLY OTHER OPNP: Performed by: UROLOGY

## 2025-02-25 PROCEDURE — 710N000010 HC RECOVERY PHASE 1, LEVEL 2, PER MIN: Performed by: UROLOGY

## 2025-02-25 PROCEDURE — 258N000001 HC RX 258: Performed by: UROLOGY

## 2025-02-25 PROCEDURE — 250N000011 HC RX IP 250 OP 636: Performed by: UROLOGY

## 2025-02-25 PROCEDURE — 370N000017 HC ANESTHESIA TECHNICAL FEE, PER MIN: Performed by: UROLOGY

## 2025-02-25 PROCEDURE — 82365 CALCULUS SPECTROSCOPY: CPT | Performed by: UROLOGY

## 2025-02-25 PROCEDURE — 360N000084 HC SURGERY LEVEL 4 W/ FLUORO, PER MIN: Performed by: UROLOGY

## 2025-02-25 PROCEDURE — 258N000003 HC RX IP 258 OP 636

## 2025-02-25 PROCEDURE — 52353 CYSTOURETERO W/LITHOTRIPSY: CPT | Mod: LT | Performed by: UROLOGY

## 2025-02-25 PROCEDURE — 258N000003 HC RX IP 258 OP 636: Performed by: NURSE ANESTHETIST, CERTIFIED REGISTERED

## 2025-02-25 PROCEDURE — 250N000013 HC RX MED GY IP 250 OP 250 PS 637: Performed by: UROLOGY

## 2025-02-25 PROCEDURE — 255N000002 HC RX 255 OP 636: Performed by: UROLOGY

## 2025-02-25 PROCEDURE — 272N000001 HC OR GENERAL SUPPLY STERILE: Performed by: UROLOGY

## 2025-02-25 PROCEDURE — C1758 CATHETER, URETERAL: HCPCS | Performed by: UROLOGY

## 2025-02-25 PROCEDURE — 250N000009 HC RX 250: Performed by: UROLOGY

## 2025-02-25 PROCEDURE — 999N000141 HC STATISTIC PRE-PROCEDURE NURSING ASSESSMENT: Performed by: UROLOGY

## 2025-02-25 PROCEDURE — 250N000025 HC SEVOFLURANE, PER MIN: Performed by: UROLOGY

## 2025-02-25 PROCEDURE — 250N000009 HC RX 250

## 2025-02-25 PROCEDURE — C1769 GUIDE WIRE: HCPCS | Performed by: UROLOGY

## 2025-02-25 RX ORDER — OXYCODONE HYDROCHLORIDE 5 MG/1
5 TABLET ORAL EVERY 6 HOURS PRN
Qty: 4 TABLET | Refills: 0 | Status: SHIPPED | OUTPATIENT
Start: 2025-02-25

## 2025-02-25 RX ORDER — TOLTERODINE TARTRATE 2 MG/1
2 TABLET, EXTENDED RELEASE ORAL ONCE
Status: DISCONTINUED | OUTPATIENT
Start: 2025-02-25 | End: 2025-02-25 | Stop reason: HOSPADM

## 2025-02-25 RX ORDER — ACETAMINOPHEN 650 MG/1
650 SUPPOSITORY RECTAL ONCE
Status: COMPLETED | OUTPATIENT
Start: 2025-02-25 | End: 2025-02-25

## 2025-02-25 RX ORDER — FENTANYL CITRATE 50 UG/ML
25 INJECTION, SOLUTION INTRAMUSCULAR; INTRAVENOUS EVERY 5 MIN PRN
Status: DISCONTINUED | OUTPATIENT
Start: 2025-02-25 | End: 2025-02-25 | Stop reason: HOSPADM

## 2025-02-25 RX ORDER — SODIUM CHLORIDE 9 MG/ML
INJECTION, SOLUTION INTRAVENOUS CONTINUOUS
Status: DISCONTINUED | OUTPATIENT
Start: 2025-02-25 | End: 2025-02-25 | Stop reason: HOSPADM

## 2025-02-25 RX ORDER — LIDOCAINE 40 MG/G
CREAM TOPICAL
Status: DISCONTINUED | OUTPATIENT
Start: 2025-02-25 | End: 2025-02-25 | Stop reason: HOSPADM

## 2025-02-25 RX ORDER — DEXAMETHASONE SODIUM PHOSPHATE 4 MG/ML
INJECTION, SOLUTION INTRA-ARTICULAR; INTRALESIONAL; INTRAMUSCULAR; INTRAVENOUS; SOFT TISSUE PRN
Status: DISCONTINUED | OUTPATIENT
Start: 2025-02-25 | End: 2025-02-25

## 2025-02-25 RX ORDER — DEXAMETHASONE SODIUM PHOSPHATE 10 MG/ML
4 INJECTION, SOLUTION INTRAMUSCULAR; INTRAVENOUS
Status: DISCONTINUED | OUTPATIENT
Start: 2025-02-25 | End: 2025-02-25 | Stop reason: HOSPADM

## 2025-02-25 RX ORDER — PROPOFOL 10 MG/ML
INJECTION, EMULSION INTRAVENOUS PRN
Status: DISCONTINUED | OUTPATIENT
Start: 2025-02-25 | End: 2025-02-25

## 2025-02-25 RX ORDER — EPHEDRINE SULFATE 50 MG/ML
INJECTION, SOLUTION INTRAMUSCULAR; INTRAVENOUS; SUBCUTANEOUS PRN
Status: DISCONTINUED | OUTPATIENT
Start: 2025-02-25 | End: 2025-02-25

## 2025-02-25 RX ORDER — HYDROMORPHONE HCL IN WATER/PF 6 MG/30 ML
0.4 PATIENT CONTROLLED ANALGESIA SYRINGE INTRAVENOUS EVERY 5 MIN PRN
Status: DISCONTINUED | OUTPATIENT
Start: 2025-02-25 | End: 2025-02-25 | Stop reason: HOSPADM

## 2025-02-25 RX ORDER — NALOXONE HYDROCHLORIDE 0.4 MG/ML
0.1 INJECTION, SOLUTION INTRAMUSCULAR; INTRAVENOUS; SUBCUTANEOUS
Status: DISCONTINUED | OUTPATIENT
Start: 2025-02-25 | End: 2025-02-25 | Stop reason: HOSPADM

## 2025-02-25 RX ORDER — OXYCODONE HYDROCHLORIDE 5 MG/1
5 TABLET ORAL
Status: DISCONTINUED | OUTPATIENT
Start: 2025-02-25 | End: 2025-02-25 | Stop reason: HOSPADM

## 2025-02-25 RX ORDER — ONDANSETRON 4 MG/1
4 TABLET, ORALLY DISINTEGRATING ORAL EVERY 30 MIN PRN
Status: DISCONTINUED | OUTPATIENT
Start: 2025-02-25 | End: 2025-02-25 | Stop reason: HOSPADM

## 2025-02-25 RX ORDER — CEFAZOLIN SODIUM/WATER 2 G/20 ML
2 SYRINGE (ML) INTRAVENOUS
Status: COMPLETED | OUTPATIENT
Start: 2025-02-25 | End: 2025-02-25

## 2025-02-25 RX ORDER — FENTANYL CITRATE 50 UG/ML
50 INJECTION, SOLUTION INTRAMUSCULAR; INTRAVENOUS
Status: DISCONTINUED | OUTPATIENT
Start: 2025-02-25 | End: 2025-02-25 | Stop reason: HOSPADM

## 2025-02-25 RX ORDER — ONDANSETRON 2 MG/ML
4 INJECTION INTRAMUSCULAR; INTRAVENOUS EVERY 30 MIN PRN
Status: DISCONTINUED | OUTPATIENT
Start: 2025-02-25 | End: 2025-02-25 | Stop reason: HOSPADM

## 2025-02-25 RX ORDER — CEFAZOLIN SODIUM/WATER 2 G/20 ML
2 SYRINGE (ML) INTRAVENOUS SEE ADMIN INSTRUCTIONS
Status: DISCONTINUED | OUTPATIENT
Start: 2025-02-25 | End: 2025-02-25 | Stop reason: HOSPADM

## 2025-02-25 RX ORDER — AMOXICILLIN 250 MG
1-2 CAPSULE ORAL 2 TIMES DAILY
Qty: 30 TABLET | Refills: 0 | Status: SHIPPED | OUTPATIENT
Start: 2025-02-25

## 2025-02-25 RX ORDER — HYDROMORPHONE HCL IN WATER/PF 6 MG/30 ML
0.2 PATIENT CONTROLLED ANALGESIA SYRINGE INTRAVENOUS EVERY 5 MIN PRN
Status: DISCONTINUED | OUTPATIENT
Start: 2025-02-25 | End: 2025-02-25 | Stop reason: HOSPADM

## 2025-02-25 RX ORDER — LIDOCAINE HYDROCHLORIDE 20 MG/ML
INJECTION, SOLUTION INFILTRATION; PERINEURAL PRN
Status: DISCONTINUED | OUTPATIENT
Start: 2025-02-25 | End: 2025-02-25

## 2025-02-25 RX ORDER — FENTANYL CITRATE 50 UG/ML
INJECTION, SOLUTION INTRAMUSCULAR; INTRAVENOUS PRN
Status: DISCONTINUED | OUTPATIENT
Start: 2025-02-25 | End: 2025-02-25

## 2025-02-25 RX ORDER — OXYCODONE HYDROCHLORIDE 5 MG/1
10 TABLET ORAL
Status: DISCONTINUED | OUTPATIENT
Start: 2025-02-25 | End: 2025-02-25 | Stop reason: HOSPADM

## 2025-02-25 RX ORDER — ACETAMINOPHEN 325 MG/1
975 TABLET ORAL ONCE
Status: COMPLETED | OUTPATIENT
Start: 2025-02-25 | End: 2025-02-25

## 2025-02-25 RX ORDER — ONDANSETRON 2 MG/ML
INJECTION INTRAMUSCULAR; INTRAVENOUS PRN
Status: DISCONTINUED | OUTPATIENT
Start: 2025-02-25 | End: 2025-02-25

## 2025-02-25 RX ORDER — FENTANYL CITRATE 50 UG/ML
50 INJECTION, SOLUTION INTRAMUSCULAR; INTRAVENOUS EVERY 5 MIN PRN
Status: DISCONTINUED | OUTPATIENT
Start: 2025-02-25 | End: 2025-02-25 | Stop reason: HOSPADM

## 2025-02-25 RX ADMIN — PROPOFOL 100 MG: 10 INJECTION, EMULSION INTRAVENOUS at 12:03

## 2025-02-25 RX ADMIN — PHENYLEPHRINE HYDROCHLORIDE 50 MCG: 10 INJECTION INTRAVENOUS at 13:08

## 2025-02-25 RX ADMIN — DEXAMETHASONE SODIUM PHOSPHATE 4 MG: 4 INJECTION, SOLUTION INTRA-ARTICULAR; INTRALESIONAL; INTRAMUSCULAR; INTRAVENOUS; SOFT TISSUE at 12:10

## 2025-02-25 RX ADMIN — Medication 2.5 MG: at 12:17

## 2025-02-25 RX ADMIN — PROPOFOL 20 MG: 10 INJECTION, EMULSION INTRAVENOUS at 12:57

## 2025-02-25 RX ADMIN — SODIUM CHLORIDE: 0.9 INJECTION, SOLUTION INTRAVENOUS at 10:34

## 2025-02-25 RX ADMIN — ACETAMINOPHEN 975 MG: 325 TABLET, FILM COATED ORAL at 09:38

## 2025-02-25 RX ADMIN — CEFAZOLIN 2 G: 10 INJECTION, POWDER, FOR SOLUTION INTRAVENOUS at 11:55

## 2025-02-25 RX ADMIN — PHENYLEPHRINE HYDROCHLORIDE 100 MCG: 10 INJECTION INTRAVENOUS at 12:17

## 2025-02-25 RX ADMIN — ONDANSETRON HYDROCHLORIDE 4 MG: 2 SOLUTION INTRAMUSCULAR; INTRAVENOUS at 13:06

## 2025-02-25 RX ADMIN — FENTANYL CITRATE 50 MCG: 50 INJECTION INTRAMUSCULAR; INTRAVENOUS at 12:26

## 2025-02-25 RX ADMIN — PHENYLEPHRINE HYDROCHLORIDE 100 MCG: 10 INJECTION INTRAVENOUS at 12:07

## 2025-02-25 RX ADMIN — Medication 5 MG: at 12:07

## 2025-02-25 RX ADMIN — FENTANYL CITRATE 50 MCG: 50 INJECTION INTRAMUSCULAR; INTRAVENOUS at 12:03

## 2025-02-25 RX ADMIN — LIDOCAINE HYDROCHLORIDE 60 MG: 20 INJECTION, SOLUTION INFILTRATION; PERINEURAL at 12:03

## 2025-02-25 ASSESSMENT — ACTIVITIES OF DAILY LIVING (ADL)
ADLS_ACUITY_SCORE: 32

## 2025-02-25 NOTE — ANESTHESIA POSTPROCEDURE EVALUATION
Patient: Bailey Sierra    Procedure: Procedure(s):  CYSTOURETEROSCOPY, WITH LITHOTRIPSY USING LASER AND URETERAL STENT REMOVAL, LEFT RETROGRADE       Anesthesia Type:  General    Note:  Disposition: Outpatient   Postop Pain Control: Uneventful            Sign Out: Well controlled pain   PONV: No   Neuro/Psych: Uneventful            Sign Out: Acceptable/Baseline neuro status   Airway/Respiratory: Uneventful            Sign Out: Acceptable/Baseline resp. status   CV/Hemodynamics: Uneventful            Sign Out: Acceptable CV status; No obvious hypovolemia; No obvious fluid overload   Other NRE: NONE   DID A NON-ROUTINE EVENT OCCUR?            Last vitals:  Vitals Value Taken Time   /54 02/25/25 1335   Temp 97.1  F (36.2  C) 02/25/25 1330   Pulse 61 02/25/25 1338   Resp 8 02/25/25 1338   SpO2 100 % 02/25/25 1338   Vitals shown include unfiled device data.    Electronically Signed By: BRITNEY Martines CRNA  February 25, 2025  1:46 PM

## 2025-02-25 NOTE — OR NURSING
Patient has been discharged to home at 1440 via ambulatory accompanied by her PCA    Written discharge instructions were provided to patient and PCA.  Prescriptions were e-scribed to [facility] for .  Patient states their pain is 0/10, and denies any nausea or dizziness upon discharge.    Patient and adult caring for them verbalize understanding of discharge instructions including no driving until tomorrow and no longer taking narcotic pain medications - no operating mechanical equipment and no making any important decisions.They understand reason for discharge, and necessary follow-up appointments.       Jannie Lobo RN

## 2025-02-25 NOTE — ANESTHESIA PREPROCEDURE EVALUATION
Anesthesia Pre-Procedure Evaluation    Patient: Bailey Sierra   MRN: [id number] : 1950        Procedure : Procedure(s):  CYSTOURETEROSCOPY, WITH LITHOTRIPSY USING LASER AND URETERAL STENT INSERTION          Past Medical History:   Diagnosis Date    Atherosclerotic heart disease of native coronary artery without angina pectoris     No Comments Provided    Cardiac murmur     No Comments Provided    Chronic kidney disease, stage III (moderate) (H)     No Comments Provided    Controlled type 2 diabetes mellitus with stage 3 chronic kidney disease, with long-term current use of insulin (H) 2013    COVID-19 virus detected 2021    Edema     No Comments Provided    Essential (primary) hypertension     No Comments Provided    Gastro-esophageal reflux disease without esophagitis     No Comments Provided    Hyperlipidemia     No Comments Provided    Impingement syndrome of right shoulder 03/10/2017         Insomnia 2012         Iron deficiency anemia     No Comments Provided    Neuromuscular dysfunction of bladder     No Comments Provided    Osteoarthritis     No Comments Provided    Other shoulder lesions, right shoulder 03/10/2017         Other shoulder lesions, unspecified shoulder     No Comments Provided    Other specified postprocedural states 2017         Personal history of other medical treatment (CODE)     Three childbirths    Plantar fascial fibromatosis     No Comments Provided    Presence of aortocoronary bypass graft 2013    [facility]    Primary osteoarthritis of shoulder 03/10/2017         Type 2 diabetes mellitus without complications (H)     No Comments Provided    Urgency of urination 2011         Uterovaginal prolapse     No Comments Provided      Past Surgical History:   Procedure Laterality Date    ARTHROSCOPY SHOULDER Right          ARTHROSCOPY SHOULDER Left          ARTHROSCOPY SHOULDER RT/LT Right [id number]         Cardiac Bypass  2013         COLONOSCOPY   11/24/2009 11/24/2009    CYSTOSCOPY  03/15/2001    DENTAL SURGERY      Dental extractions    ENDOSCOPIC RETROGRADE CHOLANGIOPANCREATOGRAPHY  05/2008    Bile leak with ERCP and stenting and drainage of bile collection through abdominal wall.    ESOPHAGOSCOPY, GASTROSCOPY, DUODENOSCOPY (EGD), COMBINED N/A 6/23/2020    Pavon's follow up 5 years, 6/23/2025    HYSTERECTOMY TOTAL ABDOMINAL, BILATERAL SALPINGO-OOPHORECTOMY, COMBINED  04/02/2001    Vag vault suspension with Cortex mesh    IMPLANT STIMULATOR AND LEADS SACRAL NERVE (STAGE ONE AND TWO) N/A 5/7/2019    Procedure: Interstim Implant Revision, GENERATOR AND TYING LEAD EXCHANGE;  Surgeon: Honorio Bowling MD;  Location: [facility] OR    IMPLANT STIMULATOR AND LEADS SACRAL NERVE (STAGE ONE AND TWO) N/A 10/18/2022    Procedure: InterStim nonrechargeable generator and tined lead revision;  Surgeon: Honorio Bowling MD;  Location: [facility] OR    Interstim Device Placement  04/14/2014    Dr. Bowling - [facility]    LAMINECTOMY LUMBAR ONE LEVEL  1997         LAPAROSCOPIC CHOLECYSTECTOMY  04/2008         LAPAROSCOPIC TUBAL LIGATION      No Comments Provided    LASER HOLMIUM LITHOTRIPSY URETER(S), INSERT STENT, COMBINED Left 1/10/2025    Procedure: CYSTOURETEROSCOPY, RETROGRADES WITH PYELOGRAMS, BASKETING, LEFT URETEROSCOPY WITH LEFT STENT PLACEMENT.;  Surgeon: Thomas Bolanos MD;  Location: [facility] OR      No Known Allergies   Social History     Tobacco Use    Smoking status: Never     Passive exposure: Past    Smokeless tobacco: Never   Substance Use Topics    Alcohol use: No     Alcohol/week: 0.0 standard drinks of alcohol      Wt Readings from Last 1 Encounters:   02/13/25 61.8 kg (136 lb 3.2 oz)        Anesthesia Evaluation   Pt has had prior anesthetic.     No history of anesthetic complications       ROS/MED HX  ENT/Pulmonary:  - neg pulmonary ROS     Neurologic: Comment: Neurodysfunction of badder      Cardiovascular: Comment: Edema     (+) Dyslipidemia hypertension- -  CAD angina-with  excertion.  CABG- -      CHF etiology: diastolic    MCKEON.   pacemaker, Reason placed: sinus node dysfunction.            valvular problems/murmurs type: AS mild.         METS/Exercise Tolerance: 3 - Able to walk 1-2 blocks without stopping    Hematologic: Comments: Thrombocytopenia     (+)      anemia,          Musculoskeletal:   (+)  arthritis,             GI/Hepatic: Comment: Pavon's esophagus     (+) GERD, Asymptomatic on medication, esophageal disease,   hiatal hernia,              Renal/Genitourinary: Comment: Incontinence - s/p stimulator insertion   Hyperparathyroidism     (+) renal disease, type: CRI, Pt does not require dialysis,    Nephrolithiasis ,       Endo:     (+)  type II DM,   Using insulin, - not using insulin pump.   Diabetic complications: nephropathy.             Psychiatric/Substance Use:  - neg psychiatric ROS     Infectious Disease:  - neg infectious disease ROS     Malignancy:  - neg malignancy ROS     Other:  - neg other ROS          Physical Exam    Airway        Mallampati: II   TM distance: > 3 FB   Neck ROM: full   Mouth opening: > 3 cm    Respiratory Devices and Support         Dental       (+) Edentulous      Cardiovascular          Rhythm and rate: regular and normal   (+) murmur       Pulmonary   pulmonary exam normal        breath sounds clear to auscultation           OUTSIDE LABS:  CBC:   Lab Results   Component Value Date    WBC 4.9 10/18/2024    WBC 5.3 04/18/2024    HGB 12.9 10/18/2024    HGB 12.8 04/18/2024    HCT 41.3 10/18/2024    HCT 40.6 04/18/2024     10/18/2024     04/18/2024     BMP:   Lab Results   Component Value Date     02/13/2025     02/10/2025    POTASSIUM 4.1 02/13/2025    POTASSIUM 3.2 (L) 02/10/2025    CHLORIDE 105 02/13/2025    CHLORIDE 107 02/10/2025    CO2 23 02/13/2025    CO2 22 02/10/2025    BUN 19.7 02/13/2025    BUN 15.6 02/10/2025    CR 2.31 (H) 02/13/2025    CR 2.31 (H) 02/10/2025     (H) 02/13/2025    GLC 81  "02/10/2025     COAGS:   Lab Results   Component Value Date    PTT 26 02/24/2021    INR 1.07 06/10/2021    FIBR 386 02/24/2021     POC: No results found for: \"BGM\", \"HCG\", \"HCGS\"  HEPATIC:   Lab Results   Component Value Date    ALBUMIN 3.7 10/18/2024    PROTTOTAL 6.5 10/18/2024    ALT 25 10/18/2024    AST 31 10/18/2024    ALKPHOS 90 10/18/2024    BILITOTAL 0.4 10/18/2024     OTHER:   Lab Results   Component Value Date    LACT 1.4 07/18/2019    A1C 7.3 (H) 10/18/2024    ELICIA 9.3 02/13/2025    PHOS 3.7 06/25/2024    MAG 2.1 07/23/2013    LIPASE 46 06/10/2021    CRP <1.0 06/03/2021       Anesthesia Plan    ASA Status:  3    NPO Status:  NPO Appropriate    Anesthesia Type: General.     - Airway: LMA   Induction: Intravenous, Propofol.   Maintenance: Balanced.        Consents    Anesthesia Plan(s) and associated risks, benefits, and realistic alternatives discussed. Questions answered and patient/representative(s) expressed understanding.     - Discussed: Risks, Benefits and Alternatives for BOTH SEDATION and the PROCEDURE were discussed     - Discussed with:  Patient      - Extended Intubation/Ventilatory Support Discussed: No.      - Patient is DNR/DNI Status: No     Use of blood products discussed: No .     Postoperative Care    Pain management: IV analgesics.   PONV prophylaxis: Ondansetron (or other 5HT-3)     Comments:               BRITNEY Martines CRNA    I have reviewed the pertinent notes and labs in the chart from the past 30 days and (re)examined the patient.  Any updates or changes from those notes are reflected in this note.    Clinically Significant Risk Factors Present on Admission                 # Drug Induced Platelet Defect: home medication list includes an antiplatelet medication   # Hypertension: Noted on problem list          # DMII: A1C = N/A within past 6 months                "

## 2025-02-25 NOTE — OP NOTE
Operative report    Preoperative diagnosis: 1.5 cm left renal calculus upper pole    Postoperative diagnosis: Same    Procedure: Cystoscopy, left stent removal, left retrograde pyelogram, left flexible ureteroscopy, laser lithotripsy, basketing of stone fragments    Surgeon: Thomas Bolanos MD    Anesthesiologist: Adelia Luu CRNA    Anesthesia: General    Description:    Informed consent was obtained.  Risks of bleeding, infection, ureteral injury or stricture, stent discomfort including urgency and frequency, urinary retention, bladder injury, the need for a staged procedure secondary to the inability to access the stone, DVT, PE, pneumonia and cardiac or anesthetic complications were explained to the patient.  Site had been marked and designated.  Films were reviewed.    The patient was taken to the procedure room.  General anesthesia was administered.  Antibiotics had been given prior to the procedure per protocol.  Timeout was done all were in agreement.    The patient was placed lithotomy.  Prepped and draped in sterile fashion.  The patient was personally positioned by me and all pressure points were padded appropriately.    Cystoscopy was done with the rigid cystoscope using a 30 and 70 degree lens and the 22 [place] of [place] sheath.  The bladder was surveyed and found to be normal without any visible tumor stone or lesion.    The left ureter was visualized.  The left stent was removed completely.  An open-ended catheter was used to intubate the ureter and a retrograde was done.  Findings included moderate hydroureteronephrosis.  No obstructing stone in the ureter.    Through the open-ended catheter a 0.38 Glidewire was advanced past the stone into the collecting system and used as a safety wire.  The open-ended catheter was then offloaded.      A second 0.35 sensor wire was placed adjacent to the first wire through the dual-lumen into the collecting system and secured appropriately.    The cystoscope was removed.       Over the 0.35 sensor wire a 12-14 x 35 ureteral access sheath was advanced fluoroscopically to the proximal UPJ.  Contrast was used to verify its placement and to ensure no injury or extravasation.    A flexible ureteroscope was advanced through the access sheath up to the proximal ureter and into the collecting system.  The upper, middle, and lower pole calyces were explored.  Stones were seen in the upper pole and appeared to be very brittle but large.    Using a thulium laser with settings of 0.2 and 100, the stone was ablated into several pieces.  Any large fragments were basketed and removed with a tipless nitinol tight basket.  Any smaller stone fragments were left to pass on their own.    Contrast was instilled in the collecting system indicating no injury or extravasation.    The rigid cystoscope was reinserted.  A retrograde was done in the system appeared to drain nicely.  There was no injury or significant ureteral edema.    The bladder was drained and the patient was awakened.  No stent was placed.    The patient was then awakened and transported to recovery room in good condition.    Complications: None    Specimens: Left renal calculus    Drains: None    Findings: Please stone removal    Disposition: To recovery room in good condition.  Patient will be dismissed home     Thomas Bolanos MD

## 2025-02-25 NOTE — ANESTHESIA PROCEDURE NOTES
Airway       Patient location during procedure: OR       Procedure Start/Stop Times: 2/25/2025 12:05 PM  Staff -        CRNA: Sol Luu APRN CRNA       Other Anesthesia Staff: Sunshine Luu       Performed By: SRNA  Consent for Airway        Urgency: elective  Indications and Patient Condition       Indications for airway management: emily-procedural       Induction type:intravenous       Mask difficulty assessment: 0 - not attempted    Final Airway Details       Final airway type: supraglottic airway    Supraglottic Airway Details        Type: LMA       Brand: I-Gel       LMA size: 4    Post intubation assessment        Placement verified by: capnometry, equal breath sounds and chest rise        Number of attempts at approach: 1       Number of other approaches attempted: 0       Secured with: tape       Ease of procedure: easy       Dentition: Unchanged and Intact    Medication(s) Administered   Medication Administration Time: 2/25/2025 12:05 PM

## 2025-02-25 NOTE — OR NURSING
PACU Transfer Note    Bailey Sierra was transferred to phase 2 via cart.  Equipment used for transport:  cart.  Accompanied by:  Lydia MISHRA  Prescriptions were: n/a    PACU Respiratory Event Documentation     1) Episodes of Apnea greater than or equal to 10 seconds: 0    2) Bradypnea - less than 8 breaths per minute: 0    3) Pain score on 0 to 10 scale: 0    4) Pain-sedation mismatch (yes or no): no    5) Repeated 02 desaturation less than 90% (yes or no): no    Anesthesia notified? (yes or no): no    Any of the above events occuring repeatedly in separate 30 minute intervals may be considered recurrent PACU respiratory events.    Patient stable and meets phase 1 discharge criteria for transport from PACU.

## 2025-02-25 NOTE — ANESTHESIA CARE TRANSFER NOTE
Patient: Bailey Sirera    Procedure: Procedure(s):  CYSTOURETEROSCOPY, WITH LITHOTRIPSY USING LASER AND URETERAL STENT REMOVAL, LEFT RETROGRADE       Diagnosis: Bilateral nephrolithiasis [N20.0]  Diagnosis Additional Information: No value filed.    Anesthesia Type:   General     Note:    Oropharynx: oropharynx clear of all foreign objects and spontaneously breathing  Level of Consciousness: awake  Oxygen Supplementation: face mask  Level of Supplemental Oxygen (L/min / FiO2): 6  Independent Airway: airway patency satisfactory and stable  Dentition: dentition unchanged  Vital Signs Stable: post-procedure vital signs reviewed and stable  Report to RN Given: handoff report given  Patient transferred to: PACU    Handoff Report: Identifed the Patient, Identified the Reponsible Provider, Reviewed the pertinent medical history, Discussed the surgical course, Reviewed Intra-OP anesthesia mangement and issues during anesthesia, Set expectations for post-procedure period and Allowed opportunity for questions and acknowledgement of understanding      Vitals:  Vitals Value Taken Time   /68 02/25/25 1320   Temp 97  F (36.1  C) 02/25/25 1320   Pulse 83 02/25/25 1323   Resp 11 02/25/25 1323   SpO2 100 % 02/25/25 1323   Vitals shown include unfiled device data.    Electronically Signed By: Sunshine Luu  February 25, 2025  1:24 PM

## 2025-02-25 NOTE — DISCHARGE INSTRUCTIONS
[facility]  Adult Discharge Orders & Instructions      For 24 hours after surgery:  Get plenty of rest.  A responsible adult must stay with you for at least 24 hours after you leave the hospital.   You may feel lightheaded.  IF so, sit for a few minutes before standing.  Have someone help you get up.   You may have a slight fever. Call the doctor if your fever is over 101 F (38.3 C) (taken under the tongue) or lasts longer than 24 hours.  You may have a dry mouth, a sore throat, muscle aches or trouble sleeping.  These should go away after 24 hours.  Do not make important or legal decisions.  6.   Do not drive or use heavy equipment.  If you have weakness or tingling, don't drive or use heavy equipment until this feeling goes away.                                                                                                                                                                         To contact a doctor, call    [phone]

## 2025-02-25 NOTE — BRIEF OP NOTE
[facility]    Brief Operative Note    Pre-operative diagnosis: Bilateral nephrolithiasis [N20.0]  Post-operative diagnosis Renal stones left    Procedure: CYSTOURETEROSCOPY, WITH LITHOTRIPSY USING LASER AND URETERAL STENT REMOVAL, LEFT RETROGRADE, Left - Ureter    Surgeon: Surgeons and Role:     * Thomas Bolanos MD - Primary  Anesthesia: General   Estimated Blood Loss: None    Drains: None  Specimens:   ID Type Source Tests Collected by Time Destination   A : LEFT RENAL STONE Calculus/Stone Kidney, Left STONE ANALYSIS Thomas Bolanos MD 2/25/2025  1:10 PM      Findings:   Large brittle stone component left upper pole, very brittle, stone completely evacuated.  Ureter was wide open.  No stent placed .  Complications: None.  Implants:   Implant Name Type Inv. Item Serial No.  Lot No. LRB No. Used Action   STENT URETERAL CONTOUR SOFT PERCUFLEX [id number] - [id number] Stent STENT URETERAL CONTOUR SOFT PERCUFLEX [id number]  [employer] [id number] Left 1 Explanted

## 2025-02-26 DIAGNOSIS — N20.0 LEFT RENAL STONE: Primary | ICD-10-CM

## 2025-03-02 LAB
APPEARANCE STONE: NORMAL
COMPN STONE: NORMAL
SPECIMEN WT: 104 MG

## 2025-03-11 ENCOUNTER — LAB (OUTPATIENT)
Dept: LAB | Facility: OTHER | Age: 75
End: 2025-03-11
Attending: UROLOGY
Payer: COMMERCIAL

## 2025-03-11 ENCOUNTER — ALLIED HEALTH/NURSE VISIT (OUTPATIENT)
Dept: UROLOGY | Facility: OTHER | Age: 75
End: 2025-03-11
Attending: UROLOGY
Payer: COMMERCIAL

## 2025-03-11 DIAGNOSIS — N20.0 LEFT RENAL STONE: ICD-10-CM

## 2025-03-11 DIAGNOSIS — N13.30 HYDRONEPHROSIS, UNSPECIFIED HYDRONEPHROSIS TYPE: Primary | ICD-10-CM

## 2025-03-11 LAB
CALCIUM SERPL-MCNC: 9 MG/DL (ref 8.8–10.4)
PHOSPHATE SERPL-MCNC: 3.8 MG/DL (ref 2.5–4.5)
URATE SERPL-MCNC: 2.2 MG/DL (ref 2.4–5.7)

## 2025-03-11 PROCEDURE — 36415 COLL VENOUS BLD VENIPUNCTURE: CPT | Mod: ZL

## 2025-03-11 PROCEDURE — 82310 ASSAY OF CALCIUM: CPT | Mod: ZL

## 2025-03-11 PROCEDURE — 84550 ASSAY OF BLOOD/URIC ACID: CPT | Mod: ZL

## 2025-03-11 PROCEDURE — 84100 ASSAY OF PHOSPHORUS: CPT | Mod: ZL

## 2025-03-11 PROCEDURE — 83970 ASSAY OF PARATHORMONE: CPT | Mod: ZL

## 2025-03-11 NOTE — NURSING NOTE
Patient arrived fo education and  of supplies for 24 hr urine collection. Patient verbal understanding of teaching. She was given written instructions of the collection and drop off process. Chanelle Casas RN on 3/11/2025 at 4:01 PM

## 2025-03-12 LAB — PTH-INTACT SERPL-MCNC: 63 PG/ML (ref 15–65)

## 2025-03-13 ENCOUNTER — TRANSFERRED RECORDS (OUTPATIENT)
Dept: HEALTH INFORMATION MANAGEMENT | Facility: OTHER | Age: 75
End: 2025-03-13

## 2025-03-13 ENCOUNTER — LAB (OUTPATIENT)
Dept: LAB | Facility: OTHER | Age: 75
End: 2025-03-13
Payer: COMMERCIAL

## 2025-03-13 DIAGNOSIS — N30.00 ACUTE CYSTITIS WITHOUT HEMATURIA: Primary | ICD-10-CM

## 2025-03-13 DIAGNOSIS — N30.00 ACUTE CYSTITIS WITHOUT HEMATURIA: ICD-10-CM

## 2025-03-13 DIAGNOSIS — N13.30 HYDRONEPHROSIS, UNSPECIFIED HYDRONEPHROSIS TYPE: ICD-10-CM

## 2025-03-14 LAB
Lab: NORMAL
PERFORMING LABORATORY: NORMAL
SPECIMEN STATUS: NORMAL
TEST NAME: NORMAL

## 2025-03-17 ENCOUNTER — TELEPHONE (OUTPATIENT)
Dept: FAMILY MEDICINE | Facility: OTHER | Age: 75
End: 2025-03-17
Payer: COMMERCIAL

## 2025-03-17 DIAGNOSIS — Z79.4 TYPE 2 DIABETES MELLITUS WITH STAGE 3 CHRONIC KIDNEY DISEASE, WITH LONG-TERM CURRENT USE OF INSULIN, UNSPECIFIED WHETHER STAGE 3A OR 3B CKD (H): Primary | ICD-10-CM

## 2025-03-17 DIAGNOSIS — N18.30 TYPE 2 DIABETES MELLITUS WITH STAGE 3 CHRONIC KIDNEY DISEASE, WITH LONG-TERM CURRENT USE OF INSULIN, UNSPECIFIED WHETHER STAGE 3A OR 3B CKD (H): Primary | ICD-10-CM

## 2025-03-17 DIAGNOSIS — E11.22 TYPE 2 DIABETES MELLITUS WITH STAGE 3 CHRONIC KIDNEY DISEASE, WITH LONG-TERM CURRENT USE OF INSULIN, UNSPECIFIED WHETHER STAGE 3A OR 3B CKD (H): Primary | ICD-10-CM

## 2025-03-17 NOTE — TELEPHONE ENCOUNTER
She has had a lot of labs drawn over the last few months.  Appears for her diabetes the only thing she is due for is a urine microalbumin.  This was ordered.

## 2025-03-17 NOTE — TELEPHONE ENCOUNTER
Patient does have pending/ future labes of lipid, Albumin, BMP & A1C.   However some of those have been drawn since than. BMP-2/13/25, lipid 1/28/25  Please advise.   Piedad Borden LPN (Ext [phone] ) ..........3/17/2025 11:54 AM

## 2025-03-17 NOTE — TELEPHONE ENCOUNTER
Patient is wanting to know if she will needs labs prior to her next visit. Please call    Marilin Tate on 3/17/2025 at 10:47 AM

## 2025-03-18 LAB
SCANNED LAB RESULT: NORMAL
TEST NAME: NORMAL

## 2025-03-19 DIAGNOSIS — E11.22 TYPE 2 DIABETES MELLITUS WITH STAGE 3 CHRONIC KIDNEY DISEASE, WITH LONG-TERM CURRENT USE OF INSULIN, UNSPECIFIED WHETHER STAGE 3A OR 3B CKD (H): ICD-10-CM

## 2025-03-19 DIAGNOSIS — N18.30 TYPE 2 DIABETES MELLITUS WITH STAGE 3 CHRONIC KIDNEY DISEASE, WITH LONG-TERM CURRENT USE OF INSULIN, UNSPECIFIED WHETHER STAGE 3A OR 3B CKD (H): ICD-10-CM

## 2025-03-19 DIAGNOSIS — Z79.4 TYPE 2 DIABETES MELLITUS WITH STAGE 3 CHRONIC KIDNEY DISEASE, WITH LONG-TERM CURRENT USE OF INSULIN, UNSPECIFIED WHETHER STAGE 3A OR 3B CKD (H): ICD-10-CM

## 2025-03-19 RX ORDER — INSULIN ASPART INJECTION 100 [IU]/ML
INJECTION, SOLUTION SUBCUTANEOUS
Qty: 165 ML | Refills: 3 | Status: SHIPPED | OUTPATIENT
Start: 2025-03-19

## 2025-03-19 NOTE — TELEPHONE ENCOUNTER
[facility] sent Rx request for the following:      Requested Prescriptions   Pending Prescriptions Disp Refills    insulin aspart (FIASP FLEXTOUCH) 100 UNIT/ML pen-injector [Pharmacy Med Name: FIASP FLEXTOUCH PEN INJ 3ML] 165 mL      Sig: INJECT 5 UNITS UNDER THE SKIN THREE TIMES DAILY WITH MEALS       There is no refill protocol information for this order          Last Prescription Date:   3/7/24  Last Fill Qty/Refills:         15 ml, R-12    Last Office Visit:              2/10/25 (preop)   Future Office visit:           4/14/25    Routing refill request to provider for review/approval because:  Drug not on the [facility] refill protocol     Caron Hubbard RN on 3/19/2025 at 1:39 PM

## 2025-03-19 NOTE — RESULT ENCOUNTER NOTE
Ashlyn, can you let Bailey know I reviewed your 24 hour urine.  You definitely are not drinking enough water.  You should drink at least another liter of fluid/day.  Also , your magnesium is low and that is a stone preventer.  I would recommend increasing foods that contain magnesium such as legumes, unsalted nuts, seeds and whole grains.  You can also take magnesium over the counter, 100 mg twice a day.  Dr. Bolanos

## 2025-03-19 NOTE — RESULT ENCOUNTER NOTE
Bailey, I reviewed your 24 hour urine.  You definitely are not drinking enough water.  You should drink at least another liter of fluid/day.  Also , your magnesium is low and that is a stone preventer.  I would recommend increasing foods that contain magnesium such as legumes, unsalted nuts, seeds and whole grains.  You can also take magnesium over the counter, 100 mg twice a day.  Dr. Bolanos

## 2025-04-10 ENCOUNTER — LAB (OUTPATIENT)
Dept: LAB | Facility: OTHER | Age: 75
End: 2025-04-10
Attending: FAMILY MEDICINE
Payer: COMMERCIAL

## 2025-04-10 DIAGNOSIS — N18.30 TYPE 2 DIABETES MELLITUS WITH STAGE 3 CHRONIC KIDNEY DISEASE, WITH LONG-TERM CURRENT USE OF INSULIN, UNSPECIFIED WHETHER STAGE 3A OR 3B CKD (H): ICD-10-CM

## 2025-04-10 DIAGNOSIS — Z79.4 TYPE 2 DIABETES MELLITUS WITH STAGE 3 CHRONIC KIDNEY DISEASE, WITH LONG-TERM CURRENT USE OF INSULIN, UNSPECIFIED WHETHER STAGE 3A OR 3B CKD (H): ICD-10-CM

## 2025-04-10 DIAGNOSIS — E11.8 TYPE 2 DIABETES MELLITUS WITH COMPLICATION, WITH LONG-TERM CURRENT USE OF INSULIN (H): ICD-10-CM

## 2025-04-10 DIAGNOSIS — E11.22 TYPE 2 DIABETES MELLITUS WITH STAGE 3 CHRONIC KIDNEY DISEASE, WITH LONG-TERM CURRENT USE OF INSULIN, UNSPECIFIED WHETHER STAGE 3A OR 3B CKD (H): ICD-10-CM

## 2025-04-10 DIAGNOSIS — Z79.4 TYPE 2 DIABETES MELLITUS WITH COMPLICATION, WITH LONG-TERM CURRENT USE OF INSULIN (H): ICD-10-CM

## 2025-04-10 LAB
CREAT UR-MCNC: 96.2 MG/DL
MICROALBUMIN UR-MCNC: 154.4 MG/L
MICROALBUMIN/CREAT UR: 160.5 MG/G CR (ref 0–25)

## 2025-04-10 PROCEDURE — 82043 UR ALBUMIN QUANTITATIVE: CPT | Mod: ZL

## 2025-04-10 PROCEDURE — 82570 ASSAY OF URINE CREATININE: CPT | Mod: ZL

## 2025-04-14 ENCOUNTER — OFFICE VISIT (OUTPATIENT)
Dept: FAMILY MEDICINE | Facility: OTHER | Age: 75
End: 2025-04-14
Attending: FAMILY MEDICINE
Payer: COMMERCIAL

## 2025-04-14 VITALS
TEMPERATURE: 96.9 F | BODY MASS INDEX: 23.79 KG/M2 | HEART RATE: 61 BPM | SYSTOLIC BLOOD PRESSURE: 104 MMHG | DIASTOLIC BLOOD PRESSURE: 52 MMHG | RESPIRATION RATE: 18 BRPM | WEIGHT: 138.6 LBS | OXYGEN SATURATION: 96 %

## 2025-04-14 DIAGNOSIS — E78.5 HYPERLIPIDEMIA, UNSPECIFIED HYPERLIPIDEMIA TYPE: ICD-10-CM

## 2025-04-14 DIAGNOSIS — G89.29 CHRONIC MIDLINE LOW BACK PAIN WITHOUT SCIATICA: Primary | ICD-10-CM

## 2025-04-14 DIAGNOSIS — N25.81 HYPERPARATHYROIDISM DUE TO RENAL INSUFFICIENCY: ICD-10-CM

## 2025-04-14 DIAGNOSIS — E11.22 TYPE 2 DIABETES MELLITUS WITH STAGE 3 CHRONIC KIDNEY DISEASE, WITH LONG-TERM CURRENT USE OF INSULIN, UNSPECIFIED WHETHER STAGE 3A OR 3B CKD (H): ICD-10-CM

## 2025-04-14 DIAGNOSIS — G89.29 CHRONIC RIGHT SHOULDER PAIN: ICD-10-CM

## 2025-04-14 DIAGNOSIS — Z79.4 TYPE 2 DIABETES MELLITUS WITH STAGE 3 CHRONIC KIDNEY DISEASE, WITH LONG-TERM CURRENT USE OF INSULIN, UNSPECIFIED WHETHER STAGE 3A OR 3B CKD (H): ICD-10-CM

## 2025-04-14 DIAGNOSIS — N18.30 TYPE 2 DIABETES MELLITUS WITH STAGE 3 CHRONIC KIDNEY DISEASE, WITH LONG-TERM CURRENT USE OF INSULIN, UNSPECIFIED WHETHER STAGE 3A OR 3B CKD (H): ICD-10-CM

## 2025-04-14 DIAGNOSIS — M54.50 CHRONIC MIDLINE LOW BACK PAIN WITHOUT SCIATICA: Primary | ICD-10-CM

## 2025-04-14 DIAGNOSIS — M25.511 CHRONIC RIGHT SHOULDER PAIN: ICD-10-CM

## 2025-04-14 PROCEDURE — G0463 HOSPITAL OUTPT CLINIC VISIT: HCPCS

## 2025-04-14 PROCEDURE — 99214 OFFICE O/P EST MOD 30 MIN: CPT | Performed by: FAMILY MEDICINE

## 2025-04-14 RX ORDER — EMPAGLIFLOZIN 25 MG/1
TABLET, FILM COATED ORAL
COMMUNITY
Start: 2025-03-28

## 2025-04-14 ASSESSMENT — PAIN SCALES - GENERAL: PAINLEVEL_OUTOF10: MILD PAIN (3)

## 2025-04-14 NOTE — PROGRESS NOTES
Assessment & Plan     Chronic midline low back pain without sciatica  Reviewed recent CT images which show significant lumbar degeneration.  Discussed options and given her kidney disease NSAIDs are not ideal for her.  Recommend ice/heat.  Will also refer to home care as she is homebound to see if some therapy would be beneficial.  - Home Care Referral    Chronic right shoulder pain  History of rotator cuff repair.  Would not encourage any aggressive treatment at this time.  Will again refer to home care for therapy.  - Home Care Referral    Type 2 diabetes mellitus with stage 3 chronic kidney disease, with long-term current use of insulin, unspecified whether stage 3a or 3b CKD (H)  Recent hemoglobin A1c in December of 6.9%.  Recommend continue current regimen.  She is plan to follow-up again in September or we can repeat labs at that time.    Hyperlipidemia, unspecified hyperlipidemia type  Recent fasting lipids were reviewed, continue statin however    Hyperparathyroidism due to renal insufficiency  Chronic and stable.  Repeat labs again in September.        No follow-ups on file.    April Avalos is a 75 year old, presenting for the following health issues:  Diabetes      4/14/2025    10:37 AM   Additional Questions   Roomed by Ariella crawford     Comes in today for follow-up.  She has a history of type 2 diabetes.  She continues to check her sugars once a day and she is generally in the mid upper 100s.  She has had no difficulty with hypoglycemia.  She continues with Lantus 15 units at night and NovoLog 5 units with meals.    She has had some occasional diarrhea generally 1 day every 2 weeks.    Since she was last seen she had kidney stones removed stent placed and then removed.  She has had no further urinary symptoms.    She has a history of chronic low back pain and right shoulder pain.  She has a history of rotator cuff surgery nearly 10 years ago.  No new injuries that she is aware of.  She does not use  anti-inflammatories.    History of Present Illness       She eats 0-1 servings of fruits and vegetables daily.She consumes 0 sweetened beverage(s) daily.   She is taking medications regularly.            Objective    /52   Pulse 61   Temp 96.9  F (36.1  C) (Temporal)   Resp 18   Wt 62.9 kg (138 lb 9.6 oz)   LMP  (LMP Unknown)   SpO2 96%   BMI 23.79 kg/m    Body mass index is 23.79 kg/m .  Physical Exam   GENERAL: alert and no distress  RESP: lungs clear to auscultation - no rales, rhonchi or wheezes  CV: regular rate and rhythm, normal S1 S2, no S3 or S4, no murmur, click or rub, no peripheral edema   MS: Right shoulder shows normal flexion, abduction and internal rotation.  She has some tenderness over her anterior humerus and acromion.  No tenderness along her clavicle.  She has intact strength with supraspinatus, internal and external rotation strength testing.  Normal biceps testing.  SKIN: no suspicious lesions or rashes          Signed Electronically by: Sam Leal MD

## 2025-04-14 NOTE — NURSING NOTE
Patient is here for diabetic check. No concerns at this time regarding diabetes.     Previous A1C is at goal of <8  Lab Results   Component Value Date    A1C 7.3 10/18/2024    A1C 7.2 04/18/2024    A1C 6.9 10/19/2023    A1C 7.2 03/30/2023    A1C 6.9 10/10/2022    A1C 6.7 06/04/2021    A1C 7.0 12/17/2020    A1C Canceled, Test credited 11/03/2020    A1C 7.1 07/08/2020    A1C 6.8 04/20/2020     Urine Microalbumin/Creat:    Albumin Urine mg/L   Date Value Ref Range Status   04/10/2025 154.4 mg/L Final     Comment:     The reference ranges have not been established in urine albumin. The results should be integrated into the clinical context for interpretation.   04/20/2020 273 mg/L Final     Albumin Urine mg/g Cr   Date Value Ref Range Status   04/10/2025 160.50 (H) 0.00 - 25.00 mg/g Cr Final     Comment:     Microalbuminuria is defined as an albumin:creatinine ratio of 17 to 299 for males and 25 to 299 for females. A ratio of albumin:creatinine of 300 or higher is indicative of overt proteinuria.  Due to biologic variability, positive results should be confirmed by a second, first-morning random or 24-hour timed urine specimen. If there is discrepancy, a third specimen is recommended. When 2 out of 3 results are in the microalbuminuria range, this is evidence for incipient nephropathy and warrants increased efforts at glucose control, blood pressure control, and institution of therapy with an angiotensin-converting-enzyme (ACE) inhibitor (if the patient can tolerate it).     04/20/2020 176.13 (H) 0 - 25 mg/g Cr Final     Creatinine Urine   Date Value Ref Range Status   04/20/2020 155 mg/dL Final     Creatinine Urine mg/dL   Date Value Ref Range Status   04/10/2025 96.2 mg/dL Final     Comment:     The reference ranges have not been established in urine creatinine. The results should be integrated into the clinical context for interpretation.     Foot exam 10/2024  Eye exam 12/2024    Tobacco User no  Patient is on a  daily aspirin  Patient is on a Statin.  Blood pressure today of:     BP Readings from Last 1 Encounters:   04/14/25 104/52      is at the goal of <139/89 for diabetics.    Ariella Rivera LPN on 4/14/2025 at 10:50 AM

## 2025-04-17 ENCOUNTER — TELEPHONE (OUTPATIENT)
Dept: SURGERY | Facility: OTHER | Age: 75
End: 2025-04-17

## 2025-04-17 ENCOUNTER — HOSPITAL ENCOUNTER (OUTPATIENT)
Facility: OTHER | Age: 75
End: 2025-04-17
Attending: SURGERY | Admitting: SURGERY
Payer: COMMERCIAL

## 2025-04-17 NOTE — TELEPHONE ENCOUNTER
Screening Questions for the Scheduling of Screening Colonoscopies   (If Colonoscopy is diagnostic, Provider should review the chart before scheduling.)  Are you younger than 45 or older than 80?  NO  Do you take aspirin or fish oil?  ASPIRIN (if yes, tell patient to stop 1 week prior to Colonoscopy)  Do you take warfarin (Coumadin), clopidogrel (Plavix), apixaban (Eliquis), dabigatram (Pradaxa), rivaroxaban (Xarelto) or any blood thinner? NO  Do you take semaglutide (Ozempic or Wegovy), tirzepatide (Mounjaro or Zepbound), liraglutide (Victoza), or dulaglutide (Trulicity)? NO  Do you use oxygen or a CPAP at home?  NO  Do you have kidney disease? YES  Are you on dialysis? NO  Have you had a stroke or heart attack in the last year? NO  Have you had a stent in your heart or any blood vessel in the last year? NO  Have you had a transplant of any organ? NO  Have you had a colonoscopy or upper endoscopy (EGD) before? YES         When?  2020  Date of scheduled EGD. 06/24/2025  [facility]

## 2025-04-18 ENCOUNTER — TELEPHONE (OUTPATIENT)
Dept: FAMILY MEDICINE | Facility: OTHER | Age: 75
End: 2025-04-18
Payer: COMMERCIAL

## 2025-04-18 NOTE — TELEPHONE ENCOUNTER
We were unable to start home care within 48 hours of the referral. OK to start services on 4/22/25?    Thank you.    Misty Sousa LPN.....4/18/2025  3:21 PM

## 2025-04-22 ENCOUNTER — TELEPHONE (OUTPATIENT)
Dept: FAMILY MEDICINE | Facility: OTHER | Age: 75
End: 2025-04-22
Payer: COMMERCIAL

## 2025-04-22 NOTE — TELEPHONE ENCOUNTER
I agree with the Home Care order requests below.  SHARRON WESTBROOK, APRN CNP on 4/22/2025 at 2:25 PM

## 2025-04-22 NOTE — TELEPHONE ENCOUNTER
The physical therapy evaluation and treatment that was ordered was completed on 4/22/25      Request for additional Home Care Physical Therapy orders as follows:      Continuation frequency =   ___1___  x week x  ___1___ week(s)    Effective date = 4/23/25      Continuation frequency =   ___2___  x week x  ___4___ week(s)    Effective date = 4/28/25      Interventions include:    Muscle strengthening exercises  Education - home safety  Instruction - home exercise program  Therapeutic exercise  Pain assessment & management  Ultrasound  Perform manual procedures/modality treatment  Soft tissue mobilization  Home safety assessment                Therapist: Baldomero Browning PT    The occupational therapy evaluation and treatment that was ordered was completed on 4/22/25    Request for additional Home Care Occupational Therapy orders as follows:        Continuation frequency =   ___1___  x week x  ___1___ week(s)    Effective date = 4/23/25      Continuation frequency =   ___2___  x week x  ___3___ week(s)    Effective date = 4/28/25      Interventions include:    ADL skills training  Education - home safety  Instruction - home exercise program  Education - energy conservation  Therapeutic exercise  Perform manual procedures/modality treatment  Adaptive equipment           For therapist: Bonny Bell OTR/L    Please respond with .HOMECAREAGREE, if you are in agreement. Please sign with your comments and signature, if you are not in agreement. Route to the  Home Care pool.

## 2025-04-26 ENCOUNTER — HEALTH MAINTENANCE LETTER (OUTPATIENT)
Age: 75
End: 2025-04-26

## 2025-04-29 ENCOUNTER — TELEPHONE (OUTPATIENT)
Dept: FAMILY MEDICINE | Facility: OTHER | Age: 75
End: 2025-04-29
Payer: COMMERCIAL

## 2025-04-29 DIAGNOSIS — M19.011 PRIMARY OSTEOARTHRITIS OF RIGHT SHOULDER: Primary | ICD-10-CM

## 2025-04-29 NOTE — TELEPHONE ENCOUNTER
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 Certification period effective 4/22/25 to 6/20/25.    Lisa Phillips CMA on 4/29/2025 at 10:00 AM

## 2025-04-30 DIAGNOSIS — Z79.4 TYPE 2 DIABETES MELLITUS WITH COMPLICATION, WITH LONG-TERM CURRENT USE OF INSULIN (H): ICD-10-CM

## 2025-04-30 DIAGNOSIS — E11.8 TYPE 2 DIABETES MELLITUS WITH COMPLICATION, WITH LONG-TERM CURRENT USE OF INSULIN (H): ICD-10-CM

## 2025-05-01 RX ORDER — ACYCLOVIR 400 MG/1
TABLET ORAL
Qty: 3 EACH | Refills: 3 | Status: SHIPPED | OUTPATIENT
Start: 2025-05-01

## 2025-05-01 NOTE — TELEPHONE ENCOUNTER
[id number] of [place] sent Rx request for the following:      Requested Prescriptions   Pending Prescriptions Disp Refills    Continuous Glucose Sensor (DEXCOM G7 SENSOR) MISC [Pharmacy Med Name: DEXCOM G7 MOBILE SENSOR KIT]  6     Sig: CHANGE 1 SENSOR EVERY 10 DAYS       There is no refill protocol information for this order        Last Prescription Date:   9/30/24  Last Fill Qty/Refills:         3, R-6    Last Office Visit:              4/14/25 (DM discussed)   Future Office visit:           9/15/25    Unable to complete prescription refill per RN Medication Refill Policy. Routing to covering provider for refill consideration, as PCP/provider is out of clinic >48 hours or Pt is completely out of medication and provider is out of the clinic today. Pretty Rodríguez, Refill RN .............. 5/1/2025  3:47 PM

## 2025-05-15 ENCOUNTER — TELEPHONE (OUTPATIENT)
Dept: FAMILY MEDICINE | Facility: OTHER | Age: 75
End: 2025-05-15
Payer: COMMERCIAL

## 2025-05-15 DIAGNOSIS — G89.29 CHRONIC RIGHT SHOULDER PAIN: ICD-10-CM

## 2025-05-15 DIAGNOSIS — M54.50 CHRONIC MIDLINE LOW BACK PAIN WITHOUT SCIATICA: Primary | ICD-10-CM

## 2025-05-15 DIAGNOSIS — M25.511 CHRONIC RIGHT SHOULDER PAIN: ICD-10-CM

## 2025-05-15 DIAGNOSIS — G89.29 CHRONIC MIDLINE LOW BACK PAIN WITHOUT SCIATICA: Primary | ICD-10-CM

## 2025-05-15 NOTE — TELEPHONE ENCOUNTER
Pt is discharging from home PT/OT on Friday 5/16/25 and requesting orders for outpatient PT. Per pt request she would like outpatient PT orders sent to [facility].    Thanks    Meg Ovalle, PT on 5/15/2025 at 2:17 PM

## 2025-05-16 ENCOUNTER — TELEPHONE (OUTPATIENT)
Dept: FAMILY MEDICINE | Facility: OTHER | Age: 75
End: 2025-05-16
Payer: COMMERCIAL

## 2025-05-16 NOTE — TELEPHONE ENCOUNTER
"S: The patient had a vital sign that was outside of \"normal\" parameters at the end of their home visit today. The vital sign was:    Blood pressure at rest: 111/57    Patient asymptomatic at time of reading but reports diarrhea for the last 24 hours.  Did encourage patient to continue to drink plenty of fluids.     B: You are being contacted because it's best practice for home care clinicians to notify the provider when the patient's vital signs are outside of defined parameters as a best practice of patient care.    [facility]'s vital sign parameters for adults are as follows:        oxygen saturation - less than 90%         blood pressure - less than 95/60 or greater than 140/90         respirations - less than 12 or greater than 20        pulse - less than 60 or greater than 100         temperature - less than 96.8 or greater than 100.4     A: If you are not concerned about the report above and do not want future communications like this, you can change the patient's defined parameters or specify when you would like to be notified by responding to this message.     R: Consider revising parameters or provide follow-up instructions        Please sign this encounter with your electronic signature and route to the  Home Care pool.          "

## 2025-05-28 ENCOUNTER — MEDICAL CORRESPONDENCE (OUTPATIENT)
Dept: HEALTH INFORMATION MANAGEMENT | Facility: OTHER | Age: 75
End: 2025-05-28
Payer: COMMERCIAL

## 2025-05-28 ENCOUNTER — TRANSFERRED RECORDS (OUTPATIENT)
Dept: HEALTH INFORMATION MANAGEMENT | Facility: OTHER | Age: 75
End: 2025-05-28
Payer: COMMERCIAL

## 2025-06-03 DIAGNOSIS — Z79.4 TYPE 2 DIABETES MELLITUS WITHOUT COMPLICATION, WITH LONG-TERM CURRENT USE OF INSULIN (H): ICD-10-CM

## 2025-06-03 DIAGNOSIS — E11.9 TYPE 2 DIABETES MELLITUS WITHOUT COMPLICATION, WITH LONG-TERM CURRENT USE OF INSULIN (H): ICD-10-CM

## 2025-06-05 RX ORDER — PEN NEEDLE, DIABETIC 31 GX5/16"
NEEDLE, DISPOSABLE MISCELLANEOUS
Qty: 400 EACH | Refills: 1 | Status: SHIPPED | OUTPATIENT
Start: 2025-06-05

## 2025-06-05 NOTE — TELEPHONE ENCOUNTER
[id number] sent Rx request for the following:      Requested Prescriptions   Pending Prescriptions Disp Refills    insulin pen needle (BD PEN NEEDLE SHORT ULTRAFINE) 31G X 8 MM miscellaneous [Pharmacy Med Name: BD 31G 5/16IN SHORT PEN NEEDLE] 400 each 3     Sig: USE 4 PENS DAILY       Diabetic Supplies Protocol Passed - 6/5/2025 11:02 AM        Passed - Medication is active on med list and the sig matches. RN to manually verify dose and sig if red X/fail.     If the protocol passes (green check), you do not need to verify med dose and sig.    A prescription matches if they are the same clinical intention.    For Example: once daily and every morning are the same.    The protocol can not identify upper and lower case letters as matching and will fail.     For Example: Take 1 tablet (50 mg) by mouth daily     TAKE 1 TABLET (50 MG) BY MOUTH DAILY    For all fails (red x), verify dose and sig.    If the refill does match what is on file, the RN can still proceed to approve the refill request.       If they do not match, route to the appropriate provider.             Passed - Recent (12 month) or future (90 days) visit with authorizing provider's specialty (provided they have been seen in the past 15 months)     The patient must have completed an in-person or virtual visit within the past 12 months or has a future visit scheduled within the next 90 days with the authorizing provider s specialty.  Urgent care and e-visits do not qualify as an office visit for this protocol.          Passed - Medication indicated for associated diagnosis        Passed - Patient is 18 years of age or older             Last Prescription Date:   6/20/24  Last Fill Qty/Refills:         400, R-3    Last Office Visit:              4/14/25   Future Office visit:           9/15/25    Prescription approved per [facility] Refill Protocol.  Caron Hubbard RN on 6/5/2025 at 11:03 AM  inga

## 2025-06-16 ENCOUNTER — TELEPHONE (OUTPATIENT)
Dept: FAMILY MEDICINE | Facility: OTHER | Age: 75
End: 2025-06-16
Payer: COMMERCIAL

## 2025-06-16 DIAGNOSIS — I50.32 CHRONIC DIASTOLIC CONGESTIVE HEART FAILURE (H): Primary | ICD-10-CM

## 2025-06-16 NOTE — TELEPHONE ENCOUNTER
Reason for call: Medication or medication refill    Have you contacted your pharmacy regarding this refill? no    If No, direct the patient to contact the Pharmacy and discontinue telephone note using Erroneous Encounter.  If Yes, continue.    Name of medication requested: Pt states [facility] in [place] needs RX for brakes to be fixed on her walker    How many days of medication do you have left?     What pharmacy [facility] use[facility]    Preferred method for responding to this message: Telephone Call    Phone number patient can be reached at: Other phone number:  [phone]    If we cannot reach you directly, may we leave a detailed response at the number you provided? Yes    Patient states [facility] needs a RX to fix pt brakes on walker    Sylvia Wang on 6/16/2025 at 10:05 AM

## 2025-06-17 NOTE — TELEPHONE ENCOUNTER
Called and spoke with [facility] and they state that they need an order from provider to fix patients brakes on her walker for insurance.    Called patient and patient states that her brakes are not working on her 4 wheel walker.  States only the brakes need to be fixed.  Order set up, will route to provider for review and sign if appropriate.  Guillermina Wang RN on 6/17/2025 at 11:00 AM

## 2025-06-17 NOTE — TELEPHONE ENCOUNTER
Called and spoke with [facility] and they have order and patient is scheduled for tomorrow.  Guillermina Wang RN on 6/17/2025 at 11:25 AM

## 2025-07-21 ENCOUNTER — TRANSFERRED RECORDS (OUTPATIENT)
Dept: HEALTH INFORMATION MANAGEMENT | Facility: OTHER | Age: 75
End: 2025-07-21
Payer: COMMERCIAL

## 2025-08-19 ENCOUNTER — TRANSFERRED RECORDS (OUTPATIENT)
Dept: HEALTH INFORMATION MANAGEMENT | Facility: OTHER | Age: 75
End: 2025-08-19
Payer: COMMERCIAL

## (undated) DEVICE — Device

## (undated) DEVICE — PROGRAMMER MEDT SMART HANDSET W/COMMUNICATOR TH90G01

## (undated) DEVICE — SU MONOCRYL 4-0 PS-2 27" UND Y426H

## (undated) DEVICE — SU VICRYL 3-0 CT-2 27" UND J232H

## (undated) DEVICE — SOL WATER 1500ML

## (undated) DEVICE — BLADE KNIFE SURG 11 371111

## (undated) DEVICE — BAG DRAINAGE URO CATCHER II 4-040-14-10

## (undated) DEVICE — DRAPE C-ARM PACK 9"

## (undated) DEVICE — GW UROSTREAM HYDROPHILIC NITINOL TUNGSTEN G59155

## (undated) DEVICE — PAD CHUX UNDERPAD 30X36" P3036C

## (undated) DEVICE — PREP CHLORAPREP 26ML TINTED ORANGE  260815

## (undated) DEVICE — CATH URETERAL DL 6FR FLEX-TIP 10FRX50CM G17323 AQ-022610

## (undated) DEVICE — SUCTION MANIFOLD NEPTUNE 2 SYS 4 PORT 0702-020-000

## (undated) DEVICE — SYR 50ML LL W/O NDL 309653

## (undated) DEVICE — SLEEVE COMPRESSION SCD KNEE MED 74022

## (undated) DEVICE — GLOVE PROTEXIS POWDER FREE SMT 7.5  2D72PT75X

## (undated) DEVICE — SPONGE RAY-TEC 4X4" 7317

## (undated) DEVICE — DRAPE C-ARMOR 5 SIDED 5523

## (undated) DEVICE — COVER LIGHT HANDLE LT-F02

## (undated) DEVICE — PACK CYSTO SBA15CSFCA

## (undated) DEVICE — ENDO SHEATH URETERAL 12FRX35CM G19168 FUS-120035

## (undated) DEVICE — BASKET NITINOL TIPLESS HALO  1.5FRX120CM 554120

## (undated) DEVICE — ENDO KIT COMPLIANCE DYKENDOCMPLY

## (undated) DEVICE — STIMULATOR EXTERNAL VERIFY MEDTRONIC INTERSTIM 353101

## (undated) DEVICE — DRAPE IOBAN C-SECTION W/POUCH 30X35" 6657

## (undated) DEVICE — PUMP SYSTEM SINGLE ACTION M0067201000

## (undated) DEVICE — GLOVE ESTEEM POWDER FREE SMT 8.5 2D72PT85

## (undated) DEVICE — COVER ULTRASOUND PROBE 1X9" LF 25040

## (undated) DEVICE — PROGRAMMER INTERSTIM 3037

## (undated) DEVICE — ADH SKIN CLOSURE PREMIERPRO EXOFIN 1.0ML 3470

## (undated) DEVICE — ESU PENCIL SMOKE EVAC W/ROCKER SWITCH 0703-047-000

## (undated) DEVICE — ENDO BITE BLOCK 60 MAXI LF 00712804

## (undated) DEVICE — DRAPE IOBAN INCISE 23X17" 6650EZ

## (undated) DEVICE — PACK MAJOR LAPAROTOMY LF SBA15MLFCA

## (undated) DEVICE — SYR BULB IRRIG 50ML LATEX FREE 0035280

## (undated) DEVICE — TUBING SUCTION 10'X3/16" N510

## (undated) DEVICE — KIT ACCESSORY PERIPHERAL LEAD INTR 3550-18

## (undated) DEVICE — DRAPE SLEEVE 599

## (undated) DEVICE — MEDTRONIC HANDSET WITH COMMUNICATOR

## (undated) RX ORDER — EPHEDRINE SULFATE 50 MG/ML
INJECTION, SOLUTION INTRAMUSCULAR; INTRAVENOUS; SUBCUTANEOUS
Status: DISPENSED
Start: 2025-02-25

## (undated) RX ORDER — ALUMINA, MAGNESIA, AND SIMETHICONE 2400; 2400; 240 MG/30ML; MG/30ML; MG/30ML
SUSPENSION ORAL
Status: DISPENSED
Start: 2019-07-18

## (undated) RX ORDER — LIDOCAINE HYDROCHLORIDE 20 MG/ML
SOLUTION OROPHARYNGEAL
Status: DISPENSED
Start: 2019-07-18

## (undated) RX ORDER — PROPOFOL 10 MG/ML
INJECTION, EMULSION INTRAVENOUS
Status: DISPENSED
Start: 2019-05-07

## (undated) RX ORDER — LIDOCAINE HYDROCHLORIDE 20 MG/ML
INJECTION, SOLUTION EPIDURAL; INFILTRATION; INTRACAUDAL; PERINEURAL
Status: DISPENSED
Start: 2019-05-07

## (undated) RX ORDER — PROPOFOL 10 MG/ML
INJECTION, EMULSION INTRAVENOUS
Status: DISPENSED
Start: 2025-01-10

## (undated) RX ORDER — LIDOCAINE HYDROCHLORIDE 10 MG/ML
INJECTION, SOLUTION EPIDURAL; INFILTRATION; INTRACAUDAL; PERINEURAL
Status: DISPENSED
Start: 2022-10-18

## (undated) RX ORDER — CEFAZOLIN SODIUM 1 G/3ML
INJECTION, POWDER, FOR SOLUTION INTRAMUSCULAR; INTRAVENOUS
Status: DISPENSED
Start: 2019-05-07

## (undated) RX ORDER — FENTANYL CITRATE 50 UG/ML
INJECTION, SOLUTION INTRAMUSCULAR; INTRAVENOUS
Status: DISPENSED
Start: 2022-10-18

## (undated) RX ORDER — DEXAMETHASONE SODIUM PHOSPHATE 4 MG/ML
INJECTION, SOLUTION INTRA-ARTICULAR; INTRALESIONAL; INTRAMUSCULAR; INTRAVENOUS; SOFT TISSUE
Status: DISPENSED
Start: 2025-01-10

## (undated) RX ORDER — DEXAMETHASONE SODIUM PHOSPHATE 4 MG/ML
INJECTION, SOLUTION INTRA-ARTICULAR; INTRALESIONAL; INTRAMUSCULAR; INTRAVENOUS; SOFT TISSUE
Status: DISPENSED
Start: 2025-02-25

## (undated) RX ORDER — KETOROLAC TROMETHAMINE 15 MG/ML
INJECTION, SOLUTION INTRAMUSCULAR; INTRAVENOUS
Status: DISPENSED
Start: 2021-06-03

## (undated) RX ORDER — FENTANYL CITRATE 50 UG/ML
INJECTION, SOLUTION INTRAMUSCULAR; INTRAVENOUS
Status: DISPENSED
Start: 2025-02-25

## (undated) RX ORDER — LIDOCAINE HYDROCHLORIDE 20 MG/ML
INJECTION, SOLUTION EPIDURAL; INFILTRATION; INTRACAUDAL; PERINEURAL
Status: DISPENSED
Start: 2022-10-18

## (undated) RX ORDER — ONDANSETRON 2 MG/ML
INJECTION INTRAMUSCULAR; INTRAVENOUS
Status: DISPENSED
Start: 2019-05-07

## (undated) RX ORDER — ACETAMINOPHEN 325 MG/1
TABLET ORAL
Status: DISPENSED
Start: 2025-01-10

## (undated) RX ORDER — PROPOFOL 10 MG/ML
INJECTION, EMULSION INTRAVENOUS
Status: DISPENSED
Start: 2022-10-18

## (undated) RX ORDER — OXYCODONE AND ACETAMINOPHEN 5; 325 MG/1; MG/1
TABLET ORAL
Status: DISPENSED
Start: 2019-05-07

## (undated) RX ORDER — CEFAZOLIN SODIUM/WATER 2 G/20 ML
SYRINGE (ML) INTRAVENOUS
Status: DISPENSED
Start: 2025-01-10

## (undated) RX ORDER — LIDOCAINE HYDROCHLORIDE 10 MG/ML
INJECTION, SOLUTION INFILTRATION; PERINEURAL
Status: DISPENSED
Start: 2019-05-07

## (undated) RX ORDER — PROPOFOL 10 MG/ML
INJECTION, EMULSION INTRAVENOUS
Status: DISPENSED
Start: 2025-02-25

## (undated) RX ORDER — ACETAMINOPHEN 325 MG/1
TABLET ORAL
Status: DISPENSED
Start: 2025-02-25

## (undated) RX ORDER — FENTANYL CITRATE-0.9 % NACL/PF 10 MCG/ML
PLASTIC BAG, INJECTION (ML) INTRAVENOUS
Status: DISPENSED
Start: 2025-01-10

## (undated) RX ORDER — CEFAZOLIN SODIUM 1 G/3ML
INJECTION, POWDER, FOR SOLUTION INTRAMUSCULAR; INTRAVENOUS
Status: DISPENSED
Start: 2022-10-18

## (undated) RX ORDER — ONDANSETRON 2 MG/ML
INJECTION INTRAMUSCULAR; INTRAVENOUS
Status: DISPENSED
Start: 2025-01-10

## (undated) RX ORDER — OLANZAPINE 10 MG/2ML
INJECTION, POWDER, FOR SOLUTION INTRAMUSCULAR
Status: DISPENSED
Start: 2021-06-10

## (undated) RX ORDER — HYDROMORPHONE HYDROCHLORIDE 1 MG/ML
INJECTION, SOLUTION INTRAMUSCULAR; INTRAVENOUS; SUBCUTANEOUS
Status: DISPENSED
Start: 2021-06-03

## (undated) RX ORDER — WATER 10 ML/10ML
INJECTION INTRAMUSCULAR; INTRAVENOUS; SUBCUTANEOUS
Status: DISPENSED
Start: 2021-06-10

## (undated) RX ORDER — FUROSEMIDE 20 MG
TABLET ORAL
Status: DISPENSED
Start: 2019-07-18

## (undated) RX ORDER — HYDROMORPHONE HYDROCHLORIDE 1 MG/ML
INJECTION, SOLUTION INTRAMUSCULAR; INTRAVENOUS; SUBCUTANEOUS
Status: DISPENSED
Start: 2019-05-07

## (undated) RX ORDER — POTASSIUM CHLORIDE 20MEQ/15ML
LIQUID (ML) ORAL
Status: DISPENSED
Start: 2021-06-03

## (undated) RX ORDER — METHYLPREDNISOLONE SODIUM SUCCINATE 125 MG/2ML
INJECTION, POWDER, LYOPHILIZED, FOR SOLUTION INTRAMUSCULAR; INTRAVENOUS
Status: DISPENSED
Start: 2021-06-03

## (undated) RX ORDER — SUCRALFATE 1 G/1
TABLET ORAL
Status: DISPENSED
Start: 2019-07-18

## (undated) RX ORDER — CEFAZOLIN SODIUM/WATER 2 G/20 ML
SYRINGE (ML) INTRAVENOUS
Status: DISPENSED
Start: 2025-02-25

## (undated) RX ORDER — PANTOPRAZOLE SODIUM 40 MG/10ML
INJECTION, POWDER, LYOPHILIZED, FOR SOLUTION INTRAVENOUS
Status: DISPENSED
Start: 2021-08-22

## (undated) RX ORDER — LIDOCAINE HYDROCHLORIDE 10 MG/ML
INJECTION, SOLUTION EPIDURAL; INFILTRATION; INTRACAUDAL; PERINEURAL
Status: DISPENSED
Start: 2019-05-07

## (undated) RX ORDER — OXYCODONE HYDROCHLORIDE 5 MG/1
TABLET ORAL
Status: DISPENSED
Start: 2025-01-10

## (undated) RX ORDER — SODIUM CHLORIDE 9 MG/ML
INJECTION, SOLUTION INTRAVENOUS
Status: DISPENSED
Start: 2021-08-22

## (undated) RX ORDER — TOLTERODINE TARTRATE 2 MG/1
TABLET, EXTENDED RELEASE ORAL
Status: DISPENSED
Start: 2025-01-10

## (undated) RX ORDER — OXYCODONE HYDROCHLORIDE 5 MG/1
TABLET ORAL
Status: DISPENSED
Start: 2022-10-18

## (undated) RX ORDER — ONDANSETRON 2 MG/ML
INJECTION INTRAMUSCULAR; INTRAVENOUS
Status: DISPENSED
Start: 2025-02-25

## (undated) RX ORDER — FENTANYL CITRATE 50 UG/ML
INJECTION, SOLUTION INTRAMUSCULAR; INTRAVENOUS
Status: DISPENSED
Start: 2025-01-10

## (undated) RX ORDER — ONDANSETRON 2 MG/ML
INJECTION INTRAMUSCULAR; INTRAVENOUS
Status: DISPENSED
Start: 2021-06-03

## (undated) RX ORDER — PANTOPRAZOLE SODIUM 40 MG/1
TABLET, DELAYED RELEASE ORAL
Status: DISPENSED
Start: 2019-07-18

## (undated) RX ORDER — LIDOCAINE HYDROCHLORIDE AND EPINEPHRINE 10; 10 MG/ML; UG/ML
INJECTION, SOLUTION INFILTRATION; PERINEURAL
Status: DISPENSED
Start: 2022-10-18

## (undated) RX ORDER — FENTANYL CITRATE 50 UG/ML
INJECTION, SOLUTION INTRAMUSCULAR; INTRAVENOUS
Status: DISPENSED
Start: 2019-05-07